# Patient Record
Sex: FEMALE | Race: WHITE | NOT HISPANIC OR LATINO | URBAN - METROPOLITAN AREA
[De-identification: names, ages, dates, MRNs, and addresses within clinical notes are randomized per-mention and may not be internally consistent; named-entity substitution may affect disease eponyms.]

---

## 2017-02-21 ENCOUNTER — EMERGENCY (EMERGENCY)
Facility: HOSPITAL | Age: 82
LOS: 1 days | Discharge: HOME | End: 2017-02-21
Attending: EMERGENCY MEDICINE | Admitting: EMERGENCY MEDICINE
Payer: MEDICARE

## 2017-02-21 VITALS
RESPIRATION RATE: 16 BRPM | SYSTOLIC BLOOD PRESSURE: 110 MMHG | DIASTOLIC BLOOD PRESSURE: 74 MMHG | OXYGEN SATURATION: 98 % | HEART RATE: 74 BPM | TEMPERATURE: 98 F

## 2017-02-21 VITALS
HEART RATE: 70 BPM | TEMPERATURE: 98 F | OXYGEN SATURATION: 97 % | RESPIRATION RATE: 16 BRPM | DIASTOLIC BLOOD PRESSURE: 68 MMHG | SYSTOLIC BLOOD PRESSURE: 126 MMHG

## 2017-02-21 DIAGNOSIS — R04.0 EPISTAXIS: ICD-10-CM

## 2017-02-21 DIAGNOSIS — I10 ESSENTIAL (PRIMARY) HYPERTENSION: ICD-10-CM

## 2017-02-21 DIAGNOSIS — J44.9 CHRONIC OBSTRUCTIVE PULMONARY DISEASE, UNSPECIFIED: ICD-10-CM

## 2017-02-21 DIAGNOSIS — Z79.82 LONG TERM (CURRENT) USE OF ASPIRIN: ICD-10-CM

## 2017-02-21 DIAGNOSIS — Z79.899 OTHER LONG TERM (CURRENT) DRUG THERAPY: ICD-10-CM

## 2017-02-21 DIAGNOSIS — I25.10 ATHEROSCLEROTIC HEART DISEASE OF NATIVE CORONARY ARTERY WITHOUT ANGINA PECTORIS: ICD-10-CM

## 2017-02-21 DIAGNOSIS — E11.9 TYPE 2 DIABETES MELLITUS WITHOUT COMPLICATIONS: ICD-10-CM

## 2017-02-21 LAB
HCT VFR BLD CALC: 30.9 % — LOW (ref 34.5–45)
HGB BLD-MCNC: 9.5 G/DL — LOW (ref 11.5–15.5)
INR BLD: 3.06 — HIGH (ref 0.88–1.16)
MCHC RBC-ENTMCNC: 29.1 PG — SIGNIFICANT CHANGE UP (ref 27–34)
MCHC RBC-ENTMCNC: 30.7 G/DL — LOW (ref 32–36)
MCV RBC AUTO: 94.5 FL — SIGNIFICANT CHANGE UP (ref 80–100)
PLATELET # BLD AUTO: 186 K/UL — SIGNIFICANT CHANGE UP (ref 150–400)
PROTHROM AB SERPL-ACNC: 34.4 SEC — HIGH (ref 10–13.1)
RBC # BLD: 3.27 M/UL — LOW (ref 3.8–5.2)
RBC # FLD: 14.6 % — SIGNIFICANT CHANGE UP (ref 10.3–16.9)
WBC # BLD: 9.1 K/UL — SIGNIFICANT CHANGE UP (ref 3.8–10.5)
WBC # FLD AUTO: 9.1 K/UL — SIGNIFICANT CHANGE UP (ref 3.8–10.5)

## 2017-02-21 PROCEDURE — 85610 PROTHROMBIN TIME: CPT

## 2017-02-21 PROCEDURE — 85027 COMPLETE CBC AUTOMATED: CPT

## 2017-02-21 PROCEDURE — 30901 CONTROL OF NOSEBLEED: CPT

## 2017-02-21 PROCEDURE — 30901 CONTROL OF NOSEBLEED: CPT | Mod: RT

## 2017-02-21 PROCEDURE — 99284 EMERGENCY DEPT VISIT MOD MDM: CPT | Mod: 25

## 2017-02-21 PROCEDURE — 99283 EMERGENCY DEPT VISIT LOW MDM: CPT | Mod: 25

## 2017-02-21 NOTE — ED PROVIDER NOTE - PROGRESS NOTE DETAILS
resolved no more bleeding, successfully cauterized with xeroform packing placed.  Plan outpt ENT fu.  INR WNL.

## 2017-02-21 NOTE — ED ADULT TRIAGE NOTE - CHIEF COMPLAINT QUOTE
BIBA for epistaxis from both nostrils that started 1 hour prior to arrival. Patient is on Coumadin. +Tissue "packing" in both nostrils, no bleeding noted at this time. Denies any HA, visual changes, numbness or tingling to extremities, fever, dizziness.

## 2017-02-21 NOTE — ED PROVIDER NOTE - MEDICAL DECISION MAKING DETAILS
Epistaxis resolved with cautery and packing.  Pt HD stable.  INR stable and CBC results noted.  Plan outpt ENT fu.

## 2017-02-21 NOTE — ED ADULT NURSE NOTE - ADDITIONAL PRINTED INSTRUCTIONS GIVEN
D/c w/ instructions for followup, pt verbalized understanding of all instructions, pt to followup w/ ENT return for worsening symptoms.

## 2017-02-21 NOTE — ED PROVIDER NOTE - MUSCULOSKELETAL, MLM
Spine appears normal, range of motion is not limited, no muscle or joint tenderness, chronic appearing symmetric bl LE edema.

## 2017-02-21 NOTE — ED PROVIDER NOTE - PSH
History of total knee replacement  2003  Presence of cardiac pacemaker  at [facility] by Dr Escoto

## 2017-02-21 NOTE — ED PROVIDER NOTE - OBJECTIVE STATEMENT
88 yo F with hx of afib on coumadin, hx of nose bleeding in past s/p cautery, presenting with spontaneous R sided and then L sided and pharyngeal epistaxis that occurred w/o provocation 1 hour prior to arrival.  Denies other bleeding, lightheadedness or near syncope.

## 2017-02-21 NOTE — ED PROVIDER NOTE - ENMT, MLM
R distal medial septum with nonbleeding prominent open blood vessel.  No L sided lesions noted.  No active bleeding to posterior pharynx or bl nares.

## 2017-02-21 NOTE — ED ADULT NURSE NOTE - PMH
CAD (coronary artery disease)    CHF (congestive heart failure)    COPD (chronic obstructive pulmonary disease)    Diabetes    HTN (hypertension)

## 2017-02-21 NOTE — ED ADULT NURSE NOTE - PSH
History of total knee replacement  2003  Presence of cardiac pacemaker  at [facility] by Dr Escoto

## 2017-02-21 NOTE — ED PROCEDURE NOTE - CPROC ED NASAL DETAIL1
2 times silver nitrate/The area was cauterized with silver nitrate./The source of the bleeding was clearly identified./The anatomic location was packed.

## 2017-06-11 ENCOUNTER — INPATIENT (INPATIENT)
Facility: HOSPITAL | Age: 82
LOS: 24 days | Discharge: NURSING, REHAB OR LONG-TERM CARE | DRG: 273 | End: 2017-07-06
Attending: INTERNAL MEDICINE | Admitting: INTERNAL MEDICINE
Payer: MEDICARE

## 2017-06-11 VITALS
SYSTOLIC BLOOD PRESSURE: 107 MMHG | HEART RATE: 80 BPM | DIASTOLIC BLOOD PRESSURE: 66 MMHG | TEMPERATURE: 99 F | RESPIRATION RATE: 20 BRPM | OXYGEN SATURATION: 90 %

## 2017-06-11 DIAGNOSIS — I50.33 ACUTE ON CHRONIC DIASTOLIC (CONGESTIVE) HEART FAILURE: ICD-10-CM

## 2017-06-11 DIAGNOSIS — I48.0 PAROXYSMAL ATRIAL FIBRILLATION: ICD-10-CM

## 2017-06-11 DIAGNOSIS — J44.9 CHRONIC OBSTRUCTIVE PULMONARY DISEASE, UNSPECIFIED: ICD-10-CM

## 2017-06-11 DIAGNOSIS — L89.92 PRESSURE ULCER OF UNSPECIFIED SITE, STAGE 2: ICD-10-CM

## 2017-06-11 DIAGNOSIS — E11.9 TYPE 2 DIABETES MELLITUS WITHOUT COMPLICATIONS: ICD-10-CM

## 2017-06-11 DIAGNOSIS — Z29.9 ENCOUNTER FOR PROPHYLACTIC MEASURES, UNSPECIFIED: ICD-10-CM

## 2017-06-11 DIAGNOSIS — I35.0 NONRHEUMATIC AORTIC (VALVE) STENOSIS: ICD-10-CM

## 2017-06-11 DIAGNOSIS — Z95.0 PRESENCE OF CARDIAC PACEMAKER: ICD-10-CM

## 2017-06-11 DIAGNOSIS — I25.10 ATHEROSCLEROTIC HEART DISEASE OF NATIVE CORONARY ARTERY WITHOUT ANGINA PECTORIS: ICD-10-CM

## 2017-06-11 DIAGNOSIS — D64.9 ANEMIA, UNSPECIFIED: ICD-10-CM

## 2017-06-11 DIAGNOSIS — N18.4 CHRONIC KIDNEY DISEASE, STAGE 4 (SEVERE): ICD-10-CM

## 2017-06-11 LAB
ALBUMIN SERPL ELPH-MCNC: 2.9 G/DL — LOW (ref 3.3–5)
ALP SERPL-CCNC: 117 U/L — SIGNIFICANT CHANGE UP (ref 40–120)
ALT FLD-CCNC: 12 U/L — SIGNIFICANT CHANGE UP (ref 10–45)
ANION GAP SERPL CALC-SCNC: 13 MMOL/L — SIGNIFICANT CHANGE UP (ref 5–17)
APPEARANCE UR: (no result)
APPEARANCE UR: CLEAR — SIGNIFICANT CHANGE UP
APTT BLD: 46.4 SEC — HIGH (ref 27.5–37.4)
AST SERPL-CCNC: 15 U/L — SIGNIFICANT CHANGE UP (ref 10–40)
BASOPHILS NFR BLD AUTO: 0.8 % — SIGNIFICANT CHANGE UP (ref 0–2)
BILIRUB SERPL-MCNC: 0.4 MG/DL — SIGNIFICANT CHANGE UP (ref 0.2–1.2)
BILIRUB UR-MCNC: NEGATIVE — SIGNIFICANT CHANGE UP
BILIRUB UR-MCNC: NEGATIVE — SIGNIFICANT CHANGE UP
BUN SERPL-MCNC: 40 MG/DL — HIGH (ref 7–23)
CALCIUM SERPL-MCNC: 9 MG/DL — SIGNIFICANT CHANGE UP (ref 8.4–10.5)
CHLORIDE SERPL-SCNC: 100 MMOL/L — SIGNIFICANT CHANGE UP (ref 96–108)
CK MB CFR SERPL CALC: 1.7 NG/ML — SIGNIFICANT CHANGE UP (ref 0–6.7)
CK SERPL-CCNC: 34 U/L — SIGNIFICANT CHANGE UP (ref 25–170)
CO2 SERPL-SCNC: 28 MMOL/L — SIGNIFICANT CHANGE UP (ref 22–31)
COLOR SPEC: YELLOW — SIGNIFICANT CHANGE UP
COLOR SPEC: YELLOW — SIGNIFICANT CHANGE UP
CREAT SERPL-MCNC: 2.8 MG/DL — HIGH (ref 0.5–1.3)
DIFF PNL FLD: (no result)
DIFF PNL FLD: (no result)
EOSINOPHIL NFR BLD AUTO: 3.2 % — SIGNIFICANT CHANGE UP (ref 0–6)
GLUCOSE SERPL-MCNC: 78 MG/DL — SIGNIFICANT CHANGE UP (ref 70–99)
GLUCOSE UR QL: NEGATIVE — SIGNIFICANT CHANGE UP
GLUCOSE UR QL: NEGATIVE — SIGNIFICANT CHANGE UP
HCT VFR BLD CALC: 32 % — LOW (ref 34.5–45)
HGB BLD-MCNC: 10 G/DL — LOW (ref 11.5–15.5)
INR BLD: 4.89 — HIGH (ref 0.88–1.16)
KETONES UR-MCNC: NEGATIVE — SIGNIFICANT CHANGE UP
KETONES UR-MCNC: NEGATIVE — SIGNIFICANT CHANGE UP
LEUKOCYTE ESTERASE UR-ACNC: (no result)
LEUKOCYTE ESTERASE UR-ACNC: (no result)
LYMPHOCYTES # BLD AUTO: 19.8 % — SIGNIFICANT CHANGE UP (ref 13–44)
MAGNESIUM SERPL-MCNC: 1.8 MG/DL — SIGNIFICANT CHANGE UP (ref 1.6–2.6)
MCHC RBC-ENTMCNC: 27.3 PG — SIGNIFICANT CHANGE UP (ref 27–34)
MCHC RBC-ENTMCNC: 31.3 G/DL — LOW (ref 32–36)
MCV RBC AUTO: 87.4 FL — SIGNIFICANT CHANGE UP (ref 80–100)
MONOCYTES NFR BLD AUTO: 6.3 % — SIGNIFICANT CHANGE UP (ref 2–14)
NEUTROPHILS NFR BLD AUTO: 69.9 % — SIGNIFICANT CHANGE UP (ref 43–77)
NITRITE UR-MCNC: NEGATIVE — SIGNIFICANT CHANGE UP
NITRITE UR-MCNC: POSITIVE
NT-PROBNP SERPL-SCNC: HIGH PG/ML (ref 0–300)
PH UR: 5 — SIGNIFICANT CHANGE UP (ref 5–8)
PH UR: 5.5 — SIGNIFICANT CHANGE UP (ref 5–8)
PLATELET # BLD AUTO: 149 K/UL — LOW (ref 150–400)
POTASSIUM SERPL-MCNC: 4.5 MMOL/L — SIGNIFICANT CHANGE UP (ref 3.5–5.3)
POTASSIUM SERPL-SCNC: 4.5 MMOL/L — SIGNIFICANT CHANGE UP (ref 3.5–5.3)
PROT SERPL-MCNC: 6.7 G/DL — SIGNIFICANT CHANGE UP (ref 6–8.3)
PROT UR-MCNC: (no result) MG/DL
PROT UR-MCNC: 30 MG/DL
PROTHROM AB SERPL-ACNC: 56.1 SEC — HIGH (ref 9.8–12.7)
RBC # BLD: 3.66 M/UL — LOW (ref 3.8–5.2)
RBC # FLD: 15.7 % — SIGNIFICANT CHANGE UP (ref 10.3–16.9)
SODIUM SERPL-SCNC: 141 MMOL/L — SIGNIFICANT CHANGE UP (ref 135–145)
SP GR SPEC: 1.01 — SIGNIFICANT CHANGE UP (ref 1–1.03)
SP GR SPEC: 1.02 — SIGNIFICANT CHANGE UP (ref 1–1.03)
TROPONIN T SERPL-MCNC: 0.04 NG/ML — HIGH (ref 0–0.01)
UROBILINOGEN FLD QL: 0.2 E.U./DL — SIGNIFICANT CHANGE UP
UROBILINOGEN FLD QL: 0.2 E.U./DL — SIGNIFICANT CHANGE UP
WBC # BLD: 6.7 K/UL — SIGNIFICANT CHANGE UP (ref 3.8–10.5)
WBC # FLD AUTO: 6.7 K/UL — SIGNIFICANT CHANGE UP (ref 3.8–10.5)

## 2017-06-11 PROCEDURE — 99233 SBSQ HOSP IP/OBS HIGH 50: CPT

## 2017-06-11 PROCEDURE — 71010: CPT | Mod: 26

## 2017-06-11 PROCEDURE — 93010 ELECTROCARDIOGRAM REPORT: CPT

## 2017-06-11 PROCEDURE — 99285 EMERGENCY DEPT VISIT HI MDM: CPT | Mod: 25

## 2017-06-11 RX ORDER — METOPROLOL TARTRATE 50 MG
25 TABLET ORAL DAILY
Qty: 0 | Refills: 0 | Status: DISCONTINUED | OUTPATIENT
Start: 2017-06-11 | End: 2017-06-14

## 2017-06-11 RX ORDER — ACETAMINOPHEN 500 MG
1 TABLET ORAL
Qty: 0 | Refills: 0 | COMMUNITY

## 2017-06-11 RX ORDER — ACETAMINOPHEN 500 MG
650 TABLET ORAL ONCE
Qty: 0 | Refills: 0 | Status: COMPLETED | OUTPATIENT
Start: 2017-06-11 | End: 2017-06-11

## 2017-06-11 RX ORDER — FUROSEMIDE 40 MG
40 TABLET ORAL DAILY
Qty: 0 | Refills: 0 | Status: DISCONTINUED | OUTPATIENT
Start: 2017-06-12 | End: 2017-06-12

## 2017-06-11 RX ORDER — FUROSEMIDE 40 MG
80 TABLET ORAL ONCE
Qty: 0 | Refills: 0 | Status: DISCONTINUED | OUTPATIENT
Start: 2017-06-11 | End: 2017-06-11

## 2017-06-11 RX ORDER — FUROSEMIDE 40 MG
40 TABLET ORAL ONCE
Qty: 0 | Refills: 0 | Status: COMPLETED | OUTPATIENT
Start: 2017-06-11 | End: 2017-06-11

## 2017-06-11 RX ORDER — FERROUS SULFATE 325(65) MG
1 TABLET ORAL
Qty: 0 | Refills: 0 | COMMUNITY

## 2017-06-11 RX ORDER — FERROUS SULFATE 325(65) MG
325 TABLET ORAL EVERY 8 HOURS
Qty: 0 | Refills: 0 | Status: DISCONTINUED | OUTPATIENT
Start: 2017-06-11 | End: 2017-07-06

## 2017-06-11 RX ORDER — ACETAMINOPHEN 500 MG
650 TABLET ORAL EVERY 6 HOURS
Qty: 0 | Refills: 0 | Status: DISCONTINUED | OUTPATIENT
Start: 2017-06-11 | End: 2017-06-12

## 2017-06-11 RX ADMIN — Medication 650 MILLIGRAM(S): at 14:00

## 2017-06-11 RX ADMIN — Medication 25 MILLIGRAM(S): at 17:57

## 2017-06-11 RX ADMIN — Medication 650 MILLIGRAM(S): at 13:35

## 2017-06-11 RX ADMIN — Medication 650 MILLIGRAM(S): at 19:21

## 2017-06-11 RX ADMIN — Medication 650 MILLIGRAM(S): at 17:57

## 2017-06-11 RX ADMIN — Medication 40 MILLIGRAM(S): at 10:58

## 2017-06-11 RX ADMIN — Medication 325 MILLIGRAM(S): at 21:34

## 2017-06-11 NOTE — H&P ADULT - PROBLEM SELECTOR PLAN 6
former smoker (quit 5 yrs ago) 30 yr ppk hx  -Call Dr. Mekhi Trejo for evaluation Type 2 DM (off meds).   -Check HbA1c in AM

## 2017-06-11 NOTE — ED PROVIDER NOTE - MEDICAL DECISION MAKING DETAILS
here with likely chf exacerbation. needs to be diuresed with IV lasix, but carefully given CKD. CT surg consulted because of pts aortic stenosis, will follow. As pts volume status complicated, cards consulted as well. dispo to ccu vs cards tele as per their evaluation.

## 2017-06-11 NOTE — H&P ADULT - PROBLEM SELECTOR PLAN 10
DVT prophylaxis: on systemic AC. Holding AC 2/2 Supra therapeutic    Pt signed DNR/DNI form and daughter is in agreement  SW/PT consult: discharge planning

## 2017-06-11 NOTE — H&P ADULT - HISTORY OF PRESENT ILLNESS
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 Pt states that her "legs were too weak". Pt is a poor historian and HPI obtained from her daughter. Pt's daughter states that her mother was unable to get OOB for 3 days and pt used her Life Alert x3 times to call for help. Reports that she had been "tired" for several days and her mobility has declined since her discharge from rehab in December. Additionally, pt has gained 17 lbs over the past month (from 258 to 275) and does not follow a low salt diet. 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 Pt states that her "legs were too weak". Pt is a poor historian and HPI obtained from her daughter. Pt's daughter states that her mother was unable to get OOB for 3 days and pt used her Life Alert x3 times to call for help-was told to go to the ED. Reports that she had been "tired" for several day, SOB w/ minimal exertion for "several months" and her mobility has declined since her discharge from rehab in December. Additionally, pt has gained 17 lbs over the past month (from 258 to 275) and non-compliant with her diet. As per daughter, pt has been hospitalized prior 2-3 yrs ago (? [facility]) for CHF exacerbation w/ aggressive diuresis. 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 Pt states that her "legs were too weak". Pt is a poor historian and HPI obtained from her daughter. Pt's daughter states that her mother was unable to get OOB for 3 days and pt used her Life Alert x3 times to call for help-was told to go to the ED. Reports that she had been "tired" for several day, SOB w/ minimal exertion for "several months" and her mobility has declined since her discharge from rehab in December. Additionally, pt has gained 17 lbs over the past month (from 258 to 275) and non-compliant with her diet. As per daughter, pt has been hospitalized prior 2-3 yrs ago (? [facility]) for CHF exacerbation w/ aggressive diuresis. Of note, pt was evaluation by Dr. Barger for aortic stenosis in December 16'. However, due to her deconditioned status during that time, pt was recommended medical management.

## 2017-06-11 NOTE — H&P ADULT - PSH
History of total knee replacement  2003  Presence of cardiac pacemaker  at [facility] by Dr Escoto

## 2017-06-11 NOTE — CONSULT NOTE ADULT - ASSESSMENT
1) acute on chronic diastolic chf  -o>i as tolerated with iv lasix  -clarify home rx list with o/p NP   -repeat cardiac markers and troponin until downtrend confirmed  -check tsh  2) nonrheumatic aortic stenosis  -communicated with [facility]bel - plan for echo in am; consider additional ischemia evalution if TAVR needed  3) cad s/p pci  -serial troponin and ecg; continuous telemetry for now - call if evidence of mi  -lipid panel in am  4) afib - supratherapeutic inr - hold coumadin for now; bb as tolerated  5) s/p ppm - call ep for ppm check  6) htn - home rx for now; avoid labile bp  7) copd - call Dr. Gaming to evaluate  8) dm-2 - monitor blood sugar; check hga1c  9) lupus - monitor   10) pvd per chart - please obtain old records for review  11) kidney failure - call renal -abnormal ua suggestive of uri - repeat ua now; consider antb  12) normocytic anemia and thrombocytopenia - check fe studies and b12/folate; consider Hematology eval  13) maintain K>4, Mg>2 1) acute on chronic diastolic chf  -o>i as tolerated with iv lasix  -clarify home rx list with o/p NP   -repeat cardiac markers and troponin until downtrend confirmed  -check tsh  2) nonrheumatic aortic stenosis  -communicated with Charbel - plan for echo in am; consider additional ischemia evalution if TAVR needed  3) cad s/p pci  -serial troponin and ecg; continuous telemetry for now - call if evidence of mi  -lipid panel in am  4) afib - supratherapeutic inr - hold coumadin for now; bb as tolerated  5) s/p ppm - call ep for ppm check  6) htn - home rx for now; avoid labile bp  7) copd - call Dr. Gaming to evaluate  8) dm-2 - monitor blood sugar; check hga1c  9) lupus - monitor   10) pvd per chart - please obtain old records for review  11) kidney failure - call renal -abnormal ua suggestive of uri - repeat ua now; consider antb  12) normocytic anemia and thrombocytopenia - check fe studies and b12/folate; consider Hematology eval  13) maintain K>4, Mg>2  d/w Housestaff; communicated with Charbel

## 2017-06-11 NOTE — ED ADULT TRIAGE NOTE - CHIEF COMPLAINT QUOTE
Pt BIBA from home c/o weakness and pain in her legs since last night, pt says she's been in the sitting position since 11pm. Denies fall, no respiratory distress or CP. Pt has pacemaker and COPD.

## 2017-06-11 NOTE — H&P ADULT - NSHPREVIEWOFSYSTEMS_GEN_ALL_CORE
REVIEW OF SYSTEMS:    CONSTITUTIONAL: No weakness, fevers or chills  EYES/ENT: No visual changes;  No vertigo or throat pain   NECK: No pain or stiffness  RESPIRATORY: No cough, wheezing, hemoptysis; No shortness of breath  CARDIOVASCULAR: No chest pain or palpitations  GASTROINTESTINAL: No abdominal or epigastric pain. No nausea, vomiting, or hematemesis; No diarrhea or constipation. No melena or hematochezia.  GENITOURINARY: No dysuria, frequency or hematuria  NEUROLOGICAL: No numbness or weakness  SKIN: No itching, burning, rashes, or lesions   All other review of systems is negative unless indicated above. REVIEW OF SYSTEMS:    CONSTITUTIONAL: No weakness, fevers or chills  EYES/ENT: No visual changes;  No vertigo or throat pain   NECK: No pain or stiffness  RESPIRATORY: No cough, wheezing, hemoptysis; c/o SOB with minimal exertion  CARDIOVASCULAR: No chest pain or palpitations  GASTROINTESTINAL: No abdominal or epigastric pain. No nausea, vomiting, or hematemesis; No diarrhea or constipation. No melena or hematochezia.  GENITOURINARY: No dysuria, frequency or hematuria  NEUROLOGICAL: No numbness or weakness  SKIN: No itching, burning, rashes, or lesions   All other review of systems is negative unless indicated above.

## 2017-06-11 NOTE — CONSULT NOTE ADULT - SUBJECTIVE AND OBJECTIVE BOX
Surgeon: Dr. Barger     Requesting Physician: Dr. Soto    HISTORY OF PRESENT ILLNESS:  87y Female y/o PMH of HTN, HLD, DM, pAF (on coumadin), PPM, PVD, known CAD s/p PCI (/), cardiac cath on 3/15 w/ MANDI to distal LM required impella support, CKD and CHF with known aortic stenosis who presented to [facility] w/ the complaint of not being able to get out of bed. Pt was evaluated in the [facility] ED and admitted to  for management of acute on chronic diastolic CHF exacerbation. Pt was seen by Dr. Barger as an outpatient in  for evaluation of valvular heart disease. Echo was reviewed by Dr. Barger which revealed moderate-severe aortic stenosis VS low gradient severe aortic stenosis. Due to her deconditioned state at the time pt was recommended for medical management and optimization. Currently Dr. Barger was consulted for further evaluation for her known valvular heart disease.     PAST MEDICAL & SURGICAL HISTORY:  Diabetes  HTN (hypertension)  CHF (congestive heart failure)  CAD (coronary artery disease)  COPD (chronic obstructive pulmonary disease)  History of total knee replacement:   Presence of cardiac pacemaker: at [facility] by Dr Escoto      MEDICATIONS  (STANDING):  metoprolol succinate ER 25milliGRAM(s) Oral daily  ferrous    sulfate 325milliGRAM(s) Oral every 8 hours    MEDICATIONS  (PRN):  acetaminophen   Tablet. 650milliGRAM(s) Oral every 6 hours PRN Mild Pain (1 - 3)      Allergies: No Known Allergies    Intolerances      SOCIAL HISTORY:  Smoker:  YES / NO        PACK YEARS:                         WHEN QUIT?  ETOH use:  YES / NO               FREQUENCY / QUANTITY:  Ilicit Drug use:  YES / NO  Occupation:  Assisted device use (Cane / Walker):  Live with:    FAMILY HISTORY:      Review of Systems  CONSTITUTIONAL:  Fevers / chills, sweats, fatigue, weight loss, weight gain                                    NEGATIVE  NEURO:  parathesias, seizures, syncope, confusion                                                                               NEGATIVE  EYES:  Blurry vision, discharge, pain, loss of vision                                                                                  NEGATIVE  ENMT:  Difficulty hearing, vertigo, dysphagia, epistaxis, recent dental work                                     NEGATIVE  CV:  Chest pain, palpitations, TERRY, orthopnea                                                                                         NEGATIVE  RESPIRATORY:  Wheezing, SOB, cough / sputum, hemoptysis                                                              NEGATIVE  GI:  Nausea, vommiting, diarrhea, constipation, melena                                                                        NEGATIVE  : Hematuria, dysuria, urgency, incontinence                                                                                       NEGATIVE  MUSKULOSKELETAL:  arthritis, joint swelling, muscle weakness                                                           NEGATIVE  SKIN/BREAST:  rash, itching, lucina loss, masses                                                                                            NEGATIVE  PSYCH:  depresion, anxiety, suicidal ideation                                                                                             NEGATIVE  HEME/LYMPH:  bruises easily, enlarged lymph nodes, tender lymph nodes                                        NEGATIVE  ENDOCRINE:  cold intolerance, heat intolerance, polydipsia                                                                   NEGATIVE    PHYSICAL EXAM  Vital Signs Last 24 Hrs  T(C): 37.2, Max: 37.2 ( @ 16:02)  T(F): 99, Max: 99 ( @ 16:02)  HR: 68 (68 - 80)  BP: 105/62 (105/62 - 127/58)  BP(mean): 74 (74 - 74)  RR: 22 (18 - 22)  SpO2: 95% (90% - 96%)    CONSTITUTIONAL:   NEURO:                    EYES:    ENMT:   CV:   RESPIRATORY:    GI:   : KISER + / -    MUSKULOSKELETAL:    SKIN / BREAST:                                                             LABS:                        10.0   6.7   )-----------( 149      ( 2017 10:15 )             32.0     11    141  |  100  |  40<H>  ----------------------------<  78  4.5   |  28  |  2.80<H>    Ca    9.0      2017 10:15  Mg     1.8     -11    TPro  6.7  /  Alb  2.9<L>  /  TBili  0.4  /  DBili  x   /  AST  15  /  ALT  12  /  AlkPhos  117  06-11    PT/INR - ( 2017 10:15 )   PT: 56.1 sec;   INR: 4.89          PTT - ( 2017 10:15 )  PTT:46.4 sec  Urinalysis Basic - ( 2017 11:17 )    Color: Yellow / Appearance: Clear / S.015 / pH: x  Gluc: x / Ketone: NEGATIVE  / Bili: NEGATIVE / Urobili: 0.2 E.U./dL   Blood: x / Protein: Trace mg/dL / Nitrite: POSITIVE   Leuk Esterase: Small / RBC: < 5 /HPF / WBC < 5 /HPF   Sq Epi: x / Non Sq Epi: Rare /HPF / Bacteria: Many /HPF      CARDIAC MARKERS ( 2017 10:15 )  x     / 0.04 ng/mL / 38 U/L / x     / 1.7 ng/mL      RADIOLOGY & ADDITIONAL STUDIES:    CXR: Portable examination of the chest demonstrates cardiomegaly. Pacemaker   overlies left chest wall. Tips of pacer wires overlie right atrium and   right ventricle respectively. Prominent bronchovascular markings. Mild   congestion cannot be excluded. Dextroscoliosis thoracic spine with   degenerative changes. Calcification noted involving thoracic aorta.    Impression: Prominent bronchovascular markings. Mild congestion cannot be   excluded. Small right effusion      Assessment: 87y Female y/o PMH of HTN, HLD, DM, pAF (on coumadin), PPM, PVD, known CAD s/p PCI (//), cardiac cath on 3/15 w/ MANDI to distal LM required impella support, CKD and CHF with known aortic stenosis who presented to [facility] w/ the complaint of not being able to get out of bed. Pt was evaluated in the [facility] ED and admitted to  for management of acute on chronic diastolic CHF exacerbation. Dr. Barger was consulted for evaluation of her known aortic stenosis.    Plan: As discussed with Dr. Barger  -Will need official echocardiogram tomorrow  -BNP   -PT consult   -Diuresis for a negative I/O's goal   -continue medical management as per primary team Surgeon: Dr. Barger     Requesting Physician: Dr. Soto    HISTORY OF PRESENT ILLNESS:  87y Female y/o PMH of HTN, HLD, DM, pAF (on coumadin), PPM, PVD, known CAD s/p PCI (/), cardiac cath on 3/15 w/ MANDI to distal LM required impella support, CKD and CHF with known aortic stenosis who presented to [facility] w/ the complaint of not being able to get out of bed. Pt was evaluated in the [facility] ED and admitted to  for management of acute on chronic diastolic CHF exacerbation. Pt was seen by Dr. Barger as an outpatient in  for evaluation of valvular heart disease. Echo was reviewed by Dr. Barger which revealed moderate-severe aortic stenosis VS low gradient severe aortic stenosis. Due to her deconditioned state at the time pt was recommended for medical management and optimization. Currently Dr. Barger was consulted for further evaluation for her known valvular heart disease.     PAST MEDICAL & SURGICAL HISTORY:  Diabetes  HTN (hypertension)  CHF (congestive heart failure)  CAD (coronary artery disease)  COPD (chronic obstructive pulmonary disease)  History of total knee replacement:   Presence of cardiac pacemaker: at [facility] by Dr Escoto      MEDICATIONS  (STANDING):  metoprolol succinate ER 25milliGRAM(s) Oral daily  ferrous    sulfate 325milliGRAM(s) Oral every 8 hours    MEDICATIONS  (PRN):  acetaminophen   Tablet. 650milliGRAM(s) Oral every 6 hours PRN Mild Pain (1 - 3)      Allergies: No Known Allergies    Intolerances      SOCIAL HISTORY:  Smoker:  YES / NO        PACK YEARS:                         WHEN QUIT?  ETOH use:  YES / NO               FREQUENCY / QUANTITY:  Ilicit Drug use:  YES / NO  Occupation:  Assisted device use (Cane / Walker):  Live with:    FAMILY HISTORY:      Review of Systems  CONSTITUTIONAL:  Fevers / chills, sweats, fatigue, weight loss, weight gain                                    NEGATIVE  NEURO:  parathesias, seizures, syncope, confusion                                                                               NEGATIVE  EYES:  Blurry vision, discharge, pain, loss of vision                                                                                  NEGATIVE  ENMT:  Difficulty hearing, vertigo, dysphagia, epistaxis, recent dental work                                     NEGATIVE  CV:  Chest pain, palpitations, TERRY, orthopnea                                                                                         NEGATIVE  RESPIRATORY:  Wheezing, SOB, cough / sputum, hemoptysis                                                              NEGATIVE  GI:  Nausea, vommiting, diarrhea, constipation, melena                                                                        NEGATIVE  : Hematuria, dysuria, urgency, incontinence                                                                                       NEGATIVE  MUSKULOSKELETAL:  arthritis, joint swelling, muscle weakness                                                           NEGATIVE  SKIN/BREAST:  rash, itching, lucina loss, masses                                                                                            NEGATIVE  PSYCH:  depresion, anxiety, suicidal ideation                                                                                             NEGATIVE  HEME/LYMPH:  bruises easily, enlarged lymph nodes, tender lymph nodes                                        NEGATIVE  ENDOCRINE:  cold intolerance, heat intolerance, polydipsia                                                                   NEGATIVE    PHYSICAL EXAM  Vital Signs Last 24 Hrs  T(C): 37.2, Max: 37.2 ( @ 16:02)  T(F): 99, Max: 99 ( @ 16:02)  HR: 68 (68 - 80)  BP: 105/62 (105/62 - 127/58)  BP(mean): 74 (74 - 74)  RR: 22 (18 - 22)  SpO2: 95% (90% - 96%)    CONSTITUTIONAL:   NEURO:                    EYES:    ENMT:   CV:   RESPIRATORY:    GI:   : KISER + / -    MUSKULOSKELETAL:    SKIN / BREAST:                                                             LABS:                        10.0   6.7   )-----------( 149      ( 2017 10:15 )             32.0     11    141  |  100  |  40<H>  ----------------------------<  78  4.5   |  28  |  2.80<H>    Ca    9.0      2017 10:15  Mg     1.8     -11    TPro  6.7  /  Alb  2.9<L>  /  TBili  0.4  /  DBili  x   /  AST  15  /  ALT  12  /  AlkPhos  117  06-11    PT/INR - ( 2017 10:15 )   PT: 56.1 sec;   INR: 4.89          PTT - ( 2017 10:15 )  PTT:46.4 sec  Urinalysis Basic - ( 2017 11:17 )    Color: Yellow / Appearance: Clear / S.015 / pH: x  Gluc: x / Ketone: NEGATIVE  / Bili: NEGATIVE / Urobili: 0.2 E.U./dL   Blood: x / Protein: Trace mg/dL / Nitrite: POSITIVE   Leuk Esterase: Small / RBC: < 5 /HPF / WBC < 5 /HPF   Sq Epi: x / Non Sq Epi: Rare /HPF / Bacteria: Many /HPF      CARDIAC MARKERS ( 2017 10:15 )  x     / 0.04 ng/mL / 38 U/L / x     / 1.7 ng/mL      RADIOLOGY & ADDITIONAL STUDIES:    CXR: Portable examination of the chest demonstrates cardiomegaly. Pacemaker   overlies left chest wall. Tips of pacer wires overlie right atrium and   right ventricle respectively. Prominent bronchovascular markings. Mild   congestion cannot be excluded. Dextroscoliosis thoracic spine with   degenerative changes. Calcification noted involving thoracic aorta.    Impression: Prominent bronchovascular markings. Mild congestion cannot be   excluded. Small right effusion      Assessment: 87y Female y/o PMH of HTN, HLD, DM, pAF (on coumadin), PPM, PVD, known CAD s/p PCI (/), cardiac cath on 3/15 w/ MANDI to distal LM required impella support, CKD and CHF with known aortic stenosis who presented to [facility] w/ the complaint of not being able to get out of bed. Pt was evaluated in the [facility] ED and admitted to  for management of acute on chronic diastolic CHF exacerbation. Dr. Barger was consulted for evaluation of her known aortic stenosis.    Plan: As discussed with Dr. Barger  -Will need official echocardiogram tomorrow  -+U/A- treat as per primary team and send for urine culture  -BNP   -PT consult   -Diuresis for a negative I/O's goal   -continue medical management as per primary team Surgeon: Dr. Barger     Requesting Physician: Dr. Soto    HISTORY OF PRESENT ILLNESS:  87y Female y/o PMH of HTN, HLD, DM, pAF (on coumadin), PPM, PVD, known CAD s/p PCI (/), cardiac cath on 3/15 w/ MANDI to distal LM required impella support, CKD and CHF with known aortic stenosis who presented to [facility] w/ the complaint of not being able to get out of bed. Pt was evaluated in the [facility] ED and admitted to  for management of acute on chronic diastolic CHF exacerbation. Pt was seen by Dr. Barger as an outpatient in  for evaluation of valvular heart disease. Echo was reviewed by Dr. Barger which revealed moderate-severe aortic stenosis VS low gradient severe aortic stenosis. Due to her deconditioned state at the time pt was recommended for medical management and optimization. Currently Dr. Barger was consulted for further evaluation for her known valvular heart disease.     PAST MEDICAL & SURGICAL HISTORY:  Diabetes  HTN (hypertension)  CHF (congestive heart failure)  CAD (coronary artery disease)  COPD (chronic obstructive pulmonary disease)  History of total knee replacement:   Presence of cardiac pacemaker: at [facility] by Dr Escoto      MEDICATIONS  (STANDING):  metoprolol succinate ER 25milliGRAM(s) Oral daily  ferrous    sulfate 325milliGRAM(s) Oral every 8 hours    MEDICATIONS  (PRN):  acetaminophen   Tablet. 650milliGRAM(s) Oral every 6 hours PRN Mild Pain (1 - 3)      Allergies: No Known Allergies    Intolerances      SOCIAL HISTORY:  Smoker:  YES / NO        PACK YEARS:                         WHEN QUIT?  ETOH use:  YES / NO               FREQUENCY / QUANTITY:  Ilicit Drug use:  YES / NO  Occupation:  Assisted device use (Cane / Walker):  Live with:    FAMILY HISTORY:      Review of Systems  CONSTITUTIONAL:  Fevers / chills, sweats, fatigue, weight loss, weight gain                                    NEGATIVE  NEURO:  parathesias, seizures, syncope, confusion                                                                               NEGATIVE  EYES:  Blurry vision, discharge, pain, loss of vision                                                                                  NEGATIVE  ENMT:  Difficulty hearing, vertigo, dysphagia, epistaxis, recent dental work                                     NEGATIVE  CV:  Chest pain, palpitations, TERRY, orthopnea                                                                                         NEGATIVE  RESPIRATORY:  Wheezing, SOB, cough / sputum, hemoptysis                                                              NEGATIVE  GI:  Nausea, vommiting, diarrhea, constipation, melena                                                                        NEGATIVE  : Hematuria, dysuria, urgency, incontinence                                                                                       NEGATIVE  MUSKULOSKELETAL:  arthritis, joint swelling, muscle weakness                                                           NEGATIVE  SKIN/BREAST:  rash, itching, lucina loss, masses                                                                                            NEGATIVE  PSYCH:  depresion, anxiety, suicidal ideation                                                                                             NEGATIVE  HEME/LYMPH:  bruises easily, enlarged lymph nodes, tender lymph nodes                                        NEGATIVE  ENDOCRINE:  cold intolerance, heat intolerance, polydipsia                                                                   NEGATIVE    PHYSICAL EXAM  Vital Signs Last 24 Hrs  T(C): 37.2, Max: 37.2 ( @ 16:02)  T(F): 99, Max: 99 ( @ 16:02)  HR: 68 (68 - 80)  BP: 105/62 (105/62 - 127/58)  BP(mean): 74 (74 - 74)  RR: 22 (18 - 22)  SpO2: 95% (90% - 96%)    CONSTITUTIONAL:   NEURO:                    EYES:    ENMT:   CV:   RESPIRATORY:    GI:   : KISER + / -    MUSKULOSKELETAL:    SKIN / BREAST:                                                             LABS:                        10.0   6.7   )-----------( 149      ( 2017 10:15 )             32.0     11    141  |  100  |  40<H>  ----------------------------<  78  4.5   |  28  |  2.80<H>    Ca    9.0      2017 10:15  Mg     1.8     -11    TPro  6.7  /  Alb  2.9<L>  /  TBili  0.4  /  DBili  x   /  AST  15  /  ALT  12  /  AlkPhos  117  06-11    PT/INR - ( 2017 10:15 )   PT: 56.1 sec;   INR: 4.89          PTT - ( 2017 10:15 )  PTT:46.4 sec  Urinalysis Basic - ( 2017 11:17 )    Color: Yellow / Appearance: Clear / S.015 / pH: x  Gluc: x / Ketone: NEGATIVE  / Bili: NEGATIVE / Urobili: 0.2 E.U./dL   Blood: x / Protein: Trace mg/dL / Nitrite: POSITIVE   Leuk Esterase: Small / RBC: < 5 /HPF / WBC < 5 /HPF   Sq Epi: x / Non Sq Epi: Rare /HPF / Bacteria: Many /HPF      CARDIAC MARKERS ( 2017 10:15 )  x     / 0.04 ng/mL / 38 U/L / x     / 1.7 ng/mL      RADIOLOGY & ADDITIONAL STUDIES:    CXR: Portable examination of the chest demonstrates cardiomegaly. Pacemaker   overlies left chest wall. Tips of pacer wires overlie right atrium and   right ventricle respectively. Prominent bronchovascular markings. Mild   congestion cannot be excluded. Dextroscoliosis thoracic spine with   degenerative changes. Calcification noted involving thoracic aorta.    Impression: Prominent bronchovascular markings. Mild congestion cannot be   excluded. Small right effusion      Assessment: 87y Female y/o PMH of HTN, HLD, DM, pAF (on coumadin), PPM, PVD, known CAD s/p PCI (/), cardiac cath on 3/15 w/ MANDI to distal LM required impella support, CKD and CHF with known aortic stenosis who presented to [facility] w/ the complaint of not being able to get out of bed. Pt was evaluated in the [facility] ED and admitted to  for management of acute on chronic diastolic CHF exacerbation. Dr. Barger was consulted for evaluation of her known aortic stenosis.    Plan: As discussed with Dr. Barger  -Will need official echocardiogram tomorrow  -continue to Trend INR and Reverse if needed   -+U/A- treat as per primary team and send for urine culture  -BNP   -PT consult   -Diuresis for a negative I/O's goal   -continue medical management as per primary team Surgeon: Dr. Barger     Requesting Physician: Dr. Soto    HISTORY OF PRESENT ILLNESS:  87y Female y/o PMH of HTN, HLD, DM, pAF (on coumadin), PPM, PVD, known CAD s/p PCI (/), cardiac cath on 3/15 w/ MANDI to distal LM required impella support, CKD and CHF with known aortic stenosis who presented to [facility] w/ the complaint of not being able to get out of bed. Pt was evaluated in the [facility] ED and admitted to  for management of acute on chronic diastolic CHF exacerbation. Pt was seen by Dr. Barger as an outpatient in  for evaluation of valvular heart disease. Echo was reviewed by Dr. Barger which revealed moderate-severe aortic stenosis VS low gradient severe aortic stenosis. Due to her deconditioned state at the time pt was recommended for medical management and optimization. Currently Dr. Barger was consulted for further evaluation for her known valvular heart disease.     PAST MEDICAL & SURGICAL HISTORY:  Diabetes  HTN (hypertension)  CHF (congestive heart failure)  CAD (coronary artery disease)  COPD (chronic obstructive pulmonary disease)  History of total knee replacement:   Presence of cardiac pacemaker: at [facility] by Dr Escoto      MEDICATIONS  (STANDING):  metoprolol succinate ER 25milliGRAM(s) Oral daily  ferrous    sulfate 325milliGRAM(s) Oral every 8 hours    MEDICATIONS  (PRN):  acetaminophen   Tablet. 650milliGRAM(s) Oral every 6 hours PRN Mild Pain (1 - 3)      Allergies: No Known Allergies    Intolerances      SOCIAL HISTORY:  Smoker:  NO        WHEN QUIT?   ETOH use:  NO          Ilicit Drug use:  NO  Assisted device use (Cane / Walker): walker  Live with: daughter    FAMILY HISTORY:      Review of Systems  CONSTITUTIONAL:  -Fevers / chills, -sweats, +fatigue, +weight gain   -  NEURO:  -  NEGATIVE  EYES:  -    NEGATIVE  ENMT:   NEGATIVE  CV:  -Chest pain, -palpitations, +TERRY, -orthopnea    +LE edema  RESPIRATORY:   +SOB,   GI:      NEGATIVE  :    NEGATIVE  MUSKULOSKELETAL:  +arthritis,+ joint swelling, +muscle weakness    SKIN/BREAST:    NEGATIVE  PSYCH:    NEGATIVE  HEME/LYMPH:   NEGATIVE  ENDOCRINE:        NEGATIVE    PHYSICAL EXAM  Vital Signs Last 24 Hrs  T(C): 37.2, Max: 37.2 ( @ 16:02)  T(F): 99, Max: 99 ( @ 16:02)  HR: 68 (68 - 80)  BP: 105/62 (105/62 - 127/58)  BP(mean): 74 (74 - 74)  RR: 22 (18 - 22)  SpO2: 95% (90% - 96%)    CONSTITUTIONAL: NAD, sitting upright in bed  NEURO:  moving all extremities, no focal deficits                  Neck: supple   CV: RRR, + IV/V systolic murmur, heard best at right sternal border   RESPIRATORY:  Unable to assess posterior lung fields, put was unable to sit up. Crackles at the bases b/l   GI: NT-ND, soft +obese abd. +pitting edema focused to the flanks  : KISER +   MUSKULOSKELETAL:  moving all extremities  Ext: unable to assess LE pulses d/t edema however warm and well perfused b/l. +2 Radial pulses b/l   SKIN / BREAST:   LE edema extending to abd and erythema                                                           LABS:                        10.0   6.7   )-----------( 149      ( 2017 10:15 )             32.0     06-11    141  |  100  |  40<H>  ----------------------------<  78  4.5   |  28  |  2.80<H>    Ca    9.0      2017 10:15  Mg     1.8     -11    TPro  6.7  /  Alb  2.9<L>  /  TBili  0.4  /  DBili  x   /  AST  15  /  ALT  12  /  AlkPhos  117  06-11    PT/INR - ( 2017 10:15 )   PT: 56.1 sec;   INR: 4.89          PTT - ( 2017 10:15 )  PTT:46.4 sec  Urinalysis Basic - ( 2017 11:17 )    Color: Yellow / Appearance: Clear / S.015 / pH: x  Gluc: x / Ketone: NEGATIVE  / Bili: NEGATIVE / Urobili: 0.2 E.U./dL   Blood: x / Protein: Trace mg/dL / Nitrite: POSITIVE   Leuk Esterase: Small / RBC: < 5 /HPF / WBC < 5 /HPF   Sq Epi: x / Non Sq Epi: Rare /HPF / Bacteria: Many /HPF      CARDIAC MARKERS ( 2017 10:15 )  x     / 0.04 ng/mL / 38 U/L / x     / 1.7 ng/mL      RADIOLOGY & ADDITIONAL STUDIES:    CXR: Portable examination of the chest demonstrates cardiomegaly. Pacemaker   overlies left chest wall. Tips of pacer wires overlie right atrium and   right ventricle respectively. Prominent bronchovascular markings. Mild   congestion cannot be excluded. Dextroscoliosis thoracic spine with   degenerative changes. Calcification noted involving thoracic aorta.    Impression: Prominent bronchovascular markings. Mild congestion cannot be   excluded. Small right effusion      Assessment: 87y Female y/o PMH of HTN, HLD, DM, pAF (on coumadin), PPM, PVD, known CAD s/p PCI (//), cardiac cath on 3/15 w/ MANDI to distal LM required impella support, CKD and CHF with known aortic stenosis who presented to [facility] w/ the complaint of not being able to get out of bed. Pt was evaluated in the [facility] ED and admitted to  for management of acute on chronic diastolic CHF exacerbation. Dr. Barger was consulted for evaluation of her known aortic stenosis.    Plan: As discussed with Dr. Barger  -Will need official echocardiogram tomorrow  -continue to Trend INR and Reverse if needed   -+U/A- treat as per primary team and send for urine culture  -BNP   -PT consult   -Diuresis for a negative I/O's goal   -continue medical management as per primary team Surgeon: Dr. Barger     Requesting Physician: Dr. Soto    HISTORY OF PRESENT ILLNESS:  87y Female y/o PMH of HTN, HLD, DM, pAF (on coumadin), PPM, PVD, known CAD s/p PCI (/), cardiac cath on 3/15 w/ MANDI to distal LM required impella support, CKD and CHF with known aortic stenosis who presented to [facility] w/ the complaint of not being able to get out of bed. Pt was evaluated in the [facility] ED and admitted to  for management of acute on chronic diastolic CHF exacerbation. Pt was seen by Dr. Barger as an outpatient in  for evaluation of valvular heart disease. Echo was reviewed by Dr. Barger which revealed moderate-severe aortic stenosis VS low gradient severe aortic stenosis. Due to her deconditioned state at the time pt was recommended for medical management and optimization. Currently Dr. Barger was consulted for further evaluation for her known valvular heart disease.     PAST MEDICAL & SURGICAL HISTORY:  Diabetes  HTN (hypertension)  CHF (congestive heart failure)  CAD (coronary artery disease)  COPD (chronic obstructive pulmonary disease)  History of total knee replacement:   Presence of cardiac pacemaker: at [facility] by Dr Escoto      MEDICATIONS  (STANDING):  metoprolol succinate ER 25milliGRAM(s) Oral daily  ferrous    sulfate 325milliGRAM(s) Oral every 8 hours    MEDICATIONS  (PRN):  acetaminophen   Tablet. 650milliGRAM(s) Oral every 6 hours PRN Mild Pain (1 - 3)      Allergies: No Known Allergies    Intolerances      SOCIAL HISTORY:  Smoker:  NO        WHEN QUIT?   ETOH use:  NO          Ilicit Drug use:  NO  Assisted device use (Cane / Walker): walker  Live with: daughter    FAMILY HISTORY:      Review of Systems  CONSTITUTIONAL:  -Fevers / chills, -sweats, +fatigue, +weight gain   -  NEURO:  -  NEGATIVE  EYES:  -    NEGATIVE  ENMT:   NEGATIVE  CV:  -Chest pain, -palpitations, +TERRY, -orthopnea    +LE edema  RESPIRATORY:   +SOB,   GI:      NEGATIVE  :    NEGATIVE  MUSKULOSKELETAL:  +arthritis,+ joint swelling, +muscle weakness    SKIN/BREAST:    NEGATIVE  PSYCH:    NEGATIVE  HEME/LYMPH:   NEGATIVE  ENDOCRINE:        NEGATIVE    PHYSICAL EXAM  Vital Signs Last 24 Hrs  T(C): 37.2, Max: 37.2 ( @ 16:02)  T(F): 99, Max: 99 (11 @ 16:02)  HR: 68 (68 - 80)  BP: 105/62 (105/62 - 127/58)  BP(mean): 74 (74 - 74)  RR: 22 (18 - 22)  SpO2: 95% (90% - 96%)    CONSTITUTIONAL: NAD, sitting upright in bed  NEURO:  moving all extremities, no focal deficits                  Neck: supple   CV: RRR, + III/V systolic murmur, heard best at right sternal border, diminshed s2  RESPIRATORY:  Unable to assess posterior lung fields, put was unable to sit up. Crackles at the bases b/l   GI: NT-ND, soft +obese abd. +pitting edema focused to the flanks  : KISER +   MUSKULOSKELETAL:  moving all extremities  Ext: unable to assess LE pulses d/t edema however warm and well perfused b/l. +2 Radial pulses b/l   SKIN / BREAST:   LE edema extending to abd and erythema                                                           LABS:                        10.0   6.7   )-----------( 149      ( 2017 10:15 )             32.0     06-11    141  |  100  |  40<H>  ----------------------------<  78  4.5   |  28  |  2.80<H>    Ca    9.0      2017 10:15  Mg     1.8     -11    TPro  6.7  /  Alb  2.9<L>  /  TBili  0.4  /  DBili  x   /  AST  15  /  ALT  12  /  AlkPhos  117  06-11    PT/INR - ( 2017 10:15 )   PT: 56.1 sec;   INR: 4.89          PTT - ( 2017 10:15 )  PTT:46.4 sec  Urinalysis Basic - ( 2017 11:17 )    Color: Yellow / Appearance: Clear / S.015 / pH: x  Gluc: x / Ketone: NEGATIVE  / Bili: NEGATIVE / Urobili: 0.2 E.U./dL   Blood: x / Protein: Trace mg/dL / Nitrite: POSITIVE   Leuk Esterase: Small / RBC: < 5 /HPF / WBC < 5 /HPF   Sq Epi: x / Non Sq Epi: Rare /HPF / Bacteria: Many /HPF      CARDIAC MARKERS ( 2017 10:15 )  x     / 0.04 ng/mL / 38 U/L / x     / 1.7 ng/mL      RADIOLOGY & ADDITIONAL STUDIES:    CXR: Portable examination of the chest demonstrates cardiomegaly. Pacemaker   overlies left chest wall. Tips of pacer wires overlie right atrium and   right ventricle respectively. Prominent bronchovascular markings. Mild   congestion cannot be excluded. Dextroscoliosis thoracic spine with   degenerative changes. Calcification noted involving thoracic aorta.    Impression: Prominent bronchovascular markings. Mild congestion cannot be   excluded. Small right effusion      Assessment: 87y Female y/o PMH of HTN, HLD, DM, pAF (on coumadin), PPM, PVD, known CAD s/p PCI (//), cardiac cath on 3/15 w/ MANDI to distal LM required impella support, CKD and CHF with known aortic stenosis who presented to [facility] w/ the complaint of not being able to get out of bed. Pt was evaluated in the [facility] ED and admitted to  for management of acute on chronic diastolic CHF exacerbation. Dr. Barger was consulted for evaluation of her known aortic stenosis.  imp: aortic stenosis; acute/chronic diastolic CHF; CAD s/p PCI with hx of LMCA stent impella assist; ARF/CRI; severe deconditioning; poor medical compliance; morbid obesity. pt is high risk for SAVR. given severe deconditioned state, high risk for TAVR at this time as well.    Plan: As discussed with Dr. Barger  -Will need official echocardiogram tomorrow  -agree [facility] medical optimization/agressive diuresis; renal consult  -will need cardiac catheterization when medically optimized and creatinine stable from renal standpoint  -+U/A- treat as per primary team and send for urine culture  -BNP   -PT consult   -continue medical management as per primary team

## 2017-06-11 NOTE — H&P ADULT - NSHPSOCIALHISTORY_GEN_ALL_CORE
Lives with daughter (Sandra) in an apartment  Prior tobacco abuse (quit 5 yrs ago, 30yr ppk hx)  Social alcohol (1-2 x/yr)  Denies drug use   Has HHA 6 days/week 5hrs

## 2017-06-11 NOTE — H&P ADULT - PROBLEM SELECTOR PLAN 8
Baseline Cr per daughter 2-3  On admission, Cr 2.8  -Strict I/Os-us catheter in place  -Renally dose all meds  -call Renal service consult (Dr. Ramirez-her outpt Nephrologist)  -Repeat UA and culture. UA with positive nitrites, WBCs and bacteria and thrombocytopenia   -check fe studies and b12/folate  -consider Hematology eval

## 2017-06-11 NOTE — H&P ADULT - PROBLEM SELECTOR PLAN 4
SR on telemetry currently  Supratherapeutic INR on admission, 4.8  -hold Coumadin tonight  -Daily PT/INR   -Continue Toprol XL 25mg po daily SR on telemetry currently  Supratherapeutic INR on admission, 4.8  -hold Coumadin tonight  s/p PPM 2011 2/2 symptomatic bradycardia  -Will call EP to check PPM in AM  -Daily PT/INR   -Continue Toprol XL 25mg po daily

## 2017-06-11 NOTE — H&P ADULT - PMH
Aortic stenosis, severe    CAD (coronary artery disease)    CHF (congestive heart failure)    CKD (chronic kidney disease) stage 4, GFR 15-29 ml/min    COPD (chronic obstructive pulmonary disease)    Diabetes    HTN (hypertension)    Paroxysmal atrial fibrillation Aortic stenosis, severe    CAD (coronary artery disease)    CHF (congestive heart failure)    CKD (chronic kidney disease) stage 4, GFR 15-29 ml/min    COPD (chronic obstructive pulmonary disease)    Diabetes    HTN (hypertension)    Iron deficiency anemia    Lupus (systemic lupus erythematosus)    Paroxysmal atrial fibrillation    PVD (peripheral vascular disease)

## 2017-06-11 NOTE — H&P ADULT - NSHPPHYSICALEXAM_GEN_ALL_CORE
General: A/ox 3, No acute Distress; obese female   Neck: Supple, NO JVD  Cardiac: S1 S2 noted; 4/6 holosystolic murmur; no rub or gallop   Pulmonary: Breathing unlabored, bibasilar fine rales, no wheezing or rhonchi   Abdomen: Soft, Non -tender, +BS x 4 quads; obese  Extremities: chronic venous changes noted to bilateral lower extremities; edema noted from abdomen to bilateral feet 3+  Vascular: R/L DP doppler; PT unable to assess due to edema; skim warm and perfused   Skin: Right buttock pressure ulcer stage II, 1 x 1.5cm; bilateral groins with redness noted   Neuro: A/o x 3, No focal deficits

## 2017-06-11 NOTE — ED PROVIDER NOTE - PSH
History of total knee replacement  2003  Presence of cardiac pacemaker  at [facility] by Dr Escoto

## 2017-06-11 NOTE — ED ADULT NURSE NOTE - ASSOCIATED SYMPTOMS
chronic pain in the legs, erythema and B/L LE swelling, chronic leg pain, erythema and B/L LE swelling, multiple ulcerations on both LE chronic leg pain, erythema and B/L LE swelling, wheeping serosanguinous fluid, pedal pulses good bilaterally.

## 2017-06-11 NOTE — H&P ADULT - ASSESSMENT
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 Pt states that her "legs were too weak". Pt is a poor historian and HPI obtained from her daughter. Pt's daughter states that her mother was unable to get OOB for 3 days and pt used her Life Alert x3 times to call for help-was told to go to the ED. Reports that she had been "tired" for several day, SOB w/ minimal exertion for "several months" and her mobility has declined since her discharge from rehab in December. Additionally, pt has gained 17 lbs over the past month (from 258 to 275) and non-compliant with her diet. As per daughter, pt has been hospitalized prior 2-3 yrs ago (? [facility]) for CHF exacerbation w/ aggressive diuresis. Of note, pt was evaluation by Dr. Barger for aortic stenosis in December 16'. However, due to her deconditioned status during that time, pt was recommended medical management.

## 2017-06-11 NOTE — H&P ADULT - NSHPOUTPATIENTPROVIDERS_GEN_ALL_CORE
Dr. Mayo Ramirez (Nephrologist)  Dr. Thomas (CTS)  PCP is a house call NP Tomas Tellez [phone] Dr. Mayo Ramirez (Nephrologist)  Dr. Barger (CTS)  PCP is a house call NP Tomas Tellez [phone] Dr. Mayo Ramirez (Nephrologist)  Dr. Barger (CTS)    PCP is a house call NP Tomas Tellez [phone]

## 2017-06-11 NOTE — ED PROVIDER NOTE - MUSCULOSKELETAL, MLM
Spine appears normal, range of motion is not limited, no muscle or joint tenderness. +diffuse edema from foot all the way up to hips

## 2017-06-11 NOTE — H&P ADULT - PROBLEM SELECTOR PLAN 1
2/2 dietary non-compliance; EF 50-55% on last TTE 2016  BNP on admission 02936  -Daily weights, strict I/Os (us in place), FR 1L/day  -TTE in AM  -Check TSH in AM   -Trend trop and cardiac enzymes. Initial trop 0.04  -Continue Toprol XL 25mg po daily  -Start Furosemide 40mg IV daily-tomorrow 6/12 2/2 dietary non-compliance; EF 50-55% on last TTE 2016  BNP on admission 89898  Lungs bibasilar fine rales, edema noted from abdomen to bilateral legs and feet  -Daily weights, strict I/Os (us in place), FR 1L/day  -TTE in AM  -Check TSH in AM   -Trend trop and cardiac enzymes. Initial trop 0.04  -Continue Toprol XL 25mg po daily  -Start Furosemide 40mg IV daily-tomorrow 6/12

## 2017-06-11 NOTE — H&P ADULT - PROBLEM SELECTOR PLAN 5
s/p PPM 2011 2/2 symptomatic bradycardia  -Will call EP to check PPM in AM former smoker (quit 5 yrs ago) 30 yr ppk hx  -Call Dr. Mekhi Trejo for evaluation

## 2017-06-11 NOTE — PATIENT PROFILE ADULT. - TEACHING/LEARNING LEARNING PREFERENCES
computer/internet/audio/group instruction/pictorial/written material/video/individual instruction/verbal instruction/skill demonstration

## 2017-06-11 NOTE — ED ADULT NURSE NOTE - PSH
History of total knee replacement  2003  Presence of cardiac pacemaker  at [facility] by Dr Escoto

## 2017-06-11 NOTE — ED ADULT NURSE NOTE - OBJECTIVE STATEMENT
Pt with hx of CAD, CHF, COPD BIBA c/o LE weakness and inability to sit up, had to call Life Alert several times in the past few days to help. Pt is a poor historian, states " My daughter arranges all the medications and appointments for me". Pt with hx of CAD, CHF, COPD, AFib on Coumadin BIBA c/o LE weakness and inability to sit up, had to call Life Alert several times in the past few days to help. Pt is a poor historian, states " My daughter arranges all the medications and appointments for me". Pt with hx of CAD, CHF, COPD, AFib on Coumadin BIBA c/o LE weakness and inability to sit up, had to call Life Alert several times in the past few days to help. Pt is a poor historian, states " My daughter arranges all the medications and appointments for me". Pt appears unkempt and smells of urine, states she usually wears a diaper at home and ambulates with the walker. Pt also c/o "wound from sitting on the commode", small stage II ulcer noted to Lt posterior thigh. Pt state she has a home Pt with hx of CAD, CHF, COPD, AFib on Coumadin BIBA c/o LE weakness and inability to sit up, had to call Life Alert several times in the past few days to help. Pt is a poor historian, states " My daughter arranges all the medications and appointments for me". Pt appears unkempt and smells of urine, states she usually wears a diaper at home and ambulates with the walker. Pt also c/o "wound from sitting on the commode", small stage II ulcer noted to Lt posterior thigh and stage I on left inner buttock, no drainage noted. Pt state she has a home Pt with hx of CAD, CHF, COPD, AFib on Coumadin BIBA c/o LE weakness and inability to sit up, had to call Life Alert several times in the past few days to help. Pt is a poor historian, states " My daughter arranges all the medications and appointments for me". Pt appears unkempt and smells of urine, states she usually wears a diaper at home and ambulates with the walker. Pt also c/o "wound from sitting on the commode", small stage II ulcer noted to Lt posterior thigh approx 4cm x 4cm and stage I on left inner buttock non blanchable, no drainage noted. Pt state she has a home

## 2017-06-11 NOTE — ED PROVIDER NOTE - OBJECTIVE STATEMENT
88yo F hx of afib on coumadin, in need of valve replacement but surgery too risky,       daughter is Sandra Nuñez. Cell [phone] 86yo F hx of afib on coumadin, in need of aortic valve replacement but surgery too risky, CKD requiring dialysis for 4 sessions last august but no longer requiring, now baseline 2-3, CHF, COPD, DM not requiring meds, HTN, here with complaint of inability to get out of bed x2 days. Denies trauma. Called life alert x2 yesterday to get out of bed. called again today and they told her to come to ED.     daughter states ckd starting to worsen, last 3.2. states that pt was weighed yesterday, 258lbs last month, now 275lbs.  is on lasix 20mg daily  warfarin 2mg daily  PCP is a house call doctor  daughter is Sandra Nuñez. Cell [phone] 86yo F hx of afib on coumadin, in need of aortic valve replacement but surgery too risky, CKD requiring dialysis for 4 sessions last august but no longer requiring, now baseline 2-3, CHF, COPD, DM not requiring meds, HTN, here with complaint of inability to get out of bed x2 days. Denies trauma. Called life alert x2 yesterday to get out of bed. called again today and they told her to come to ED.     daughter states ckd starting to worsen, last 3.2. states that pt was weighed yesterday, 258lbs last month, now 275lbs.  is on lasix 20mg daily  warfarin 2mg daily  PCP is a house call doctor  daughter is Sandra Nuñez. Cell [phone]. her  just suffered cardiac arrest so she is unable to come to hospital but available by phone.   has home health aide mon-sat for about 5 hrs/day 88yo F hx of afib on coumadin, in need of aortic valve replacement but surgery too risky, CKD requiring dialysis for 4 sessions last august but no longer requiring, now baseline 2-3, CHF, COPD, DM not requiring meds, HTN, here with complaint of inability to get out of bed x2 days. Denies trauma. Called life alert x2 yesterday to get out of bed. called again today and they told her to come to ED.     daughter states ckd starting to worsen, last 3.2. states that pt was weighed yesterday, 258lbs last month, now 275lbs.  is on lasix 20mg daily  warfarin 2mg daily  PCP is a house call NP Tomas Tellez [phone]  daughter is Sandra Nuñez. Cell [phone]. her  just suffered cardiac arrest so she is unable to come to hospital but available by phone.   has home health aide mon-sat for about 5 hrs/day

## 2017-06-11 NOTE — H&P ADULT - PROBLEM SELECTOR PLAN 2
non rheumatic heart disease  -Dr. Soto discussed with Dr. Barger-plan for TTE tomorrow and consider additional ischemic eval if TAVR is needed

## 2017-06-11 NOTE — CONSULT NOTE ADULT - SUBJECTIVE AND OBJECTIVE BOX
Patient is a 87y old  Female who presents with a chief complaint of unable to get out of bed x 3 days - known chf      HPI:  87 yof - h/o chronic diastolic CHF (EF 50-55% ), mod to severe AS (ALEKS 0.5cm), afib, CAD s/p PCI (04', 06' and 12' - last cath 3/15 PCI dLM w/ Impella support), Lupus, COPD (prior smoker), PNA, PPM , CKD, prior episode of renal failure requiring HD, HTN, PVD, and Type 2 DM, MONICA, and TKR ' - unable to get OOB x 3 days. Pt reported "legs were too weak". History by patient unclear.  Denies acute dyspnea at rest or cp.  Recent fatigue and exertioanl dyspnea with minimal exertion.  Recent weight gain.      PAST MEDICAL & SURGICAL HISTORY:  PVD (peripheral vascular disease)  Iron deficiency anemia  Lupus (systemic lupus erythematosus)  Paroxysmal atrial fibrillation  Aortic stenosis, severe  CKD (chronic kidney disease) stage 4, GFR 15-29 ml/min  Diabetes  HTN (hypertension)  CHF (congestive heart failure)  CAD (coronary artery disease)  COPD (chronic obstructive pulmonary disease)  History of total knee replacement:   Presence of cardiac pacemaker: at [facility] by Dr Escoto    MEDICATIONS  (STANDING):  metoprolol succinate ER 25milliGRAM(s) Oral daily  ferrous    sulfate 325milliGRAM(s) Oral every 8 hours    MEDICATIONS  (PRN):  acetaminophen   Tablet. 650milliGRAM(s) Oral every 6 hours PRN Mild Pain (1 - 3)  1 - 3)    FAMILY HISTORY:  acutely noncontributory    SOCIAL HISTORY: prior smoker; no recent drug or etoh use    REVIEW OF SYSTEMS  General:	no fever  	  Ophthalmologic:no acute vision changes  	  ENMT:	no nasal congestion    Respiratory and Thorax: exertional dyspnea as above  	  Cardiovascular:	no cp    Gastrointestinal:	no new abd pain    Genitourinary:	no dysuria    Musculoskeletal: lower extremity weakness as above	    Neurological:	no headache now    Psychiatric:	appropriate    Hematology/Lymphatics:anemic and thrombocytopenic	    Endocrine: known dm; no known thyroid disease	    Allergic/Immunologic:	nkda    Vital Signs Last 24 Hrs  T(C): 37.2, Max: 37.2 ( @ 16:02)  T(F): 99, Max: 99 ( @ 16:02)  HR: 68 (68 - 80)  BP: 105/62 (105/62 - 127/58)  BP(mean): 74 (74 - 74)  RR: 22 (18 - 22)  SpO2: 95% (90% - 96%)    PHYSICAL EXAM:  Constitutional:    Eyes:    ENMT:    Neck:    Breasts:    Back:    Respiratory:    Cardiovascular:    Gastrointestinal:    Genitourinary:    Rectal:    Extremities:    Vascular:    Neurological:    Skin:    Lymph Nodes:    Musculoskeletal:    Psychiatric:    INTERPRETATION OF TELEMETRY:    ECG:    I&O's Detail    I & Os for current day (as of 2017 18:55)  =============================================  IN:    Oral Fluid: 240 ml    Total IN: 240 ml  ---------------------------------------------  OUT:    Indwelling Catheter - Urethral: 800 ml    Total OUT: 800 ml  ---------------------------------------------  Total NET: -560 ml      LABS:                        10.0   6.7   )-----------( 149      ( 2017 10:15 )             32.0     06-11    141  |  100  |  40<H>  ----------------------------<  78  4.5   |  28  |  2.80<H>    Ca    9.0      2017 10:15  Mg     1.8     06-11    TPro  6.7  /  Alb  2.9<L>  /  TBili  0.4  /  DBili  x   /  AST  15  /  ALT  12  /  AlkPhos  117  06-11    CARDIAC MARKERS ( 2017 10:15 )  x     / 0.04 ng/mL / 38 U/L / x     / 1.7 ng/mL      PT/INR - ( 2017 10:15 )   PT: 56.1 sec;   INR: 4.89          PTT - ( 2017 10:15 )  PTT:46.4 sec  Urinalysis Basic - ( 2017 11:17 )    Color: Yellow / Appearance: Clear / S.015 / pH: x  Gluc: x / Ketone: NEGATIVE  / Bili: NEGATIVE / Urobili: 0.2 E.U./dL   Blood: x / Protein: Trace mg/dL / Nitrite: POSITIVE   Leuk Esterase: Small / RBC: < 5 /HPF / WBC < 5 /HPF   Sq Epi: x / Non Sq Epi: Rare /HPF / Bacteria: Many /HPF    Troponin T, Serum (17 @ 10:15)    Troponin T, Serum: 0.04: Reference interval for troponin T is </= 0.01 ng/mL which includes the  99th percentile of a healthy population. Troponin T results are not  interchangeable with troponin I results. ng/mL          I&O's Summary    I & Os for current day (as of 2017 18:55)  =============================================  IN: 240 ml / OUT: 800 ml / NET: -560 ml    BNPSerum Pro-Brain Natriuretic Peptide: 80480 pg/mL ( @ 10:15)    RADIOLOGY & ADDITIONAL STUDIES:    EXAM:  XR CHEST 1 VIEW PORT URGENT                          PROCEDURE DATE:  2017                     INTERPRETATION:  Clinical History: Congestion    Portable examination of the chest demonstrates cardiomegaly. Pacemaker   overlies left chest wall. Tips of pacer wires overlie right atrium and   right ventricle respectively. Prominent bronchovascular markings. Mild   congestion cannot be excluded. Dextroscoliosis thoracic spine with   degenerative changes. Calcification noted involving thoracic aorta.    Impression: Prominent bronchovascular markings. Mild congestion cannot be   excluded. Small right effusion              "Thank you for the opportunity to participate in the care of this   patient."        KATHARINA MOROCHO M.D., ATTENDING RADIOLOGIST  This document has been electronically signed. 2017 11:02AM    EXAM:  ECHOCARDIOGRAM (CARDIOL)       PROCEDURE DATE:  2016                    INTERPRETATION:  Patient Height: 167.6 cm  Patient Weight: 90.7 kg  Systolic Pressure: 130 mmHg  Diastolic Pressure: 64 mmHg  BSA: 2.0 m^2  Interpretation Summary  Study Quality: Fair.There was technical limitations during this study due   to   patient positioning.Left ventricular hypertrophy presentThe left   ventricular   ejection fraction is estimated to be 50-55%The left atrium is borderline   dilated.The mitral inflow pattern is consistent with impaired left   ventricular   relaxation with mildly elevated left atrial pressure (8-14mmHg).  Right   atrial   size is normal. The right ventricle is normal in size and function.   There is a   pacemaker wire in the right heart.  Calcified aortic valve. There is   Moderate   to severe aortic stenosis.The calculated aortic valve area indexed to   body   surface area is 0.5 cm2/m2.The peak pressure gradient is 45 mmHg.The mean   pressure gradient is 27 mmHg.The calculated stroke volume index is 19   cc/m2   (normal >35cc/m2).The calculated aortic valve area using the continuity   equation is 0.17 cm2.There is mild mitral annular calcification. There is   trace mitral regurgitation.  Structurally normal tricuspid valve. There   is   trace tricuspid regurgitation.There is no echocardiographic evidence for   pulmonary hypertension.No pulmonic regurgitation noted.No aortic root   dilatation.The inferior vena cava is normal in size (<2.1 cm) with normal  inspiratory collapse (>50%) consistent with normal right atrial pressure.   There is no pericardial effusion.  Procedure Details  A complete two-dimensional transthoracic echocardiogram was performed (2D,  M-mode, spectral and color flow doppler).  Study Quality: Fair.  There was technical limitations during this study due to patient   positioning.  Left Ventricle  Left ventricular hypertrophy present  The left ventricular ejection fraction is estimated to be 50-55%  Left Atrium  The mitral inflow pattern is consistent with impaired left ventricular  relaxation with mildly elevated left atrial pressure (8-14mmHg).  The left atrial volume index is 34 cc/m2 (normal <34cc/m2)  The left atrium is borderline dilated.  Right Atrium  Right atrial size is normal.  Right Ventricle  The right ventricle is normal in size and function.  There is a pacemaker wire in the right heart.  Aortic Valve  Calcified aortic valve.  No aortic regurgitation noted.  The peak pressure gradient is 45 mmHg.  The meanpressure gradient is 27 mmHg.  The calculated aortic valve area indexed to body surface area is 0.5   cm2/m2.  There is Moderate to severe aortic stenosis.  The calculated stroke volume index is 19 cc/m2 (normal >35cc/m2).  The calculated aortic valve area using the continuity equation is 0.17   cm2.  Mitral Valve  There is mild mitral annular calcification.  There is trace mitral regurgitation.  Tricuspid Valve  Structurally normal tricuspid valve.  There is trace tricuspid regurgitation.  The pulmonary artery systolic pressure is estimated to be 28 mmHg.  There is no echocardiographic evidence for pulmonary hypertension.  Pulmonic Valve  No pulmonic regurgitation noted.  Arteries and Venous System  No aortic root dilatation.  The inferior vena cava is normal in size (<2.1 cm) with normal inspiratory  collapse (>50%) consistent with normal right atrial pressure.  Pericardium / Pleura  There is no pericardial effusion.  Doppler Measurements & Calculations  MV E point: 82.4 cm/sec  MV dec time: 0.2 sec  Ao V2 mean: 206.3 cm/sec  Ao mean P.8 mmHg  Ao mean PG (full): 18.8 mmHg  Ao V2 VTI: 61.6 cm  ALEKS(I,A): 0.7 cm^2  ALEKS(I,D): 0.7 cm^2  LV max P.3 mmHg  LV mean P mmHg  LV V1 max: 57.8 cm/sec  LV V1 mean: 40.9 cm/sec  LV V1 VTI: 11 cm  SV(Ao): 576.0 ml  SI(Ao): 288.0 ml/m^2  SV(LVOT): 42.2 ml  SI(LVOT): 21.1 ml/m^2  TR Max graciela: 199.4 cm/sec  MMode 2D Measurements & Calculations  IVSd: 1.6 cm  LVIDd: 5.5 cm  LVIDs: 4.3 cm  LVPWd: 1.4 cm  IVS/LVPW: 1.2  FS: 22.2 %  EDV(Teich): 149.8 ml  ESV(Teich): 83.5 ml  LV mass(C)d: 374.6 grams  LV mass(C)dI: 187.3 grams/m^2  SI(cubed): 45.0 ml/m^2  Ao root diam: 3.5 cm  Ao root area: 9.4 cm^2  LA dimension: 4.4 cm  LA/Ao: 1.3  LVOT diam: 2.2 cm  LVOT area: 3.8 cm^2  LVOT area (M): 3.6 cm^2  LVLd ap4: 6.6 cm  EDV(MOD-sp4): 69 ml  LVLs ap4: 5.7 cm  ESV(MOD-sp4): 33 ml  EF(MOD-sp4): 52.2 %  LVLd ap2: 5.9 cm  EDV(MOD-sp2): 43 ml  LVLs ap2: 5.3 cm  ESV(MOD-sp2): 15 ml  EF(MOD-sp2): 65.1 %  SV(MOD-sp4): 36 ml  SI(MOD-sp4): 18.0 ml/m^2  SV(MOD-sp2): 28 ml  SI(MOD-sp2): 14.0 ml/m^2  Interpreting Physician:Verena Nathan MD electronically signed on   2016 14:22:22                "Thank you for the opportunity to participate in the care of this   patient."        VERENA NATHAN M.D., ATTENDING CARDIOLOGIST  This document has been electronically signed. 2016  2:22PM Patient is a 87y old  Female who presents with a chief complaint of unable to get out of bed x 3 days - known chf      HPI:  87 yof - h/o chronic diastolic CHF (EF 50-55% ), mod to severe AS (ALEKS 0.5cm), afib, CAD s/p PCI (04', 06' and 12' - last cath 3/15 PCI dLM w/ Impella support), Lupus, COPD (prior smoker), PNA, PPM , CKD, prior episode of renal failure requiring HD, HTN, PVD, and Type 2 DM, MONICA, and TKR 03' - unable to get OOB x 3 days. Pt reported "legs were too weak". History by patient unclear.  Denies acute dyspnea at rest or cp.  Recent fatigue and exertional dyspnea with minimal exertion per chart.  Recent weight gain.        PAST MEDICAL & SURGICAL HISTORY:  PVD (peripheral vascular disease)  Iron deficiency anemia  Lupus (systemic lupus erythematosus)  Paroxysmal atrial fibrillation  Aortic stenosis, severe  CKD (chronic kidney disease) stage 4, GFR 15-29 ml/min  Diabetes  HTN (hypertension)  CHF (congestive heart failure)  CAD (coronary artery disease)  COPD (chronic obstructive pulmonary disease)  History of total knee replacement:   Presence of cardiac pacemaker: at [facility] by Dr Escoto    MEDICATIONS  (STANDING):  metoprolol succinate ER 25milliGRAM(s) Oral daily  ferrous    sulfate 325milliGRAM(s) Oral every 8 hours    MEDICATIONS  (PRN):  acetaminophen   Tablet. 650milliGRAM(s) Oral every 6 hours PRN Mild Pain (1 - 3)  1 - 3)    FAMILY HISTORY:  acutely noncontributory    SOCIAL HISTORY: prior smoker; no recent drug or etoh use    REVIEW OF SYSTEMS  General:	no fever  	  Ophthalmologic:no acute vision changes  	  ENMT:	no nasal congestion    Respiratory and Thorax: exertional dyspnea as above  	  Cardiovascular:	no cp    Gastrointestinal:	no new abd pain    Genitourinary:	no dysuria    Musculoskeletal: lower extremity weakness as above	    Neurological:	no headache now    Psychiatric:	appropriate    Hematology/Lymphatics:anemic and thrombocytopenic	    Endocrine: known dm; no known thyroid disease	    Allergic/Immunologic:	nkda    Vital Signs Last 24 Hrs  T(C): 37.2, Max: 37.2 ( @ 16:02)  T(F): 99, Max: 99 ( @ 16:02)  HR: 68 (68 - 80)  BP: 105/62 (105/62 - 127/58)  BP(mean): 74 (74 - 74)  RR: 22 (18 - 22)  SpO2: 95% (90% - 96%)    PHYSICAL EXAM:  Constitutional: nad    Eyes:anicteric    ENMT:mmm    Neck:jugular vein exam obscured by habitus    Respiratory: decreased at base    Cardiovascular: rr; systolic murmur present at base toward carotid; diminshed s2; s1 present    Gastrointestinal:soft abd    Extremities: bilat pitting edema    Vascular:decreased le pulses    Neurological: arousable from sleep    Skin:warm    Psychiatric:no acute agtriation now    INTERPRETATION OF TELEMETRY:reviewed    EC2017 ecg reviewed on 2017    I&O's Detail    I & Os for current day (as of 2017 18:55)  =============================================  IN:    Oral Fluid: 240 ml    Total IN: 240 ml  ---------------------------------------------  OUT:    Indwelling Catheter - Urethral: 800 ml    Total OUT: 800 ml  ---------------------------------------------  Total NET: -560 ml      LABS:                        10.0   6.7   )-----------( 149      ( 2017 10:15 )             32.0     06-11    141  |  100  |  40<H>  ----------------------------<  78  4.5   |  28  |  2.80<H>    Ca    9.0      2017 10:15  Mg     1.8     11    TPro  6.7  /  Alb  2.9<L>  /  TBili  0.4  /  DBili  x   /  AST  15  /  ALT  12  /  AlkPhos  117  06-11    CARDIAC MARKERS ( 2017 10:15 )  x     / 0.04 ng/mL / 38 U/L / x     / 1.7 ng/mL      PT/INR - ( 2017 10:15 )   PT: 56.1 sec;   INR: 4.89          PTT - ( 2017 10:15 )  PTT:46.4 sec  Urinalysis Basic - ( 2017 11:17 )    Color: Yellow / Appearance: Clear / S.015 / pH: x  Gluc: x / Ketone: NEGATIVE  / Bili: NEGATIVE / Urobili: 0.2 E.U./dL   Blood: x / Protein: Trace mg/dL / Nitrite: POSITIVE   Leuk Esterase: Small / RBC: < 5 /HPF / WBC < 5 /HPF   Sq Epi: x / Non Sq Epi: Rare /HPF / Bacteria: Many /HPF    Troponin T, Serum (17 @ 10:15)    Troponin T, Serum: 0.04: Reference interval for troponin T is </= 0.01 ng/mL which includes the  99th percentile of a healthy population. Troponin T results are not  interchangeable with troponin I results. ng/mL          I&O's Summary    I & Os for current day (as of 2017 18:55)  =============================================  IN: 240 ml / OUT: 800 ml / NET: -560 ml    BNPSerum Pro-Brain Natriuretic Peptide: 31950 pg/mL ( @ 10:15)    RADIOLOGY & ADDITIONAL STUDIES:    EXAM:  XR CHEST 1 VIEW PORT URGENT                          PROCEDURE DATE:  2017                     INTERPRETATION:  Clinical History: Congestion    Portable examination of the chest demonstrates cardiomegaly. Pacemaker   overlies left chest wall. Tips of pacer wires overlie right atrium and   right ventricle respectively. Prominent bronchovascular markings. Mild   congestion cannot be excluded. Dextroscoliosis thoracic spine with   degenerative changes. Calcification noted involving thoracic aorta.    Impression: Prominent bronchovascular markings. Mild congestion cannot be   excluded. Small right effusion              "Thank you for the opportunity to participate in the care of this   patient."        KATHARINA MOROCHO M.D., ATTENDING RADIOLOGIST  This document has been electronically signed. 2017 11:02AM    EXAM:  ECHOCARDIOGRAM (CARDIOL)       PROCEDURE DATE:  2016                    INTERPRETATION:  Patient Height: 167.6 cm  Patient Weight: 90.7 kg  Systolic Pressure: 130 mmHg  Diastolic Pressure: 64 mmHg  BSA: 2.0 m^2  Interpretation Summary  Study Quality: Fair.There was technical limitations during this study due   to   patient positioning.Left ventricular hypertrophy presentThe left   ventricular   ejection fraction is estimated to be 50-55%The left atrium is borderline   dilated.The mitral inflow pattern is consistent with impaired left   ventricular   relaxation with mildly elevated left atrial pressure (8-14mmHg).  Right   atrial   size is normal. The right ventricle is normal in size and function.   There is a   pacemaker wire in the right heart.  Calcified aortic valve. There is   Moderate   to severe aortic stenosis.The calculated aortic valve area indexed to   body   surface area is 0.5 cm2/m2.The peak pressure gradient is 45 mmHg.The mean   pressure gradient is 27 mmHg.The calculated stroke volume index is 19   cc/m2   (normal >35cc/m2).The calculated aortic valve area using the continuity   equation is 0.17 cm2.There is mild mitral annular calcification. There is   trace mitral regurgitation.  Structurally normal tricuspid valve. There   is   trace tricuspid regurgitation.There is no echocardiographic evidence for   pulmonary hypertension.No pulmonic regurgitation noted.No aortic root   dilatation.The inferior vena cava is normal in size (<2.1 cm) with normal  inspiratory collapse (>50%) consistent with normal right atrial pressure.   There is no pericardial effusion.  Procedure Details  A complete two-dimensional transthoracic echocardiogram was performed (2D,  M-mode, spectral and color flow doppler).  Study Quality: Fair.  There was technical limitations during this study due to patient   positioning.  Left Ventricle  Left ventricular hypertrophy present  The left ventricular ejection fraction is estimated to be 50-55%  Left Atrium  The mitral inflow pattern is consistent with impaired left ventricular  relaxation with mildly elevated left atrial pressure (8-14mmHg).  The left atrial volume index is 34 cc/m2 (normal <34cc/m2)  The left atrium is borderline dilated.  Right Atrium  Right atrial size is normal.  Right Ventricle  The right ventricle is normal in size and function.  There is a pacemaker wire in the right heart.  Aortic Valve  Calcified aortic valve.  No aortic regurgitation noted.  The peak pressure gradient is 45 mmHg.  The meanpressure gradient is 27 mmHg.  The calculated aortic valve area indexed to body surface area is 0.5   cm2/m2.  There is Moderate to severe aortic stenosis.  The calculated stroke volume index is 19 cc/m2 (normal >35cc/m2).  The calculated aortic valve area using the continuity equation is 0.17   cm2.  Mitral Valve  There is mild mitral annular calcification.  There is trace mitral regurgitation.  Tricuspid Valve  Structurally normal tricuspid valve.  There is trace tricuspid regurgitation.  The pulmonary artery systolic pressure is estimated to be 28 mmHg.  There is no echocardiographic evidence for pulmonary hypertension.  Pulmonic Valve  No pulmonic regurgitation noted.  Arteries and Venous System  No aortic root dilatation.  The inferior vena cava is normal in size (<2.1 cm) with normal inspiratory  collapse (>50%) consistent with normal right atrial pressure.  Pericardium / Pleura  There is no pericardial effusion.  Doppler Measurements & Calculations  MV E point: 82.4 cm/sec  MV dec time: 0.2 sec  Ao V2 mean: 206.3 cm/sec  Ao mean P.8 mmHg  Ao mean PG (full): 18.8 mmHg  Ao V2 VTI: 61.6 cm  ALEKS(I,A): 0.7 cm^2  ALEKS(I,D): 0.7 cm^2  LV max P.3 mmHg  LV mean P mmHg  LV V1 max: 57.8 cm/sec  LV V1 mean: 40.9 cm/sec  LV V1 VTI: 11 cm  SV(Ao): 576.0 ml  SI(Ao): 288.0 ml/m^2  SV(LVOT): 42.2 ml  SI(LVOT): 21.1 ml/m^2  TR Max graciela: 199.4 cm/sec  MMode 2D Measurements & Calculations  IVSd: 1.6 cm  LVIDd: 5.5 cm  LVIDs: 4.3 cm  LVPWd: 1.4 cm  IVS/LVPW: 1.2  FS: 22.2 %  EDV(Teich): 149.8 ml  ESV(Teich): 83.5 ml  LV mass(C)d: 374.6 grams  LV mass(C)dI: 187.3 grams/m^2  SI(cubed): 45.0 ml/m^2  Ao root diam: 3.5 cm  Ao root area: 9.4 cm^2  LA dimension: 4.4 cm  LA/Ao: 1.3  LVOT diam: 2.2 cm  LVOT area: 3.8 cm^2  LVOT area (M): 3.6 cm^2  LVLd ap4: 6.6 cm  EDV(MOD-sp4): 69 ml  LVLs ap4: 5.7 cm  ESV(MOD-sp4): 33 ml  EF(MOD-sp4): 52.2 %  LVLd ap2: 5.9 cm  EDV(MOD-sp2): 43 ml  LVLs ap2: 5.3 cm  ESV(MOD-sp2): 15 ml  EF(MOD-sp2): 65.1 %  SV(MOD-sp4): 36 ml  SI(MOD-sp4): 18.0 ml/m^2  SV(MOD-sp2): 28 ml  SI(MOD-sp2): 14.0 ml/m^2  Interpreting Physician:Verena Nathan MD electronically signed on   2016 14:22:22                "Thank you for the opportunity to participate in the care of this   patient."        VERENA NATHAN M.D., ATTENDING CARDIOLOGIST  This document has been electronically signed. 2016  2:22PM

## 2017-06-11 NOTE — H&P ADULT - PROBLEM SELECTOR PLAN 9
and thrombocytopenia   -check fe studies and b12/folate  -consider Hematology eval Right pressure ulcer to right buttock  -Wound care consult  -Keep turned Q2  -Apply barrier cream to buttock daily and prn

## 2017-06-11 NOTE — ED ADULT NURSE NOTE - BREATHING, MLM
Spontaneous, unlabored and symmetrical Clear in upper bases bilaterally, fine crackles noted in lower bases bilaterally./Spontaneous, unlabored and symmetrical

## 2017-06-11 NOTE — ED ADULT NURSE REASSESSMENT NOTE - COMFORT CARE
plan of care explained/meal provided/warm blanket provided/repositioned/po fluids offered/wait time explained/side rails up

## 2017-06-11 NOTE — H&P ADULT - PROBLEM SELECTOR PLAN 7
Type 2 DM (off meds).   -Check HbA1c in AM Baseline Cr per daughter 2-3  On admission, Cr 2.8  -Strict I/Os-us catheter in place  -Renally dose all meds  -call Renal service consult (Dr. Ramirez-her outpt Nephrologist)  -Repeat UA and culture. UA with positive nitrites, WBCs and bacteria

## 2017-06-11 NOTE — ED ADULT NURSE REASSESSMENT NOTE - NS ED NURSE REASSESS COMMENT FT1
Labs and x-ray results reviewed, patient awaiting admission and aware with plan of care, will continue to monitor.

## 2017-06-11 NOTE — H&P ADULT - PROBLEM SELECTOR PLAN 3
s/p PCI x 3 stent (04', 06' and 12')-last cath 3/15' PCI dLM w/ Impella support  -continue to trend trop, cardiac enzymes   -Place on telemetry   -Continue Toprol XL 25mg po daily   -Lipid panel in AM

## 2017-06-11 NOTE — H&P ADULT - NSHPLABSRESULTS_GEN_ALL_CORE
CXR 6/11/17: see in results; prominent brochovascular markings, mild congestion cannot be excluded; small right effusion.   TTE 11/2016: EF 50-55%, CXR 6/11/17: see in results; prominent brochovascular markings, mild congestion cannot be excluded; small right effusion.   TTE 11/2016: EF 50-55%-  6/11/17 EKG: Afib rate 77, RBBB 10.0   6.7   )-----------( 149      ( 11 Jun 2017 10:15 )             32.0     06-11    141  |  100  |  40<H>  ----------------------------<  78  4.5   |  28  |  2.80<H>    Ca    9.0      11 Jun 2017 10:15  Mg     1.8     06-11    TPro  6.7  /  Alb  2.9<L>  /  TBili  0.4  /  DBili  x   /  AST  15  /  ALT  12  /  AlkPhos  117  06-11  BNP 12626    PT/INR - ( 11 Jun 2017 10:15 )   PT: 56.1 sec;   INR: 4.89          PTT - ( 11 Jun 2017 10:15 )  PTT:46.4 sec      CXR 6/11/17: see in results; prominent brochovascular markings, mild congestion cannot be excluded; small right effusion.   TTE 11/2016: EF 50-55%; Interpretation Summary  Study Quality: Fair.There was technical limitations during this study due   to patient positioning.Left ventricular hypertrophy presentThe left   ventricular ejection fraction is estimated to be 50-55%The left atrium is borderline dilated.The mitral inflow pattern is consistent with impaired left   ventricular relaxation with mildly elevated left atrial pressure (8-14mmHg).  Right atrial size is normal. The right ventricle is normal in size and function.   There is a pacemaker wire in the right heart.  Calcified aortic valve. There is   Moderate to severe aortic stenosis.The calculated aortic valve area indexed to   body surface area is 0.5 cm2/m2.The peak pressure gradient is 45 mmHg.The mean   pressure gradient is 27 mmHg.The calculated stroke volume index is 19   cc/m2 (normal >35cc/m2).The calculated aortic valve area using the continuity   equation is 0.17 cm2.There is mild mitral annular calcification. There is   trace mitral regurgitation.  Structurally normal tricuspid valve. There   is trace tricuspid regurgitation.There is no echocardiographic evidence for pulmonary hypertension.No pulmonic regurgitation noted.No aortic root   dilatation.The inferior vena cava is normal in size (<2.1 cm) with normal   inspiratory collapse (>50%) consistent with normal right atrial pressure. There is no pericardial effusion.  Procedure Details  A complete two-dimensional transthoracic echocardiogram was performed (2D,  M-mode, spectral and color flow doppler).  Study Quality: Fair.  There was technical limitations during this study due to patient   positioning.  Left Ventricle  Left ventricular hypertrophy present  The left ventricular ejection fraction is estimated to be 50-55%  Left Atrium  The mitral inflow pattern is consistent with impaired left ventricular  relaxation with mildly elevated left atrial pressure (8-14mmHg).  The left atrial volume index is 34 cc/m2 (normal <34cc/m2)  The left atrium is borderline dilated.  Right Atrium  Right atrial size is normal.  Right Ventricle  The right ventricle is normal in size and function.  There is a pacemaker wire in the right heart.  Aortic Valve  Calcified aortic valve.  No aortic regurgitation noted.  The peak pressure gradient is 45 mmHg.  The mean pressure gradient is 27 mmHg.  The calculated aortic valve area indexed to body surface area is 0.5   cm2/m2.  There is Moderate to severe aortic stenosis.  The calculated stroke volume index is 19 cc/m2 (normal >35cc/m2).  The calculated aortic valve area using the continuity equation is 0.17   cm2.  Mitral Valve  There is mild mitral annular calcification.  There is trace mitral regurgitation.  Tricuspid Valve  Structurally normal tricuspid valve.  There is trace tricuspid regurgitation.  The pulmonary artery systolic pressure is estimated to be 28 mmHg.  There is no echocardiographic evidence for pulmonary hypertension.  Pulmonic Valve  No pulmonic regurgitation noted.  Arteries and Venous System  No aortic root dilatation.  The inferior vena cava is normal in size (<2.1 cm) with normal inspiratory  collapse (>50%) consistent with normal right atrial pressure.  Pericardium / Pleura  There is no pericardial effusion.    6/11/17 EKG: Afib rate 77, RBBB

## 2017-06-12 DIAGNOSIS — R60.9 EDEMA, UNSPECIFIED: ICD-10-CM

## 2017-06-12 DIAGNOSIS — J81.0 ACUTE PULMONARY EDEMA: ICD-10-CM

## 2017-06-12 DIAGNOSIS — I50.9 HEART FAILURE, UNSPECIFIED: ICD-10-CM

## 2017-06-12 DIAGNOSIS — R06.81 APNEA, NOT ELSEWHERE CLASSIFIED: ICD-10-CM

## 2017-06-12 DIAGNOSIS — G47.9 SLEEP DISORDER, UNSPECIFIED: ICD-10-CM

## 2017-06-12 DIAGNOSIS — I10 ESSENTIAL (PRIMARY) HYPERTENSION: ICD-10-CM

## 2017-06-12 LAB
ALBUMIN SERPL ELPH-MCNC: 3 G/DL — LOW (ref 3.3–5)
ALP SERPL-CCNC: 114 U/L — SIGNIFICANT CHANGE UP (ref 40–120)
ALT FLD-CCNC: 11 U/L — SIGNIFICANT CHANGE UP (ref 10–45)
ANION GAP SERPL CALC-SCNC: 11 MMOL/L — SIGNIFICANT CHANGE UP (ref 5–17)
AST SERPL-CCNC: 14 U/L — SIGNIFICANT CHANGE UP (ref 10–40)
BILIRUB SERPL-MCNC: 0.4 MG/DL — SIGNIFICANT CHANGE UP (ref 0.2–1.2)
BUN SERPL-MCNC: 41 MG/DL — HIGH (ref 7–23)
CALCIUM SERPL-MCNC: 9.1 MG/DL — SIGNIFICANT CHANGE UP (ref 8.4–10.5)
CHLORIDE SERPL-SCNC: 99 MMOL/L — SIGNIFICANT CHANGE UP (ref 96–108)
CHOLEST SERPL-MCNC: 91 MG/DL — SIGNIFICANT CHANGE UP (ref 10–199)
CK MB CFR SERPL CALC: 1.9 NG/ML — SIGNIFICANT CHANGE UP (ref 0–6.7)
CK MB CFR SERPL CALC: 2.2 NG/ML — SIGNIFICANT CHANGE UP (ref 0–6.7)
CK SERPL-CCNC: 30 U/L — SIGNIFICANT CHANGE UP (ref 25–170)
CK SERPL-CCNC: 35 U/L — SIGNIFICANT CHANGE UP (ref 25–170)
CO2 SERPL-SCNC: 31 MMOL/L — SIGNIFICANT CHANGE UP (ref 22–31)
CREAT SERPL-MCNC: 2.9 MG/DL — HIGH (ref 0.5–1.3)
FERRITIN SERPL-MCNC: 92 NG/ML — SIGNIFICANT CHANGE UP (ref 15–150)
FOLATE SERPL-MCNC: 13.7 NG/ML — SIGNIFICANT CHANGE UP (ref 4.8–24.2)
GLUCOSE SERPL-MCNC: 91 MG/DL — SIGNIFICANT CHANGE UP (ref 70–99)
HBA1C BLD-MCNC: 5.7 % — HIGH (ref 4–5.6)
HCT VFR BLD CALC: 34.3 % — LOW (ref 34.5–45)
HDLC SERPL-MCNC: 30 MG/DL — LOW (ref 40–125)
HGB BLD-MCNC: 10.2 G/DL — LOW (ref 11.5–15.5)
INR BLD: 4.11 — HIGH (ref 0.88–1.16)
IRON SATN MFR SERPL: 18 % — SIGNIFICANT CHANGE UP (ref 14–50)
IRON SATN MFR SERPL: 41 UG/DL — SIGNIFICANT CHANGE UP (ref 30–160)
LDH SERPL L TO P-CCNC: 250 U/L — HIGH (ref 50–242)
LIPID PNL WITH DIRECT LDL SERPL: 49 MG/DL — SIGNIFICANT CHANGE UP
MAGNESIUM SERPL-MCNC: 2 MG/DL — SIGNIFICANT CHANGE UP (ref 1.6–2.6)
MCHC RBC-ENTMCNC: 27.1 PG — SIGNIFICANT CHANGE UP (ref 27–34)
MCHC RBC-ENTMCNC: 29.7 G/DL — LOW (ref 32–36)
MCV RBC AUTO: 91.2 FL — SIGNIFICANT CHANGE UP (ref 80–100)
PLATELET # BLD AUTO: 146 K/UL — LOW (ref 150–400)
POTASSIUM SERPL-MCNC: 4.7 MMOL/L — SIGNIFICANT CHANGE UP (ref 3.5–5.3)
POTASSIUM SERPL-SCNC: 4.7 MMOL/L — SIGNIFICANT CHANGE UP (ref 3.5–5.3)
PROT SERPL-MCNC: 7 G/DL — SIGNIFICANT CHANGE UP (ref 6–8.3)
PROTHROM AB SERPL-ACNC: 47 SEC — HIGH (ref 9.8–12.7)
RBC # BLD: 3.76 M/UL — LOW (ref 3.8–5.2)
RBC # FLD: 15.8 % — SIGNIFICANT CHANGE UP (ref 10.3–16.9)
SODIUM SERPL-SCNC: 141 MMOL/L — SIGNIFICANT CHANGE UP (ref 135–145)
TIBC SERPL-MCNC: 231 UG/DL — SIGNIFICANT CHANGE UP (ref 220–430)
TOTAL CHOLESTEROL/HDL RATIO MEASUREMENT: 3 RATIO — LOW (ref 3.3–7.1)
TRIGL SERPL-MCNC: 60 MG/DL — SIGNIFICANT CHANGE UP (ref 10–149)
TROPONIN T SERPL-MCNC: 0.04 NG/ML — HIGH (ref 0–0.01)
TROPONIN T SERPL-MCNC: 0.04 NG/ML — HIGH (ref 0–0.01)
TSH SERPL-MCNC: 2.62 UIU/ML — SIGNIFICANT CHANGE UP (ref 0.35–4.94)
UIBC SERPL-MCNC: 190 UG/DL — SIGNIFICANT CHANGE UP (ref 110–370)
VIT B12 SERPL-MCNC: 314 PG/ML — SIGNIFICANT CHANGE UP (ref 243–894)
WBC # BLD: 5.8 K/UL — SIGNIFICANT CHANGE UP (ref 3.8–10.5)
WBC # FLD AUTO: 5.8 K/UL — SIGNIFICANT CHANGE UP (ref 3.8–10.5)

## 2017-06-12 PROCEDURE — 93306 TTE W/DOPPLER COMPLETE: CPT | Mod: 26

## 2017-06-12 PROCEDURE — 93970 EXTREMITY STUDY: CPT | Mod: 26

## 2017-06-12 PROCEDURE — 93288 INTERROG EVL PM/LDLS PM IP: CPT | Mod: 26

## 2017-06-12 PROCEDURE — 99223 1ST HOSP IP/OBS HIGH 75: CPT | Mod: GC

## 2017-06-12 PROCEDURE — 93010 ELECTROCARDIOGRAM REPORT: CPT

## 2017-06-12 PROCEDURE — 73502 X-RAY EXAM HIP UNI 2-3 VIEWS: CPT | Mod: 26,RT

## 2017-06-12 RX ORDER — CEFTRIAXONE 500 MG/1
1 INJECTION, POWDER, FOR SOLUTION INTRAMUSCULAR; INTRAVENOUS ONCE
Qty: 0 | Refills: 0 | Status: COMPLETED | OUTPATIENT
Start: 2017-06-12 | End: 2017-06-12

## 2017-06-12 RX ORDER — FUROSEMIDE 40 MG
40 TABLET ORAL ONCE
Qty: 0 | Refills: 0 | Status: COMPLETED | OUTPATIENT
Start: 2017-06-12 | End: 2017-06-12

## 2017-06-12 RX ORDER — ACETAMINOPHEN 500 MG
975 TABLET ORAL EVERY 6 HOURS
Qty: 0 | Refills: 0 | Status: DISCONTINUED | OUTPATIENT
Start: 2017-06-12 | End: 2017-06-21

## 2017-06-12 RX ORDER — CEFTRIAXONE 500 MG/1
1 INJECTION, POWDER, FOR SOLUTION INTRAMUSCULAR; INTRAVENOUS EVERY 24 HOURS
Qty: 0 | Refills: 0 | Status: DISCONTINUED | OUTPATIENT
Start: 2017-06-13 | End: 2017-06-19

## 2017-06-12 RX ORDER — FUROSEMIDE 40 MG
40 TABLET ORAL EVERY 12 HOURS
Qty: 0 | Refills: 0 | Status: DISCONTINUED | OUTPATIENT
Start: 2017-06-12 | End: 2017-06-14

## 2017-06-12 RX ORDER — CEFTRIAXONE 500 MG/1
INJECTION, POWDER, FOR SOLUTION INTRAMUSCULAR; INTRAVENOUS
Qty: 0 | Refills: 0 | Status: DISCONTINUED | OUTPATIENT
Start: 2017-06-12 | End: 2017-06-19

## 2017-06-12 RX ADMIN — Medication 975 MILLIGRAM(S): at 14:00

## 2017-06-12 RX ADMIN — Medication 25 MILLIGRAM(S): at 06:45

## 2017-06-12 RX ADMIN — Medication 650 MILLIGRAM(S): at 12:03

## 2017-06-12 RX ADMIN — Medication 975 MILLIGRAM(S): at 12:58

## 2017-06-12 RX ADMIN — Medication 40 MILLIGRAM(S): at 18:19

## 2017-06-12 RX ADMIN — Medication 40 MILLIGRAM(S): at 13:59

## 2017-06-12 RX ADMIN — CEFTRIAXONE 100 GRAM(S): 500 INJECTION, POWDER, FOR SOLUTION INTRAMUSCULAR; INTRAVENOUS at 10:55

## 2017-06-12 RX ADMIN — Medication 650 MILLIGRAM(S): at 10:55

## 2017-06-12 RX ADMIN — Medication 325 MILLIGRAM(S): at 21:15

## 2017-06-12 RX ADMIN — Medication 650 MILLIGRAM(S): at 04:22

## 2017-06-12 RX ADMIN — Medication 40 MILLIGRAM(S): at 06:45

## 2017-06-12 RX ADMIN — Medication 325 MILLIGRAM(S): at 06:45

## 2017-06-12 RX ADMIN — Medication 650 MILLIGRAM(S): at 01:52

## 2017-06-12 NOTE — CONSULT NOTE ADULT - ATTENDING COMMENTS
continue with lasix IV BID for now-  patient did require acute dialysis in past ( 4 treatments per her daughter)  No indication for dialysis at this time.  May need to add metolazone  will also add diamox if CO2 remains elevated

## 2017-06-12 NOTE — PROGRESS NOTE ADULT - PROBLEM SELECTOR PLAN 1
2/2 dietary non-compliance; EF 50-55% on last TTE 2016  BNP on admission 15064  Lungs bibasilar fine rales, edema noted from abdomen to bilateral legs and feet  -Daily weights, strict I/Os (us in place), FR 1L/day  -TTE in AM  -Check TSH in AM   -Trend trop and cardiac enzymes. Initial trop 0.04  -Continue Toprol XL 25mg po daily  -Start Furosemide 40mg IV daily-tomorrow 6/12 2/2 dietary non-compliance; EF 50-55% on last TTE 2016. BNP on admission 08511.  Lungs bibasilar fine rales, edema noted from abdomen to bilateral legs and feet  -Daily weights, strict I/Os (us in place), FR 1L/day  -Pending echo today  -TSH, Lipid panel, HgA1c WNL.  -Trend trop and cardiac enzymes. Trop 0.04 x3.  -Continue Toprol XL 25mg po daily  -Start Furosemide 40mg BID per renal recs

## 2017-06-12 NOTE — PROGRESS NOTE ADULT - SUBJECTIVE AND OBJECTIVE BOX
PUD ATTENDING    87 year old  retired [employer] worker is admitted because she could not get out of bed. She has massive leg edema and severe aortic stenosis.  Her daughter lives in the same apartment. She has a helper who visits 5 days per week. She has dyspnea and her CXR shows intrathoracic edema. Today she is agitated and does not want to answer many questions    PAST MEDICAL & SURGICAL HISTORY:  PVD (peripheral vascular disease)  Iron deficiency anemia  Lupus (systemic lupus erythematosus)  Paroxysmal atrial fibrillation  Aortic stenosis, severe  CKD (chronic kidney disease) stage 4, GFR 15-29 ml/min  Diabetes  HTN (hypertension)  CHF (congestive heart failure)  CAD (coronary artery disease)  COPD (chronic obstructive pulmonary disease)  History of total knee replacement:   Presence of cardiac pacemaker: at [facility] by Dr Escoto    FAMILY HISTORY:  No pertinent family history in first degree relatives  fa cirrhosis 58  mo DM 80s    SOCIAL HISTORY:  quit smoking at age 30; has a son and a daughter    REVIEW OF SYSTEMS:  Constitutional: +weight change, -weight loss, EDEMA -fever, -chills, -fatigue, -night sweats  Eyes: -discharge, -eye pain, -visual change  ENT:  +hearing difficulty, -tinnitus, -vertigo, -sinus pain, -throat pain, -epistaxis, -dysphagia, -hoarseness  Neck: -pain, -stiffness, -neck swelling  Respiratory: -cough, -wheezing, -hemoptysis      Cardiovascular: -chest pain, -dysrhythmia, -palpitations, -dizziness   Gastrointestinal: -abdominal pain, -nausea, -vomiting, -hematemesis, -diarrhea, -constipation. -melena  Genitourinary: -dysuria  DENIED, -frequency, -hematuria, -incontinence      Neurologic: -headache, -memory loss, +loss of strength, -numbness, -tremor     Skin: -itching, -burning, +rash, -lesions   Lymphatic: -enlarged lymph nodes  Endocrine: -hair loss, -temperature intolerance         Musculoskeletal: -back pain, -joint pain, -extremity pain  Psychiatric: -visual change, -auditory change, -depression, -anxiety, -suicidal  Sleep: ++disorder, -insomnia, -sleep deprivation  Heme/Lymph: -easy bruising, -bleeding gums            Allergy and Immunologic: -hives, -eczema    Vital Signs Last 24 Hrs  T(C): 36.3, Max: 37.2 (06-11 @ 16:02)  T(F): 97.3, Max: 99 (06-11 @ 16:02)  HR: 70 (64 - 73)  BP: 106/53 (100/55 - 127/58)  BP(mean): 80 (74 - 97)  RR: 24 (16 - 24)  SpO2: 98% (95% - 98%)    I&O's Detail  I & Os for 24h ending 2017 07:00  =============================================  IN:    Oral Fluid: 240 ml    Total IN: 240 ml  ---------------------------------------------  OUT:    Indwelling Catheter - Urethral: 1150 ml    Total OUT: 1150 ml  ---------------------------------------------  Total NET: -910 ml    I & Os for current day (as of 2017 09:49)  =============================================  IN:    Total IN: 0 ml  ---------------------------------------------  OUT:    Indwelling Catheter - Urethral: 300 ml    Total OUT: 300 ml  ---------------------------------------------  Total NET: -300 ml    PHYSICAL EXAM:  in bed, on monitor, agitated, hearing difficulties  Obese, well developed, uncomfortable, - acute distress; vital signs are monitored continuously  Eyes: PERRLA, EOMI, -conjunctivitis, -scleritis   Head: no focal deficit, normocephalic,  no trauma  ENMT: DRY tongue, no thrush, -nasal discharge, -hoarseness, normal hearing, -cough, -hemoptysis, trachea midline  Neck: supple, - lymphadenopathy,  -masses, -JVD  Respiratory: bilateral diminished breath sounds, -wheezing, -rhonchi, -rales, -crackles  Chest: -accessory muscle use, -paradoxical breathing  Cardiovascular: irregular rate and afib; pacer FROM 2011, S1 S2 normal, -S3, -S4, 4/6 SYSTOLIC murmur, -gallop, -rub  Gastrointestinal: soft, nontender, nondistended, normal bowel sounds, no hepatosplenomegaly  Genitourinary: -flank pain, -dysuria  Extremities: -clubbing, -cyanosis, +++edema and erythema   Vascular: peripheral pulses palpable 2+ bilaterally  Neurological: alert, oriented x 3, no focal deficit, -tremor   Skin: warm, dry, -erythema, iv sites intact  Lymph nodes; no cervical, supraclavicular or axillary adenopathy  Psychiatric: agitated and somewhat cooperative    MEDICATIONS  (STANDING):  metoprolol succinate ER 25milliGRAM(s) Oral daily  ferrous    sulfate 325milliGRAM(s) Oral every 8 hours  furosemide   Injectable 40milliGRAM(s) IV Push every 12 hours    MEDICATIONS  (PRN):  acetaminophen   Tablet. 650milliGRAM(s) Oral every 6 hours PRN Mild Pain (1 - 3)    Allergies  No Known Allergies  Intolerances    LABS:                        10.2   5.8   )-----------( 146      ( 2017 05:20 )             34.3     06-12    141  |  99  |  41<H>  ----------------------------<  91  4.7   |  31  |  2.90<H>    Ca    9.1      2017 05:27  Mg     2.0     06-12  TPro  7.0  /  Alb  3.0<L>  /  TBili  0.4  /  DBili  x   /  AST  14  /  ALT  11  /  AlkPhos  114  06-12  PT/INR - ( 2017 05:20 )   PT: 47.0 sec;   INR: 4.11     PTT - ( 2017 10:15 )  PTT:46.4 sec  Urinalysis Basic - ( 2017 20:57 )    Color: Yellow / Appearance: Cloudy / S.020 / pH: x  Gluc: x / Ketone: NEGATIVE  / Bili: NEGATIVE / Urobili: 0.2 E.U./dL   Blood: x / Protein: 30 mg/dL / Nitrite: NEGATIVE   Leuk Esterase: Large / RBC: Many /HPF / WBC Many /HPF   Sq Epi: x / Non Sq Epi: Many /HPF / Bacteria: Many /HPF    +DVT prophylaxis INR  +Sleep POOR  +Nutrition goals  ORAL  -Pain  AT HOME EXTRASTRENGTH TYLENOL, "EVERYTHING HURTS"  -Decubital ulcer  +GI prophylaxis (PPV, coagulopathy, Hx)  +Aspiration prophylaxis (45 degrees)  +Sedation/analgesia stopped once  +ID (phos, CH, mupi, SB)  -Delirium  +Cardiac Beta  +Prevention  +Education  +Medication reviewed (drug-drug interactions, PDA)  Medical devices  AROLDO KISER    Discussed with PGY, CCRN, Dr Soto    RADIOLOGY & ADDITIONAL STUDIES:    CXR with intrathoracic edema

## 2017-06-12 NOTE — DIETITIAN INITIAL EVALUATION ADULT. - DIET TYPE
consistent carbohydrate (no snacks)/renal replacement pts:no protein restr,no conc K & phos, low sodium/DASH/TLC (sodium and cholesterol restricted diet)/1000ml

## 2017-06-12 NOTE — PROGRESS NOTE ADULT - PROBLEM SELECTOR PLAN 4
SR on telemetry currently  Supratherapeutic INR on admission 4.8. INR 4.11 today.  -hold Coumadin tonight  -s/p PPM 2011 2/2 symptomatic bradycardia. Awaiting EP PPM interrogation.  -Daily PT/INR   -Continue Toprol XL 25mg po daily

## 2017-06-12 NOTE — PROGRESS NOTE ADULT - SUBJECTIVE AND OBJECTIVE BOX
Electrophysiology Device Interrogation       Indication: CHF    Device model: 	[employer] Accent			                                  Functioning Mode: 	DDDR 65/110	    Underlying Rhythm:  afib    Pacemaker dependency: no   AP<1%, 65%    Battery status: 2.4-4.2 years    Interrogating parameters:   				RA			RV			   Sense:                                   4.3mV                         >12mV  Threshold:                             in afib                             0.5V@0.4ms                                                          Pacing Impedance:                 400                                  460                                                                                                                                                                        Events/Alert:  none    Parameter change: none	     Assessment: Pacemaker interrogation shows normal function.  All measured data shows normal function.  No events for review.  In afib 62% of the time on Coumadin.  In afib for past few days.  No other events for review.  patient educated on importance of following up for device checks.  No other EP intervention needed.  Please call [phone] with any questions or concerns

## 2017-06-12 NOTE — DIETITIAN INITIAL EVALUATION ADULT. - ENERGY NEEDS
Height: 5'6" Weight: 279.5lbs, IBW 130lbs+/-10%, %%, BMI 45  IBW used for calculations as pt >120% of IBW   Needs adjusted for CKD stage 4. Fluid needs per MD 2/2 CKD & current diuresis Height: 5'6" Weight: 279.5lbs, IBW 130lbs+/-10%, %%, BMI 45  IBW used for calculations as pt >120% of IBW   Needs adjusted for CKD stage 4. Fluid needs as estimated or per MD 2/2 CKD & current diuresis

## 2017-06-12 NOTE — PROGRESS NOTE ADULT - PROBLEM SELECTOR PLAN 2
Non rheumatic heart disease  -Dr. Soto discussed with Dr. Barger- awaiting Echo today and consider additional ischemic eval if TAVR is needed

## 2017-06-12 NOTE — CONSULT NOTE ADULT - PROBLEM SELECTOR RECOMMENDATION 2
Hgb acceptable @ 10.0 with iron sat @ 18percent  Cholesterol levels acceptable  Check phosphorus levels

## 2017-06-12 NOTE — PROGRESS NOTE ADULT - PROBLEM SELECTOR PLAN 2
non rheumatic heart disease  -Dr. Soto discussed with Dr. Barger-plan for TTE tomorrow and consider additional ischemic eval if TAVR is needed Non rheumatic heart disease  -Dr. Soto discussed with Dr. Barger- awaiting Echo today and consider additional ischemic eval if TAVR is needed

## 2017-06-12 NOTE — CONSULT NOTE ADULT - PROBLEM SELECTOR RECOMMENDATION 9
recommend aggressive iv diuresis of 40 mg iv lasix bid recommend aggressive iv diuresis of 40 mg iv lasix bid  may need to add diamox if alkalemia ( bicarb 31)  does not improve.

## 2017-06-12 NOTE — CHART NOTE - NSCHARTNOTEFT_GEN_A_CORE
Upon Nutritional Assessment by the Registered Dietitian your patient was determined to meet criteria / has evidence of the following diagnosis/diagnoses:          [ ]  Mild Protein Calorie Malnutrition        [ ]  Moderate Protein Calorie Malnutrition        [ ] Severe Protein Calorie Malnutrition        [ ] Unspecified Protein Calorie Malnutrition        [ ] Underweight / BMI <19        [ X] Morbid Obesity / BMI > 40      Findings as based on:  •  Comprehensive nutrition assessment and consultation    Treatment:    The following diet has been recommended:    DASH/ CHO consistent w/ Snack/ 60g pro/ 1000mL fluid restriction   Suplena BID 2/2 poor PO intake     PROVIDER Section:     By signing this assessment you are acknowledging and agree with the diagnosis/diagnoses assigned by the Registered Dietitian    Comments:

## 2017-06-12 NOTE — PROGRESS NOTE ADULT - PROBLEM SELECTOR PLAN 3
s/p PCI x 3 stent (04', 06' and 12')-last cath 3/15' PCI dLM w/ Impella support  -continue to trend trop, cardiac enzymes   -Place on telemetry   -Continue Toprol XL 25mg po daily   -Lipid panel in AM s/p PCI x 3 stent (04', 06' and 12')-last cath 3/15' PCI dLM w/ Impella support  -Trop 0.04 x3.  -Cont on telemetry   -Continue Toprol XL 25mg po daily   -Lipid panel WNL

## 2017-06-12 NOTE — CONSULT NOTE ADULT - SUBJECTIVE AND OBJECTIVE BOX
Pt is an 87 year old female, baseline cr 2.7,  ckd stage 4, h/o of requiring HD x 4 session ' @ [facility], admitted for sob and unable to get out of bed. Pt has gained 17 lbs over the past month (from 258 to 275) . pt currently sitting up in bed,noting significant lower extremity edema.   Pt denies nausea vomiting diarrhea sob or chest pains.       PAST MEDICAL & SURGICAL HISTORY:  PVD (peripheral vascular disease)  Iron deficiency anemia  Lupus (systemic lupus erythematosus)  Paroxysmal atrial fibrillation  Aortic stenosis, severe  CKD (chronic kidney disease) stage 4, GFR 15-29 ml/min  Diabetes  HTN (hypertension)  CHF (congestive heart failure)  CAD (coronary artery disease)  COPD (chronic obstructive pulmonary disease)  History of total knee replacement:   Presence of cardiac pacemaker: at [facility] by Dr Escoto      MEDICATIONS  (STANDING):  metoprolol succinate ER 25milliGRAM(s) Oral daily  ferrous    sulfate 325milliGRAM(s) Oral every 8 hours  furosemide   Injectable 40milliGRAM(s) IV Push every 12 hours  cefTRIAXone   IVPB  IV Intermittent     MEDICATIONS  (PRN):  acetaminophen   Tablet. 650milliGRAM(s) Oral every 6 hours PRN Mild Pain (1 - 3)      Allergies    No Known Allergies    Intolerances        SOCIAL HISTORY:    FAMILY HISTORY:  No pertinent family history in first degree relatives      T(C): , Max: 37.2 ( @ 16:02)  T(F): , Max: 99 ( @ 16:02)  HR: 70  BP: 106/53  BP(mean): 80  RR: 24  SpO2: 98%  Wt(kg): --  I & Os for 24h ending  @ 07:00  =============================================  IN: 240 ml / OUT: 1150 ml / NET: -910 ml    I & Os for current day (as of  @ 11:51)  =============================================  IN: 0 ml / OUT: 300 ml / NET: -300 ml    Height (cm): 167.6 ( @ 06:44)  Weight (kg): 126.8 ( @ 06:44)  BMI (kg/m2): 45.1 ( @ 06:44)  BSA (m2): 2.31 ( @ 06:44)    PHYSICAL EXAM:  Constitutional: ill  appearning.  No acute distress  ENMT: Moist mucous membrane.  No cyanosis.  Neck: Supple. No JVD.  Back: No CVA tenderness  Respiratory decreased breath sounds @ bases    Cardiovascular: S1, S2.  Regular rate and rhythm.    Gastrointestinal: soft, non-tender, non-distended, normal bowel sounds  Extremities: Warm.  chronic venous distention b/;      LABS:                        10.2   5.8   )-----------( 146      ( 2017 05:20 )             34.3         141  |  99  |  41<H>  ----------------------------<  91  4.7   |  31  |  2.90<H>    Ca    9.1      2017 05:27  Mg     2.0         TPro  7.0  /  Alb  3.0<L>  /  TBili  0.4  /  DBili  x   /  AST  14  /  ALT  11  /  AlkPhos  114      Cholesterol, Serum: 91 mg/dL [10 - 199] ( @ 05:27)  Creatine Kinase, Serum: 30 U/L [25 - 170] ( @ 05:27)    PT/INR - ( 2017 05:20 )   PT: 47.0 sec;   INR: 4.11          PTT - ( 2017 10:15 )  PTT:46.4 sec  Urinalysis Basic - ( 2017 20:57 )    Color: Yellow / Appearance: Cloudy / S.020 / pH: x  Gluc: x / Ketone: NEGATIVE  / Bili: NEGATIVE / Urobili: 0.2 E.U./dL   Blood: x / Protein: 30 mg/dL / Nitrite: NEGATIVE   Leuk Esterase: Large / RBC: Many /HPF / WBC Many /HPF   Sq Epi: x / Non Sq Epi: Many /HPF / Bacteria: Many /HPF            RADIOLOGY & ADDITIONAL STUDIES: Pt is an 87 year old female, baseline cr 2.7,  ckd stage 4, h/o of requiring HD x 4 session ' @ [facility], admitted for sob and unable to get out of bed. Pt has gained 17 lbs over the past month (from 258 to 275) . pt currently sitting up in bed,noting significant lower extremity edema.   Pt denies nausea vomiting diarrhea sob or chest pains.       PAST MEDICAL & SURGICAL HISTORY:  PVD (peripheral vascular disease)  Iron deficiency anemia  Lupus (systemic lupus erythematosus)  Paroxysmal atrial fibrillation  Aortic stenosis, severe  CKD (chronic kidney disease) stage 4, GFR 15-29 ml/min  Diabetes  HTN (hypertension)  CHF (congestive heart failure)  CAD (coronary artery disease)  COPD (chronic obstructive pulmonary disease)  History of total knee replacement:   Presence of cardiac pacemaker: at [facility] by Dr Escoto      MEDICATIONS  (STANDING):  metoprolol succinate ER 25milliGRAM(s) Oral daily  ferrous    sulfate 325milliGRAM(s) Oral every 8 hours  furosemide   Injectable 40milliGRAM(s) IV Push every 12 hours  cefTRIAXone   IVPB  IV Intermittent     MEDICATIONS  (PRN):  acetaminophen   Tablet. 650milliGRAM(s) Oral every 6 hours PRN Mild Pain (1 - 3)      Allergies    No Known Allergies    Intolerances        SOCIAL HISTORY:    FAMILY HISTORY:  No pertinent family history in first degree relatives      T(C): , Max: 37.2 ( @ 16:02)  T(F): , Max: 99 ( @ 16:02)  HR: 70  BP: 106/53  BP(mean): 80  RR: 24  SpO2: 98%  Wt(kg): --  I & Os for 24h ending  @ 07:00  =============================================  IN: 240 ml / OUT: 1150 ml / NET: -910 ml    I & Os for current day (as of  @ 11:51)  =============================================  IN: 0 ml / OUT: 300 ml / NET: -300 ml    Height (cm): 167.6 ( @ 06:44)  Weight (kg): 126.8 ( @ 06:44)  BMI (kg/m2): 45.1 ( @ 06:44)  BSA (m2): 2.31 ( @ 06:44)    PHYSICAL EXAM:  Constitutional: ill  appearning.  No acute distress  ENMT: Moist mucous membrane.  No cyanosis.  Neck: Supple. No JVD.  Back: No CVA tenderness  Respiratory decreased breath sounds @ bases    Cardiovascular: S1, S2.  Regular rate and rhythm. +murmur   Gastrointestinal: soft, non-tender, non-distended, normal bowel sounds  Extremities: Warm.  chronic venous distention bilat; + edema     LABS:                        10.2   5.8   )-----------( 146      ( 2017 05:20 )             34.3         141  |  99  |  41<H>  ----------------------------<  91  4.7   |  31  |  2.90<H>    Ca    9.1      2017 05:27  Mg     2.0         TPro  7.0  /  Alb  3.0<L>  /  TBili  0.4  /  DBili  x   /  AST  14  /  ALT  11  /  AlkPhos  114      Cholesterol, Serum: 91 mg/dL [10 - 199] ( @ 05:27)  Creatine Kinase, Serum: 30 U/L [25 - 170] ( @ 05:27)    PT/INR - ( 2017 05:20 )   PT: 47.0 sec;   INR: 4.11          PTT - ( 2017 10:15 )  PTT:46.4 sec  Urinalysis Basic - ( 2017 20:57 )    Color: Yellow / Appearance: Cloudy / S.020 / pH: x  Gluc: x / Ketone: NEGATIVE  / Bili: NEGATIVE / Urobili: 0.2 E.U./dL   Blood: x / Protein: 30 mg/dL / Nitrite: NEGATIVE   Leuk Esterase: Large / RBC: Many /HPF / WBC Many /HPF   Sq Epi: x / Non Sq Epi: Many /HPF / Bacteria: Many /HPF            RADIOLOGY & ADDITIONAL STUDIES:

## 2017-06-12 NOTE — PROGRESS NOTE ADULT - PROBLEM SELECTOR PLAN 8
and thrombocytopenia   -check fe studies and b12/folate  -consider Hematology eval and thrombocytopenia   -check Fe studies and b12/folate  -consider Hematology eval

## 2017-06-12 NOTE — PROGRESS NOTE ADULT - ASSESSMENT
88 yo female with pmh of chronic diastolic CHF (EF 50-55% 11/16'), mod to severe AS (ALEKS 0.5cm)-not a surgical candidate, pafib (on Coumadin), CAD s/p PCI x 3 stent (04', 06' and 12')-last cath 3/15' PCI dLM w/ Impella support, Lupus, COPD (prior 30yr ppk hx), PNA, PPM 2011 for bradycardia, CKD (baseline Cr 2-3), hx of Renal failure requiring HD x 4 session 8/16' @ [facility], HTN, PVD, and Type 2 DM (no longer on meds), MONICA, and TKR 03' presents to [facility] with cc of inability to get OOB x 3 days, Admitted for CHF exacerbation.

## 2017-06-12 NOTE — PROGRESS NOTE ADULT - ASSESSMENT
1) acute on chronic diastolic chf - now mildly reduced lvef with basal septal wma not previously described - elevated troponin without change  -supratherapeutic inr  -continue o>i as tolerated - lasix dosing per renal  -stable elevated troponin - check v/q; consider ischemia eval when cr imporoves  -hold ace-i/arb with kidney disease  2) nonrheumatic aortic stenosis  -awaiting Dr. Barger evaluation  3) cad s/p pci  -clarify home rx - rec asa and statin if no contraindications  4) afib - holding coumandin  5) s/p ppm - appreciate ep recs  6) htn - monitor on rx as tolerated  7) copd - Pulm following - note pulmonary htn  8) dm-2 - monitor blood sugar control; consider endo eval  9) lupus - clarify home rx  10) pvd per chart - please obtain old records  11) kidney failure - on antb; renal following  12) normocytic anemia and thrombocytopenia - monitor for now  13) maintain K>4, Mg>2  d/w [place]

## 2017-06-12 NOTE — PROGRESS NOTE ADULT - SUBJECTIVE AND OBJECTIVE BOX
Patient is a 87y old  Female who presents with a chief complaint of unable to get out of bed x 3 days - known chf      HPI:  87 yof - h/o chronic diastolic CHF (EF 50-55% ), mod to severe AS (ALEKS 0.5cm), afib, CAD s/p PCI (04', ' and 12' - last cath 3/15 PCI dLM w/ Impella support), Lupus, COPD (prior smoker), PNA, PPM , CKD, prior episode of renal failure requiring HD, HTN, PVD, and Type 2 DM, MONICA, and TKR 03' - unable to get OOB x 3 days. Pt reported "legs were too weak". History by patient unclear.  Denies acute dyspnea at rest or cp.  Recent fatigue and exertional dyspnea with minimal exertion per chart.  Recent weight gain.      interval - no cp now; events noted      PAST MEDICAL & SURGICAL HISTORY:  PVD (peripheral vascular disease)  Iron deficiency anemia  Lupus (systemic lupus erythematosus)  Paroxysmal atrial fibrillation  Aortic stenosis, severe  CKD (chronic kidney disease) stage 4, GFR 15-29 ml/min  Diabetes  HTN (hypertension)  CHF (congestive heart failure)  CAD (coronary artery disease)  COPD (chronic obstructive pulmonary disease)  History of total knee replacement:   Presence of cardiac pacemaker: at [facility] by Dr Escoto    MEDICATIONS  (STANDING):  metoprolol succinate ER 25milliGRAM(s) Oral daily  ferrous    sulfate 325milliGRAM(s) Oral every 8 hours  furosemide   Injectable 40milliGRAM(s) IV Push every 12 hours  cefTRIAXone   IVPB  IV Intermittent     MEDICATIONS  (PRN):  acetaminophen   Tablet. 975milliGRAM(s) Oral every 6 hours PRN Moderate Pain (4 - 6)      ICU Vital Signs Last 24 Hrs  T(C): 36.3, Max: 36.3 (06-12 @ 05:19)  T(F): 97.4, Max: 97.4 (06-12 @ 18:35)  HR: 64 (64 - 72)  BP: 105/56 (100/55 - 116/75)  BP(mean): 78 (73 - 97)  ABP: --  ABP(mean): --  RR: 17 (16 - 24)  SpO2: 95% (92% - 98%)      PHYSICAL EXAM:  Constitutional: nad; arousable    Eyes:clear sclerae    ENMT:no nasal congestion    Neck:jugular vein exam obscured by habitus    Respiratory: decreased breath sounds    Cardiovascular: rr; systolic murmur present at base toward carotid; decreased s2    Gastrointestinal:soft abd; nt    Extremities: le edema present - pitting    Vascular:ue pulses present    Neurological: grossly nonfocal    Skin:warm    Psychiatric:no acute agitation     INTERPRETATION OF TELEMETRY:reviewed    EC2017 ecg reviewed on 2017    I&O's Detail  I & Os for 24h ending 2017 07:00  =============================================  IN:    Oral Fluid: 240 ml    Total IN: 240 ml  ---------------------------------------------  OUT:    Indwelling Catheter - Urethral: 1150 ml    Total OUT: 1150 ml  ---------------------------------------------  Total NET: -910 ml    I & Os for current day (as of 2017 22:45)  =============================================  IN:    Oral Fluid: 180 ml    Total IN: 180 ml  ---------------------------------------------  OUT:    Indwelling Catheter - Urethral: 950 ml    Total OUT: 950 ml  ---------------------------------------------  Total NET: -770 ml        LABS:                                   10.2   5.8   )-----------( 146      ( 2017 05:20 )             34.3   06-12    141  |  99  |  41<H>  ----------------------------<  91  4.7   |  31  |  2.90<H>    Ca    9.1      2017 05:27  Mg     2.0         TPro  7.0  /  Alb  3.0<L>  /  TBili  0.4  /  DBili  x   /  AST  14  /  ALT  11  /  AlkPhos  114      I&O's Summary  I & Os for 24h ending 2017 07:00  =============================================  IN: 240 ml / OUT: 1150 ml / NET: -910 ml    I & Os for current day (as of 2017 22:45)  =============================================  IN: 180 ml / OUT: 950 ml / NET: -770 ml      BNPSerum Pro-Brain Natriuretic Peptide: 36929 pg/mL ( @ 10:15)    RADIOLOGY & ADDITIONAL STUDIES:    EXAM:  ECHOCARDIOGRAM W CONTRAST                          PROCEDURE DATE:  2017                        INTERPRETATION:  Patient Height: 167.6 cm  Patient Weight: 131.5 kg  Heart Rate: 108 bpm  Systolic Pressure: 100 mmHg  Diastolic Pressure: 60 mmHg  BSA: 2.3 m^2  Interpretation Summary  There is moderate concentric left ventricular hypertrophy. Hypokinesis of   the   basal septal region. Normal motion in the remaining regions.  The left   ventricular ejection fraction is 50%.The left atrium is severely   dilated.The   right atrium is markedly dilated.The right ventricle is not well   visualized.Calcified aortic valve. There is Severe aortic stenosis.The   calculated aortic valve area using the continuity equation is 0.55   cm2.The   peak pressure gradient is 71 mmHg.The mean pressure gradient is 43   mmHg.The   calculated stroke volume index is 27 cc/m2 (normal >35cc/m2).The   dimensionless   index (ratio of LVOTvelocity to aortic velocity) was calculated to be   0.17.   There ismoderate mitral annular calcification. There is mild mitral   regurgitation.  There is mild to moderate tricuspid regurgitation.There   is   moderate pulmonary hypertension. The pulmonary artery systolic pressure   is   estimated to be 52 mmHg.  No aortic root dilatation.There is a pacemaker   wire   in the right heart.The IVC is dilated (>2.1 cm) with an abnormal   inspiratory   collapse (<50%) consistent with elevated right atrial pressure.  There   is no   pericardial effusion.Left pleural effusion noted.  Procedure Details  A complete two-dimensional transthoracic echocardiogram was performed (2D,  M-mode, spectral and color flow doppler).  Study Quality: Good.  After verbal consent obtained, contrast injection of 2ml of diluted   Definity  contrast (1.3ml Definity diluted in 8.7ml Saline) were given to enhance  endocardial resolution.  Left Ventricle  There is moderate concentric left ventricular hypertrophy.  Hypokinesis of the basal septal region. Normal motion in the remaining  regions.  The left ventricular ejection fraction is 50%.  Left Atrium  The left atrium is severely dilated.  Right Atrium  The right atrium is markedly dilated.  Right Ventricle  The right ventricle is not well visualized.  There is a pacemaker wire in the right heart.  Aortic Valve  Calcified aortic valve.  No aortic regurgitation noted.  The calculated stroke volume index is 27 cc/m2 (normal >35cc/m2).  The peak pressure gradient is 71 mmHg.  The mean pressure gradient is 43 mmHg.  The calculated aortic valve area using the continuity equation is 0.55   cm2.  The dimensionless index (ratio of LVOTvelocity to aortic velocity) was  calculated to be 0.17.  There is Severe aortic stenosis.  Mitral Valve  There is moderate mitral annular calcification.  There is mild mitral regurgitation.  Tricuspid Valve  Structurally normal tricuspid valve.  There is mild to moderate tricuspid regurgitation.  The pulmonary artery systolic pressure is estimated to be 52 mmHg.  There is moderate pulmonary hypertension.  Pulmonic Valve  No pulmonic regurgitation noted.  Arteries and Venous System  No aortic root dilatation.  The IVC is dilated (>2.1 cm) with an abnormal inspiratory collapse (<50%)  consistent with elevated right atrial pressure.  Pericardium/ Pleura  There is no pericardial effusion.  Left pleural effusion noted.  Doppler Measurements & Calculations  MV dec time: 0.1 sec  Ao V2 mean: 295.8 cm/sec  Ao mean P.8 mmHg  Ao mean PG (full): 39.5 mmHg  Ao V2 VTI: 109.0 cm  ALEKS(I,A): 0.6 cm^2  ALEKS(I,D): 0.6 cm^2  LV max P.4 mmHg  LV mean P.3 mmHg  LV V1 max: 77.9 cm/sec  LV V1 mean: 53.4 cm/sec  LV V1 VTI: 20.1 cm  SV(Ao): 1115.9 ml  SI(Ao): 476.4 ml/m^2  SV(LVOT): 64.8 ml  SI(LVOT): 27.7 ml/m^2  TR Max graciela: 315.4 cm/sec  MMode 2D Measurements & Calculations  IVSd: 1.6 cm  LVIDd: 5.2 cm  LVIDs: 4.1 cm  LVPWd: 1.6 cm  IVS/LVPW: 1.0  FS: 20.9 %  EDV(Teich): 128.4 ml  ESV(Teich): 74.2 ml  LV mass(C)d: 373.3 grams  LV mass(C)dI: 159.4 grams/m^2  SI(cubed): 30.0 ml/m^2  Ao root diam: 3.6 cm  Ao root area: 10.2 cm^2  LA dimension: 5.2 cm  LA/Ao: 1.4  LVOT diam: 2.0 cm  LVOT area: 3.2 cm^2  LVOT area (M): 3.1 cm^2  LVLd ap4: 5.8 cm  EDV(MOD-sp4): 97 ml  LVLs ap4: 5.1 cm  ESV(MOD-sp4): 50 ml  EF(MOD-sp4): 48.5 %  SV(MOD-sp4): 47ml  SI(MOD-sp4): 20.1 ml/m^2  Interpreting Physician:Verena Nathan MD electronically signed on   2017 16:18:05                "Thank you for the opportunity to participate in the care of this   patient."        VERENA NATHAN M.D., ATTENDINGCARDIOLOGIST  This document has been electronically signed. 2017  4:18PM      EXAM:  US DPLX LWR EXT VEINS COMPL BI                          PROCEDURE DATE:  2017                     INTERPRETATION:  VENOUS DUPLEX DOPPLER OF BOTH LOWER EXTREMITIES dated   2017 3:07 PM    INDICATION: Bilateral lower summary swelling, right leg pain. Rule out   DVT.    TECHNIQUE: Duplex Doppler evaluation including gray-scale ultrasound   imaging, color flow Doppler imaging, and Doppler spectral analysis of the   veins of both lower extremities was performed.     COMPARISON: Partially Doppler 2015 and 3/5/2015    FINDINGS:    Thigh veins: The common femoral, femoral, popliteal, proximal greater   saphenous, and proximal deep femoral veins are patent and free of   thrombus bilaterally. The veins are normally compressibleand have normal   phasic flow and augmentation response.    Calf veins: The paired peroneal and posterior tibial calf veins were not   well visualized due to extensive soft tissue edema.    IMPRESSION:  No deep vein thrombosis seen above the knees.    Soft tissue edema in both lower extremities.            "Thank you for the opportunity to participate in the care of this   patient."    SRIDEVI BAUTISTA M.D., RADIOLOGY RESIDENT  This document has been electronically signed.  SOFÍA VILCHIS M.D., ATTENDING RADIOLOGIST  This document has been electronically signed. 2017  5:03PM          EXAM:  XR CHEST 1 VIEW PORT URGENT                          PROCEDURE DATE:  2017                     INTERPRETATION:  Clinical History: Congestion    Portable examination of the chest demonstrates cardiomegaly. Pacemaker   overlies left chest wall. Tips of pacer wires overlie right atrium and   right ventricle respectively. Prominent bronchovascular markings. Mild   congestion cannot be excluded. Dextroscoliosis thoracic spine with   degenerative changes. Calcification noted involving thoracic aorta.    Impression: Prominent bronchovascular markings. Mild congestion cannot be   excluded. Small right effusion              "Thank you for the opportunity to participate in the care of this   patient."        KATHARINA MOROCHO M.D., ATTENDING RADIOLOGIST  This document has been electronically signed. 2017 11:02AM    EXAM:  ECHOCARDIOGRAM (CARDIOL)       PROCEDURE DATE:  2016                    INTERPRETATION:  Patient Height: 167.6 cm  Patient Weight: 90.7 kg  Systolic Pressure: 130 mmHg  Diastolic Pressure: 64 mmHg  BSA: 2.0 m^2  Interpretation Summary  Study Quality: Fair.There was technical limitations during this study due   to   patient positioning.Left ventricular hypertrophy presentThe left   ventricular   ejection fraction is estimated to be 50-55%The left atrium is borderline   dilated.The mitral inflow pattern is consistent with impaired left   ventricular   relaxation with mildly elevated left atrial pressure (8-14mmHg).  Right   atrial   size is normal. The right ventricle is normal in size and function.   There is a   pacemaker wire in the right heart.  Calcified aortic valve. There is   Moderate   to severe aortic stenosis.The calculated aortic valve area indexed to   body   surface area is 0.5 cm2/m2.The peak pressure gradient is 45 mmHg.The mean   pressure gradient is 27 mmHg.The calculated stroke volume index is 19   cc/m2   (normal >35cc/m2).The calculated aortic valve area using the continuity   equation is 0.17 cm2.There is mild mitral annular calcification. There is   trace mitral regurgitation.  Structurally normal tricuspid valve. There   is   trace tricuspid regurgitation.There is no echocardiographic evidence for   pulmonary hypertension.No pulmonic regurgitation noted.No aortic root   dilatation.The inferior vena cava is normal in size (<2.1 cm) with normal  inspiratory collapse (>50%) consistent with normal right atrial pressure.   There is no pericardial effusion.

## 2017-06-12 NOTE — PROGRESS NOTE ADULT - PROBLEM SELECTOR PLAN 3
s/p PCI x 3 stent (04', 06' and 12')-last cath 3/15' PCI dLM w/ Impella support  -Trop 0.04 x3.  -Cont on telemetry   -Continue Toprol XL 25mg po daily   -Lipid panel WNL

## 2017-06-12 NOTE — PROGRESS NOTE ADULT - PROBLEM SELECTOR PLAN 7
Baseline Cr per daughter 2-3  On admission, Cr 2.8  -Strict I/Os-us catheter in place  -Renally dose all meds  -Renal consulted. F/u recs. (Dr. Ramirez-her outpt Nephrologist)  - UA with positive nitrites, WBCs and bacteria. UCx w/ GNR. Started on Ceftriaxone 1g qd.

## 2017-06-12 NOTE — PROGRESS NOTE ADULT - PROBLEM SELECTOR PLAN 5
former smoker (quit 5 yrs ago) 30 yr ppk hx  -Call Dr. Mekhi Trejo for evaluation former smoker (quit 5 yrs ago) 30 yr ppk hx  - Pulm Dr. Gaming consulted

## 2017-06-12 NOTE — PROGRESS NOTE ADULT - PROBLEM SELECTOR PLAN 4
SR on telemetry currently  Supratherapeutic INR on admission, 4.8  -hold Coumadin tonight  s/p PPM 2011 2/2 symptomatic bradycardia  -Will call EP to check PPM in AM  -Daily PT/INR   -Continue Toprol XL 25mg po daily SR on telemetry currently  Supratherapeutic INR on admission 4.8. INR 4.11 today.  -hold Coumadin tonight  -s/p PPM 2011 2/2 symptomatic bradycardia. Awaiting EP PPM interrogation.  -Daily PT/INR   -Continue Toprol XL 25mg po daily

## 2017-06-12 NOTE — DIETITIAN INITIAL EVALUATION ADULT. - NS AS NUTRI INTERV ED CONTENT
CKD stage 4 medical nutrition therapy edu initiated, pt declined./Purpose of the nutrition education

## 2017-06-12 NOTE — PROGRESS NOTE ADULT - SUBJECTIVE AND OBJECTIVE BOX
88 yo female with pmh of chronic diastolic CHF (EF 50-55% 11/16'), mod to severe AS (ALEKS 0.5cm)-not a surgical candidate, pafib (on Coumadin), CAD s/p PCI x 3 stent (04', 06' and 12')-last cath 3/15' PCI dLM w/ Impella support, Lupus, COPD (prior 30yr ppk hx), PNA, 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, HTN, PVD, and Type 2 DM (no longer on meds), MONICA, and TKR 03' presents to [facility] with cc of inability to get OOB x 3 days. Pt states that her "legs were too weak". Pt is a poor historian and HPI obtained from her daughter. Pt's daughter states that her mother was unable to get OOB for 3 days and pt used her Life Alert x3 times to call for help-was told to go to the ED. Reports that she had been "tired" for several day, SOB w/ minimal exertion for "several months" and her mobility has declined since her discharge from rehab in December. Additionally, pt has gained 17 lbs over the past month (from 258 to 275) and non-compliant with her diet. As per daughter, pt has been hospitalized prior 2-3 yrs ago (? [facility]) for CHF exacerbation w/ aggressive diuresis. Of note, pt was evaluation by Dr. Barger for aortic stenosis in December 16'. However, due to her deconditioned status during that time, pt was recommended medical management.      Pt seen and examined at bedside. Reports chronic R groin pain since last cath in 2015. Denies chest pain or sob at rest.    Overnight Events: Trop 0.04 x3.    TELEMETRY: Afib 70s    EKG: V-pacing    ICU Vital Signs Last 24 Hrs  T(C): 36.3, Max: 36.3 (06-12 @ 05:19)  T(F): 97.4, Max: 97.4 (06-12 @ 18:35)  HR: 64 (64 - 72)  BP: 105/56 (100/55 - 116/75)  BP(mean): 78 (73 - 97)  ABP: --  ABP(mean): --  RR: 17 (16 - 24)  SpO2: 95% (92% - 98%)    I&O's Summary  I & Os for 24h ending 12 Jun 2017 07:00  =============================================  IN: 240 ml / OUT: 1150 ml / NET: -910 ml    I & Os for current day (as of 12 Jun 2017 13:05)  =============================================  IN: 0 ml / OUT: 300 ml / NET: -300 ml        PHYSICAL EXAM:    General: A/ox 3, No acute Distress, Obese  Neck: Supple,+JVD  Cardiac: S1 S2, Grade III/VI systolic murmur  Pulmonary: Bibasilar crackles, Breathing unlabored, No Rhonchi/Rales/Wheezing  Abdomen: Soft, Non -tender, +BS x 4 quads  Extremities: No Rashes, 2+ romeo edema from abd to romeo feet, romeo LE redness appears chronic venous stasis, 1+ romeo pedal pulses  Neuro: A/o x 3, No focal deficits  Skin: Right buttock pressure ulcer stage II, 1 x 1.5cm; bilateral groins with redness noted           LABS:                          10.2   5.8   )-----------( 146      ( 12 Jun 2017 05:20 )             34.3                              06-12    141  |  99  |  41<H>  ----------------------------<  91  4.7   |  31  |  2.90<H>    Ca    9.1      12 Jun 2017 05:27  Mg     2.0     06-12    TPro  7.0  /  Alb  3.0<L>  /  TBili  0.4  /  DBili  x   /  AST  14  /  ALT  11  /  AlkPhos  114  06-12    LIVER FUNCTIONS - ( 12 Jun 2017 05:27 )  Alb: 3.0 g/dL / Pro: 7.0 g/dL / ALK PHOS: 114 U/L / ALT: 11 U/L / AST: 14 U/L / GGT: x                                 PT/INR - ( 12 Jun 2017 05:20 )   PT: 47.0 sec;   INR: 4.11          PTT - ( 11 Jun 2017 10:15 )  PTT:46.4 sec  CAPILLARY BLOOD GLUCOSE    CARDIAC MARKERS ( 12 Jun 2017 05:27 )  x     / 0.04 ng/mL / 30 U/L / x     / 1.9 ng/mL  CARDIAC MARKERS ( 11 Jun 2017 18:40 )  x     / 0.04 ng/mL / 34 U/L / x     / 1.7 ng/mL  CARDIAC MARKERS ( 11 Jun 2017 10:15 )  x     / 0.04 ng/mL / 38 U/L / x     / 1.7 ng/mL            No Known Allergies    MEDICATIONS  (STANDING):  metoprolol succinate ER 25milliGRAM(s) Oral daily  ferrous    sulfate 325milliGRAM(s) Oral every 8 hours  furosemide   Injectable 40milliGRAM(s) IV Push every 12 hours  cefTRIAXone   IVPB  IV Intermittent     MEDICATIONS  (PRN):  acetaminophen   Tablet. 975milliGRAM(s) Oral every 6 hours PRN Moderate Pain (4 - 6)        DIAGNOSTIC TESTS:

## 2017-06-12 NOTE — DIETITIAN INITIAL EVALUATION ADULT. - OTHER INFO
88 yo F  w/ CKD stage 4 (baseline Cr 2-3), hx of Renal failure requiring HD x 4 session 8/16, p/w c/o inability to get OOB x 3 days, Admitted for CHF exacerbation. Nutrition consulted for BMI. Pt is currently on a DASH, CSTCHO, [id number], Renal Replacement diet and tolerating PO. Pt states conusming <50% of meals d/t lack of desire to eat food provided. Main consumption is requested foods at off meal hrs like PB&J, crackers. Denies N/V/D/C, no issues chewing or swallowing. C/o of groin pain. Pt declined discussions over dietary habits and declined education on renal diet. States her daughter managers her medical issues and food intake. Pt has NKFA or dietary restrictions.Will provide written edu for daughter. Skin integrity intact except for pressure ulcer, willy score of 16, a tear & a wound bundle- GI WDL per flowsheet. 86 yo F  w/ CKD stage 4 (baseline Cr 2-3), hx of Renal failure requiring HD x 4 session 8/16, p/w c/o inability to get OOB x 3 days, Admitted for CHF exacerbation. Nutrition consulted for BMI. Pt is currently on a DASH, CSTCHO, [id number], Renal Replacement diet and tolerating PO. Pt states conusming <50% of meals d/t lack of desire to eat food provided. Main consumption is requested foods at off meal hrs like PB&J, crackers. Denies N/V/D/C, no issues chewing or swallowing. C/o of groin pain. Pt declined discussions over dietary habits and declined education on renal diet. States her daughter manages her medical issues and food intake. Pt has NKFA or dietary restrictions.Will provide written edu for daughter. Skin integrity intact except for pressure ulcer, willy score of 16, a tear & a wound bundle- GI WDL per flowsheet.

## 2017-06-12 NOTE — PROGRESS NOTE ADULT - SUBJECTIVE AND OBJECTIVE BOX
CARDIOLOGY NP PROGRESS NOTE    Subjective: Pt seen and examined at bedside. Reports chronic R groin pain since last cath in 2015. Denies chest pain or sob at rest.    Overnight Events: Trop 0.04 x3.    TELEMETRY: Afib 70s    EKG: V-pacing      VITAL SIGNS:  T(C): 36.3, Max: 37.2 (06-11 @ 16:02)  HR: 70 (64 - 73)  BP: 106/53 (100/55 - 127/58)  RR: 24 (16 - 24)  SpO2: 98% (95% - 98%)  Wt(kg): --    I&O's Summary  I & Os for 24h ending 12 Jun 2017 07:00  =============================================  IN: 240 ml / OUT: 1150 ml / NET: -910 ml    I & Os for current day (as of 12 Jun 2017 13:05)  =============================================  IN: 0 ml / OUT: 300 ml / NET: -300 ml        PHYSICAL EXAM:    General: A/ox 3, No acute Distress, Obese  Neck: Supple,+JVD  Cardiac: S1 S2, Grade III/VI systolic murmur  Pulmonary: Bibasilar crackles, Breathing unlabored, No Rhonchi/Rales/Wheezing  Abdomen: Soft, Non -tender, +BS x 4 quads  Extremities: No Rashes, 2+ romeo edema from abd to romeo feet, romeo LE redness appears chronic venous stasis, 1+ romeo pedal pulses  Neuro: A/o x 3, No focal deficits  Skin: Right buttock pressure ulcer stage II, 1 x 1.5cm; bilateral groins with redness noted           LABS:                          10.2   5.8   )-----------( 146      ( 12 Jun 2017 05:20 )             34.3                              06-12    141  |  99  |  41<H>  ----------------------------<  91  4.7   |  31  |  2.90<H>    Ca    9.1      12 Jun 2017 05:27  Mg     2.0     06-12    TPro  7.0  /  Alb  3.0<L>  /  TBili  0.4  /  DBili  x   /  AST  14  /  ALT  11  /  AlkPhos  114  06-12    LIVER FUNCTIONS - ( 12 Jun 2017 05:27 )  Alb: 3.0 g/dL / Pro: 7.0 g/dL / ALK PHOS: 114 U/L / ALT: 11 U/L / AST: 14 U/L / GGT: x                                 PT/INR - ( 12 Jun 2017 05:20 )   PT: 47.0 sec;   INR: 4.11          PTT - ( 11 Jun 2017 10:15 )  PTT:46.4 sec  CAPILLARY BLOOD GLUCOSE    CARDIAC MARKERS ( 12 Jun 2017 05:27 )  x     / 0.04 ng/mL / 30 U/L / x     / 1.9 ng/mL  CARDIAC MARKERS ( 11 Jun 2017 18:40 )  x     / 0.04 ng/mL / 34 U/L / x     / 1.7 ng/mL  CARDIAC MARKERS ( 11 Jun 2017 10:15 )  x     / 0.04 ng/mL / 38 U/L / x     / 1.7 ng/mL            No Known Allergies    MEDICATIONS  (STANDING):  metoprolol succinate ER 25milliGRAM(s) Oral daily  ferrous    sulfate 325milliGRAM(s) Oral every 8 hours  furosemide   Injectable 40milliGRAM(s) IV Push every 12 hours  cefTRIAXone   IVPB  IV Intermittent     MEDICATIONS  (PRN):  acetaminophen   Tablet. 975milliGRAM(s) Oral every 6 hours PRN Moderate Pain (4 - 6)        DIAGNOSTIC TESTS:

## 2017-06-12 NOTE — PROGRESS NOTE ADULT - PROBLEM SELECTOR PLAN 9
Right pressure ulcer to right buttock  -Wound care consult  -Keep turned Q2  -Apply barrier cream to buttock daily and prn

## 2017-06-12 NOTE — DIETITIAN INITIAL EVALUATION ADULT. - PERTINENT LABORATORY DATA
H/H 10.2/34.3, BUN 41, Cr 2.9, Alb 3.0, HDL 30, HgbA1C 5.7 H+H 10.2/34.3, BUN 41, Cr 2.9, Alb 3.0, HDL 30, HgbA1C 5.7

## 2017-06-12 NOTE — PROGRESS NOTE ADULT - ASSESSMENT
86 yo female with pmh of chronic diastolic CHF (EF 50-55% 11/16'), mod to severe AS (ALEKS 0.5cm)-not a surgical candidate, pafib (on Coumadin), CAD s/p PCI x 3 stent (04', 06' and 12')-last cath 3/15' PCI dLM w/ Impella support, Lupus, COPD (prior 30yr ppk hx), PNA, PPM 2011 for bradycardia, CKD (baseline Cr 2-3), hx of Renal failure requiring HD x 4 session 8/16' @ [facility], HTN, PVD, and Type 2 DM (no longer on meds), MONICA, and TKR 03' presents to [facility] with cc of inability to get OOB x 3 days, Admitted for CHF exacerbation.

## 2017-06-12 NOTE — PROGRESS NOTE ADULT - PROBLEM SELECTOR PLAN 1
2/2 dietary non-compliance; EF 50-55% on last TTE 2016. BNP on admission 96865.  Lungs bibasilar fine rales, edema noted from abdomen to bilateral legs and feet  -Daily weights, strict I/Os (us in place), FR 1L/day  -Pending echo today  -TSH, Lipid panel, HgA1c WNL.  -Trend trop and cardiac enzymes. Trop 0.04 x3.  -Continue Toprol XL 25mg po daily  -Start Furosemide 40mg BID per renal recs

## 2017-06-12 NOTE — PROGRESS NOTE ADULT - PROBLEM SELECTOR PLAN 7
Baseline Cr per daughter 2-3  On admission, Cr 2.8  -Strict I/Os-us catheter in place  -Renally dose all meds  -call Renal service consult (Dr. Ramirez-her outpt Nephrologist)  -Repeat UA and culture. UA with positive nitrites, WBCs and bacteria Baseline Cr per daughter 2-3  On admission, Cr 2.8  -Strict I/Os-us catheter in place  -Renally dose all meds  -Renal consulted. F/u recs. (Dr. Ramirez-her outpt Nephrologist)  - UA with positive nitrites, WBCs and bacteria. UCx w/ GNR. Started on Ceftriaxone 1g qd.

## 2017-06-13 LAB
-  AMPICILLIN/SULBACTAM: SIGNIFICANT CHANGE UP
-  AMPICILLIN: SIGNIFICANT CHANGE UP
-  CEFAZOLIN: SIGNIFICANT CHANGE UP
-  CEFTRIAXONE: SIGNIFICANT CHANGE UP
-  CIPROFLOXACIN: SIGNIFICANT CHANGE UP
-  GENTAMICIN: SIGNIFICANT CHANGE UP
-  NITROFURANTOIN: SIGNIFICANT CHANGE UP
-  PIPERACILLIN/TAZOBACTAM: SIGNIFICANT CHANGE UP
-  TOBRAMYCIN: SIGNIFICANT CHANGE UP
-  TRIMETHOPRIM/SULFAMETHOXAZOLE: SIGNIFICANT CHANGE UP
ANION GAP SERPL CALC-SCNC: 12 MMOL/L — SIGNIFICANT CHANGE UP (ref 5–17)
ANION GAP SERPL CALC-SCNC: 9 MMOL/L — SIGNIFICANT CHANGE UP (ref 5–17)
ANISOCYTOSIS BLD QL: SLIGHT — SIGNIFICANT CHANGE UP
BASOPHILS NFR BLD AUTO: 0.6 % — SIGNIFICANT CHANGE UP (ref 0–2)
BUN SERPL-MCNC: 42 MG/DL — HIGH (ref 7–23)
BUN SERPL-MCNC: 45 MG/DL — HIGH (ref 7–23)
CALCIUM SERPL-MCNC: 8.6 MG/DL — SIGNIFICANT CHANGE UP (ref 8.4–10.5)
CALCIUM SERPL-MCNC: 9.1 MG/DL — SIGNIFICANT CHANGE UP (ref 8.4–10.5)
CHLORIDE SERPL-SCNC: 100 MMOL/L — SIGNIFICANT CHANGE UP (ref 96–108)
CHLORIDE SERPL-SCNC: 97 MMOL/L — SIGNIFICANT CHANGE UP (ref 96–108)
CK MB CFR SERPL CALC: 2.2 NG/ML — SIGNIFICANT CHANGE UP (ref 0–6.7)
CK SERPL-CCNC: 29 U/L — SIGNIFICANT CHANGE UP (ref 25–170)
CO2 SERPL-SCNC: 31 MMOL/L — SIGNIFICANT CHANGE UP (ref 22–31)
CO2 SERPL-SCNC: 33 MMOL/L — HIGH (ref 22–31)
CREAT SERPL-MCNC: 3 MG/DL — HIGH (ref 0.5–1.3)
CREAT SERPL-MCNC: 3.1 MG/DL — HIGH (ref 0.5–1.3)
CULTURE RESULTS: SIGNIFICANT CHANGE UP
EOSINOPHIL NFR BLD AUTO: 4 % — SIGNIFICANT CHANGE UP (ref 0–6)
GAS PNL BLDA: SIGNIFICANT CHANGE UP
GAS PNL BLDA: SIGNIFICANT CHANGE UP
GLUCOSE SERPL-MCNC: 103 MG/DL — HIGH (ref 70–99)
GLUCOSE SERPL-MCNC: 95 MG/DL — SIGNIFICANT CHANGE UP (ref 70–99)
HCT VFR BLD CALC: 38.4 % — SIGNIFICANT CHANGE UP (ref 34.5–45)
HCT VFR BLD CALC: 38.6 % — SIGNIFICANT CHANGE UP (ref 34.5–45)
HGB BLD-MCNC: 10.8 G/DL — LOW (ref 11.5–15.5)
HGB BLD-MCNC: 11.1 G/DL — LOW (ref 11.5–15.5)
HYPOCHROMIA BLD QL: SLIGHT — SIGNIFICANT CHANGE UP
INR BLD: 3.95 — HIGH (ref 0.88–1.16)
LACTATE SERPL-SCNC: 1 MMOL/L — SIGNIFICANT CHANGE UP (ref 0.5–2)
LYMPHOCYTES # BLD AUTO: 6 % — LOW (ref 13–44)
MAGNESIUM SERPL-MCNC: 1.9 MG/DL — SIGNIFICANT CHANGE UP (ref 1.6–2.6)
MAGNESIUM SERPL-MCNC: 1.9 MG/DL — SIGNIFICANT CHANGE UP (ref 1.6–2.6)
MANUAL DIF COMMENT BLD-IMP: SIGNIFICANT CHANGE UP
MANUAL SMEAR VERIFICATION: SIGNIFICANT CHANGE UP
MCHC RBC-ENTMCNC: 26.6 PG — LOW (ref 27–34)
MCHC RBC-ENTMCNC: 27 PG — SIGNIFICANT CHANGE UP (ref 27–34)
MCHC RBC-ENTMCNC: 28.1 G/DL — LOW (ref 32–36)
MCHC RBC-ENTMCNC: 28.8 G/DL — LOW (ref 32–36)
MCV RBC AUTO: 93.9 FL — SIGNIFICANT CHANGE UP (ref 80–100)
MCV RBC AUTO: 94.6 FL — SIGNIFICANT CHANGE UP (ref 80–100)
METHOD TYPE: SIGNIFICANT CHANGE UP
MICROCYTES BLD QL: SLIGHT — SIGNIFICANT CHANGE UP
MONOCYTES NFR BLD AUTO: 22 % — HIGH (ref 2–14)
NEUTROPHILS NFR BLD AUTO: 64 % — SIGNIFICANT CHANGE UP (ref 43–77)
NEUTROPHILS NFR BLD AUTO: 64 % — SIGNIFICANT CHANGE UP (ref 43–77)
NEUTS BAND # BLD: 4 % — SIGNIFICANT CHANGE UP
ORGANISM # SPEC MICROSCOPIC CNT: SIGNIFICANT CHANGE UP
ORGANISM # SPEC MICROSCOPIC CNT: SIGNIFICANT CHANGE UP
OVALOCYTES BLD QL SMEAR: SLIGHT — SIGNIFICANT CHANGE UP
PLAT MORPH BLD: (no result)
PLATELET # BLD AUTO: 130 K/UL — LOW (ref 150–400)
PLATELET # BLD AUTO: 141 K/UL — LOW (ref 150–400)
POIKILOCYTOSIS BLD QL AUTO: SLIGHT — SIGNIFICANT CHANGE UP
POTASSIUM SERPL-MCNC: 4.8 MMOL/L — SIGNIFICANT CHANGE UP (ref 3.5–5.3)
POTASSIUM SERPL-MCNC: 4.9 MMOL/L — SIGNIFICANT CHANGE UP (ref 3.5–5.3)
POTASSIUM SERPL-SCNC: 4.8 MMOL/L — SIGNIFICANT CHANGE UP (ref 3.5–5.3)
POTASSIUM SERPL-SCNC: 4.9 MMOL/L — SIGNIFICANT CHANGE UP (ref 3.5–5.3)
PROTHROM AB SERPL-ACNC: 45.1 SEC — HIGH (ref 9.8–12.7)
RBC # BLD: 4.06 M/UL — SIGNIFICANT CHANGE UP (ref 3.8–5.2)
RBC # BLD: 4.11 M/UL — SIGNIFICANT CHANGE UP (ref 3.8–5.2)
RBC # FLD: 15.5 % — SIGNIFICANT CHANGE UP (ref 10.3–16.9)
RBC # FLD: 15.6 % — SIGNIFICANT CHANGE UP (ref 10.3–16.9)
RBC BLD AUTO: (no result)
SODIUM SERPL-SCNC: 140 MMOL/L — SIGNIFICANT CHANGE UP (ref 135–145)
SODIUM SERPL-SCNC: 142 MMOL/L — SIGNIFICANT CHANGE UP (ref 135–145)
SPECIMEN SOURCE: SIGNIFICANT CHANGE UP
TROPONIN T SERPL-MCNC: 0.04 NG/ML — HIGH (ref 0–0.01)
TROPONIN T SERPL-MCNC: 0.04 NG/ML — HIGH (ref 0–0.01)
WBC # BLD: 6.6 K/UL — SIGNIFICANT CHANGE UP (ref 3.8–10.5)
WBC # BLD: 8.1 K/UL — SIGNIFICANT CHANGE UP (ref 3.8–10.5)
WBC # FLD AUTO: 6.6 K/UL — SIGNIFICANT CHANGE UP (ref 3.8–10.5)
WBC # FLD AUTO: 8.1 K/UL — SIGNIFICANT CHANGE UP (ref 3.8–10.5)

## 2017-06-13 PROCEDURE — 71010: CPT | Mod: 26

## 2017-06-13 PROCEDURE — 71010: CPT | Mod: 26,77

## 2017-06-13 PROCEDURE — 78580 LUNG PERFUSION IMAGING: CPT | Mod: 26

## 2017-06-13 PROCEDURE — 99232 SBSQ HOSP IP/OBS MODERATE 35: CPT | Mod: GC

## 2017-06-13 RX ORDER — IPRATROPIUM/ALBUTEROL SULFATE 18-103MCG
3 AEROSOL WITH ADAPTER (GRAM) INHALATION EVERY 6 HOURS
Qty: 0 | Refills: 0 | Status: DISCONTINUED | OUTPATIENT
Start: 2017-06-13 | End: 2017-06-16

## 2017-06-13 RX ORDER — ACETAZOLAMIDE 250 MG/1
250 TABLET ORAL DAILY
Qty: 0 | Refills: 0 | Status: DISCONTINUED | OUTPATIENT
Start: 2017-06-13 | End: 2017-06-15

## 2017-06-13 RX ORDER — MAGNESIUM OXIDE 400 MG ORAL TABLET 241.3 MG
400 TABLET ORAL ONCE
Qty: 0 | Refills: 0 | Status: COMPLETED | OUTPATIENT
Start: 2017-06-13 | End: 2017-06-13

## 2017-06-13 RX ORDER — IPRATROPIUM/ALBUTEROL SULFATE 18-103MCG
3 AEROSOL WITH ADAPTER (GRAM) INHALATION EVERY 6 HOURS
Qty: 0 | Refills: 0 | Status: DISCONTINUED | OUTPATIENT
Start: 2017-06-13 | End: 2017-06-13

## 2017-06-13 RX ADMIN — ACETAZOLAMIDE 250 MILLIGRAM(S): 250 TABLET ORAL at 13:24

## 2017-06-13 RX ADMIN — Medication 975 MILLIGRAM(S): at 04:14

## 2017-06-13 RX ADMIN — Medication 325 MILLIGRAM(S): at 07:08

## 2017-06-13 RX ADMIN — Medication 3 MILLILITER(S): at 17:33

## 2017-06-13 RX ADMIN — Medication 25 MILLIGRAM(S): at 07:08

## 2017-06-13 RX ADMIN — Medication 975 MILLIGRAM(S): at 15:40

## 2017-06-13 RX ADMIN — Medication 40 MILLIGRAM(S): at 07:08

## 2017-06-13 RX ADMIN — Medication 325 MILLIGRAM(S): at 13:24

## 2017-06-13 RX ADMIN — Medication 975 MILLIGRAM(S): at 07:15

## 2017-06-13 RX ADMIN — MAGNESIUM OXIDE 400 MG ORAL TABLET 400 MILLIGRAM(S): 241.3 TABLET ORAL at 07:10

## 2017-06-13 RX ADMIN — Medication 40 MILLIGRAM(S): at 17:44

## 2017-06-13 RX ADMIN — CEFTRIAXONE 100 GRAM(S): 500 INJECTION, POWDER, FOR SOLUTION INTRAMUSCULAR; INTRAVENOUS at 10:07

## 2017-06-13 RX ADMIN — Medication 975 MILLIGRAM(S): at 15:00

## 2017-06-13 NOTE — PROGRESS NOTE ADULT - PROBLEM SELECTOR PLAN 7
Baseline Cr per daughter 2-3  On admission, Cr 2.8  -Strict I/Os-us catheter in place  -Renally dose all meds  -Renal consulted. F/u recs. (Dr. Ramirez-her outpt Nephrologist)  - UA with positive nitrites, WBCs and bacteria. UCx w/ GNR. Started on Ceftriaxone 1g qd. Baseline Cr per daughter 2-3. On admission, Cr 2.8  -Cont Furosemide 40mg IV BID, start Diamox 250mg qd per renal recs. May add Metolazone 5mg qd if I/O not at goal per Dr Gonzalez.  -Strict I/Os, daily weights  -us catheter in place  -Renally dose all meds  -Renal consulted. F/u recs. (Dr. Ramirez-her outpt Nephrologist)  - UA with positive nitrites, WBCs and bacteria. UCx w/ GNR, Klesiella. Started on Ceftriaxone 1g qd (day 2/5). Baseline Cr per daughter 2-3. On admission, Cr 2.8  -Cont Furosemide 40mg IV BID, start Diamox 250mg qd for alkalosis per renal recs. May add Metolazone 5mg qd if I/O not at goal per Dr Gonzalez.  -Strict I/Os, daily weights  -us catheter in place  -Renally dose all meds  -Renal consulted. F/u recs. (Dr. Ramirez-her outpt Nephrologist)  - UA with positive nitrites, WBCs and bacteria. UCx w/ GNR, Klesiella. Started on Ceftriaxone 1g qd (day 2/5).

## 2017-06-13 NOTE — PROGRESS NOTE ADULT - PROBLEM SELECTOR PLAN 7
Baseline Cr per daughter 2-3. On admission, Cr 2.8  -Cont Furosemide 40mg IV BID, start Diamox 250mg qd for alkalosis per renal recs. May add Metolazone 5mg qd if I/O not at goal per Dr Gonzalez.  -Strict I/Os, daily weights  -us catheter in place  -Renally dose all meds  -Renal consulted. F/u recs. (Dr. Ramirez-her outpt Nephrologist)  - UA with positive nitrites, WBCs and bacteria. UCx w/ GNR, Klesiella. Started on Ceftriaxone 1g qd (day 2/5).

## 2017-06-13 NOTE — PROGRESS NOTE ADULT - PROBLEM SELECTOR PLAN 8
and thrombocytopenia   -check Fe studies and b12/folate  -consider Hematology eval and thrombocytopenia   - Fe studies and b12/folate WNL

## 2017-06-13 NOTE — PHYSICAL THERAPY INITIAL EVALUATION ADULT - IMPAIRED TRANSFERS: SIT/STAND, REHAB EVAL
cognition/impaired balance/pain/decreased strength impaired motor control/cognition/impaired balance/pain/decreased strength/impaired postural control

## 2017-06-13 NOTE — PROGRESS NOTE ADULT - PROBLEM SELECTOR PLAN 10
DVT prophylaxis: on systemic AC. Holding AC 2/2 Supra therapeutic    Pt signed DNR/DNI form and daughter is in agreement  SW/PT consult: discharge planning DVT prophylaxis: on systemic AC. Holding AC 2/2 Supra therapeutic    Pt signed DNR/DNI form and daughter is in agreement    PT consult: TALIA

## 2017-06-13 NOTE — PROGRESS NOTE ADULT - PROBLEM SELECTOR PLAN 1
recommend aggressive iv diuresis of 40 mg iv lasix bid  will  add diamox 250 mg daily for alkalemia . recommend c/w iv diuresis of 40 mg lasix bid  will  add diamox 250 mg daily for rising CO2  can add metolazone if suboptimal diuresis- did receive 3 doses of lasix instead of metolazone on 6/12.

## 2017-06-13 NOTE — PROGRESS NOTE ADULT - PROBLEM SELECTOR PLAN 10
DVT prophylaxis: on systemic AC. Holding AC 2/2 Supra therapeutic    Pt signed DNR/DNI form and daughter is in agreement    PT consult: TALIA

## 2017-06-13 NOTE — CONSULT NOTE ADULT - ASSESSMENT
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     Problem/Plan - 1:  ·  Problem: Acute on chronic diastolic CHF (congestive heart failure).  Plan: 2/2 dietary non-compliance; EF 50-55% on last TTE 2016. BNP on admission 50033.  Lungs bibasilar crackles, edema noted from abdomen to bilateral legs and feet  -Daily weights, strict I/Os (us in place), FR 1L/day  -Echo w/ EF 50%, mod conc LVH, basal septal hypokinesis (seen previously on Echo 12/2015), severely dilated LA, severe AS, ALEKS 0.5, mean pressure gradient 43, mod pulm HTN, PASP 52.  -TSH, Lipid panel, HgA1c WNL.  -Trop 0.04 x 4. Likely 2/2 demand ischemia. Cont to trend until downtrends per Dr Soto.   -Continue Toprol XL 25mg po daily  -Cont Furosemide 40mg IV BID, start Diamox 250mg qd per renal recs. May add Metolazone 5mg qd if I/O not at goal per Dr Gonzalez.  -VQ scan per Dr Soto, after pt was taken down to VQ scan, pt refused to go through with procedure after discussion at length.  -F/u CXR2/2 dietary non-compliance; EF 50-55% on last TTE 2016. BNP on admission 99258.  Lungs bibasilar crackles, edema noted from abdomen to bilateral legs and feet  -Daily weights, strict I/Os (us in place), FR 1L/day  -Echo w/ EF 50%, mod conc LVH, basal septal hypokinesis (seen previously on Echo 12/2015), severely dilated LA, severe AS, ALEKS 0.5, mean pressure gradient 43, mod pulm HTN, PASP 52.  -TSH, Lipid panel, HgA1c WNL.  -Trop 0.04 x 4. Cont to trend until downtrends per Dr Soto  -Continue Toprol XL 25mg po daily  -Cont Furosemide 40mg IV BID, start Diamox 250mg qd per renal recs.     Problem/Plan - 2:  ·  Problem: Aortic stenosis, severe.  Plan: Non rheumatic heart disease. Echo w/ ALEKS 0.5, mean pressure gradient 43   -CTS Dr. Barger consulted. F/u recs.  -Per CTS team, to be medically optimized in regards to CHF prior to proceeding w/ TAVR w/u. Pt to follow up with out-patient for her continued work-up.  -Dr Soto to discuss w/ Dr Barger regarding plan.  -Consider possible ischemic w/u if TAVR is neededNon rheumatic heart disease. Echo w/ ALEKS 0.5, mean pressure gradient 43   -CTS Dr. Barger consulted. F/u recs.  -Per CTS team, to be medically optimized in regards to CHF prior to proceeding w/ TAVR w/u. Pt to follow up with out-patient for her continued work-up.  -Consider possible ischemic w/u if TAVR is needed.

## 2017-06-13 NOTE — PROGRESS NOTE ADULT - SUBJECTIVE AND OBJECTIVE BOX
PUD ATTENDING    INTERVAL HPI/OVERNIGHT EVENTS:    on monitor  CEF for UTI and cellulitis  pulmonary edema with effusions    PAST MEDICAL & SURGICAL HISTORY:  PVD (peripheral vascular disease)  Iron deficiency anemia  Lupus (systemic lupus erythematosus)  Paroxysmal atrial fibrillation  Aortic stenosis, severe  CKD (chronic kidney disease) stage 4, GFR 15-29 ml/min  Diabetes  HTN (hypertension)  CHF (congestive heart failure)  CAD (coronary artery disease)  COPD (chronic obstructive pulmonary disease)  History of total knee replacement:   Presence of cardiac pacemaker: at [facility] by Dr Escoto    FAMILY HISTORY:  No pertinent family history in first degree relatives    SOCIAL HISTORY:    REVIEW OF SYSTEMS:  no significant change  she does not want to talk much    Vital Signs Last 24 Hrs  T(C): 37.3, Max: 37.3 ( @ 05:12)  T(F): 99.1, Max: 99.1 ( @ 05:12)  HR: 81 (64 - 81)  BP: 116/62 (99/41 - 125/59)  BP(mean): 76 (54 - 90)  RR: 22 (17 - 22)  SpO2: 95% (92% - 97%) ON NASAL CANNULA    I&O's Detail  I & Os for 24h ending 2017 07:00  =============================================  IN:    Oral Fluid: 180 ml    Total IN: 180 ml  ---------------------------------------------  OUT:    Indwelling Catheter - Urethral: 1300 ml    Voided: 525 ml    Total OUT: 1825 ml  ---------------------------------------------  Total NET: -1645 ml    I & Os for current day (as of 2017 12:02)  =============================================  IN:    Oral Fluid: 180 ml    Total IN: 180 ml  ---------------------------------------------  OUT:    Total OUT: 0 ml  ---------------------------------------------  Total NET: 180 ml    PHYSICAL EXAM:  In bed, tired, weak, confused  Obese, uncomfortable, - acute distress; vital signs are monitored continuously  Eyes: PERRLA, EOMI, -conjunctivitis, -scleritis   Head: no focal deficit, normocephalic,  no trauma  ENMT: DRY tongue, no thrush, -nasal discharge, -hoarseness, abnormal hearing, -cough, -hemoptysis, trachea midline  Neck: supple, - lymphadenopathy,  -masses, -JVD  Respiratory: bilateral diminished breath sounds, -wheezing, +rhonchi, +rales, -crackles  Chest: +accessory muscle use, -paradoxical breathing  Cardiovascular: irregular rate and atrial flutter, S1 S2 normal, -S3, -S4, -murmur, -gallop, -rub  Gastrointestinal: soft, nontender, nondistended, normal bowel sounds, no hepatosplenomegaly  Genitourinary: -flank pain, -dysuria  Extremities: -clubbing, -cyanosis, ++edema    Vascular: peripheral pulses palpable 2+ bilaterally  Neurological: awake, confused, no focal deficit, -tremor   Skin: warm, dry, -erythema, iv site intact  Lymph nodes; no cervical, supraclavicular or axillary adenopathy  Psychiatric: uncooperative    MEDICATIONS  (STANDING):  metoprolol succinate ER 25milliGRAM(s) Oral daily  ferrous    sulfate 325milliGRAM(s) Oral every 8 hours  furosemide   Injectable 40milliGRAM(s) IV Push every 12 hours  cefTRIAXone   IVPB 1Gram(s) IV Intermittent every 24 hours  cefTRIAXone   IVPB  IV Intermittent     MEDICATIONS  (PRN):  acetaminophen   Tablet. 975milliGRAM(s) Oral every 6 hours PRN Moderate Pain (4 - 6)    Allergies  No Known Allergies  Intolerances    LABS:                        11.1   6.6   )-----------( 141      ( 2017 05:22 )             38.6     06-13    142  |  100  |  45<H>  ----------------------------<  95  4.9   |  33<H>  |  3.00<H>    Ca    9.1      2017 05:22  Mg     1.9     06-13  TPro  7.0  /  Alb  3.0<L>  /  TBili  0.4  /  DBili  x   /  AST  14  /  ALT  11  /  AlkPhos  114  06-12  PT/INR - ( 2017 05:22 )   PT: 45.1 sec;   INR: 3.95       Urinalysis Basic - ( 2017 20:57 )    Color: Yellow / Appearance: Cloudy / S.020 / pH: x  Gluc: x / Ketone: NEGATIVE  / Bili: NEGATIVE / Urobili: 0.2 E.U./dL   Blood: x / Protein: 30 mg/dL / Nitrite: NEGATIVE   Leuk Esterase: Large / RBC: Many /HPF / WBC Many /HPF   Sq Epi: x / Non Sq Epi: Many /HPF / Bacteria: Many /HPF    +DVT prophylaxis INR  +Sleep ?  +Nutrition goals ORAL  -Pain DENIED  -Decubital ulcer  +GI prophylaxis (PPV, coagulopathy, Hx)  +Aspiration prophylaxis (45 degrees)  +Sedation/analgesia stopped once  +ID (phos, CH, mupi, SB)  -Delirium  +Cardiac Beta  +Prevention  +Education  +Medication reviewed (drug-drug interactions, PDA)  Medical devices MARGI    Discussed with PA, REFUGION, cardiothoracic, daughter    RADIOLOGY & ADDITIONAL STUDIES:    CXR reviewed - cardiomegaly, bilateral pleural effusions, right > left

## 2017-06-13 NOTE — PROGRESS NOTE ADULT - SUBJECTIVE AND OBJECTIVE BOX
Patient is a 87y old  Female who presents with a chief complaint of unable to get out of bed x 3 days - known chf      HPI:  87 yof - h/o chronic diastolic CHF (EF 50-55% ), mod to severe AS (ALEKS 0.5cm), afib, CAD s/p PCI (04', ' and 12' - last cath 3/15 PCI dLM w/ Impella support), Lupus, COPD (prior smoker), PNA, PPM , CKD, prior episode of renal failure requiring HD, HTN, PVD, and Type 2 DM, MONICA, and TKR 03' - unable to get OOB x 3 days. Pt reported "legs were too weak". History by patient unclear.  Denies acute dyspnea at rest or cp.  Recent fatigue and exertional dyspnea with minimal exertion per chart.  Recent weight gain.      interval - events noted; pt unable to provide significant history now due to lethargy    PAST MEDICAL & SURGICAL HISTORY:  PVD (peripheral vascular disease)  Iron deficiency anemia  Lupus (systemic lupus erythematosus)  Paroxysmal atrial fibrillation  Aortic stenosis, severe  CKD (chronic kidney disease) stage 4, GFR 15-29 ml/min  Diabetes  HTN (hypertension)  CHF (congestive heart failure)  CAD (coronary artery disease)  COPD (chronic obstructive pulmonary disease)  History of total knee replacement:   Presence of cardiac pacemaker: at [facility] by Dr Escoto    MEDICATIONS  (STANDING):  metoprolol succinate ER 25milliGRAM(s) Oral daily  ferrous    sulfate 325milliGRAM(s) Oral every 8 hours  furosemide   Injectable 40milliGRAM(s) IV Push every 12 hours  cefTRIAXone   IVPB 1Gram(s) IV Intermittent every 24 hours  cefTRIAXone   IVPB  IV Intermittent   acetazolamide    Tablet 250milliGRAM(s) Oral daily    MEDICATIONS  (PRN):  acetaminophen   Tablet. 975milliGRAM(s) Oral every 6 hours PRN Moderate Pain (4 - 6)  ALBUTerol/ipratropium for Nebulization 3milliLiter(s) Nebulizer every 6 hours PRN Shortness of Breath and/or Wheezing    ICU Vital Signs Last 24 Hrs  T(C): 36.9, Max: 37.3 (- @ 05:12)  T(F): 98.4, Max: 99.1 ( @ 05:12)  HR: 74 (64 - 85)  BP: 113/55 (99/41 - 132/70)  BP(mean): 72 (54 - 91)  ABP: --  ABP(mean): --  RR: 23 (17 - 31)  SpO2: 98% (90% - 98%)      PHYSICAL EXAM:  Constitutional: nad; venti-mask in position    ENMT:mmm    Neck:jugular vein exam obscured by habitus    Respiratory: decreased breath sounds; rales present    Cardiovascular: rr; systolic murmur present     Gastrointestinal:soft abd; no guarding    Extremities: bilat le edema     Vascular:warm le     Neurological: lethargic - arousable with verbal and tactile stimuli    Psychiatric:arousable     INTERPRETATION OF TELEMETRY:reviewed    EC2017 ecg reviewed on 2017    I&O's Detail  I & Os for 24h ending 2017 07:00  =============================================  IN:    Oral Fluid: 180 ml    Total IN: 180 ml  ---------------------------------------------  OUT:    Indwelling Catheter - Urethral: 1300 ml    Voided: 525 ml    Total OUT: 1825 ml  ---------------------------------------------  Total NET: -1645 ml    I & Os for current day (as of 2017 21:04)  =============================================  IN:    Oral Fluid: 180 ml    Total IN: 180 ml  ---------------------------------------------  OUT:    Indwelling Catheter - Urethral: 1500 ml    Total OUT: 1500 ml  ---------------------------------------------  Total NET: -1320 ml          LABS:                                                 10.8   8.1   )-----------( 130      ( 2017 18:02 )             38.4       140  |  97  |  42<H>  ----------------------------<  103<H>  4.8   |  31  |  3.10<H>    Ca    8.6      2017 18:03  Mg     1.9         TPro  7.0  /  Alb  3.0<L>  /  TBili  0.4  /  DBili  x   /  AST  14  /  ALT  11  /  AlkPhos  114        I&O's Summary  I & Os for 24h ending 2017 07:00  =============================================  IN: 180 ml / OUT: 1825 ml / NET: -1645 ml    I & Os for current day (as of 2017 21:05)  =============================================  IN: 180 ml / OUT: 1500 ml / NET: -1320 ml        BNPSerum Pro-Brain Natriuretic Peptide: 94077 pg/mL ( @ 10:15)      RADIOLOGY & ADDITIONAL STUDIES:  ******PRELIMINARY REPORT******    ******PRELIMINARY REPORT******            EXAM:  NM PULM PERFUSION IMG                          PROCEDURE DATE:  2017               ******PRELIMINARY REPORT******    ******PRELIMINARY REPORT******              INTERPRETATION:    PERFUSION - VENTILATION LUNG SCAN    Indication: Shortness of breath.    Procedure:  Limited study due to patient's condition. The patient was   injected with 3 mCi of technetium 99m labeled macroaggregated albumin and   single anterior, left anterior oblique and right anterior oblique planar   images were produced. SPECT imaging were not performed. The ventilation   portion of the examination was not performed as well. The images were   aligned using fusion software and analyzed concurrently.    Findings:  None segmental filling defects are seen in the right upper   lung zone and left mid and lower lung zones.    Impression: Limited evaluation as patient was unable to perform the   ventilation component of the examination. Furthermore the patient could   not be positioned properly for SPECT imaging. Nonsegmental perfusion   defects are noted which are nonspecific. Nondiagnostic examination.   Recommend further evaluation with CTA of the chest.              "Thank you for the opportunity to participate in the care of this   patient."  ******PRELIMINARY REPORT******    ******PRELIMINARY REPORT******          GIOVANA TOVAR M.D., RADIOLOGY RESIDENT        ******PRELIMINARY REPORT******    ******PRELIMINARY REPORT******            EXAM:  XR CHEST 1 VIEW PORT IMMEDIATE                          PROCEDURE DATE:  2017               ******PRELIMINARY REPORT******    ******PRELIMINARY REPORT******          INTERPRETATION:  RESIDENT PRELIMINARY REPORT    AP Portable CXR dated 2017 5:28 PM    CLINICAL INFORMATION: 87 years, Female, r/o pnemonia    PRIOR STUDIES: Portable CXR on 2017 at 9:36 AM    FINDINGS:   Left-sided cardiac pacemaker with lead tips overlying the right atrium   and right ventricle. The heart is enlarged. There is interval increase in   congestion. Bilateral small pleural effusions. Increased opacity in the   right lower lung zone. No pneumothorax..No acute abnormalities of the   soft tissues and osseous structures. Upper abdomen is unremarkable.    IMPRESSION:  Interval increase in vascular congestion and bilateral small pleural   effusions.              "Thank you for the opportunity to participate in the care of this   patient."  ******PRELIMINARY REPORT******    ******PRELIMINARY REPORT******          GIOVANA TOVAR M.D., RADIOLOGY RESIDENT          EXAM:  ECHOCARDIOGRAM W CONTRAST                          PROCEDURE DATE:  2017                        INTERPRETATION:  Patient Height: 167.6 cm  Patient Weight: 131.5 kg  Heart Rate: 108 bpm  Systolic Pressure: 100 mmHg  Diastolic Pressure: 60 mmHg  BSA: 2.3 m^2  Interpretation Summary  There is moderate concentric left ventricular hypertrophy. Hypokinesis of   the   basal septal region. Normal motion in the remaining regions.  The left   ventricular ejection fraction is 50%.The left atrium is severely   dilated.The   right atrium is markedly dilated.The right ventricle is not well   visualized.Calcified aortic valve. There is Severe aortic stenosis.The   calculated aortic valve area using the continuity equation is 0.55   cm2.The   peak pressure gradient is 71 mmHg.The mean pressure gradient is 43   mmHg.The   calculated stroke volume index is 27 cc/m2 (normal >35cc/m2).The   dimensionless   index (ratio of LVOTvelocity to aortic velocity) was calculated to be   0.17.   There ismoderate mitral annular calcification. There is mild mitral   regurgitation.  There is mild to moderate tricuspid regurgitation.There   is   moderate pulmonary hypertension. The pulmonary artery systolic pressure   is   estimated to be 52 mmHg.  No aortic root dilatation.There is a pacemaker   wire   in the right heart.The IVC is dilated (>2.1 cm) with an abnormal   inspiratory   collapse (<50%) consistent with elevated right atrial pressure.  There   is no   pericardial effusion.Left pleural effusion noted.  Procedure Details  A complete two-dimensional transthoracic echocardiogram was performed (2D,  M-mode, spectral and color flow doppler).  Study Quality: Good.  After verbal consent obtained, contrast injection of 2ml of diluted   Definity  contrast (1.3ml Definity diluted in 8.7ml Saline) were given to enhance  endocardial resolution.  Left Ventricle  There is moderate concentric left ventricular hypertrophy.  Hypokinesis of the basal septal region. Normal motion in the remaining  regions.  The left ventricular ejection fraction is 50%.  Left Atrium  The left atrium is severely dilated.  Right Atrium  The right atrium is markedly dilated.  Right Ventricle  The right ventricle is not well visualized.  There is a pacemaker wire in the right heart.  Aortic Valve  Calcified aortic valve.  No aortic regurgitation noted.  The calculated stroke volume index is 27 cc/m2 (normal >35cc/m2).  The peak pressure gradient is 71 mmHg.  The mean pressure gradient is 43 mmHg.  The calculated aortic valve area using the continuity equation is 0.55   cm2.  The dimensionless index (ratio of LVOTvelocity to aortic velocity) was  calculated to be 0.17.  There is Severe aortic stenosis.  Mitral Valve  There is moderate mitral annular calcification.  There is mild mitral regurgitation.  Tricuspid Valve  Structurally normal tricuspid valve.  There is mild to moderate tricuspid regurgitation.  The pulmonary artery systolic pressure is estimated to be 52 mmHg.  There is moderate pulmonary hypertension.  Pulmonic Valve  No pulmonic regurgitation noted.  Arteries and Venous System  No aortic root dilatation.  The IVC is dilated (>2.1 cm) with an abnormal inspiratory collapse (<50%)  consistent with elevated right atrial pressure.  Pericardium/ Pleura  There is no pericardial effusion.  Left pleural effusion noted.  Doppler Measurements & Calculations  MV dec time: 0.1 sec  Ao V2 mean: 295.8 cm/sec  Ao mean P.8 mmHg  Ao mean PG (full): 39.5 mmHg  Ao V2 VTI: 109.0 cm  ALEKS(I,A): 0.6 cm^2  ALEKS(I,D): 0.6 cm^2  LV max P.4 mmHg  LV mean P.3 mmHg  LV V1 max: 77.9 cm/sec  LV V1 mean: 53.4 cm/sec  LV V1 VTI: 20.1 cm  SV(Ao): 1115.9 ml  SI(Ao): 476.4 ml/m^2  SV(LVOT): 64.8 ml  SI(LVOT): 27.7 ml/m^2  TR Max graciela: 315.4 cm/sec  MMode 2D Measurements & Calculations  IVSd: 1.6 cm  LVIDd: 5.2 cm  LVIDs: 4.1 cm  LVPWd: 1.6 cm  IVS/LVPW: 1.0  FS: 20.9 %  EDV(Teich): 128.4 ml  ESV(Teich): 74.2 ml  LV mass(C)d: 373.3 grams  LV mass(C)dI: 159.4 grams/m^2  SI(cubed): 30.0 ml/m^2  Ao root diam: 3.6 cm  Ao root area: 10.2 cm^2  LA dimension: 5.2 cm  LA/Ao: 1.4  LVOT diam: 2.0 cm  LVOT area: 3.2 cm^2  LVOT area (M): 3.1 cm^2  LVLd ap4: 5.8 cm  EDV(MOD-sp4): 97 ml  LVLs ap4: 5.1 cm  ESV(MOD-sp4): 50 ml  EF(MOD-sp4): 48.5 %  SV(MOD-sp4): 47ml  SI(MOD-sp4): 20.1 ml/m^2  Interpreting Physician:Verena Nathan MD electronically signed on   2017 16:18:05                "Thank you for the opportunity to participate in the care of this   patient."        VERENA NATHAN M.D., ATTENDINGCARDIOLOGIST  This document has been electronically signed. 2017  4:18PM          EXAM:  XR CHEST 1 VIEW PORT URGENT                          PROCEDURE DATE:  2017                     INTERPRETATION:  Clinical History: Congestion    Portable examination of the chest demonstrates cardiomegaly. Pacemaker   overlies left chest wall. Tips of pacer wires overlie right atrium and   right ventricle respectively. Prominent bronchovascular markings. Mild   congestion cannot be excluded. Dextroscoliosis thoracic spine with   degenerative changes. Calcification noted involving thoracic aorta.    Impression: Prominent bronchovascular markings. Mild congestion cannot be   excluded. Small right effusion              "Thank you for the opportunity to participate in the care of this   patient."        KATHARINA MOROCHO M.D., ATTENDING RADIOLOGIST  This document has been electronically signed. 2017 11:02AM    EXAM:  ECHOCARDIOGRAM (CARDIOL)       PROCEDURE DATE:  2016                    INTERPRETATION:  Patient Height: 167.6 cm  Patient Weight: 90.7 kg  Systolic Pressure: 130 mmHg  Diastolic Pressure: 64 mmHg  BSA: 2.0 m^2  Interpretation Summary  Study Quality: Fair.There was technical limitations during this study due   to   patient positioning.Left ventricular hypertrophy presentThe left   ventricular   ejection fraction is estimated to be 50-55%The left atrium is borderline   dilated.The mitral inflow pattern is consistent with impaired left   ventricular   relaxation with mildly elevated left atrial pressure (8-14mmHg).  Right   atrial   size is normal. The right ventricle is normal in size and function.   There is a   pacemaker wire in the right heart.  Calcified aortic valve. There is   Moderate   to severe aortic stenosis.The calculated aortic valve area indexed to   body   surface area is 0.5 cm2/m2.The peak pressure gradient is 45 mmHg.The mean   pressure gradient is 27 mmHg.The calculated stroke volume index is 19   cc/m2   (normal >35cc/m2).The calculated aortic valve area using the continuity   equation is 0.17 cm2.There is mild mitral annular calcification. There is   trace mitral regurgitation.  Structurally normal tricuspid valve. There   is   trace tricuspid regurgitation.There is no echocardiographic evidence for   pulmonary hypertension.No pulmonic regurgitation noted.No aortic root   dilatation.The inferior vena cava is normal in size (<2.1 cm) with normal  inspiratory collapse (>50%) consistent with normal right atrial pressure.   There is no pericardial effusion.

## 2017-06-13 NOTE — PROGRESS NOTE ADULT - PROBLEM SELECTOR PLAN 4
Supratherapeutic INR on admission 4.8. INR 3.95 today.  -hold Coumadin tonight  -s/p PPM 2011 2/2 symptomatic bradycardia. PPM interrogation WNL, .  -Daily PT/INR   -Continue Toprol XL 25mg po daily

## 2017-06-13 NOTE — PROGRESS NOTE ADULT - ASSESSMENT
The patient is an 84 yo F with the above history admitted for a CHF exacerbation, found to be altered for a few mins, found to have elevated CO2, put on BiPAP and her mental status improved to baseline.    CARDIO:  Pump: acute CHF exacerbation 2/2 dietary non-compliance   EF 50-55% on last TTE 2016. BNP on admission 43707.  Lungs bibasilar crackles, edema noted from abdomen to bilateral legs and feet  -Daily weights, strict I/Os (us in place)  -Echo w/ EF 50%, mod conc LVH, basal septal hypokinesis (seen previously on Echo 12/2015), severely dilated LA, severe AS, ALEKS 0.5, mean pressure gradient 43, mod pulm HTN, PASP 52.  -Trop 0.04 x 4. Likely 2/2 demand ischemia. Cont to trend until downtrends per Dr Soto.   -Continue Toprol XL 25mg po daily  -Cont Furosemide 40mg IV BID, start Diamox 250mg qd per renal recs. May add Metolazone 5mg qd if I/O not at goal per Dr Gonzalez, thought negative 2 L today  -VQ scan per Dr Soto, after pt was taken down to VQ scan, pt refused to go through with procedure after discussion at length.  -F/u CXR.   - BIPAP    Vessel:  s/p PCI x 3 stent (04', 06' and 12')-last cath 3/15' PCI dLM w/ Impella support  -Trop 0.04 x4. Will cont to trend cardiac enzymes until downtrend.  -Continue Toprol XL 25mg po daily   -Per daughter [facility] NP Eben Tellez d/c'ed ASA due to dual AC w/ Coumadin, statin was d/c'ed years ago for unclear reasons.    Electrical:  Supra therapeutic INR on admission 4.8. INR 3.95 yesterday.  -s/p PPM 2011 2/2 symptomatic bradycardia. PPM interrogation WNL, .  -Daily PT/INR   -Continue Toprol XL 25mg po daily.     Valves:  Non rheumatic heart disease. Echo w/ ALEKS 0.5, mean pressure gradient 43   -CTS Dr. Barger consulted. F/u recs.  -Per CTS team, to be medically optimized in regards to CHF prior to proceeding w/ TAVR w/u. Pt to follow up with out-patient for her continued work-up.  -Dr Soto to discuss w/ Dr Barger regarding plan.  -Consider possible ischemic w/u if TAVR is needed.     Hemodynamics: pressures stable, holding ACE/ARB in setting of ARF The patient is an 82 yo F with the above history admitted for a CHF exacerbation, found to be altered for a few mins, found to have elevated CO2, put on BiPAP and her mental status improved to baseline.    CARDIO:  Pump: acute CHF exacerbation 2/2 dietary non-compliance   EF 50-55% on last TTE 2016. BNP on admission 70305.  Lungs bibasilar crackles, edema noted from abdomen to bilateral legs and feet  -Daily weights, strict I/Os (us in place)  -Echo w/ EF 50%, mod conc LVH, basal septal hypokinesis (seen previously on Echo 12/2015), severely dilated LA, severe AS, ALEKS 0.5, mean pressure gradient 43, mod pulm HTN, PASP 52.  -Trop 0.04 x 4. Likely 2/2 demand ischemia. Cont to trend until downtrends per Dr Soto.   -Continue Toprol XL 25mg po daily  -Cont Furosemide 40mg IV BID, start Diamox 250mg qd per renal recs. May add Metolazone 5mg qd if I/O not at goal per Dr Gonzalez, thought negative 2 L today  -VQ scan per Dr Soto, after pt was taken down to VQ scan, pt refused to go through with procedure after discussion at length.  -daily CXR.   - BIPAP  - serial ABGs    Vessel:  s/p PCI x 3 stent (04', 06' and 12')-last cath 3/15' PCI dLM w/ Impella support  -Trop 0.04 x4. Will cont to trend cardiac enzymes until downtrend.  -Continue Toprol XL 25mg po daily   -Per daughter [facility] NP Eben Tellez d/c'ed ASA due to dual AC w/ Coumadin, statin was d/c'ed years ago for unclear reasons.    Electrical:  Supra therapeutic INR on admission 4.8. INR 3.95 yesterday.  -s/p PPM 2011 2/2 symptomatic bradycardia. PPM interrogation WNL, .  -Daily PT/INR   -Continue Toprol XL 25mg po daily.     Valves:  Non rheumatic heart disease. Echo w/ ALEKS 0.5, mean pressure gradient 43   -CTS Dr. Barger consulted. F/u recs.  -Per CTS team, to be medically optimized in regards to CHF prior to proceeding w/ TAVR w/u. Pt to follow up with out-patient for her continued work-up.  -Dr Soto to discuss w/ Dr Barger regarding plan.  -Consider possible ischemic w/u if TAVR is needed.     Hemodynamics: pressures stable, holding ACE/ARB in setting of ARF    RENAL:  CKD (chronic kidney disease) stage 4, GFR 15-29 ml/min- Baseline Cr per daughter 2-3. On admission, Cr 2.8  -Cont Furosemide 40mg IV BID, start Diamox 250mg qd for alkalosis per renal recs. May add Metolazone 5mg qd if I/O not at goal per Dr Gonzalez.  -Renal consulted. F/u recs.  - UA with positive nitrites, WBCs and bacteria. UCx w/ GNR, Klesiella. Started on Ceftriaxone 1g qd (day 2/5 to end on 6/16)Baseline Cr per daughter 2-3. On admission, Cr 2.8    PULM:  COPD (chronic obstructive pulmonary disease)  - V/Q scan, though patient refused  - Pulm Dr. Gaming consulted    Endo:  DM - off meds, a1c wnl

## 2017-06-13 NOTE — PROGRESS NOTE ADULT - ASSESSMENT
88 yo female with pmh of chronic diastolic CHF (EF 50-55% 11/16'), mod to severe AS (ALEKS 0.5cm)-not a surgical candidate, pafib (on Coumadin), CAD s/p PCI x 3 stent (04', 06' and 12')-last cath 3/15' PCI dLM w/ Impella support, Lupus, COPD (prior 30yr ppk hx), PNA, PPM 2011 for bradycardia, CKD (baseline Cr 2-3), hx of Renal failure requiring HD x 4 session 8/16' @ [facility], HTN, PVD, and Type 2 DM (no longer on meds), MONICA, and TKR 03' presents to [facility] with cc of inability to get OOB x 3 days, Admitted for CHF exacerbation.

## 2017-06-13 NOTE — PROGRESS NOTE ADULT - SUBJECTIVE AND OBJECTIVE BOX
Dr. Tan Florence  Renal Fellow  Beeper # [phone]        Patient seen and examined at bedside.     metoprolol succinate ER 25milliGRAM(s) daily  ferrous    sulfate 325milliGRAM(s) every 8 hours  furosemide   Injectable 40milliGRAM(s) every 12 hours  cefTRIAXone   IVPB 1Gram(s) every 24 hours  cefTRIAXone   IVPB    acetaminophen   Tablet. 975milliGRAM(s) every 6 hours PRN  acetazolamide    Tablet 250milliGRAM(s) daily      Allergies    No Known Allergies    Intolerances        T(C): , Max: 37.3 ( @ 05:12)  T(F): , Max: 99.1 ( @ 05:12)  HR: 85  BP: 105/47  BP(mean): 69  RR: 22  SpO2: 95%  Wt(kg): --  I & Os for 24h ending  @ 07:00  =============================================  IN:    Oral Fluid: 180 ml    Total IN: 180 ml  ---------------------------------------------  OUT:    Indwelling Catheter - Urethral: 1300 ml    Voided: 525 ml    Total OUT: 1825 ml  ---------------------------------------------  Total NET: -1645 ml    I & Os for current day (as of  @ 15:07)  =============================================  IN:    Oral Fluid: 180 ml    Total IN: 180 ml  ---------------------------------------------  OUT:    Indwelling Catheter - Urethral: 1350 ml    Total OUT: 1350 ml  ---------------------------------------------  Total NET: -1170 ml        PHYSICAL EXAM:  Constitutional: Well appearing.  No acute distress  Neck: Supple. No JVD.  Respiratory: Clear to auscultation   Cardiovascular: S1, S2.  Regular rate and rhythm.    Gastrointestinal: soft, non-tender, non-distended, normal bowel sounds  Extremities: Warm.  No lower extremity edema.      ACCESS:     LABS:                        11.1   6.6   )-----------( 141      ( 2017 05:22 )             38.6     06-13    142  |  100  |  45<H>  ----------------------------<  95  4.9   |  33<H>  |  3.00<H>    Ca    9.1      2017 05:22  Mg     1.9     06-13    TPro  7.0  /  Alb  3.0<L>  /  TBili  0.4  /  DBili  x   /  AST  14  /  ALT  11  /  AlkPhos  114  06-12      PT/INR - ( 2017 05:22 )   PT: 45.1 sec;   INR: 3.95            Urinalysis Basic - ( 2017 20:57 )    Color: Yellow / Appearance: Cloudy / S.020 / pH: x  Gluc: x / Ketone: NEGATIVE  / Bili: NEGATIVE / Urobili: 0.2 E.U./dL   Blood: x / Protein: 30 mg/dL / Nitrite: NEGATIVE   Leuk Esterase: Large / RBC: Many /HPF / WBC Many /HPF   Sq Epi: x / Non Sq Epi: Many /HPF / Bacteria: Many /HPF Dr. Tan Florence  Renal Fellow  Beeper # [phone]        Patient seen and examined at bedside.   reports feeling better this am.     metoprolol succinate ER 25milliGRAM(s) daily  ferrous    sulfate 325milliGRAM(s) every 8 hours  furosemide   Injectable 40milliGRAM(s) every 12 hours  cefTRIAXone   IVPB 1Gram(s) every 24 hours  cefTRIAXone   IVPB    acetaminophen   Tablet. 975milliGRAM(s) every 6 hours PRN  acetazolamide    Tablet 250milliGRAM(s) daily      Allergies    No Known Allergies    Intolerances        T(C): , Max: 37.3 ( @ 05:12)  T(F): , Max: 99.1 ( @ 05:12)  HR: 85  BP: 105/47  BP(mean): 69  RR: 22  SpO2: 95%  Wt(kg): --  I & Os for 24h ending  @ 07:00  =============================================  IN:    Oral Fluid: 180 ml    Total IN: 180 ml  ---------------------------------------------  OUT:    Indwelling Catheter - Urethral: 1300 ml    Voided: 525 ml    Total OUT: 1825 ml  ---------------------------------------------  Total NET: -1645 ml    I & Os for current day (as of  @ 15:07)  =============================================  IN:    Oral Fluid: 180 ml    Total IN: 180 ml  ---------------------------------------------  OUT:    Indwelling Catheter - Urethral: 1350 ml    Total OUT: 1350 ml  ---------------------------------------------  Total NET: -1170 ml        PHYSICAL EXAM:  Constitutional: ill appearing.  No acute distress  Neck: Supple. No JVD.  Respiratory: Clear to auscultation   Cardiovascular: S1, S2.  Regular rate and rhythm.    Gastrointestinal: soft, non-tender, non-distended, normal bowel sounds  Extremities: Warm.   one plus ower extremity edema.   chronic venous changes.     ACCESS:     LABS:                        11.1   6.6   )-----------( 141      ( 2017 05:22 )             38.6     06-13    142  |  100  |  45<H>  ----------------------------<  95  4.9   |  33<H>  |  3.00<H>    Ca    9.1      2017 05:22  Mg     1.9     06-13    TPro  7.0  /  Alb  3.0<L>  /  TBili  0.4  /  DBili  x   /  AST  14  /  ALT  11  /  AlkPhos  114  06-12      PT/INR - ( 2017 05:22 )   PT: 45.1 sec;   INR: 3.95            Urinalysis Basic - ( 2017 20:57 )    Color: Yellow / Appearance: Cloudy / S.020 / pH: x  Gluc: x / Ketone: NEGATIVE  / Bili: NEGATIVE / Urobili: 0.2 E.U./dL   Blood: x / Protein: 30 mg/dL / Nitrite: NEGATIVE   Leuk Esterase: Large / RBC: Many /HPF / WBC Many /HPF   Sq Epi: x / Non Sq Epi: Many /HPF / Bacteria: Many /HPF

## 2017-06-13 NOTE — PROGRESS NOTE ADULT - PROBLEM SELECTOR PLAN 1
2/2 dietary non-compliance; EF 50-55% on last TTE 2016. BNP on admission 92367.  Lungs bibasilar crackles, edema noted from abdomen to bilateral legs and feet  -Daily weights, strict I/Os (us in place), FR 1L/day  -Echo w/ EF 50%, mod conc LVH, basal septal hypokinesis (seen previously on Echo 12/2015), severely dilated LA, severe AS, ALEKS 0.5, mean pressure gradient 43, mod pulm HTN, PASP 52.  -TSH, Lipid panel, HgA1c WNL.  -Trop 0.04 x 4. Likely 2/2 demand ischemia. Cont to trend until downtrends per Dr Soto.   -Continue Toprol XL 25mg po daily  -Cont Furosemide 40mg IV BID, start Diamox 250mg qd per renal recs. May add Metolazone 5mg qd if I/O not at goal per Dr Gonzalez.  -VQ scan per Dr Soto, after pt was taken down to VQ scan, pt refused to go through with procedure after discussion at length.  -F/u CXR

## 2017-06-13 NOTE — CONSULT NOTE ADULT - SUBJECTIVE AND OBJECTIVE BOX
Patient is a 87y old  Female who presents with a chief complaint of unable to get out of bed x 3 days (2017 16:57)      HPI:  88 yo female with pmh of chronic diastolic CHF (EF 50-55% ), mod to severe AS (ALKES 0.5cm)-not a surgical candidate, pafib (on Coumadin), CAD s/p PCI x 3 stent (', ' and )-last cath 3/15' PCI dLM w/ Impella support, Lupus, COPD (prior 30yr ppk hx), PNA, PPM  for bradycardia, CKD (baseline Cr 2-3), hx of Renal failure requiring HD x 4 session  @ [facility], HTN, PVD, and Type 2 DM (no longer on meds), MONICA, and TKR  presents to [facility] with cc of inability to get OOB x 3 days. Pt states that her "legs were too weak". Pt is a poor historian and HPI obtained from her daughter. Pt's daughter states that her mother was unable to get OOB for 3 days and pt used her Life Alert x3 times to call for help-was told to go to the ED. Reports that she had been "tired" for several day, SOB w/ minimal exertion for "several months" and her mobility has declined since her discharge from rehab in December. Additionally, pt has gained 17 lbs over the past month (from 258 to 275) and non-compliant with her diet. As per daughter, pt has been hospitalized prior 2-3 yrs ago (? [facility]) for CHF exacerbation w/ aggressive diuresis. Of note, pt was evaluation by Dr. Barger for aortic stenosis in . However, due to her deconditioned status during that time, pt was recommended medical management. (2017 16:57)      PAST MEDICAL & SURGICAL HISTORY:  PVD (peripheral vascular disease)  Iron deficiency anemia  Lupus (systemic lupus erythematosus)  Paroxysmal atrial fibrillation  Aortic stenosis, severe  CKD (chronic kidney disease) stage 4, GFR 15-29 ml/min  Diabetes  HTN (hypertension)  CHF (congestive heart failure)  CAD (coronary artery disease)  COPD (chronic obstructive pulmonary disease)  History of total knee replacement:   Presence of cardiac pacemaker: at [facility] by Dr Escoto      MEDICATIONS  (STANDING):  metoprolol succinate ER 25milliGRAM(s) Oral daily  ferrous    sulfate 325milliGRAM(s) Oral every 8 hours  furosemide   Injectable 40milliGRAM(s) IV Push every 12 hours  cefTRIAXone   IVPB 1Gram(s) IV Intermittent every 24 hours  cefTRIAXone   IVPB  IV Intermittent   acetazolamide    Tablet 250milliGRAM(s) Oral daily    MEDICATIONS  (PRN):  acetaminophen   Tablet. 975milliGRAM(s) Oral every 6 hours PRN Moderate Pain (4 - 6)      Social History: lives alone in an elevator accessible apartment, has HHA 11:30 am to 4:30 pm x 7days    Functional Level Prior to Admission: bathing with assist, walks with a rolling walker    FAMILY HISTORY:  No pertinent family history in first degree relatives      CBC Full  -  ( 2017 05:22 )  WBC Count : 6.6 K/uL  Hemoglobin : 11.1 g/dL  Hematocrit : 38.6 %  Platelet Count - Automated : 141 K/uL  Mean Cell Volume : 93.9 fL  Mean Cell Hemoglobin : 27.0 pg  Mean Cell Hemoglobin Concentration : 28.8 g/dL  Auto Neutrophil # : x  Auto Lymphocyte # : x  Auto Monocyte # : x  Auto Eosinophil # : x  Auto Basophil # : x  Auto Neutrophil % : 64.0 %  Auto Lymphocyte % : 6.0 %  Auto Monocyte % : 22.0 %  Auto Eosinophil % : 4.0 %  Auto Basophil % : 0.6 %      -    142  |  100  |  45<H>  ----------------------------<  95  4.9   |  33<H>  |  3.00<H>    Ca    9.1      2017 05:22  Mg     1.9     -    TPro  7.0  /  Alb  3.0<L>  /  TBili  0.4  /  DBili  x   /  AST  14  /  ALT  11  /  AlkPhos  114  06-12      Urinalysis Basic - ( 2017 20:57 )    Color: Yellow / Appearance: Cloudy / S.020 / pH: x  Gluc: x / Ketone: NEGATIVE  / Bili: NEGATIVE / Urobili: 0.2 E.U./dL   Blood: x / Protein: 30 mg/dL / Nitrite: NEGATIVE   Leuk Esterase: Large / RBC: Many /HPF / WBC Many /HPF   Sq Epi: x / Non Sq Epi: Many /HPF / Bacteria: Many /HPF          Radiology:    EXAM:  XR CHEST 1 VIEW PORT URGENT                          PROCEDURE DATE:  2017                     INTERPRETATION:  Indication: CHF    A single portable view of the chest is submitted. Comparison is made to   the most recent prior study dated 2017. There is increased pulmonary   vascular congestion. Other features of the chest are similar to the prior   study.     Impression:    Increased vascular congestion        EXAM:  US DPLX LWR EXT VEINS COMPL BI                          PROCEDURE DATE:  2017                     INTERPRETATION:  VENOUS DUPLEX DOPPLER OF BOTH LOWER EXTREMITIES dated   2017 3:07 PM    INDICATION: Bilateral lower summary swelling, right leg pain. Rule out   DVT.    TECHNIQUE: Duplex Doppler evaluation including gray-scale ultrasound   imaging, color flow Doppler imaging, and Doppler spectral analysis of the   veins of both lower extremities was performed.     COMPARISON: Partially Doppler 2015 and 3/5/2015    FINDINGS:    Thigh veins: The common femoral, femoral, popliteal, proximal greater   saphenous, and proximal deep femoral veins are patent and free of   thrombus bilaterally. The veins are normally compressibleand have normal   phasic flow and augmentation response.    Calf veins: The paired peroneal and posterior tibial calf veins were not   well visualized due to extensive soft tissue edema.    IMPRESSION:  No deep vein thrombosis seen above the knees.    Soft tissue edema in both lower extremities.                Vital Signs Last 24 Hrs  T(C): 36.9, Max: 37.3 (- @ 05:12)  T(F): 98.4, Max: 99.1 ( @ 05:12)  HR: 85 (64 - 85)  BP: 105/47 (99/41 - 125/59)  BP(mean): 69 (54 - 78)  RR: 22 (17 - 22)  SpO2: 95% (92% - 97%)    REVIEW OF SYSTEMS:      CONSTITUTIONAL: fatigue  EYES: No eye pain, visual disturbances, or discharge  ENMT:  No difficulty hearing, tinnitus, vertigo; No sinus or throat pain  NECK: No pain or stiffness  BREASTS: No pain, masses, or nipple discharge  RESPIRATORY:  shortness of breath  CARDIOVASCULAR: No chest pain, palpitations, dizziness, or leg swelling  GASTROINTESTINAL: No abdominal or epigastric pain. No nausea, vomiting, or hematemesis; No diarrhea or constipation. No melena or hematochezia.  GENITOURINARY: No dysuria, frequency, hematuria, or incontinence  NEUROLOGICAL: No headaches, memory loss, loss of strength, numbness, or tremors  SKIN: No itching, burning, rashes, or lesions   LYMPH NODES: No enlarged glands  ENDOCRINE: No heat or cold intolerance; No hair loss  MUSCULOSKELETAL: No joint pain or swelling; No muscle, back, or extremity pain  PSYCHIATRIC: No depression, anxiety, mood swings, or difficulty sleeping  HEME/LYMPH: No easy bruising, or bleeding gums  ALLERGY AND IMMUNOLOGIC: No hives or eczema      Physical Exam: obese  woman lying in bed, agitated,     HEENT: normocephalic/ atraumatic, anicteric    Neck: supple, negative JVD, negative carotid bruits,    Chest: bibasilar rales    Cardiovascular: irregular rate and rhythm, III/VI holosystolic murmur    Abdomen: soft, obese, non tender, negative rebound/guarding, normal bowel sounds, neg hepatosplenomegaly    Extremities: WWP, 2 + LE edema with venous stasis changes, negative calf tenderness to palpation, negative Germaine's sign, ST 2 right buttock ulcer 2x4 cm neg drainage    Neurologic Exam:    Alert and oriented to person, place, date/year, speech fluent w/o dysarthria, repetition intact, comprehension intact,     Cranial Nerves:     II:                       pupils equal, round and reactive to light, visual fields intact   III/ IV/VI:             extraocular movements intact, neg nystagmus, ptosis  V:                      facial sensation intact, V1-3 normal  VII:                     face symmetric, no droop, normal eye closure and smile  VIII:                    hearing intact to finger rub bilaterally  IX/ X:                  soft palate rise symmetrical  XI:                      head turning, shoulder shrug normal  XII:                     tongue midline    Motor Exam:    Bilateral UE:        4+/5 /intrinsics                            5/5 biceps/triceps/wrist extensors-flexors/deltoid                            negative pronator drift      Bilateral LE:        3+/5 hip flexors                            4/5/5 quadriceps/hamstrings                            5/5 dorsiflexors/plantar flexors/invertors-evertors    Sensory: intact to LT/PP in all UE/LE dermatomes    DTR:       =  biceps/     triceps/     brachioradialis                =  patella/   medial hamstring/    ankle                 neg clonus                 neg Babinski                 neg Hoffmans      Gait:  NT        PM&R Impression:    1) deconditioned  2) gait dysfunction  3) acute CHF exacerbation  4) aortic stenosis      Recommendations:    1) Physical therapy focusing on therapeutic exercises, bed mobility/transfer out of bed evaluation, progressive ambulation with assistive devices.    2) Disposition Plan: recommend subacute rehab placement

## 2017-06-13 NOTE — PROGRESS NOTE ADULT - PROBLEM SELECTOR PLAN 3
s/p PCI x 3 stent (04', 06' and 12')-last cath 3/15' PCI dLM w/ Impella support  -Trop 0.04 x4. Will cont to trend cardiac enzymes until downtrend.  -Cont on telemetry   -Continue Toprol XL 25mg po daily   -Lipid panel WNL s/p PCI x 3 stent (04', 06' and 12')-last cath 3/15' PCI dLM w/ Impella support  -Trop 0.04 x4. Will cont to trend cardiac enzymes until downtrend.  -Cont on telemetry   -Continue Toprol XL 25mg po daily   -Lipid panel WNL  -Per daughter [facility] Call UMAIR Tellez d/c'ed ASA due to dual AC w/ Coumadin, statin was d/c'ed years ago for unclear reasons. Call placed to UMAIR Tellez for clarification, awaiting call back.

## 2017-06-13 NOTE — PROGRESS NOTE ADULT - PROBLEM SELECTOR PLAN 1
2/2 dietary non-compliance; EF 50-55% on last TTE 2016. BNP on admission 45608.  Lungs bibasilar crackles, edema noted from abdomen to bilateral legs and feet  -Daily weights, strict I/Os (us in place), FR 1L/day  -Echo w/ EF 50%, mod conc LVH, basal septal hypokinesis (seen previously on Echo 12/2015), severely dilated LA, severe AS, ALEKS 0.5, mean pressure gradient 43, mod pulm HTN, PASP 52.  -TSH, Lipid panel, HgA1c WNL.  -Trop 0.04 x 4. Cont to trend until downtrends per Dr Soto  -Continue Toprol XL 25mg po daily  -Cont Furosemide 40mg IV BID, start Diamox 250mg qd per renal recs 2/2 dietary non-compliance; EF 50-55% on last TTE 2016. BNP on admission 88962.  Lungs bibasilar crackles, edema noted from abdomen to bilateral legs and feet  -Daily weights, strict I/Os (us in place), FR 1L/day  -Echo w/ EF 50%, mod conc LVH, basal septal hypokinesis (seen previously on Echo 12/2015), severely dilated LA, severe AS, ALEKS 0.5, mean pressure gradient 43, mod pulm HTN, PASP 52.  -TSH, Lipid panel, HgA1c WNL.  -Trop 0.04 x 4. Likely 2/2 demand ischemia. Cont to trend until downtrends per Dr Soto.   -Continue Toprol XL 25mg po daily  -Cont Furosemide 40mg IV BID, start Diamox 250mg qd per renal recs. May add Metolazone 5mg qd if I/O not at goal per Dr Gonzalez.  -VQ scan per Dr Soto, after pt was taken down to VQ scan, pt refused to go through with procedure after discussion at length.  -F/u CXR

## 2017-06-13 NOTE — CHART NOTE - NSCHARTNOTEFT_GEN_A_CORE
No further testing to be done at this time for TAVR work-up; Patient needs to be medically optimized from heart failure standpoint before proceeding further.  If she is tuned up and appropriate for discharge from the primary team's standpoint, she can follow up with us out-patient for her continued work-up.  We will follow along with you for now.

## 2017-06-13 NOTE — PROGRESS NOTE ADULT - PROBLEM SELECTOR PLAN 1
coumadin at home, supratherapeutic INR, holding coumadin    transfer to CCU    NPO while on BiPAP, no IVF, replete lytes prn    DNR/DNI

## 2017-06-13 NOTE — PROGRESS NOTE ADULT - PROBLEM SELECTOR PLAN 2
Non rheumatic heart disease. Echo w/ ALEKS 0.5, mean pressure gradient 43   -CTS Dr. Barger consulted. F/u recs.  -Per CTS team, to be medically optimized in regards to CHF prior to proceeding w/ TAVR w/u. Pt to follow up with out-patient for her continued work-up.  -Consider possible ischemic w/u if TAVR is needed Non rheumatic heart disease. Echo w/ ALEKS 0.5, mean pressure gradient 43   -CTS Dr. Barger consulted. F/u recs.  -Per CTS team, to be medically optimized in regards to CHF prior to proceeding w/ TAVR w/u. Pt to follow up with out-patient for her continued work-up.  -Dr Soto to discuss w/ Dr Barger regarding plan.  -Consider possible ischemic w/u if TAVR is needed

## 2017-06-13 NOTE — PROVIDER CONTACT NOTE (CHANGE IN STATUS NOTIFICATION) - SITUATION
Pt oxygen saturation decreased to 80s on 4 liters of 02 during testing in nuclear medicine. Pt brought back to unit with SOB and audible wheezing.

## 2017-06-13 NOTE — PROGRESS NOTE ADULT - SUBJECTIVE AND OBJECTIVE BOX
PGY 1 CCU TRANSFER ACCEPTANCE NOTE    HOSPITAL COURSE:  The patient is a     SUBJECTIVE: Patient seen and examined at bedside.     ROS:  CV: Denies chest pain  Resp: Denies SOB  GI: Denies abdominal pain, constipation, diarrhea, nausea, vomiting  : Denies dysuria  ID: Denies fevers, chills  MSK: Denies joint pain     OBJECTIVE:    VITAL SIGNS:  ICU Vital Signs Last 24 Hrs  T(C): 36.1, Max: 37.3 (06-13 @ 05:12)  T(F): 96.9, Max: 99.1 (06-13 @ 05:12)  HR: 73 (70 - 85)  BP: 114/57 (105/47 - 132/70)  BP(mean): 81 (67 - 91)  ABP: --  ABP(mean): --  RR: 18 (18 - 31)  SpO2: 97% (90% - 98%)      I & Os for 24h ending 06-13 @ 07:00  =============================================  IN: 180 ml / OUT: 1825 ml / NET: -1645 ml    I & Os for current day (as of 06-13 @ 23:52)  =============================================  IN: 180 ml / OUT: 2050 ml / NET: -1870 ml    CAPILLARY BLOOD GLUCOSE  139 (13 Jun 2017 21:45)      PHYSICAL EXAM:    General: NAD  HEENT: NC/AT; PERRL, clear conjunctiva  Neck: supple  Respiratory: CTA b/l  Cardiovascular: +S1/S2; RRR  Abdomen: soft, NT/ND; +BS x4  Extremities: WWP, 2+ peripheral pulses b/l; no LE edema  Skin: normal color and turgor; no rash  Neurological:     MEDICATIONS:  MEDICATIONS  (STANDING):  metoprolol succinate ER 25milliGRAM(s) Oral daily  ferrous    sulfate 325milliGRAM(s) Oral every 8 hours  furosemide   Injectable 40milliGRAM(s) IV Push every 12 hours  cefTRIAXone   IVPB 1Gram(s) IV Intermittent every 24 hours  cefTRIAXone   IVPB  IV Intermittent   acetazolamide    Tablet 250milliGRAM(s) Oral daily    MEDICATIONS  (PRN):  acetaminophen   Tablet. 975milliGRAM(s) Oral every 6 hours PRN Moderate Pain (4 - 6)  ALBUTerol/ipratropium for Nebulization 3milliLiter(s) Nebulizer every 6 hours PRN Shortness of Breath and/or Wheezing      ALLERGIES:  Allergies    No Known Allergies    Intolerances        LABS:                        10.8   8.1   )-----------( 130      ( 13 Jun 2017 18:02 )             38.4     06-13    140  |  97  |  42<H>  ----------------------------<  103<H>  4.8   |  31  |  3.10<H>    Ca    8.6      13 Jun 2017 18:03  Mg     1.9     06-13    TPro  7.0  /  Alb  3.0<L>  /  TBili  0.4  /  DBili  x   /  AST  14  /  ALT  11  /  AlkPhos  114  06-12    PT/INR - ( 13 Jun 2017 05:22 )   PT: 45.1 sec;   INR: 3.95                RADIOLOGY & ADDITIONAL TESTS: Reviewed. 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 The patient was started on IV lasix 40 mg BID. CT surgery was consulted due to the patient's history of aortic stenosis They agreed with our plan of diuresis. Renal was consulted due to the patient's history of transient need for HD, and they stated the patient did not need dialysis at the time. EP interrogated her pacemaker and it was functioning well. Pulm was consulted and they recommended a CT chest after the patient was diuresed further. The patient was to get a V/Q scan, but she was not able to tolerate it and refused it adamantly. The patient had trops 0.04 x3, likely 2/2 demand ischemia. Renal later suggested to add diamox in order to further diurese the patient. The patient was then noted to have AMS, and an ABG showed elevated CO2 of 84. Though the patient's mental status was stable upon our evaluation of the patient. The patient was put on BiPAP and transferred to the CCU. The patient is pending a CT head.     SUBJECTIVE: Patient seen and examined at bedside.     ROS:  CV: Denies chest pain  Resp: Denies SOB  GI: Denies abdominal pain, constipation, diarrhea, nausea, vomiting  : Denies dysuria  ID: Denies fevers, chills  MSK: Denies joint pain     OBJECTIVE:    VITAL SIGNS:  ICU Vital Signs Last 24 Hrs  T(C): 36.1, Max: 37.3 (06-13 @ 05:12)  T(F): 96.9, Max: 99.1 (06-13 @ 05:12)  HR: 73 (70 - 85)  BP: 114/57 (105/47 - 132/70)  BP(mean): 81 (67 - 91)  ABP: --  ABP(mean): --  RR: 18 (18 - 31)  SpO2: 97% (90% - 98%)      I & Os for 24h ending 06-13 @ 07:00  =============================================  IN: 180 ml / OUT: 1825 ml / NET: -1645 ml    I & Os for current day (as of 06-13 @ 23:52)  =============================================  IN: 180 ml / OUT: 2050 ml / NET: -1870 ml    CAPILLARY BLOOD GLUCOSE  139 (13 Jun 2017 21:45)      PHYSICAL EXAM:  General: A/ox 3, No acute Distress, Obese  Neck: Supple, +JVD  Cardiac: S1 S2, Grade III/VI systolic murmur  Pulmonary: Bibasilar crackles, Breathing unlabored, No Wheezing  Abdomen: Soft, Non -tender, +BS x 4 quads  Extremities: No Rashes, 2+ romeo edema from upper thighs to romeo feet, romeo LE redness appears chronic venous stasis, 1+ romeo pedal pulses  Neuro: A/o x 3, No focal deficits  Skin: Right buttock pressure ulcer stage II, 1 x 1.5cm; bilateral groins with redness noted       MEDICATIONS:  MEDICATIONS  (STANDING):  metoprolol succinate ER 25milliGRAM(s) Oral daily  ferrous    sulfate 325milliGRAM(s) Oral every 8 hours  furosemide   Injectable 40milliGRAM(s) IV Push every 12 hours  cefTRIAXone   IVPB 1Gram(s) IV Intermittent every 24 hours  cefTRIAXone   IVPB  IV Intermittent   acetazolamide    Tablet 250milliGRAM(s) Oral daily    MEDICATIONS  (PRN):  acetaminophen   Tablet. 975milliGRAM(s) Oral every 6 hours PRN Moderate Pain (4 - 6)  ALBUTerol/ipratropium for Nebulization 3milliLiter(s) Nebulizer every 6 hours PRN Shortness of Breath and/or Wheezing      ALLERGIES:  Allergies    No Known Allergies    Intolerances        LABS:                        10.8   8.1   )-----------( 130      ( 13 Jun 2017 18:02 )             38.4     06-13    140  |  97  |  42<H>  ----------------------------<  103<H>  4.8   |  31  |  3.10<H>    Ca    8.6      13 Jun 2017 18:03  Mg     1.9     06-13    TPro  7.0  /  Alb  3.0<L>  /  TBili  0.4  /  DBili  x   /  AST  14  /  ALT  11  /  AlkPhos  114  06-12    PT/INR - ( 13 Jun 2017 05:22 )   PT: 45.1 sec;   INR: 3.95                RADIOLOGY & ADDITIONAL TESTS: Reviewed.

## 2017-06-13 NOTE — PROVIDER CONTACT NOTE (CHANGE IN STATUS NOTIFICATION) - BACKGROUND
Pt oxygen saturation decreased to 80s on 4 liters of 02 during testing in nuclear medicine. Pt brought back to unit with SOB and audible wheezing,

## 2017-06-13 NOTE — PHYSICAL THERAPY INITIAL EVALUATION ADULT - PERTINENT HX OF CURRENT PROBLEM, REHAB EVAL
87 year old female. Pt's daughter states that her mother was unable to get OOB for 3 days and pt used her Life Alert x3 times to call for help-was told to go to the ED. Reports that she had been "tired" for several day, SOB w/ minimal exertion for "several months" and her mobility has declined since her discharge from rehab in December.

## 2017-06-13 NOTE — PROGRESS NOTE ADULT - PROBLEM SELECTOR PLAN 2
Non rheumatic heart disease. Echo w/ ALEKS 0.5, mean pressure gradient 43   -CTS Dr. Barger consulted. F/u recs.  -Per CTS team, to be medically optimized in regards to CHF prior to proceeding w/ TAVR w/u. Pt to follow up with out-patient for her continued work-up.  -Dr Soto to discuss w/ Dr Barger regarding plan.  -Consider possible ischemic w/u if TAVR is needed

## 2017-06-13 NOTE — PROGRESS NOTE ADULT - ASSESSMENT
1) acute on chronic diastolic chf - now mildly reduced lvef with basal septal wma not previously described - elevated troponin without trend  -supratherapeutic inr continues - holding couamdin  -o>i as tolerated - lasix dosing per renal  -stable elevated troponin - did not tolerate v/q - monitor for now; on therapeutic ac  -hold ace-i/arb with kidney disease  2) nonrheumatic aortic stenosis  -consider BAV versus TAVR - shd follwing  3) cad s/p pci  -asa and statin if no contraindications  4) afib - holding coumadin; monitor on bb  5) s/p ppm - per ep  6) htn - monitor   7) copd - Pulm following - note pulmonary htn  -note co2 retention with ams/lethargy - recommend icu level care; consider ct brain and Neuro eval  8) dm-2 - no ace/arb with kidney failure; monitor blood sugar  9) lupus - monitor  10) pvd per chart - review old records when available  11) kidney failure - antb; f/u renal recs  12) normocytic anemia and thrombocytopenia - monitor cbc  13) K>4, Mg>2  d/w NP Brenda, CV Fellow Olivier, and Dr. Barger - I recommend transfer to ICU or CCU level care now

## 2017-06-13 NOTE — PHYSICAL THERAPY INITIAL EVALUATION ADULT - IMPAIRMENTS FOUND, PT EVAL
arousal, attention, and cognition/gait, locomotion, and balance/cognitive impairment/poor safety awareness/muscle strength/ventilation and respiration/gas exchange arousal, attention, and cognition/cognitive impairment/muscle strength/aerobic capacity/endurance/ventilation and respiration/gas exchange/gait, locomotion, and balance/anthropometric characteristics/poor safety awareness

## 2017-06-13 NOTE — CHART NOTE - NSCHARTNOTEFT_GEN_A_CORE
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 with cc of inability to get OOB x 3 days. Pt states that her "legs were too weak". Pt is a poor historian and HPI obtained from her daughter. Pt's daughter states that her mother was unable to get OOB for 3 days and pt used her Life Alert x3 times to call for help-was told to go to the ED. Reports that she had been "tired" for several day, SOB w/ minimal exertion for "several months" and her mobility has declined since her discharge from rehab in December. Additionally, pt has gained 17 lbs over the past month (from 258 to 275) and non-compliant with her diet. As per daughter, pt has been hospitalized prior 2-3 yrs ago (? [facility]) for CHF exacerbation w/ aggressive diuresis. Of note, pt was evaluation by Dr. Barger for aortic stenosis in December 16'. However, due to her deconditioned status during that time, pt was recommended medical management. 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 Pt states that her "legs were too weak". Pt is a poor historian and HPI obtained from her daughter. Pt's daughter states that her mother was unable to get OOB for 3 days, had been "tired" for several day, SOB w/ minimal exertion for "several months" and her mobility has declined since her discharge from rehab in December. Additionally, pt has gained 17 lbs over the past month (from 258 to 275) and non-compliant with low salt diet. On admission she was noted w/ elevated BNP 50453, Trop 0.04 (have flattened), INR 4.98, Coumadin was held. CXR w/ pulm vasc congestion, small R pleural effusion. EKG showed showed Afib w/ V-pacing. She was admitted to telemetry 5 [facility] for acute on chronic diastolic CHF exacerbation.             Of note, pt was evaluation by Dr. Barger for aortic stenosis in December 16'. However, due to her deconditioned status during that time, pt was recommended medical management. 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 Pt states that her "legs were too weak". Pt is a poor historian and HPI obtained from her daughter. Pt's daughter states that her mother was unable to get OOB for 3 days, had been "tired" for several day, SOB w/ minimal exertion for "several months" and her mobility has declined since her discharge from rehab in December. Additionally, pt has gained 17 lbs over the past month (from 258 to 275) and non-compliant with low salt diet. On admission she was noted w/ elevated BNP 43193, Trop 0.04 (have flattened, likely 2/2 demand ischemia), INR 4.98, Coumadin was held. CXR w/ pulm vasc congestion, small R pleural effusion. EKG showed showed Afib w/ V-pacing. She was admitted to telemetry 5 [facility] for acute on chronic diastolic CHF exacerbation. She was started on IV diuresis w/ Lasix 40mg BID. Renal was consulted for CKD, requiring HD in the past, rec adding Diamox in setting of elevated HCO3 on BMP. Pulm was consulted and rec obtaining CT chest after diuresis. CTS was consulted for critical AS w ALEKS 0.5, mean pressure gradient 43, with plan for aortic valvuloplasty after medically optimized w/ diuresis. Pt was taken to VQ scan but unable to complete test 2/2 increased somnolence during test. ABG performed showed resp acidosis w/ pH 7.2/CO2 84/HCO3 33. She was placed on BIPAP and tolerated for a few hrs but woke up alert and upset took own BIPAP mask off, refused to continue BIPAP. Pt was placed on Venti Mask FiO2 40%, maintaining SpO2 97%. Repeat ABG showed some improvement but w/ persistent resp acidosis pH 7.25/CO2 76/HCO3 33. She was placed back on BIPAP and transferred to CCU pending CT scan of head.

## 2017-06-13 NOTE — PROGRESS NOTE ADULT - ASSESSMENT
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

## 2017-06-13 NOTE — PHYSICAL THERAPY INITIAL EVALUATION ADULT - CRITERIA FOR SKILLED THERAPEUTIC INTERVENTIONS
rehab potential/anticipated discharge recommendation/risk reduction/prevention/therapy frequency/functional limitations in following categories/impairments found

## 2017-06-13 NOTE — PROGRESS NOTE ADULT - ASSESSMENT
no need for V/Q scanning at this time    balance of diuresis and critical aortic stenosis is difficult - high risk of sudden cardiac arrest    CT chest after more diuresis

## 2017-06-13 NOTE — PROGRESS NOTE ADULT - SUBJECTIVE AND OBJECTIVE BOX
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, HTN, PVD, and Type 2 DM (no longer on meds), MONICA, and TKR 03' presents to [facility] with cc of inability to get OOB x 3 days. Pt states that her "legs were too weak". Pt is a poor historian and HPI obtained from her daughter. Pt's daughter states that her mother was unable to get OOB for 3 days and pt used her Life Alert x3 times to call for help-was told to go to the ED. Reports that she had been "tired" for several day, SOB w/ minimal exertion for "several months" and her mobility has declined since her discharge from rehab in December. Additionally, pt has gained 17 lbs over the past month (from 258 to 275) and non-compliant with her diet. As per daughter, pt has been hospitalized prior 2-3 yrs ago (? [facility]) for CHF exacerbation w/ aggressive diuresis. Of note, pt was evaluation by Dr. Barger for aortic stenosis in December 16'. However, due to her deconditioned status during that time, pt was recommended medical management.      Pt seen and examined at bedside. Reports chronic R groin pain      Sleepy this AM as she did not sleep overnight .    Overnight Events: None    TELEMETRY: AF 70s          VITAL SIGNS:  T(C): 36.9, Max: 37.3 (06-13 @ 05:12)  HR: 85 (64 - 85)  BP: 105/47 (99/41 - 125/59)  RR: 22 (17 - 22)  SpO2: 95% (92% - 97%)  Wt(kg): --    I&O's Summary  I & Os for 24h ending 13 Jun 2017 07:00  =============================================  IN: 180 ml / OUT: 1825 ml / NET: -1645 ml    I & Os for current day (as of 13 Jun 2017 14:37)  =============================================  IN: 180 ml / OUT: 350 ml / NET: -170 ml        PHYSICAL EXAM:    General: A/ox 3, No acute Distress, Obese  Neck: Supple, +JVD  Cardiac: S1 S2, Grade III/VI systolic murmur  Pulmonary: Bibasilar crackles, Breathing unlabored, No Rhonchi/Rales/Wheezing  Abdomen: Soft, Non -tender, +BS x 4 quads  Extremities: No Rashes, 2+ romeo edema from upper thighs to romeo feet, romeo LE redness appears chronic venous stasis, 1+ romeo pedal pulses  Neuro: A/o x 3, No focal deficits  Skin: Right buttock pressure ulcer stage II, 1 x 1.5cm; bilateral groins with redness noted           LABS:                          11.1   6.6   )-----------( 141      ( 13 Jun 2017 05:22 )             38.6                              06-13    142  |  100  |  45<H>  ----------------------------<  95  4.9   |  33<H>  |  3.00<H>    Ca    9.1      13 Jun 2017 05:22  Mg     1.9     06-13    TPro  7.0  /  Alb  3.0<L>  /  TBili  0.4  /  DBili  x   /  AST  14  /  ALT  11  /  AlkPhos  114  06-12    LIVER FUNCTIONS - ( 12 Jun 2017 05:27 )  Alb: 3.0 g/dL / Pro: 7.0 g/dL / ALK PHOS: 114 U/L / ALT: 11 U/L / AST: 14 U/L / GGT: x                                 PT/INR - ( 13 Jun 2017 05:22 )   PT: 45.1 sec;   INR: 3.95            CAPILLARY BLOOD GLUCOSE    CARDIAC MARKERS ( 13 Jun 2017 05:22 )  x     / 0.04 ng/mL / 29 U/L / x     / 2.2 ng/mL  CARDIAC MARKERS ( 12 Jun 2017 12:35 )  x     / 0.04 ng/mL / 35 U/L / x     / 2.2 ng/mL  CARDIAC MARKERS ( 12 Jun 2017 05:27 )  x     / 0.04 ng/mL / 30 U/L / x     / 1.9 ng/mL  CARDIAC MARKERS ( 11 Jun 2017 18:40 )  x     / 0.04 ng/mL / 34 U/L / x     / 1.7 ng/mL            No Known Allergies    MEDICATIONS  (STANDING):  metoprolol succinate ER 25milliGRAM(s) Oral daily  ferrous    sulfate 325milliGRAM(s) Oral every 8 hours  furosemide   Injectable 40milliGRAM(s) IV Push every 12 hours  cefTRIAXone   IVPB 1Gram(s) IV Intermittent every 24 hours  cefTRIAXone   IVPB  IV Intermittent   acetazolamide    Tablet 250milliGRAM(s) Oral daily    MEDICATIONS  (PRN):  acetaminophen   Tablet. 975milliGRAM(s) Oral every 6 hours PRN Moderate Pain (4 - 6)        DIAGNOSTIC TESTS:     Echo 6/12/17: EF 50%, mod conc LVH, basal septal hypokinesis (seen previously on Echo 12/2015), severely dilated LA, severe AS, ALEKS 0.5, mean pressure gradient 43, mod pulm HTN, PASP 52.

## 2017-06-14 DIAGNOSIS — J96.02 ACUTE RESPIRATORY FAILURE WITH HYPERCAPNIA: ICD-10-CM

## 2017-06-14 DIAGNOSIS — Z51.5 ENCOUNTER FOR PALLIATIVE CARE: ICD-10-CM

## 2017-06-14 DIAGNOSIS — I50.23 ACUTE ON CHRONIC SYSTOLIC (CONGESTIVE) HEART FAILURE: ICD-10-CM

## 2017-06-14 DIAGNOSIS — J90 PLEURAL EFFUSION, NOT ELSEWHERE CLASSIFIED: ICD-10-CM

## 2017-06-14 DIAGNOSIS — N18.4 CHRONIC KIDNEY DISEASE, STAGE 4 (SEVERE): ICD-10-CM

## 2017-06-14 LAB
-  AMPICILLIN/SULBACTAM: SIGNIFICANT CHANGE UP
-  AMPICILLIN: SIGNIFICANT CHANGE UP
-  CEFAZOLIN: SIGNIFICANT CHANGE UP
-  CEFTRIAXONE: SIGNIFICANT CHANGE UP
-  CIPROFLOXACIN: SIGNIFICANT CHANGE UP
-  GENTAMICIN: SIGNIFICANT CHANGE UP
-  NITROFURANTOIN: SIGNIFICANT CHANGE UP
-  PIPERACILLIN/TAZOBACTAM: SIGNIFICANT CHANGE UP
-  TOBRAMYCIN: SIGNIFICANT CHANGE UP
-  TRIMETHOPRIM/SULFAMETHOXAZOLE: SIGNIFICANT CHANGE UP
ANION GAP SERPL CALC-SCNC: 15 MMOL/L — SIGNIFICANT CHANGE UP (ref 5–17)
BASE EXCESS BLDA CALC-SCNC: 4 MMOL/L — HIGH (ref -2–3)
BASE EXCESS BLDA CALC-SCNC: 4.9 MMOL/L — HIGH (ref -2–3)
BASE EXCESS BLDA CALC-SCNC: 6.1 MMOL/L — HIGH (ref -2–3)
BASOPHILS NFR BLD AUTO: 0.8 % — SIGNIFICANT CHANGE UP (ref 0–2)
BUN SERPL-MCNC: 47 MG/DL — HIGH (ref 7–23)
CALCIUM SERPL-MCNC: 9.1 MG/DL — SIGNIFICANT CHANGE UP (ref 8.4–10.5)
CHLORIDE SERPL-SCNC: 98 MMOL/L — SIGNIFICANT CHANGE UP (ref 96–108)
CO2 SERPL-SCNC: 29 MMOL/L — SIGNIFICANT CHANGE UP (ref 22–31)
CREAT SERPL-MCNC: 3 MG/DL — HIGH (ref 0.5–1.3)
CULTURE RESULTS: SIGNIFICANT CHANGE UP
EOSINOPHIL NFR BLD AUTO: 2.9 % — SIGNIFICANT CHANGE UP (ref 0–6)
GAS PNL BLDA: SIGNIFICANT CHANGE UP
GLUCOSE SERPL-MCNC: 73 MG/DL — SIGNIFICANT CHANGE UP (ref 70–99)
HCO3 BLDA-SCNC: 32 MMOL/L — HIGH (ref 21–28)
HCO3 BLDA-SCNC: 32 MMOL/L — HIGH (ref 21–28)
HCO3 BLDA-SCNC: 35 MMOL/L — HIGH (ref 21–28)
HCT VFR BLD CALC: 36.9 % — SIGNIFICANT CHANGE UP (ref 34.5–45)
HGB BLD-MCNC: 10.6 G/DL — LOW (ref 11.5–15.5)
INR BLD: 3.72 — HIGH (ref 0.88–1.16)
LYMPHOCYTES # BLD AUTO: 9.8 % — LOW (ref 13–44)
MAGNESIUM SERPL-MCNC: 2 MG/DL — SIGNIFICANT CHANGE UP (ref 1.6–2.6)
MCHC RBC-ENTMCNC: 26.7 PG — LOW (ref 27–34)
MCHC RBC-ENTMCNC: 28.7 G/DL — LOW (ref 32–36)
MCV RBC AUTO: 92.9 FL — SIGNIFICANT CHANGE UP (ref 80–100)
METHOD TYPE: SIGNIFICANT CHANGE UP
MONOCYTES NFR BLD AUTO: 23.4 % — HIGH (ref 2–14)
NEUTROPHILS NFR BLD AUTO: 63.1 % — SIGNIFICANT CHANGE UP (ref 43–77)
ORGANISM # SPEC MICROSCOPIC CNT: SIGNIFICANT CHANGE UP
ORGANISM # SPEC MICROSCOPIC CNT: SIGNIFICANT CHANGE UP
PCO2 BLDA: 65 MMHG — CRITICAL HIGH (ref 32–45)
PCO2 BLDA: 70 MMHG — CRITICAL HIGH (ref 32–45)
PCO2 BLDA: 76 MMHG — CRITICAL HIGH (ref 32–45)
PH BLDA: 7.28 — LOW (ref 7.35–7.45)
PH BLDA: 7.28 — LOW (ref 7.35–7.45)
PH BLDA: 7.32 — LOW (ref 7.35–7.45)
PLATELET # BLD AUTO: 132 K/UL — LOW (ref 150–400)
PO2 BLDA: 105 MMHG — SIGNIFICANT CHANGE UP (ref 83–108)
PO2 BLDA: 105 MMHG — SIGNIFICANT CHANGE UP (ref 83–108)
PO2 BLDA: 39 MMHG — CRITICAL LOW (ref 83–108)
POTASSIUM SERPL-MCNC: 4.6 MMOL/L — SIGNIFICANT CHANGE UP (ref 3.5–5.3)
POTASSIUM SERPL-SCNC: 4.6 MMOL/L — SIGNIFICANT CHANGE UP (ref 3.5–5.3)
PROTHROM AB SERPL-ACNC: 42.4 SEC — HIGH (ref 9.8–12.7)
RBC # BLD: 3.97 M/UL — SIGNIFICANT CHANGE UP (ref 3.8–5.2)
RBC # FLD: 15.4 % — SIGNIFICANT CHANGE UP (ref 10.3–16.9)
SAO2 % BLDA: 67 % — LOW (ref 95–100)
SAO2 % BLDA: 98 % — SIGNIFICANT CHANGE UP (ref 95–100)
SAO2 % BLDA: 98 % — SIGNIFICANT CHANGE UP (ref 95–100)
SODIUM SERPL-SCNC: 142 MMOL/L — SIGNIFICANT CHANGE UP (ref 135–145)
SPECIMEN SOURCE: SIGNIFICANT CHANGE UP
TROPONIN T SERPL-MCNC: 0.04 NG/ML — HIGH (ref 0–0.01)
WBC # BLD: 6.1 K/UL — SIGNIFICANT CHANGE UP (ref 3.8–10.5)
WBC # FLD AUTO: 6.1 K/UL — SIGNIFICANT CHANGE UP (ref 3.8–10.5)

## 2017-06-14 PROCEDURE — 99232 SBSQ HOSP IP/OBS MODERATE 35: CPT | Mod: GC

## 2017-06-14 PROCEDURE — 99291 CRITICAL CARE FIRST HOUR: CPT

## 2017-06-14 PROCEDURE — 99223 1ST HOSP IP/OBS HIGH 75: CPT

## 2017-06-14 PROCEDURE — 71010: CPT | Mod: 26

## 2017-06-14 RX ORDER — FUROSEMIDE 40 MG
20 TABLET ORAL EVERY 12 HOURS
Qty: 0 | Refills: 0 | Status: DISCONTINUED | OUTPATIENT
Start: 2017-06-14 | End: 2017-06-15

## 2017-06-14 RX ORDER — ACETYLCYSTEINE 200 MG/ML
5 VIAL (ML) MISCELLANEOUS EVERY 6 HOURS
Qty: 0 | Refills: 0 | Status: DISCONTINUED | OUTPATIENT
Start: 2017-06-14 | End: 2017-07-06

## 2017-06-14 RX ORDER — METOPROLOL TARTRATE 50 MG
2.5 TABLET ORAL EVERY 6 HOURS
Qty: 0 | Refills: 0 | Status: DISCONTINUED | OUTPATIENT
Start: 2017-06-14 | End: 2017-06-16

## 2017-06-14 RX ADMIN — Medication 2.5 MILLIGRAM(S): at 05:07

## 2017-06-14 RX ADMIN — Medication 20 MILLIGRAM(S): at 18:25

## 2017-06-14 RX ADMIN — ACETAZOLAMIDE 250 MILLIGRAM(S): 250 TABLET ORAL at 13:40

## 2017-06-14 RX ADMIN — Medication 2.5 MILLIGRAM(S): at 12:48

## 2017-06-14 RX ADMIN — Medication 40 MILLIGRAM(S): at 05:07

## 2017-06-14 RX ADMIN — Medication 325 MILLIGRAM(S): at 13:41

## 2017-06-14 RX ADMIN — Medication 2.5 MILLIGRAM(S): at 18:26

## 2017-06-14 RX ADMIN — CEFTRIAXONE 100 GRAM(S): 500 INJECTION, POWDER, FOR SOLUTION INTRAMUSCULAR; INTRAVENOUS at 10:49

## 2017-06-14 NOTE — PROGRESS NOTE ADULT - SUBJECTIVE AND OBJECTIVE BOX
PUD CCM ATTENDING    INTERVAL HPI/OVERNIGHT EVENTS:    I have evaluated the patient multiple times throughout the day  hypercapnic failure  BiPAP - high flow - lethargy - BiPAP    PAST MEDICAL & SURGICAL HISTORY:  PVD (peripheral vascular disease)  Iron deficiency anemia  Lupus (systemic lupus erythematosus)  Paroxysmal atrial fibrillation  Aortic stenosis, severe  CKD (chronic kidney disease) stage 4, GFR 15-29 ml/min  Diabetes  HTN (hypertension)  CHF (congestive heart failure)  CAD (coronary artery disease)  COPD (chronic obstructive pulmonary disease)  History of total knee replacement: 2003  Presence of cardiac pacemaker: at [facility] by Dr Escoto    FAMILY HISTORY:  No pertinent family history in first degree relatives    SOCIAL HISTORY:    REVIEW OF SYSTEMS:  -CP  -SOB  +lethargy    Vital Signs Last 24 Hrs  T(C): 36.5, Max: 36.5 (06-14 @ 14:23)  T(F): 97.7, Max: 97.7 (06-14 @ 14:23)  HR: 66 (64 - 85)  BP: 105/49 (95/38 - 134/61)  BP(mean): 71 (53 - 81)  RR: 18 (13 - 27)  SpO2: 92% (92% - 100%)    ABG - ( 14 Jun 2017 12:12 )  pH: 7.32  /  pCO2: 65    /  pO2: 105   / HCO3: 32    / Base Excess: 4.9   /  SaO2: 98        I&O's Detail  I & Os for 24h ending 14 Jun 2017 07:00  =============================================  IN:    Oral Fluid: 180 ml    Total IN: 180 ml  ---------------------------------------------  OUT:    Indwelling Catheter - Urethral: 2975 ml    Total OUT: 2975 ml  ---------------------------------------------  Total NET: -2795 ml    I & Os for current day (as of 14 Jun 2017 15:08)  =============================================  IN:    IV PiggyBack: 100 ml    Total IN: 100 ml  ---------------------------------------------  OUT:    Indwelling Catheter - Urethral: 735 ml    Total OUT: 735 ml  ---------------------------------------------  Total NET: -635 ml    PHYSICAL EXAM:  In MICU, back on BiPAP  Obese, agitated/lethargic,  uncomfortable, - acute distress; vital signs are monitored continuously  Eyes: PERRLA, EOMI, -conjunctivitis, -scleritis   Head: no focal deficit, normocephalic,  no trauma  ENMT: moist tongue, no thrush, -nasal discharge, -hoarseness, normal hearing, -cough, -hemoptysis, trachea midline  Neck: supple, - lymphadenopathy,  -masses, -JVD  Respiratory: bilateral diminished breath sounds, -wheezing, -rhonchi, +rales, -crackles  Chest: -accessory muscle use, -paradoxical breathing  Cardiovascular: regular rate and sinus rhythm, S1 S2 normal, -S3, -S4, 4/6 murmur, -gallop, -rub  Gastrointestinal: soft, nontender, nondistended, normal bowel sounds, no hepatosplenomegaly  Genitourinary: -flank pain, -dysuria KISER  Extremities: -clubbing, -cyanosis, +edema    Vascular: peripheral pulses palpable 2+ bilaterally  Neurological: alert, oriented x 3, no focal deficit, -tremor   Skin: warm, dry, -erythema, iv sites intact  Lymph nodes; no cervical, supraclavicular or axillary adenopathy  Psychiatric: cooperative, agitated    MEDICATIONS  (STANDING):  ferrous    sulfate 325milliGRAM(s) Oral every 8 hours  furosemide   Injectable 40milliGRAM(s) IV Push every 12 hours  cefTRIAXone   IVPB 1Gram(s) IV Intermittent every 24 hours  cefTRIAXone   IVPB  IV Intermittent   acetazolamide    Tablet 250milliGRAM(s) Oral daily  metoprolol Injectable 2.5milliGRAM(s) IV Push every 6 hours    MEDICATIONS  (PRN):  acetaminophen   Tablet. 975milliGRAM(s) Oral every 6 hours PRN Moderate Pain (4 - 6)  ALBUTerol/ipratropium for Nebulization 3milliLiter(s) Nebulizer every 6 hours PRN Shortness of Breath and/or Wheezing    Allergies  No Known Allergies  Intolerances    LABS:                        10.6   6.1   )-----------( 132      ( 14 Jun 2017 05:00 )             36.9     06-14    142  |  98  |  47<H>  ----------------------------<  73  4.6   |  29  |  3.00<H>    Ca    9.1      14 Jun 2017 05:01  Mg     2.0     06-14  PT/INR - ( 14 Jun 2017 05:05 )   PT: 42.4 sec;   INR: 3.72       +DVT prophylaxis INR  +Sleep DECREASED  +Nutrition goals ORAL, HAS BEEN ABLE TO EAT  -Pain DENIED  -Decubital ulcer  +GI prophylaxis (PPV, coagulopathy, Hx)  +Aspiration prophylaxis (45 degrees)    Ventilator synchronized  BiPAP 17/5; hiflow 43%, 50 L  Tracheal cuff pressure NA  +Sedation/analgesia stopped  +ID (phos, CH, mupi, SB)  -Delirium  +Cardiac Beta  +Prevention  +Education  +Medication reviewed (drug-drug interactions, PDA)  Medical devices MARGI GODDARD    Discussed with CCU, PGY, CCRN, family    RADIOLOGY & ADDITIONAL STUDIES:    CXR similar  TUS: bilateral effusions PERFORMED MYSELF

## 2017-06-14 NOTE — CONSULT NOTE ADULT - PROBLEM SELECTOR RECOMMENDATION 5
intro to service made.  Pt/family support, Music Therapy, and Massage Therapy to see for continued support.  Pt appears depressed and angry about even her pre-existing state prior to admit.  to cont. seeing for additive support as well.  DNR/DNI status noted.  Will cont. to follow

## 2017-06-14 NOTE — PROGRESS NOTE ADULT - SUBJECTIVE AND OBJECTIVE BOX
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 Pt states that her "legs were too weak". Pt is a poor historian and HPI obtained from her daughter. Pt's daughter states that her mother was unable to get OOB for 3 days and pt used her Life Alert x3 times to call for help-was told to go to the ED. Reports that she had been "tired" for several day, SOB w/ minimal exertion for "several months" and her mobility has declined since her discharge from rehab in December. Additionally, pt has gained 17 lbs over the past month (from 258 to 275) and non-compliant with her diet. As per daughter, pt has been hospitalized prior 2-3 yrs ago (? [facility]) for CHF exacerbation w/ aggressive diuresis. Of note, pt was evaluation by Dr. Barger for aortic stenosis in December 16'. However, due to her deconditioned status during that time, pt was recommended medical management.       	  MEDICATIONS:  acetazolamide    Tablet 250milliGRAM(s) Oral daily  metoprolol Injectable 2.5milliGRAM(s) IV Push every 6 hours  furosemide   Injectable 20milliGRAM(s) IV Push every 12 hours    cefTRIAXone   IVPB 1Gram(s) IV Intermittent every 24 hours  cefTRIAXone   IVPB  IV Intermittent     ALBUTerol/ipratropium for Nebulization 3milliLiter(s) Nebulizer every 6 hours PRN    acetaminophen   Tablet. 975milliGRAM(s) Oral every 6 hours PRN        ferrous    sulfate 325milliGRAM(s) Oral every 8 hours      Complaint:     Otherwise 12 point review of systems is normal.    PHYSICAL EXAM:    Constitutional: Agitated & upset  Eyes: PERRL, EOMI, sclera non-icteric  Neck: supple, no masses, no JVD  Respiratory: CTA b/l, good air entry b/l, no wheezing, rhonchi, rales,   Cardiovascular: RRR, normal S1S2, no M/R/G  Gastrointestinal: soft, NTND, no masses palpable, BS normal in all four quadrants,   Extremities:  +edema    ICU Vital Signs Last 24 Hrs  T(C): 37.1, Max: 37.1 (06-14 @ 18:27)  T(F): 98.7, Max: 98.7 (06-14 @ 18:27)  HR: 66 (61 - 85)  BP: 105/49 (95/38 - 134/61)  BP(mean): 71 (53 - 81)  ABP: 136/60 (118/52 - 138/60)  ABP(mean): 86 (70 - 88)  RR: 14 (13 - 31)  SpO2: 95% (92% - 100%)      ECG:      CHEST X RAY    CT    MRI    MRA    CT ANGIO    CAROTID DUPLEX    DUPLEX     Echocardiogram    Catheterization:    Stress Test:     LABS:	 	  CARDIAC MARKERS:  Troponin T, Serum: 0.04 ng/mL <H> [0.00 - 0.01] (06-14 @ 15:38)  Troponin T, Serum: 0.04 ng/mL <H> [0.00 - 0.01] (06-13 @ 18:03)  Troponin T, Serum: 0.04 ng/mL <H> [0.00 - 0.01] (06-13 @ 05:22)  Troponin T, Serum: 0.04 ng/mL <H> [0.00 - 0.01] (06-12 @ 12:35)  Troponin T, Serum: 0.04 ng/mL <H> [0.00 - 0.01] (06-12 @ 05:27)                              10.6   6.1   )-----------( 132      ( 14 Jun 2017 05:00 )             36.9     06-14    142  |  98  |  47<H>  ----------------------------<  73  4.6   |  29  |  3.00<H>    Ca    9.1      14 Jun 2017 05:01  Mg     2.0     06-14      proBNP:   Lipid Profile:   HgA1c: Hemoglobin A1C, Whole Blood: 5.7 % (06-12 @ 05:20)    TSH: Thyroid Stimulating Hormone, Serum: 2.618 uIU/mL (06-12 @ 05:27)            ASSESSMENT/PLAN: 	  +DVT prophylaxis  +Sleep  +Nutrition goals  -Pain  -Decubital ulcer  +GI prophylaxis (PPV, coagulopathy, Hx)  +Aspiration prophylaxis (45 degrees)

## 2017-06-14 NOTE — CONSULT NOTE ADULT - SUBJECTIVE AND OBJECTIVE BOX
LAVINIA MARIE   [id number]     : 1929    HPI:  86 yo female with pmh of chronic diastolic CHF (EF 50-55% ), mod to severe AS (ALEKS 0.5cm)-not a surgical candidate, pafib (on Coumadin), CAD s/p PCI x 3 stent (04', ' and 12')-last cath 3/15' PCI dLM w/ Impella support, Lupus, COPD (prior 30yr ppk hx), PNA, PPM  for bradycardia, CKD (baseline Cr 2-3), hx of Renal failure requiring HD x 4 session  @ [facility], HTN, PVD, and Type 2 DM (no longer on meds), MONICA, and TKR  presents to [facility] with cc of inability to get OOB x 3 days. Pt states that her "legs were too weak". Pt is a poor historian and HPI obtained from her daughter. Pt's daughter states that her mother was unable to get OOB for 3 days and pt used her Life Alert x3 times to call for help-was told to go to the ED. Reports that she had been "tired" for several day, SOB w/ minimal exertion for "several months" and her mobility has declined since her discharge from rehab in December. Additionally, pt has gained 17 lbs over the past month (from 258 to 275) and non-compliant with her diet. As per daughter, pt has been hospitalized prior 2-3 yrs ago (? [facility]) for CHF exacerbation w/ aggressive diuresis. Of note, pt was evaluation by Dr. Barger for aortic stenosis in . However, due to her deconditioned status during that time, pt was recommended medical management. (2017 16:57)      PAST MEDICAL & SURGICAL HISTORY:  PVD (peripheral vascular disease)  Iron deficiency anemia  Lupus (systemic lupus erythematosus)  Paroxysmal atrial fibrillation  Aortic stenosis, severe  CKD (chronic kidney disease) stage 4, GFR 15-29 ml/min  Diabetes  HTN (hypertension)  CHF (congestive heart failure)  CAD (coronary artery disease)  COPD (chronic obstructive pulmonary disease)  History of total knee replacement:   Presence of cardiac pacemaker: at 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 [employer], former smoker (30pk years),  HHA x5hrs x6days/wk    ROS:    Unable to attain due to:                      DYSPNEA (Leo 0-10): 3                        N/V (Y/N): n                             SECRETIONS (Y/N): n               AGITATION(Y/N): n  PAIN (Y/N): y, chronic right groin site reported as since stent placement        -Provocation/Palliation: none, extra strength Tylenol     -Quality/Quantity: sharp and deep     -Radiating: none     -Severity: severe     -Timing/Frequency: constant     -Impact on ADLs: has been homebound since 2016    GENERAL: Feels terrible and hates high flow NC  HEENT: [place]  NECK: denies  CVS: has had many prior stents placed and a PPM  RESP: hates the NC  GI: proud she doesn't use adult diapers provided by her insurance and can go to BR herself although with great effort  : denies  MS: denies    NEURO: denies  PSYCH: denies  SKIN: denies  LYMPH: denies    ALLERGIES:  Allergies    No Known Allergies    Intolerances    OPIATE NAIVE (Y/N):   -ISTOP REVIEWED(Y/N): y, ref# [id number]      MEDICATIONS:      MEDICATIONS  (STANDING):  ferrous    sulfate 325milliGRAM(s) Oral every 8 hours  furosemide   Injectable 40milliGRAM(s) IV Push every 12 hours  cefTRIAXone   IVPB 1Gram(s) IV Intermittent every 24 hours  cefTRIAXone   IVPB  IV Intermittent   acetazolamide    Tablet 250milliGRAM(s) Oral daily  metoprolol Injectable 2.5milliGRAM(s) IV Push every 6 hours    MEDICATIONS  (PRN):  acetaminophen   Tablet. 975milliGRAM(s) Oral every 6 hours PRN Moderate Pain (4 - 6)  ALBUTerol/ipratropium for Nebulization 3milliLiter(s) Nebulizer every 6 hours PRN Shortness of Breath and/or Wheezing    PHYSICAL EXAM:  Vital Signs Last 24 Hrs  T(C): 36.4, Max: 36.4 ( @ 01:16)  T(F): 97.6, Max: 97.6 ( @ 01:16)  HR: 66 (64 - 85)  BP: 105/49 (95/38 - 134/61)  BP(mean): 71 (53 - 81)  RR: 18 (13 - 27)  SpO2: 92% (92% - 100%)  Daily     Daily   CAPILLARY BLOOD GLUCOSE  139 (2017 21:45)    I&O's Summary  I & Os for 24h ending 2017 07:00  =============================================  IN: 180 ml / OUT: 2975 ml / NET: -2795 ml    I & Os for current day (as of 2017 13:57)  =============================================  IN: 100 ml / OUT: 735 ml / NET: -635 ml    GENERAL: Awake and alert, vocal about her concerns  HEENT: normocephalic, grayish plagues at edge of right eyebrown, EOMs intact, no icterus, +[place], no nasal discharge, moist mucous membranes, throat without any apparent lesions   NECK: short, but supple, no palpable masses   CVS: afib on ECG, +systolic murmur on right MCL, left PPM  RESP: 35% high flow NC, resp even and not labored even with speech, decreased BS throughout  GI: softly distended, NT, obese    : f/c with some sediments    MS: bilateral LEs with evidence of venous stasis blue purplish discoloration and cool to touch   NEURO:   PSYCH: appears a bit angry and depressed   SKIN:groin areas moist with mild rash, thicken hard toe nails    LYMPH: no supraclavicular LAD  KARNOFSKY: PRE-ADMIT= 40 %               CURRENT= 30%  CACHEXIA (Y/N): n  BMI: 45.1    LABS:    CBC:                        10.6   6.1   )-----------( 132      ( 2017 05:00 )             36.9     CMP:    -    142  |  98  |  47<H>  ----------------------------<  73  4.6   |  29  |  3.00<H>    Ca    9.1      2017 05:01  Mg     2.0     -    PT/INR - ( 2017 05:05 )   PT: 42.4 sec;   INR: 3.72     Albumin, Serum: 3.0 g/dL (17 @ 05:27)    Culture - Urine (17 @ 08:34)    -  Ciprofloxacin: S <=1    -  Tobramycin: S <=4    -  Ampicillin: R 16    -  Gentamicin: S <=4    -  Trimethoprim/Sulfamethoxazole: R >2/38    -  Ampicillin/Sulbactam: S <=8/4    -  Cefazolin: S <=8    -  Ceftriaxone: S <=1    -  Nitrofurantoin: R >64    -  Piperacillin/Tazobactam: S <=16    Specimen Source: .Urine Catheterized    Culture Results:   >100,000 CFU/ml Klebsiella pneumoniae    Organism Identification: Klebsiella pneumoniae    Organism: Klebsiella pneumoniae    Method Type: ROBERT    IMAGING:  Reviewed  EXAM:  XR CHEST 1 VIEW PORT ROUTINE                          PROCEDURE DATE:  2017  There are persistent   small bilateral pleural effusions. There is moderate pulmonary vascular   congestion. The heart is enlarged. There are calcifications of the aortic   knob.     EXAM:  XR HIP WITH PELV 2-3V RT                          PROCEDURE DATE:  2017    INTERPRETATION:  Indication: R groin painImpression: Advanced arthrosis of the right hip    ADVANCED DIRECTIVES:     DNR/DNI       DECISION MAKER: pt  LEGAL SURROGATE:    GOALS OF CARE DISCUSSION       Palliative care info/counseling provided	                  REFERRALS	         Unit SW/Case Mgmt        Patient/Family Support       Massage Therapy       Music Therapy       Hospice       Nutrition       PT/OT

## 2017-06-14 NOTE — PROGRESS NOTE ADULT - PROBLEM SELECTOR PLAN 1
recommend c/w iv diuresis of 40 mg lasix bid  c/w  diamox 250 mg daily for rising CO2  add metolazone  given increasing oxygen requirements.

## 2017-06-14 NOTE — PROGRESS NOTE ADULT - SUBJECTIVE AND OBJECTIVE BOX
Patient now on CCU Service  Per [facility] policy, management per CCU Attending during CCU Admission  Please re-call when stable for transfer

## 2017-06-14 NOTE — PROGRESS NOTE ADULT - SUBJECTIVE AND OBJECTIVE BOX
INTERVAL HPI/OVERNIGHT EVENTS:    PAST MEDICAL & SURGICAL HISTORY:  PVD (peripheral vascular disease)  Iron deficiency anemia  Lupus (systemic lupus erythematosus)  Paroxysmal atrial fibrillation  Aortic stenosis, severe  CKD (chronic kidney disease) stage 4, GFR 15-29 ml/min  Diabetes  HTN (hypertension)  CHF (congestive heart failure)  CAD (coronary artery disease)  COPD (chronic obstructive pulmonary disease)  History of total knee replacement: 2003  Presence of cardiac pacemaker: at [facility] by Dr Escoto      FAMILY HISTORY:  No pertinent family history in first degree relatives      SOCIAL HISTORY:    REVIEW OF SYSTEMS:  Constitutional: -weight change, -weight loss, -fever, -chills, -fatigue, -night sweats  Eyes: -discharge, -eye pain, -visual change, -discharge  ENT:  -hearing difficulty, -tinnitus, -vertigo, -sinus pain, -throat pain, -epistaxis, -dysphagia, -hoarseness  Neck: -pain, -stiffness, -neck swelling  Respiratory: -cough, -wheezing, -hemoptysis      Cardiovascular: -chest pain, -dysrhythmia, -palpitations, -dizziness   Gastrointestinal: -abdominal pain, -nausea, -vomiting, -hematemesis,-diarrhea, -constipation. -melena  Genitourinary: -dysuria, -frequency, -hematuria, -incontinence      Neurologic: -headache, -memory loss, -loss of strength, -numbness, -tremor     Skin: -itching, -burning, -rash, -lesions   Lymphatic: -enlarged lymph nodes  Endocrine: -hair loss, -temperature intolerance, -hair loss         Musculoskeletal: -back pain, -joint pain, -extremity pain  Psychiatric: -visual change, -auditory change, -depression, -anxiety, -suicidal  Sleep: -disorder, -insomnia, -sleep deprivation  Heme/Lymph: -easy bruising, -bleeding gums            Allergy and Immunologic: -hives, -eczema    Vital Signs Last 24 Hrs  T(C): 36.4, Max: 36.9 (06-13 @ 13:15)  T(F): 97.6, Max: 98.4 (06-13 @ 13:15)  HR: 66 (64 - 85)  BP: 105/49 (95/38 - 134/61)  BP(mean): 71 (53 - 91)  RR: 18 (13 - 31)  SpO2: 92% (90% - 100%)    ABG - ( 14 Jun 2017 12:12 )  pH: 7.32  /  pCO2: 65    /  pO2: 105   / HCO3: 32    / Base Excess: 4.9   /  SaO2: 98                      I&O's Detail  I & Os for 24h ending 14 Jun 2017 07:00  =============================================  IN:    Oral Fluid: 180 ml    Total IN: 180 ml  ---------------------------------------------  OUT:    Indwelling Catheter - Urethral: 2975 ml    Total OUT: 2975 ml  ---------------------------------------------  Total NET: -2795 ml    I & Os for current day (as of 14 Jun 2017 13:14)  =============================================  IN:    IV PiggyBack: 100 ml    Total IN: 100 ml  ---------------------------------------------  OUT:    Indwelling Catheter - Urethral: 735 ml    Total OUT: 735 ml  ---------------------------------------------  Total NET: -635 ml      PHYSICAL EXAM:    Well nourished, well developed, comfortable, - acute distress; vital signs are monitored continuously  Eyes: PERRLA, EOMI, -conjunctivitis, -scleritis   Head: no focal deficit, normocephalic,  no trauma  ENMT: moist tongue, no thrush, -nasal discharge, -hoarseness, normal hearing, -cough, -hemoptysis, trachea midline  Neck: supple, - lymphadenopathy,  -masses, -JVD  Respiratory: bilateral diminished breath sounds, -wheezing, -rhonchi, -rales, -crackles  Chest: -accessory muscle use, -paradoxical breathing  Cardiovascular: regular rate and sinus rhythm, S1 S2 normal, -S3, -S4, -murmur, -gallop, -rub  Gastrointestinal: soft, nontender, nondistended, normal bowel sounds, no hepatosplenomegaly  Genitourinary: -flank pain, -dysuria  Extremities: -clubbing, -cyanosis, -edema    Vascular: peripheral pulses palpable 2+ bilaterally  Neurological: alert, oriented x 3, no focal deficit, -tremor   Skin: warm, dry, -erythema, iv sites intact  Lymph nodes; no cervical, supraclavicular or axillary adenopathy  Psychiatric: cooperative, appropriate mood      MEDICATIONS  (STANDING):  ferrous    sulfate 325milliGRAM(s) Oral every 8 hours  furosemide   Injectable 40milliGRAM(s) IV Push every 12 hours  cefTRIAXone   IVPB 1Gram(s) IV Intermittent every 24 hours  cefTRIAXone   IVPB  IV Intermittent   acetazolamide    Tablet 250milliGRAM(s) Oral daily  metoprolol Injectable 2.5milliGRAM(s) IV Push every 6 hours    MEDICATIONS  (PRN):  acetaminophen   Tablet. 975milliGRAM(s) Oral every 6 hours PRN Moderate Pain (4 - 6)  ALBUTerol/ipratropium for Nebulization 3milliLiter(s) Nebulizer every 6 hours PRN Shortness of Breath and/or Wheezing      Allergies    No Known Allergies    Intolerances        LABS:                        10.6   6.1   )-----------( 132      ( 14 Jun 2017 05:00 )             36.9     06-14    142  |  98  |  47<H>  ----------------------------<  73  4.6   |  29  |  3.00<H>    Ca    9.1      14 Jun 2017 05:01  Mg     2.0     06-14      PT/INR - ( 14 Jun 2017 05:05 )   PT: 42.4 sec;   INR: 3.72              +DVT prophylaxis  +Sleep  +Nutrition goals  -Pain  -Decubital ulcer  +GI prophylaxis (PPV, coagulopathy, Hx)  +Aspiration prophylaxis (45 degrees)    Ventilator synchronized  Tracheal cuff pressure    +Sedation/analgesia stopped once  +ID (phos, CH, mupi, SB)  -Delirium    +Cardiac Beta/ACEI-ARB/ASA/statin  +Prevention  +Education  +Medication reviewed (drug-drug interactions, PDA)  Medical devices    Discussed with ICU, PGY, CCRN, family    RADIOLOGY & ADDITIONAL STUDIES:

## 2017-06-14 NOTE — PROGRESS NOTE ADULT - SUBJECTIVE AND OBJECTIVE BOX
INTERVAL HPI/OVERNIGHT EVENTS: Stepped up to CCU last night for hyper capneic resp distress    SUBJECTIVE:     VITAL SIGNS:  Vital Signs Last 24 Hrs  T(C): 36.4, Max: 36.9 (06-13 @ 13:15)  T(F): 97.6, Max: 98.4 (06-13 @ 13:15)  HR: 70 (64 - 85)  BP: 113/59 (105/47 - 134/61)  BP(mean): 68 (67 - 91)  RR: 19 (13 - 31)  SpO2: 99% (90% - 100%) on BIPAP    PHYSICAL EXAM:    Constitutional:  Eyes:  ENMT:  Neck:  Respiratory:  Cardiovascular:  Gastrointestinal:  Extremities:  Vascular:  Neurological:  Musculoskeletal:    LABS:                        10.6   6.1   )-----------( 132      ( 14 Jun 2017 05:00 )             36.9     06-14    142  |  98  |  47<H>  ----------------------------<  73  4.6   |  29  |  3.00<H>    Ca    9.1      14 Jun 2017 05:01  Mg     2.0     06-14      PT/INR - ( 14 Jun 2017 05:05 )   PT: 42.4 sec;   INR: 3.72       ABG - ( 14 Jun 2017 04:48 )  pH: 7.28  /  pCO2: 70    /  pO2: 105   / HCO3: 32    / Base Excess: 4.0   /  SaO2: 98         RADIOLOGY & ADDITIONAL TESTS:    MEDICATIONS  (STANDING):  ferrous    sulfate 325milliGRAM(s) Oral every 8 hours  furosemide   Injectable 40milliGRAM(s) IV Push every 12 hours  cefTRIAXone   IVPB 1Gram(s) IV Intermittent every 24 hours  cefTRIAXone   IVPB  IV Intermittent   acetazolamide    Tablet 250milliGRAM(s) Oral daily  metoprolol Injectable 2.5milliGRAM(s) IV Push every 6 hours    MEDICATIONS  (PRN):  acetaminophen   Tablet. 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 BNP on admission 94259.  Lungs bibasilar crackles, edema noted from abdomen to bilateral legs and feet  -Daily weights, strict I/Os (us in place)  -Echo w/ EF 50%, mod conc LVH, basal septal hypokinesis (seen previously on Echo 12/2015), severely dilated LA, severe AS, ALEKS 0.5, mean pressure gradient 43, mod pulm HTN, PASP 52.  -Trop 0.04 x 4. Likely 2/2 demand ischemia. Cont to trend until downtrends per Dr Soto.   -Continue Toprol XL 25mg po daily  -Cont Furosemide 40mg IV BID, start Diamox 250mg qd per renal recs. May add Metolazone 5mg qd if I/O not at goal per Dr Gonzalez, thought negative 2 L today  -VQ scan per Dr Soto, after pt was taken down to VQ scan, pt refused to go through with procedure after discussion at length.  -daily CXR.   - BIPAP  - serial ABGs    Vessel:  s/p PCI x 3 stent (04', 06' and 12')-last cath 3/15' PCI dLM w/ Impella support  -Trop 0.04 x4. Will cont to trend cardiac enzymes until downtrend.  -Continue Toprol XL 25mg po daily   -Per daughter [facility] NP Eben Rosalinda d/c'ed ASA due to dual AC w/ Coumadin, statin was d/c'ed years ago for unclear reasons.    Electrical:  Supra therapeutic INR on admission 4.8. INR 3.95 yesterday.  -s/p PPM 2011 2/2 symptomatic bradycardia. PPM interrogation WNL, .  -Daily PT/INR   -Continue Toprol XL 25mg po daily.     Valves:  Non rheumatic heart disease. Echo w/ ALEKS 0.5, mean pressure gradient 43   -CTS Dr. Barger consulted. F/u recs.  -Per CTS team, to be medically optimized in regards to CHF prior to proceeding w/ TAVR w/u. Pt to follow up with out-patient for her continued work-up.  -Dr Soto to discuss w/ Dr Barger regarding plan.  -Consider possible ischemic w/u if TAVR is needed.     Hemodynamics: pressures stable, holding ACE/ARB in setting of ARF    RENAL:  CKD (chronic kidney disease) stage 4, GFR 15-29 ml/min- Baseline Cr per daughter 2-3. On admission, Cr 2.8  -Cont Furosemide 40mg IV BID, start Diamox 250mg qd for alkalosis per renal recs. May add Metolazone 5mg qd if I/O not at goal per Dr Gonzalez.  -Renal consulted. F/u recs.  - UA with positive nitrites, WBCs and bacteria. UCx w/ GNR, Klesiella. Started on Ceftriaxone 1g qd (day 2/5 to end on 6/16)Baseline Cr per daughter 2-3. On admission, Cr 2.8    PULM:  COPD (chronic obstructive pulmonary disease)  - V/Q scan, though patient refused  - Pulm Dr. Gaming consulted    Endo:  DM - off meds, a1c wnl INTERVAL HPI/OVERNIGHT EVENTS: Stepped up to CCU last night for hyper capneic resp distress    SUBJECTIVE:     VITAL SIGNS:  Vital Signs Last 24 Hrs  T(C): 36.4, Max: 36.9 (06-13 @ 13:15)  T(F): 97.6, Max: 98.4 (06-13 @ 13:15)  HR: 70 (64 - 85)  BP: 113/59 (105/47 - 134/61)  BP(mean): 68 (67 - 91)  RR: 19 (13 - 31)  SpO2: 99% (90% - 100%) on BIPAP  I&O - net neg 2.7 L o/n    PHYSICAL EXAM:    Constitutional:  Eyes:  ENMT:  Neck:  Respiratory:  Cardiovascular:  Gastrointestinal:  Extremities:  Vascular:  Neurological:  Musculoskeletal:    LABS:                        10.6   6.1   )-----------( 132      ( 14 Jun 2017 05:00 )             36.9     06-14    142  |  98  |  47<H>  ----------------------------<  73  4.6   |  29  |  3.00<H>    Ca    9.1      14 Jun 2017 05:01  Mg     2.0     06-14      PT/INR - ( 14 Jun 2017 05:05 )   PT: 42.4 sec;   INR: 3.72       ABG - ( 14 Jun 2017 04:48 )  pH: 7.28  /  pCO2: 70    /  pO2: 105   / HCO3: 32    / Base Excess: 4.0   /  SaO2: 98         RADIOLOGY & ADDITIONAL TESTS:    MEDICATIONS  (STANDING):  ferrous    sulfate 325milliGRAM(s) Oral every 8 hours  furosemide   Injectable 40milliGRAM(s) IV Push every 12 hours  cefTRIAXone   IVPB 1Gram(s) IV Intermittent every 24 hours  cefTRIAXone   IVPB  IV Intermittent   acetazolamide    Tablet 250milliGRAM(s) Oral daily  metoprolol Injectable 2.5milliGRAM(s) IV Push every 6 hours    MEDICATIONS  (PRN):  acetaminophen   Tablet. 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 BNP on admission [id number].  Lungs bibasilar crackles, edema noted from abdomen to bilateral legs and feet  -Daily weights, strict I/Os (us in place)  -Echo w/ EF 50%, mod conc LVH, basal septal hypokinesis (seen previously on Echo 12/2015), severely dilated LA, severe AS, ALEKS 0.5, mean pressure gradient 43, mod pulm HTN, PASP 52.  -Trop 0.04 x 4. Likely 2/2 demand ischemia. Cont to trend until downtrends per Dr Soto.   -Continue Toprol XL 25mg po daily  -Cont Furosemide 40mg IV BID, start Diamox 250mg qd per renal recs. May add Metolazone 5mg qd if I/O not at goal per Dr Gonzalez, thought negative 2 L today  -VQ scan per Dr Soto, after pt was taken down to VQ scan, pt refused to go through with procedure after discussion at length.  -daily CXR.   - BIPAP  - serial ABGs    Vessel:  s/p PCI x 3 stent (04', 06' and 12')-last cath 3/15' PCI dLM w/ Impella support  -Trop 0.04 x4. Will cont to trend cardiac enzymes until downtrend.  -Continue Toprol XL 25mg po daily   -Per daughter [facility] Call NP Eben Tellez d/c'ed ASA due to dual AC w/ Coumadin, statin was d/c'ed years ago for unclear reasons.    Electrical:  Supra therapeutic INR on admission 4.8. INR 3.95 yesterday.  -s/p PPM 2011 2/2 symptomatic bradycardia. PPM interrogation WNL, .  -Daily PT/INR   -Continue Toprol XL 25mg po daily.     Valves:  Non rheumatic heart disease. Echo w/ ALEKS 0.5, mean pressure gradient 43   -CTS Dr. Barger consulted. F/u recs.  -Per CTS team, to be medically optimized in regards to CHF prior to proceeding w/ TAVR w/u. Pt to follow up with out-patient for her continued work-up.  -Dr Soto to discuss w/ Dr Barger regarding plan.  -Consider possible ischemic w/u if TAVR is needed.     Hemodynamics: pressures stable, holding ACE/ARB in setting of ARF    RENAL:  CKD (chronic kidney disease) stage 4, GFR 15-29 ml/min- Baseline Cr per daughter 2-3. On admission, Cr 2.8  -Cont Furosemide 40mg IV BID, start Diamox 250mg qd for alkalosis per renal recs. May add Metolazone 5mg qd if I/O not at goal per Dr Gonzalez.  -Renal consulted. F/u recs.  - UA with positive nitrites, WBCs and bacteria. UCx w/ GNR, Klesiella. Started on Ceftriaxone 1g qd (day 2/5 to end on 6/16)Baseline Cr per daughter 2-3. On admission, Cr 2.8    PULM:  COPD (chronic obstructive pulmonary disease)  - V/Q scan, though patient refused  - Pulm Dr. Gaming consulted    Endo:  DM - off meds, a1c wnl INTERVAL HPI/OVERNIGHT EVENTS: Stepped up to CCU last night for hyper capneic resp distress    SUBJECTIVE: Slight SOB. Chronic LLE pain. ROS otherwise negative.     VITAL SIGNS:  Vital Signs Last 24 Hrs  T(C): 36.4, Max: 36.9 (06-13 @ 13:15)  T(F): 97.6, Max: 98.4 (06-13 @ 13:15)  HR: 70 (64 - 85)  BP: 113/59 (105/47 - 134/61)  BP(mean): 68 (67 - 91)  RR: 19 (13 - 31)  SpO2: 99% (90% - 100%) on BIPAP  I&O - net neg 2.7 L o/n    PHYSICAL EXAM:    Constitutional: NAD on HFNC  ENMT: Dry MM  Neck: No JVD  Respiratory: bibasilar crackles, reduced bs at R base  Cardiovascular: RRR, systolic murmur  Gastrointestinal: obese, NTND  Extremities: WWP, bilateral pitting edema with stasis dermatitis   Vascular: +radial, trace DPs  Neurological: A&O to self, hospital, month, and year  Musculoskeletal: No joint swellings    LABS:                        10.6   6.1   )-----------( 132      ( 14 Jun 2017 05:00 )             36.9     06-14    142  |  98  |  47<H>  ----------------------------<  73  4.6   |  29  |  3.00<H>    Ca    9.1      14 Jun 2017 05:01  Mg     2.0     06-14      PT/INR - ( 14 Jun 2017 05:05 )   PT: 42.4 sec;   INR: 3.72       ABG - ( 14 Jun 2017 04:48 )  pH: 7.28  /  pCO2: 70    /  pO2: 105   / HCO3: 32    / Base Excess: 4.0   /  SaO2: 98         RADIOLOGY & ADDITIONAL TESTS:  CXR - IMPRESSION: No significant interval change in small bilateral pleural   effusions and moderate pulmonary vascular congestion.  EKG - paced with afib     MEDICATIONS  (STANDING):  ferrous    sulfate 325milliGRAM(s) Oral every 8 hours  furosemide   Injectable 40milliGRAM(s) IV Push every 12 hours  cefTRIAXone   IVPB 1Gram(s) IV Intermittent every 24 hours  cefTRIAXone   IVPB  IV Intermittent   acetazolamide    Tablet 250milliGRAM(s) Oral daily  metoprolol Injectable 2.5milliGRAM(s) IV Push every 6 hours    MEDICATIONS  (PRN):  acetaminophen   Tablet. 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 EF 50-55% on last TTE 2016. BNP on admission 24354.  -Daily weights, strict I/Os (us in place)  -Echo w/ EF 50%, mod conc LVH, basal septal hypokinesis (seen previously on Echo 12/2015), severely dilated LA, severe AS, ALEKS 0.5, mean pressure gradient 43, mod pulm HTN, PASP 52.  -Trop 0.04 x 4. Likely 2/2 demand ischemia. Cont to trend until downtrends per Dr Soto.   -Continue Toprol XL 25mg po daily  -Cont Furosemide prn, start Diamox 250mg qd per renal recs. May add Metolazone 5mg qd if I/O not at goal per Dr Gonzalez, thought negative 2 L today  -daily CXR.   - BIPAP  - serial ABGs    Vessel:  s/p PCI x 3 stent (04', 06' and 12')-last cath 3/15' PCI dLM w/ Impella support  -Trop 0.04 x4. Will cont to trend cardiac enzymes until downtrend.  -Continue Toprol XL 25mg po daily   -Per daughter [facility] Call NP Eben Tellez d/c'ed ASA due to dual AC w/ Coumadin, statin was d/c'ed years ago for unclear reasons.    Electrical:  Supra therapeutic INR on admission 4.8. INR 3.95 yesterday.  -s/p PPM 2011 2/2 symptomatic bradycardia. PPM interrogation WNL, .  -Daily PT/INR   -Continue Toprol XL 25mg po daily.     Valves:  Non rheumatic heart disease. Echo w/ ALEKS 0.5, mean pressure gradient 43   -CTS Dr. Barger consulted. F/u recs.  -Per CTS team, to be medically optimized in regards to CHF prior to proceeding w/ possible BAV   -Dr Soto to discuss w/ Dr Barger regarding plan.     Hemodynamics: pressures stable, holding ACE/ARB in setting of ARF    ID - UTI growing kleb. c/w ceftriaxone for 3 day course.     RENAL:  CKD (chronic kidney disease) stage 4, GFR 15-29 ml/min- Baseline Cr per daughter 2-3. On admission, Cr 2.8  -Cont Furosemide prn, c/w Diamox 250mg qd for alkalosis per renal recs. May add Metolazone 5mg qd if I/O not at goal per Dr Gonzalez.  -Renal consulted. F/u recs.    PULM:  COPD (chronic obstructive pulmonary disease)  - V/Q scan, though patient refused  - Pulm Dr. Gaming consulted    Endo:  DM - off meds, a1c wnl    NUTRITION - cardiac diet    PPX - supratherapeutic INR    ACCESS - peripheral, A line, us  DNR/DNI - consulting palliative

## 2017-06-14 NOTE — PROGRESS NOTE ADULT - SUBJECTIVE AND OBJECTIVE BOX
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 with cc of inability to get OOB x 3 days. Pt states that her "legs were too weak". Pt is a poor historian and HPI obtained from her daughter. Pt's daughter states that her mother was unable to get OOB for 3 days and pt used her Life Alert x3 times to call for help-was told to go to the ED. Reports that she had been "tired" for several day, SOB w/ minimal exertion for "several months" and her mobility has declined since her discharge from rehab in December. Additionally, pt has gained 17 lbs over the past month (from 258 to 275) and non-compliant with her diet. As per daughter, pt has been hospitalized prior 2-3 yrs ago (? [facility]) for CHF exacerbation w/ aggressive diuresis. Of note, pt was evaluation by Dr. Barger for aortic stenosis in December 16'. However, due to her deconditioned status during that time, pt was recommended medical management. (11 Jun 2017 16:57)                            10.6   6.1   )-----------( 132      ( 14 Jun 2017 05:00 )             36.9       06-14    142  |  98  |  47<H>  ----------------------------<  73  4.6   |  29  |  3.00<H>    Ca    9.1      14 Jun 2017 05:01  Mg     2.0     06-14      Vital Signs Last 24 Hrs  T(C): 36.5, Max: 36.5 (06-14 @ 14:23)  T(F): 97.7, Max: 97.7 (06-14 @ 14:23)  HR: 66 (64 - 85)  BP: 105/49 (95/38 - 134/61)  BP(mean): 71 (53 - 81)  RR: 18 (13 - 27)  SpO2: 92% (92% - 100%)    MEDICATIONS  (STANDING):  ferrous    sulfate 325milliGRAM(s) Oral every 8 hours  furosemide   Injectable 40milliGRAM(s) IV Push every 12 hours  cefTRIAXone   IVPB 1Gram(s) IV Intermittent every 24 hours  cefTRIAXone   IVPB  IV Intermittent   acetazolamide    Tablet 250milliGRAM(s) Oral daily  metoprolol Injectable 2.5milliGRAM(s) IV Push every 6 hours    MEDICATIONS  (PRN):  acetaminophen   Tablet. 975milliGRAM(s) Oral every 6 hours PRN Moderate Pain (4 - 6)  ALBUTerol/ipratropium for Nebulization 3milliLiter(s) Nebulizer every 6 hours PRN Shortness of Breath and/or Wheezing    Currently Undergoing Physical Therapy at bedside.    Initial Functional Status Assessment:    Previous Level of Function:   · Ambulation Skills	needs device and assist	  · Transfer Skills	needs device and assist	  	independent; needs device	  · ADL Skills	needs device and assist	  	independent; needs device	  · Work/Leisure Activity	independent; needs device	  · Additional Comments	Patient is an unreliable historian. Patient has 3 steps to enter home, prior use of rolling walker	    Cognitive Status Examination:   · Orientation	person; time (grossly to year)	  · Level of Consciousness	agitated; confused; combative	  · Follows Commands and Answers Questions	100% of the time	  · Personal Safety and Judgment	impaired	  · Short Term Memory	impaired	  · Long Term Memory	intact	    Skin:   Skin:  · Skin Color/Characteristics	redness nonblanchable; waxen; Bilateral shins	    Range of Motion Exam:   · Range of Motion Examination	bilateral upper extremity ROM was WFL (within functional limits); bilateral lower extremity ROM was WFL (within functional limits)	  Manual Muscle Testing:   · Manual Muscle Testing Results	Bilateral UE WFL grossly. Left LE grossly 4+/5, right LE grossly 4-/5	    Bed Mobility: Rolling/Turning:   · Level of [place]	maximum assist (25% patients effort)	  · Physical Assist/Nonphysical Assist	2 person assist	    Bed Mobility: Scooting/Bridging:   · Level of [place]	dependent (less than 25% patients effort)	  · Physical Assist/Nonphysical Assist	2 person assist	    Bed Mobility: Sit to Supine:   · Level of [place]	dependent (less than 25% patients effort)	  · Physical Assist/Nonphysical Assist	2 person assist	    Bed Mobility: Supine to Sit:   · Level of [place]	maximum assist (25% patients effort)	  · Physical Assist/Nonphysical Assist	2 person assist	    Bed Mobility Analysis:   · Bed Mobility Limitations	decreased ability to use arms for pushing/pulling; decreased ability to use legs for bridging/pushing; impaired ability to control trunk for mobility	  · Impairments Contributing to Impaired Bed Mobility	pain; decreased strength; cognition	  	pain; decreased strength	    Transfer: Sit to Stand:   · Level of [place]wood	maximum assist (25% patients effort)	  · Physical Assist/Nonphysical Assist	2 person assist	  · Weight-Bearing Restrictions	full weight-bearing	  · Assistive Device	rolling walker	    Transfer: Stand to Sit:   · Level of Redwood	maximum assist (25% patients effort)	  · Physical Assist/Nonphysical Assist	2 person assist	  · Weight-Bearing Restrictions	full weight-bearing	  · Assistive Device	rolling walker	    Sit/Stand Transfer Safety Analysis:   · Transfer Safety Concerns Noted	losing balance	  · Impairments Contributing to Impaired Transfers	impaired balance; cognition; pain; decreased strength; impaired postural control; impaired motor control	  	impaired balance; cognition; pain; decreased strength	    Gait Skills:   · Level of Redwood	unable to perform	    Stair Negotiation:   · Level of Redwood	To be assessed	    Balance Skills Assessment:   · Sitting Balance: Static	good balance	  · Sitting Balance: Dynamic	good balance	  · Sit-to-Stand Balance	poor balance	  · Standing Balance: Static	poor balance	  · Standing Balance: Dynamic	poor balance	  · Systems Impairment Contributing to Balance Disturbance	musculoskeletal	  · Identified Impairments Contributing to Balance Disturbance	pain; decreased strength; impaired postural control; impaired motor control	  	pain; decreased strength	    Sensory Examination:   Sensory Examination:  Grossly Intact:   · Gross Sensory Examination	Grossly Intact	      Clinical Impressions:   · Criteria for Skilled Therapeutic Interventions	impairments found; therapy frequency; functional limitations in following categories; risk reduction/prevention; rehab potential; anticipated discharge recommendation	  · Impairments Found (describe specific impairments)	anthropometric characteristics; arousal, attention, and cognition; cognitive impairment; gait, locomotion, and balance; muscle strength; poor safety awareness; ventilation and respiration/gas exchange; aerobic capacity/endurance	  · Functional Limitations in Following Categories (describe specific limitations)	self-care; home management; community/leisure	  · Risk Reduction/Prevention (Describe Specific Areas of risk reduction/prevention)	risk factors	  · Risk Areas	fall; safety awareness; cognitive impairment	  · Rehab Potential	fair, will monitor progress closely	  · Therapy Frequency	2-3x/week	    PM&R Impression: as above, currently in CCU    Disposition Plan Recommendations: subacute rehab placement

## 2017-06-14 NOTE — CONSULT NOTE ADULT - ASSESSMENT
86 y/o morbidly obese woman with h/o CHF, CAD with prior PCIs and stents, PPM, severe AS deemed not an appropriate surgical candidate previously in 12/16, prior Impella support, DM, CKD, prior short term HD, PVD, and COPD presenting from home with SOB with min. exertion, progressive functional decline (homebound since 12/16), approx. 17lb wt. gain, and unable to get OOBx3 days found to have acute on chronic heart failure Rx initially on Stepdown initially, then upgraded yesterday to ICU due to AMS and hypercapneic resp failure, now on high-flow NC, seen for goals of care

## 2017-06-14 NOTE — PROCEDURE NOTE - NSPROCDETAILS_GEN_ALL_CORE
positive blood return obtained via catheter/hemostasis with direct pressure, dressing applied/location identified, draped/prepped, sterile technique used, needle inserted/introduced/connected to a pressurized flush line/sutured in place/ultrasound guidance

## 2017-06-14 NOTE — PROGRESS NOTE ADULT - SUBJECTIVE AND OBJECTIVE BOX
Dr. Tan Florence  Renal Fellow  Beeper # [phone]        Patient seen and examined at bedside.   on bipap  urinating well   3 liters removed    ferrous    sulfate 325milliGRAM(s) every 8 hours  furosemide   Injectable 40milliGRAM(s) every 12 hours  cefTRIAXone   IVPB 1Gram(s) every 24 hours  cefTRIAXone   IVPB    acetaminophen   Tablet. 975milliGRAM(s) every 6 hours PRN  acetazolamide    Tablet 250milliGRAM(s) daily  ALBUTerol/ipratropium for Nebulization 3milliLiter(s) every 6 hours PRN  metoprolol Injectable 2.5milliGRAM(s) every 6 hours      Allergies    No Known Allergies    Intolerances        T(C): , Max: 36.9 (06-13 @ 13:15)  T(F): , Max: 98.4 (06-13 @ 13:15)  HR: 66  BP: 105/49  BP(mean): 71  RR: 18  SpO2: 92%  Wt(kg): --  I & Os for 24h ending 06-14 @ 07:00  =============================================  IN:    Oral Fluid: 180 ml    Total IN: 180 ml  ---------------------------------------------  OUT:    Indwelling Catheter - Urethral: 2975 ml    Total OUT: 2975 ml  ---------------------------------------------  Total NET: -2795 ml    I & Os for current day (as of 06-14 @ 12:02)  =============================================  IN:    IV PiggyBack: 100 ml    Total IN: 100 ml  ---------------------------------------------  OUT:    Indwelling Catheter - Urethral: 500 ml    Total OUT: 500 ml  ---------------------------------------------  Total NET: -400 ml        PHYSICAL EXAM:  Constitutional: ill appearing.   Neck: Supple. No JVD.  Respiratory: Clear to auscultation   Cardiovascular: S1, S2.  Regular rate and rhythm.    Gastrointestinal: soft, non-tender, non-distended, normal bowel sounds  Extremities: Warm.  one plus  lower extremity edema.      ACCESS:     LABS:                        10.6   6.1   )-----------( 132      ( 14 Jun 2017 05:00 )             36.9     06-14    142  |  98  |  47<H>  ----------------------------<  73  4.6   |  29  |  3.00<H>    Ca    9.1      14 Jun 2017 05:01  Mg     2.0     06-14        PT/INR - ( 14 Jun 2017 05:05 )   PT: 42.4 sec;   INR: 3.72

## 2017-06-14 NOTE — PROGRESS NOTE ADULT - ASSESSMENT
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 EF 50-55% on last TTE 2016. BNP on admission 05512.  -Daily weights, strict I/Os (us in place)  -Echo w/ EF 50%, mod conc LVH, basal septal hypokinesis (seen previously on Echo 12/2015), severely dilated LA, severe AS, ALEKS 0.5, mean pressure gradient 43, mod pulm HTN, PASP 52.  -Trop 0.04 x 4. Likely 2/2 demand ischemia. Cont to trend until downtrends per Dr Soto.   -Continue Toprol XL 25mg po daily  -Cont Furosemide prn, start Diamox 250mg qd per renal recs. May add Metolazone 5mg qd if I/O not at goal per Dr Gonzalez, thought negative 2 L today  -daily CXR.   - BIPAP  - serial ABGs    Vessel:  s/p PCI x 3 stent (04', 06' and 12')-last cath 3/15' PCI dLM w/ Impella support  -Trop 0.04 x4. Will cont to trend cardiac enzymes until downtrend.  -Continue Toprol XL 25mg po daily   -Per daughter Rosendo House Call NP Eben Tellez d/c'ed ASA due to dual AC w/ Coumadin, statin was d/c'ed years ago for unclear reasons.    Electrical:  Supra therapeutic INR on admission 4.8. INR 3.95 yesterday.  -s/p PPM 2011 2/2 symptomatic bradycardia. PPM interrogation WNL, .  -Daily PT/INR   -Continue Toprol XL 25mg po daily.     Valves:  Non rheumatic heart disease. Echo w/ ALEKS 0.5, mean pressure gradient 43   -CTS Dr. Barger consulted. F/u recs.  -Per CTS team, to be medically optimized in regards to CHF prior to proceeding w/ possible BAV   -Dr Soto to discuss w/ Dr Barger regarding plan.     Hemodynamics: pressures stable, holding ACE/ARB in setting of ARF  ID - UTI growing kleb. c/w ceftriaxone for 3 day course.     RENAL:  CKD (chronic kidney disease) stage 4, GFR 15-29 ml/min- Baseline Cr per daughter 2-3. On admission, Cr 2.8  -Cont Furosemide prn, c/w Diamox 250mg qd for alkalosis per renal recs. May add Metolazone 5mg qd if I/O not at goal per Dr Gonzalez.  -Renal consulted. F/u recs.    PULM:  COPD (chronic obstructive pulmonary disease)  - V/Q scan, though patient refused  - Pulm Dr. Gaming consulted

## 2017-06-15 DIAGNOSIS — R10.31 RIGHT LOWER QUADRANT PAIN: ICD-10-CM

## 2017-06-15 DIAGNOSIS — I35.0 NONRHEUMATIC AORTIC (VALVE) STENOSIS: ICD-10-CM

## 2017-06-15 DIAGNOSIS — Z71.89 OTHER SPECIFIED COUNSELING: ICD-10-CM

## 2017-06-15 LAB
ANION GAP SERPL CALC-SCNC: 11 MMOL/L — SIGNIFICANT CHANGE UP (ref 5–17)
APTT BLD: 40 SEC — HIGH (ref 27.5–37.4)
BUN SERPL-MCNC: 44 MG/DL — HIGH (ref 7–23)
CALCIUM SERPL-MCNC: 8.7 MG/DL — SIGNIFICANT CHANGE UP (ref 8.4–10.5)
CHLORIDE SERPL-SCNC: 99 MMOL/L — SIGNIFICANT CHANGE UP (ref 96–108)
CO2 SERPL-SCNC: 33 MMOL/L — HIGH (ref 22–31)
CREAT SERPL-MCNC: 2.8 MG/DL — HIGH (ref 0.5–1.3)
GAS PNL BLDV: SIGNIFICANT CHANGE UP
GLUCOSE SERPL-MCNC: 103 MG/DL — HIGH (ref 70–99)
HCT VFR BLD CALC: 37.2 % — SIGNIFICANT CHANGE UP (ref 34.5–45)
HGB BLD-MCNC: 10.7 G/DL — LOW (ref 11.5–15.5)
INR BLD: 3.74 — HIGH (ref 0.88–1.16)
MAGNESIUM SERPL-MCNC: 1.9 MG/DL — SIGNIFICANT CHANGE UP (ref 1.6–2.6)
MCHC RBC-ENTMCNC: 26.3 PG — LOW (ref 27–34)
MCHC RBC-ENTMCNC: 28.8 G/DL — LOW (ref 32–36)
MCV RBC AUTO: 91.4 FL — SIGNIFICANT CHANGE UP (ref 80–100)
PLATELET # BLD AUTO: 140 K/UL — LOW (ref 150–400)
POTASSIUM SERPL-MCNC: 4.6 MMOL/L — SIGNIFICANT CHANGE UP (ref 3.5–5.3)
POTASSIUM SERPL-SCNC: 4.6 MMOL/L — SIGNIFICANT CHANGE UP (ref 3.5–5.3)
PROTHROM AB SERPL-ACNC: 42.6 SEC — HIGH (ref 9.8–12.7)
RBC # BLD: 4.07 M/UL — SIGNIFICANT CHANGE UP (ref 3.8–5.2)
RBC # FLD: 15.6 % — SIGNIFICANT CHANGE UP (ref 10.3–16.9)
SODIUM SERPL-SCNC: 143 MMOL/L — SIGNIFICANT CHANGE UP (ref 135–145)
TROPONIN T SERPL-MCNC: 0.05 NG/ML — CRITICAL HIGH (ref 0–0.01)
WBC # BLD: 6.9 K/UL — SIGNIFICANT CHANGE UP (ref 3.8–10.5)
WBC # FLD AUTO: 6.9 K/UL — SIGNIFICANT CHANGE UP (ref 3.8–10.5)

## 2017-06-15 PROCEDURE — 71010: CPT | Mod: 26

## 2017-06-15 PROCEDURE — 93010 ELECTROCARDIOGRAM REPORT: CPT

## 2017-06-15 PROCEDURE — 99233 SBSQ HOSP IP/OBS HIGH 50: CPT | Mod: GC

## 2017-06-15 PROCEDURE — 99356: CPT

## 2017-06-15 PROCEDURE — 99291 CRITICAL CARE FIRST HOUR: CPT

## 2017-06-15 PROCEDURE — 99233 SBSQ HOSP IP/OBS HIGH 50: CPT | Mod: 25,GC

## 2017-06-15 RX ORDER — ACETAZOLAMIDE 250 MG/1
500 TABLET ORAL DAILY
Qty: 0 | Refills: 0 | Status: DISCONTINUED | OUTPATIENT
Start: 2017-06-16 | End: 2017-07-04

## 2017-06-15 RX ORDER — ACETAZOLAMIDE 250 MG/1
500 TABLET ORAL DAILY
Qty: 0 | Refills: 0 | Status: DISCONTINUED | OUTPATIENT
Start: 2017-06-15 | End: 2017-06-15

## 2017-06-15 RX ORDER — ACETAZOLAMIDE 250 MG/1
250 TABLET ORAL ONCE
Qty: 0 | Refills: 0 | Status: COMPLETED | OUTPATIENT
Start: 2017-06-15 | End: 2017-06-15

## 2017-06-15 RX ADMIN — ACETAZOLAMIDE 250 MILLIGRAM(S): 250 TABLET ORAL at 11:44

## 2017-06-15 RX ADMIN — ACETAZOLAMIDE 250 MILLIGRAM(S): 250 TABLET ORAL at 15:12

## 2017-06-15 RX ADMIN — Medication 2.5 MILLIGRAM(S): at 07:13

## 2017-06-15 RX ADMIN — Medication 325 MILLIGRAM(S): at 15:12

## 2017-06-15 RX ADMIN — Medication 325 MILLIGRAM(S): at 22:06

## 2017-06-15 RX ADMIN — Medication 975 MILLIGRAM(S): at 19:38

## 2017-06-15 RX ADMIN — CEFTRIAXONE 100 GRAM(S): 500 INJECTION, POWDER, FOR SOLUTION INTRAMUSCULAR; INTRAVENOUS at 09:27

## 2017-06-15 RX ADMIN — Medication 20 MILLIGRAM(S): at 07:13

## 2017-06-15 RX ADMIN — Medication 975 MILLIGRAM(S): at 18:38

## 2017-06-15 RX ADMIN — Medication 2.5 MILLIGRAM(S): at 02:42

## 2017-06-15 NOTE — PROGRESS NOTE ADULT - PROBLEM SELECTOR PLAN 1
secondary to hip arthrosis, on tylenol PRN which pt describes as "taking the edge off" Premedicate prior to P.T.

## 2017-06-15 NOTE — PROGRESS NOTE ADULT - SUBJECTIVE AND OBJECTIVE BOX
PUD CCM ATTENDING    INTERVAL HPI/OVERNIGHT EVENTS:    was on BiPAP  now on high flow at 45 L  diamox increased    PAST MEDICAL & SURGICAL HISTORY:  PVD (peripheral vascular disease)  Iron deficiency anemia  Lupus (systemic lupus erythematosus)  Paroxysmal atrial fibrillation  Aortic stenosis, severe  CKD (chronic kidney disease) stage 4, GFR 15-29 ml/min  Diabetes  HTN (hypertension)  CHF (congestive heart failure)  CAD (coronary artery disease)  COPD (chronic obstructive pulmonary disease)  History of total knee replacement: 2003  Presence of cardiac pacemaker: at [facility] by Dr Escoto    FAMILY HISTORY:  no pertinent family history in first degree relatives    SOCIAL HISTORY:    REVIEW OF SYSTEMS:  +cold feet  -CP  -SOB ON HIGH FLOW  -cough    Vital Signs Last 24 Hrs  T(C): 36.7, Max: 37.1 (06-14 @ 18:27)  T(F): 98, Max: 98.7 (06-14 @ 18:27)  HR: 68 (61 - 84)  BP: 111/50 (98/50 - 138/63)  BP(mean): 71 (59 - 99)  RR: 28 (14 - 31)  SpO2: 97% (92% - 100%)    ABG - ( 14 Jun 2017 20:10 )  pH: 7.28  /  pCO2: 76    /  pO2: 39    / HCO3: 35    / Base Excess: 6.1   /  SaO2: 67        I&O's Detail  I & Os for 24h ending 15 Hitesh 2017 07:00  =============================================  IN:    IV PiggyBack: 100 ml    Total IN: 100 ml  ---------------------------------------------  OUT:    Indwelling Catheter - Urethral: 2350 ml    Total OUT: 2350 ml  ---------------------------------------------  Total NET: -2250 ml    I & Os for current day (as of 15 Hitesh 2017 09:42)  =============================================  IN:    Total IN: 0 ml  ---------------------------------------------  OUT:    Indwelling Catheter - Urethral: 185 ml    Total OUT: 185 ml  ---------------------------------------------  Total NET: -185 ml    PHYSICAL EXAM:  VERY AGITATED, in bed, on monitor; wants water and cranberry juice  Obese, uncomfortable, - acute distress; vital signs are monitored continuously  Eyes: PERRLA, EOMI, -conjunctivitis, -scleritis   Head: no focal deficit, normocephalic,  no trauma  ENMT: moist tongue, no thrush, -nasal discharge, -hoarseness, abnormal hearing, -cough, -hemoptysis, trachea midline  Neck: supple, - lymphadenopathy,  -masses, -JVD  Respiratory: bilateral diminished breath sounds, -wheezing, -rhonchi, +rales, -crackles   Chest: +accessory muscle use (WITHOUT FLOW), -paradoxical breathing  Cardiovascular: regular rate and sinus rhythm, S1 S2 normal, -S3, -S4, -murmur, -gallop, -rub  Gastrointestinal: soft, nontender, nondistended, normal bowel sounds, no hepatosplenomegaly  Genitourinary: -flank pain, -dysuria  KISER (BECAUSE OF SKIN DEFECTS)  Extremities: -clubbing, -cyanosis, +edema; erythema of distal legs    Vascular: peripheral pulses palpable 2+ bilaterally  Neurological: alert, oriented x 3 (AFTER SEVERAL ATTEMPTS), no focal deficit, -tremor   Skin: warm, dry, -erythema, iv site intact  Lymph nodes; no cervical, supraclavicular or axillary adenopathy  Psychiatric: agitated    MEDICATIONS  (STANDING):  ferrous    sulfate 325milliGRAM(s) Oral every 8 hours  cefTRIAXone   IVPB 1Gram(s) IV Intermittent every 24 hours  cefTRIAXone   IVPB  IV Intermittent   acetazolamide    Tablet 250milliGRAM(s) Oral daily  metoprolol Injectable 2.5milliGRAM(s) IV Push every 6 hours    MEDICATIONS  (PRN):  acetaminophen   Tablet. 975milliGRAM(s) Oral every 6 hours PRN Moderate Pain (4 - 6)  ALBUTerol/ipratropium for Nebulization 3milliLiter(s) Nebulizer every 6 hours PRN Shortness of Breath and/or Wheezing  acetylcysteine 20% Inhalation 5milliLiter(s) Inhalation every 6 hours PRN shortness of breath    Allergies  No Known Allergies  Intolerances    LABS:                        10.7   6.9   )-----------( 140      ( 15 Hitesh 2017 06:31 )             37.2     06-15    143  |  99  |  44<H>  ----------------------------<  103<H>  4.6   |  33<H>  |  2.80<H>    Ca    8.7      15 Hitesh 2017 06:31  Mg     1.9     06-15  PT/INR - ( 14 Jun 2017 05:05 )   PT: 42.4 sec;   INR: 3.72       +DVT prophylaxis INR  +Sleep ON BiPAP  +Nutrition goals ORAL  -Pain DENIED  -Decubital ulcer  STAGE 1-2  +GI prophylaxis (PPV, coagulopathy, Hx)  +Aspiration prophylaxis (45 degrees)    Ventilator synchronized  33%; 45 L  Tracheal cuff pressure  NASAL PRONGS    +Sedation/analgesia stopped  AVOID SEDATION  +ID (phos, CH, mupi, SB)  -Delirium  +Cardiac Beta  +Prevention  +Education  NEED FOR BALLOON DILATION  +Medication reviewed (drug-drug interactions, PDA)  Medical devices KISER HIGH FLOW    Discussed with CCU, PGY, CCRN; daughter to be contacted; Dr. Plaza    RADIOLOGY & ADDITIONAL STUDIES:    CXR reviewed - improved edema and effusion

## 2017-06-15 NOTE — PROGRESS NOTE ADULT - SUBJECTIVE AND OBJECTIVE BOX
LAVINIA MARIE   MRN-[id number]         CC: Patient is a 87y old  Female who presents with a chief complaint of unable to get out of bed x 3 days (11 Jun 2017 16:57) secondary to acute on chronic CHF.  Daughter feels pt is mentally at baseline and better today relative to yesterday.      ROS:  UNABLE TO OBTAIN  due to:    DYSPNEA (Y/N): n, on high flow NC	  N/V (Y/N): n	  SECRETIONS (Y/N):n	  AGITATION (Y/N):n  PAIN(Y/N): y, chronic , right groin area       -Provocation/Palliation: mov't/rest     -Quality/Quantity: sharp     -Radiating: none     -Severity:sharp 'bad"     -Timing/Frequency: with mov't     -Impact on ADLs: keeps her in bed     OTHER REVIEW OF SYSTEMS:  ALLERGIES:  No Known Allergies    OPIATE NAÏVE (Y/N):y  iSTOP REVIEWED (Y/N): y    MEDICATIONS: reviewed  MEDICATIONS  (STANDING):  ferrous    sulfate 325milliGRAM(s) Oral every 8 hours  cefTRIAXone   IVPB 1Gram(s) IV Intermittent every 24 hours  cefTRIAXone   IVPB  IV Intermittent   metoprolol Injectable 2.5milliGRAM(s) IV Push every 6 hours    MEDICATIONS  (PRN):  acetaminophen   Tablet. 975milliGRAM(s) Oral every 6 hours PRN Moderate Pain (4 - 6)  ALBUTerol/ipratropium for Nebulization 3milliLiter(s) Nebulizer every 6 hours PRN Shortness of Breath and/or Wheezing  acetylcysteine 20% Inhalation 5milliLiter(s) Inhalation every 6 hours PRN shortness of breath    PHYSICAL EXAM:  T(C): 36.8, Max: 37.1 (06-14 @ 18:27)  T(F): 98.2, Max: 98.7 (06-14 @ 18:27)  HR: 70 (66 - 84)  BP: 114/53 (95/46 - 138/63)  RR: 23 (14 - 28)  SpO2: 95% (94% - 100%)    GENERAL:  Elderly woman more alert and interactive today  HEENT: normocephalic, grayish plagues at edge of right eyebrown, EOMs intact, no icterus, +[place], hears well with voice amplifier used,  no nasal discharge, moist mucous membranes, throat without any apparent lesions   NECK: short, but supple, no palpable masses   CVS: afib on ECG, +systolic murmur on right MCL, left PPM  RESP: 35% high flow NC, resp even and not labored even with speech, decreased BS throughout  GI: softly distended, NT, obese    : f/c    MS: bilateral LEs with evidence of venous stasis blue purplish discoloration and cool to touch   NEURO:   PSYCH: slightly anxious when discussing her groin pain and possible BAV  SKIN: groin areas moist with mild rash, thicken hard toe nails    LYMPH: no supraclavicular LAD          LABS: reviewed                        10.7   6.9   )-----------( 140      ( 15 Hitesh 2017 06:31 )             37.2     06-15    143  |  99  |  44<H>  ----------------------------<  103<H>  4.6   |  33<H>  |  2.80<H>    Ca    8.7      15 Hitesh 2017 06:31  Mg     1.9     06-15    PT/INR - ( 15 Hitesh 2017 14:15 )   PT: 42.6 sec;   INR: 3.74        PTT - ( 15 Hitesh 2017 14:15 )  PTT:40.0 sec    IMAGING: reviewed    ADVANCED DIRECTIVES:      DNR     DNI         DECISION MAKER: pt  LEGAL SURROGATE:    PSYCHOSOCIAL-SPIRITUAL ASSESSMENT:       Reviewed         GOALS OF CARE DISCUSSION       Palliative care info/counseling provided	           Family meeting       Advanced Directives addressed           	      AGENCY CHOICE DISCUSSED:           TALIA         REFERRALS	        Unit SW/Case Mgmt       Patient/Family Support       Massage Therapy       Music Therapy       Nutrition        PT/OT    [ ]CRITICAL CARE TIME PROVIDED TO UNSTABLE PT W/ ORGAN FAILURE    Start:               End:  	       Minutes:          [x]> 50% OF THE TIME SPENT IN COUNSELING AND COORDINATING CARE   Start:               End:  	       Minutes:  [x]PROLONGED SERVICE             FACE TO FACE: [x]PT     [x ]PT & FAMILY   Start:  1620             End:1715  	       Minutes:95

## 2017-06-15 NOTE — PROGRESS NOTE ADULT - SUBJECTIVE AND OBJECTIVE BOX
Dr. Tan Florence  Renal Fellow  Beeper # [phone]        Patient seen and examined at bedside.   sitting up in bed  reports feeling better  off bipap   output   Urethral: 2350 ml input -     ferrous    sulfate 325milliGRAM(s) every 8 hours  cefTRIAXone   IVPB 1Gram(s) every 24 hours  cefTRIAXone   IVPB    acetaminophen   Tablet. 975milliGRAM(s) every 6 hours PRN  acetazolamide    Tablet 250milliGRAM(s) daily  ALBUTerol/ipratropium for Nebulization 3milliLiter(s) every 6 hours PRN  metoprolol Injectable 2.5milliGRAM(s) every 6 hours  acetylcysteine 20% Inhalation 5milliLiter(s) every 6 hours PRN      Allergies    No Known Allergies    Intolerances        T(C): , Max: 37.1 (06-14 @ 18:27)  T(F): , Max: 98.7 (06-14 @ 18:27)  HR: 72  BP: 103/43  BP(mean): 67  RR: 23  SpO2: 96%  Wt(kg): --  I & Os for 24h ending 06-15 @ 07:00  =============================================  IN:    IV PiggyBack: 100 ml    Total IN: 100 ml  ---------------------------------------------  OUT:    Indwelling Catheter - Urethral: 2350 ml    Total OUT: 2350 ml  ---------------------------------------------  Total NET: -2250 ml    I & Os for current day (as of 06-15 @ 11:30)  =============================================  IN:    Oral Fluid: 360 ml    Total IN: 360 ml  ---------------------------------------------  OUT:    Indwelling Catheter - Urethral: 425 ml    Total OUT: 425 ml  ---------------------------------------------  Total NET: -65 ml        PHYSICAL EXAM:  Constitutional: tired appearing   Neck: Supple. No JVD.  Respiratory: rales at base   Cardiovascular: S1, S2.  Regular rate and rhythm.    Gastrointestinal: soft, non-tender, non-distended, normal bowel sounds  Extremities: Warm.  2 plus  lower extremity edema.          LABS:                        10.7   6.9   )-----------( 140      ( 15 Hitesh 2017 06:31 )             37.2     06-15    143  |  99  |  44<H>  ----------------------------<  103<H>  4.6   |  33<H>  |  2.80<H>    Ca    8.7      15 Hitesh 2017 06:31  Mg     1.9     06-15        PT/INR - ( 14 Jun 2017 05:05 )   PT: 42.4 sec;   INR: 3.72

## 2017-06-15 NOTE — PROGRESS NOTE ADULT - SUBJECTIVE AND OBJECTIVE BOX
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, HTN, PVD, and Type 2 DM (no longer on meds), MONICA, and TKR 03' presents to [facility] with cc of inability to get OOB x 3 days. Pt states that her "legs were too weak". Pt is a poor historian and HPI obtained from her daughter. Pt's daughter states that her mother was unable to get OOB for 3 days and pt used her Life Alert x3 times to call for help-was told to go to the ED. Reports that she had been "tired" for several day, SOB w/ minimal exertion for "several months" and her mobility has declined since her discharge from rehab in December. Additionally, pt has gained 17 lbs over the past month (from 258 to 275) and non-compliant with her diet. As per daughter, pt has been hospitalized prior 2-3 yrs ago (? [facility]) for CHF exacerbation w/ aggressive diuresis. Of note, pt was evaluation by Dr. Barger for aortic stenosis in December 16'. However, due to her deconditioned status during that time, pt was recommended medical management.         SUBJECTIVE:  Had long discussion with pt about aortic valve surgery risks and benefits. Pt expressed interest in the procedure but is nervous about the possibility of being intubated and other risks related to the procedure. She does not report any complaints other than "hating" the BIPAP.      VITAL SIGNS:  Vital Signs Last 24 Hrs  T(C): 36.3, Max: 37.1 (06-14 @ 18:27)  T(F): 97.4, Max: 98.7 (06-14 @ 18:27)  HR: 68 (61 - 85)  BP: 107/50 (95/38 - 138/63)  BP(mean): 72 (53 - 99)  RR: 17 (14 - 31)  SpO2: 99% (92% - 100%)  I&O: 24 hr net neg 2.2 L     PHYSICAL EXAM:    Constitutional: emotionally distraught, NAD on BIPAP  Eyes: PERRL  ENMT: dry MM  Neck: no JVD  Respiratory: RLL crackles and reduced bs at base.   Cardiovascular: RRR, loud systolic murmur at RUSB  Gastrointestinal: obese, soft NTND  Extremities: LE edema with stasis dermatitis. WWP  Vascular: diminished DPs        LABS:                        10.7   6.9   )-----------( 140      ( 15 Hitesh 2017 06:31 )             37.2     06-15    143  |  99  |  44<H>  ----------------------------<  103<H>  4.6   |  33<H>  |  2.80<H>    Ca    8.7      15 Hitesh 2017 06:31  Mg     1.9     06-15      RADIOLOGY & ADDITIONAL TESTS:      MEDICATIONS  (STANDING):  ferrous    sulfate 325milliGRAM(s) Oral every 8 hours  cefTRIAXone   IVPB 1Gram(s) IV Intermittent every 24 hours  cefTRIAXone   IVPB  IV Intermittent   acetazolamide    Tablet 250milliGRAM(s) Oral daily  metoprolol Injectable 2.5milliGRAM(s) IV Push every 6 hours  furosemide   Injectable 20milliGRAM(s) IV Push every 12 hours    MEDICATIONS  (PRN):  acetaminophen   Tablet. 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, HTN, PVD, and Type 2 DM (no longer on meds), MONICA, and TKR 03' who presented to the hospital due to the inability to get out of bed x 3 days, SOB, and increased LE edema, found to have a CHF exacerbation stepped up to CCU for hypercapneic resp failure    PLAN  CARDIO:  Valves:  Non rheumatic heart disease. Echo w/ ALEKS 0.5, mean pressure gradient 43   -CTS Dr. Barger consulted. F/u recs.  -Per CTS team, to be medically optimized in regards to CHF prior to proceeding w/ possible BAV   -severe AS, ALEKS 0.5, mean pressure gradient 43, mod pulm HTN, PASP 52. Structural cardiology is following pt and offered BAV. Pt is uncertain whether she wants to reverse her DNR/DNI for the procedure. Need GOC discussion with pt and family today with palliative.     Pump: acute CHF exacerbation 2/2 dietary non-compliance and severe AS . EF 50-55% on last TTE 2016.   -Daily weights, strict I/Os (us in place)  -Echo w/ EF 50%, mod conc LVH, basal septal hypokinesis (seen previously on Echo 12/2015), severely dilated LA-Continue Toprol XL 25mg po daily  -Cont Furosemide prn, increasing diamox to 500mg qd per renal recs. May add Metolazone 5mg qd if I/O not at goal per Dr Gonzalez,    Vessel:  s/p PCI x 3 stent (04', 06' and 12')-last cath 3/15' PCI dLM w/ Impella support  -Trop 0.04 x 4. Likely 2/2 demand ischemia. Not continuing to trend.   -Continue Toprol XL 25mg po daily   -Per daughter Rosendo House Call NP Eben Tellez d/c'ed ASA due to dual AC w/ Coumadin, statin was d/c'ed years ago for unclear reasons.    Electrical:  Supra therapeutic INR on admission 4.8. INR 3.95 yesterday.  -s/p PPM 2011 2/2 symptomatic bradycardia. PPM interrogation WNL.  -Daily PT/INR   -Continue Toprol XL 25mg po daily.     Hemodynamics: pressures stable, holding ACE/ARB in setting of acute renal injury.     PULM:  #COPD (chronic obstructive pulmonary disease) - c/w duonebs. not in acute exacerbation  #Hypercapneic resp failure 2/2 pulmonary congestion 2/2 CHF 2/2 severe AS - see plan above.   ID - UTI growing kleb. c/w ceftriaxone for 5-7 day course (day 4 today).     RENAL:  CKD (chronic kidney disease) stage 4, GFR 15-29 ml/min- Baseline Cr per daughter 2-3. On admission, Cr 2.8  -Cont Furosemide prn, c/w Diamox 500mg qd for alkalosis per renal recs.   -Renal consulted. F/u recs.    Endo:  DM - off meds, a1c wnl    NUTRITION - cardiac diet    PPX - supratherapeutic INR    ACCESS - peripheral    DNR/DNI - palliative is following. plan on GOC discussion today.

## 2017-06-15 NOTE — PROGRESS NOTE ADULT - PROBLEM SELECTOR PLAN 2
present during Dr. Barger's mtg with pt and daughter regarding possible BAV for the future pending pt improvement.  Both pt and daughter states they want the procedure. Rescinding of DNR/DNI status for procedure discussed, questions answered, to be readdress at a later time

## 2017-06-15 NOTE — PROGRESS NOTE ADULT - PROBLEM SELECTOR PLAN 3
pt/daughter's goal is to ultimately get potential BAV.  Pt agrees to be proactive in improving her functional status, participate in P.T. and allowing staff to improve her clinical status by following recs/being compliant

## 2017-06-15 NOTE — PROGRESS NOTE ADULT - SUBJECTIVE AND OBJECTIVE BOX
INTERVAL HPI/OVERNIGHT EVENTS: On BIPAP o/n. GALILEO    SUBJECTIVE:     VITAL SIGNS:  Vital Signs Last 24 Hrs  T(C): 36.3, Max: 37.1 (06-14 @ 18:27)  T(F): 97.4, Max: 98.7 (06-14 @ 18:27)  HR: 68 (61 - 85)  BP: 107/50 (95/38 - 138/63)  BP(mean): 72 (53 - 99)  RR: 17 (14 - 31)  SpO2: 99% (92% - 100%)  I&O: 24 hr net neg 2.2 L     PHYSICAL EXAM:    Constitutional:  Eyes:  ENMT:  Neck:  Respiratory:  Cardiovascular:  Gastrointestinal:  Extremities:  Vascular:  Neurological:  Musculoskeletal:    MEDICATIONS  (STANDING):  ferrous    sulfate 325milliGRAM(s) Oral every 8 hours  cefTRIAXone   IVPB 1Gram(s) IV Intermittent every 24 hours  cefTRIAXone   IVPB  IV Intermittent   acetazolamide    Tablet 250milliGRAM(s) Oral daily  metoprolol Injectable 2.5milliGRAM(s) IV Push every 6 hours  furosemide   Injectable 20milliGRAM(s) IV Push every 12 hours    MEDICATIONS  (PRN):  acetaminophen   Tablet. 975milliGRAM(s) Oral every 6 hours PRN Moderate Pain (4 - 6)  ALBUTerol/ipratropium for Nebulization 3milliLiter(s) Nebulizer every 6 hours PRN Shortness of Breath and/or Wheezing  acetylcysteine 20% Inhalation 5milliLiter(s) Inhalation every 6 hours PRN shortness of breath      Allergies    No Known Allergies    Intolerances        LABS:                        10.7   6.9   )-----------( 140      ( 15 Hitesh 2017 06:31 )             37.2     06-14    142  |  98  |  47<H>  ----------------------------<  73  4.6   |  29  |  3.00<H>    Ca    9.1      14 Jun 2017 05:01  Mg     2.0     06-14      PT/INR - ( 14 Jun 2017 05:05 )   PT: 42.4 sec;   INR: 3.72                RADIOLOGY & ADDITIONAL TESTS: INTERVAL HPI/OVERNIGHT EVENTS: Pt pulled out a-line yesterday. Had GOC discussion with pt's HCP (daughter). On BIPAP o/n. GALILEO    SUBJECTIVE:  Had long discussion with pt about aortic valve surgery risks and benefits. Pt expressed interest in the procedure but is nervous about the possibility of being intubated and other risks related to the procedure. She does not report any complaints other than "hating" the BIPAP. ROS otherwise negative.     VITAL SIGNS:  Vital Signs Last 24 Hrs  T(C): 36.3, Max: 37.1 (06-14 @ 18:27)  T(F): 97.4, Max: 98.7 (06-14 @ 18:27)  HR: 68 (61 - 85)  BP: 107/50 (95/38 - 138/63)  BP(mean): 72 (53 - 99)  RR: 17 (14 - 31)  SpO2: 99% (92% - 100%)  I&O: 24 hr net neg 2.2 L     PHYSICAL EXAM:    Constitutional: emotionally distraught, NAD on BIPAP  Eyes: PERRL  ENMT: dry MM  Neck: no JVD  Respiratory: RLL crackles and reduced bs at base.   Cardiovascular: RRR, loud systolic murmur at RUSB  Gastrointestinal: obese, soft NTND  Extremities: LE edema with stasis dermatitis. WWP  Vascular: diminished DPs  Neurological: A&O x 3  Musculoskeletal: No joint swelling       LABS:                        10.7   6.9   )-----------( 140      ( 15 Hitesh 2017 06:31 )             37.2     06-15    143  |  99  |  44<H>  ----------------------------<  103<H>  4.6   |  33<H>  |  2.80<H>    Ca    8.7      15 Hitesh 2017 06:31  Mg     1.9     06-15      RADIOLOGY & ADDITIONAL TESTS:      MEDICATIONS  (STANDING):  ferrous    sulfate 325milliGRAM(s) Oral every 8 hours  cefTRIAXone   IVPB 1Gram(s) IV Intermittent every 24 hours  cefTRIAXone   IVPB  IV Intermittent   acetazolamide    Tablet 250milliGRAM(s) Oral daily  metoprolol Injectable 2.5milliGRAM(s) IV Push every 6 hours  furosemide   Injectable 20milliGRAM(s) IV Push every 12 hours    MEDICATIONS  (PRN):  acetaminophen   Tablet. 975milliGRAM(s) Oral every 6 hours PRN Moderate Pain (4 - 6)  ALBUTerol/ipratropium for Nebulization 3milliLiter(s) Nebulizer every 6 hours PRN Shortness of Breath and/or Wheezing  acetylcysteine 20% Inhalation 5milliLiter(s) Inhalation every 6 hours PRN shortness of breath      Allergies    No Known Allergies    Intolerances INTERVAL HPI/OVERNIGHT EVENTS: Pt pulled out a-line yesterday. Had GOC discussion with pt's HCP (daughter). On BIPAP o/n. GALILEO    SUBJECTIVE:  Had long discussion with pt about aortic valve surgery risks and benefits. Pt expressed interest in the procedure but is nervous about the possibility of being intubated and other risks related to the procedure. She does not report any complaints other than "hating" the BIPAP. ROS otherwise negative.     VITAL SIGNS:  Vital Signs Last 24 Hrs  T(C): 36.3, Max: 37.1 (06-14 @ 18:27)  T(F): 97.4, Max: 98.7 (06-14 @ 18:27)  HR: 68 (61 - 85)  BP: 107/50 (95/38 - 138/63)  BP(mean): 72 (53 - 99)  RR: 17 (14 - 31)  SpO2: 99% (92% - 100%)  I&O: 24 hr net neg 2.2 L     PHYSICAL EXAM:    Constitutional: emotionally distraught, NAD on BIPAP  Eyes: PERRL  ENMT: dry MM  Neck: no JVD  Respiratory: RLL crackles and reduced bs at base.   Cardiovascular: RRR, loud systolic murmur at RUSB  Gastrointestinal: obese, soft NTND  Extremities: LE edema with stasis dermatitis. WWP  Vascular: diminished DPs  Neurological: A&O x 3  Musculoskeletal: No joint swelling       LABS:                        10.7   6.9   )-----------( 140      ( 15 Hitesh 2017 06:31 )             37.2     06-15    143  |  99  |  44<H>  ----------------------------<  103<H>  4.6   |  33<H>  |  2.80<H>    Ca    8.7      15 Hitesh 2017 06:31  Mg     1.9     06-15      RADIOLOGY & ADDITIONAL TESTS:      MEDICATIONS  (STANDING):  ferrous    sulfate 325milliGRAM(s) Oral every 8 hours  cefTRIAXone   IVPB 1Gram(s) IV Intermittent every 24 hours  cefTRIAXone   IVPB  IV Intermittent   acetazolamide    Tablet 250milliGRAM(s) Oral daily  metoprolol Injectable 2.5milliGRAM(s) IV Push every 6 hours  furosemide   Injectable 20milliGRAM(s) IV Push every 12 hours    MEDICATIONS  (PRN):  acetaminophen   Tablet. 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 Echo w/ ALEKS 0.5, mean pressure gradient 43   -CTS Dr. Barger consulted. F/u recs.  -Per CTS team, to be medically optimized in regards to CHF prior to proceeding w/ possible BAV   -Dr Soto to discuss w/ Dr Barger regarding plan.     Pump: acute CHF exacerbation 2/2 dietary non-compliance and severe AS . EF 50-55% on last TTE 2016.   -Daily weights, strict I/Os (us in place)  -Echo w/ EF 50%, mod conc LVH, basal septal hypokinesis (seen previously on Echo 12/2015), severely dilated LA, severe AS, ALEKS 0.5, mean pressure gradient 43, mod pulm HTN, PASP 52. Structural cardiology is following pt and offered BAV. Pt is uncertain whether she wants to reverse her DNR/DNI for the procedure. Need GOC discussion with pt and family today with palliative.   -Continue Toprol XL 25mg po daily  -Cont Furosemide prn, increasing diamox to 500mg qd per renal recs. May add Metolazone 5mg qd if I/O not at goal per Dr Gonzalez,    Vessel:  s/p PCI x 3 stent (04', 06' and 12')-last cath 3/15' PCI dLM w/ Impella support  -Trop 0.04 x 4. Likely 2/2 demand ischemia. Not continuing to trend.   -Continue Toprol XL 25mg po daily   -Per daughter [facility] NP Eben Tellez d/c'ed ASA due to dual AC w/ Coumadin, statin was d/c'ed years ago for unclear reasons.    Electrical:  Supra therapeutic INR on admission 4.8. INR 3.95 yesterday.  -s/p PPM 2011 2/2 symptomatic bradycardia. PPM interrogation WNL, .  -Daily PT/INR   -Continue Toprol XL 25mg po daily.     Hemodynamics: pressures stable, holding ACE/ARB in setting of acute renal injury.     ID - UTI growing kleb. c/w ceftriaxone for 3 day course.     RENAL:  CKD (chronic kidney disease) stage 4, GFR 15-29 ml/min- Baseline Cr per daughter 2-3. On admission, Cr 2.8  -Cont Furosemide prn, c/w Diamox 250mg qd for alkalosis per renal recs. May add Metolazone 5mg qd if I/O not at goal per Dr Gonzalez.  -Renal consulted. F/u recs.    PULM:  COPD (chronic obstructive pulmonary disease)  - V/Q scan, though patient refused  - Pulm Dr. Gaming consulted    Endo:  DM - off meds, a1c wnl    NUTRITION - cardiac diet    PPX - supratherapeutic INR    ACCESS - peripheral, A line, us  DNR/DNI - consulting palliative INTERVAL HPI/OVERNIGHT EVENTS: Pt pulled out a-line yesterday. Had GOC discussion with pt's HCP (daughter). On BIPAP o/n. GALILEO    SUBJECTIVE:  Had long discussion with pt about aortic valve surgery risks and benefits. Pt expressed interest in the procedure but is nervous about the possibility of being intubated and other risks related to the procedure. She does not report any complaints other than "hating" the BIPAP. ROS otherwise negative.     VITAL SIGNS:  Vital Signs Last 24 Hrs  T(C): 36.3, Max: 37.1 (06-14 @ 18:27)  T(F): 97.4, Max: 98.7 (06-14 @ 18:27)  HR: 68 (61 - 85)  BP: 107/50 (95/38 - 138/63)  BP(mean): 72 (53 - 99)  RR: 17 (14 - 31)  SpO2: 99% (92% - 100%)  I&O: 24 hr net neg 2.2 L     PHYSICAL EXAM:    Constitutional: emotionally distraught, NAD on BIPAP  Eyes: PERRL  ENMT: dry MM  Neck: no JVD  Respiratory: RLL crackles and reduced bs at base.   Cardiovascular: RRR, loud systolic murmur at RUSB  Gastrointestinal: obese, soft NTND  Extremities: LE edema with stasis dermatitis. WWP  Vascular: diminished DPs  Neurological: A&O x 3  Musculoskeletal: No joint swelling       LABS:                        10.7   6.9   )-----------( 140      ( 15 Hitesh 2017 06:31 )             37.2     06-15    143  |  99  |  44<H>  ----------------------------<  103<H>  4.6   |  33<H>  |  2.80<H>    Ca    8.7      15 Hitesh 2017 06:31  Mg     1.9     06-15      RADIOLOGY & ADDITIONAL TESTS:      MEDICATIONS  (STANDING):  ferrous    sulfate 325milliGRAM(s) Oral every 8 hours  cefTRIAXone   IVPB 1Gram(s) IV Intermittent every 24 hours  cefTRIAXone   IVPB  IV Intermittent   acetazolamide    Tablet 250milliGRAM(s) Oral daily  metoprolol Injectable 2.5milliGRAM(s) IV Push every 6 hours  furosemide   Injectable 20milliGRAM(s) IV Push every 12 hours    MEDICATIONS  (PRN):  acetaminophen   Tablet. 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 Echo w/ ALEKS 0.5, mean pressure gradient 43   -CTS Dr. Barger consulted. F/u recs.  -Per CTS team, to be medically optimized in regards to CHF prior to proceeding w/ possible BAV   -severe AS, ALEKS 0.5, mean pressure gradient 43, mod pulm HTN, PASP 52. Structural cardiology is following pt and offered BAV. Pt is uncertain whether she wants to reverse her DNR/DNI for the procedure. Need GOC discussion with pt and family today with palliative.     Pump: acute CHF exacerbation 2/2 dietary non-compliance and severe AS . EF 50-55% on last TTE 2016.   -Daily weights, strict I/Os (us in place)  -Echo w/ EF 50%, mod conc LVH, basal septal hypokinesis (seen previously on Echo 12/2015), severely dilated LA-Continue Toprol XL 25mg po daily  -Cont Furosemide prn, increasing diamox to 500mg qd per renal recs. May add Metolazone 5mg qd if I/O not at goal per Dr Gonzalez,    Vessel:  s/p PCI x 3 stent (04', 06' and 12')-last cath 3/15' PCI dLM w/ Impella support  -Trop 0.04 x 4. Likely 2/2 demand ischemia. Not continuing to trend.   -Continue Toprol XL 25mg po daily   -Per daughter [place] [facility] NP Eben Careyt d/c'ed ASA due to dual AC w/ Coumadin, statin was d/c'ed years ago for unclear reasons.    Electrical:  Supra therapeutic INR on admission 4.8. INR 3.95 yesterday.  -s/p PPM 2011 2/2 symptomatic bradycardia. PPM interrogation WNL.  -Daily PT/INR   -Continue Toprol XL 25mg po daily.     Hemodynamics: pressures stable, holding ACE/ARB in setting of acute renal injury.     PULM:  #COPD (chronic obstructive pulmonary disease) - c/w duonebs. not in acute exacerbation  #Hypercapneic resp failure 2/2 pulmonary congestion 2/2 CHF 2/2 severe AS - see plan above.   ID - UTI growing kleb. c/w ceftriaxone for 5-7 day course (day 4 today).     RENAL:  CKD (chronic kidney disease) stage 4, GFR 15-29 ml/min- Baseline Cr per daughter 2-3. On admission, Cr 2.8  -Cont Furosemide prn, c/w Diamox 500mg qd for alkalosis per renal recs.   -Renal consulted. F/u recs.    Endo:  DM - off meds, a1c wnl    NUTRITION - cardiac diet    PPX - supratherapeutic INR    ACCESS - peripheral    DNR/DNI - palliative is following. plan on GOC discussion today.

## 2017-06-15 NOTE — PROGRESS NOTE ADULT - PROBLEM SELECTOR PLAN 1
recommend c/w iv diuresis of 40 mg lasix bid  increase  diamox to 500 mg daily for rising bicarbonate

## 2017-06-15 NOTE — PROGRESS NOTE ADULT - ASSESSMENT
86 y/o morbidly obese woman with h/o CHF, CAD with prior PCIs and stents, PPM, severe AS deemed not an appropriate surgical candidate previously in 12/16, prior Impella support, DM, CKD, prior short term HD, PVD, and COPD presenting from home with SOB with min. exertion, progressive functional decline (homebound since 12/16), approx. 17lb wt. gain, and unable to get OOBx3 days found to have acute on chronic heart failure Rx initially on Stepdown initially, then upgraded to ICU due to AMS and hypercapneic resp failure, now on high-flow NC

## 2017-06-16 LAB
ANION GAP SERPL CALC-SCNC: 12 MMOL/L — SIGNIFICANT CHANGE UP (ref 5–17)
APTT BLD: 41 SEC — HIGH (ref 27.5–37.4)
BASE EXCESS BLDV CALC-SCNC: 9 MMOL/L — SIGNIFICANT CHANGE UP
BUN SERPL-MCNC: 44 MG/DL — HIGH (ref 7–23)
CA-I SERPL-SCNC: 1.11 MMOL/L — LOW (ref 1.12–1.3)
CALCIUM SERPL-MCNC: 8.9 MG/DL — SIGNIFICANT CHANGE UP (ref 8.4–10.5)
CHLORIDE SERPL-SCNC: 100 MMOL/L — SIGNIFICANT CHANGE UP (ref 96–108)
CO2 SERPL-SCNC: 32 MMOL/L — HIGH (ref 22–31)
CREAT SERPL-MCNC: 2.8 MG/DL — HIGH (ref 0.5–1.3)
GAS PNL BLDV: 140 MMOL/L — SIGNIFICANT CHANGE UP (ref 138–146)
GAS PNL BLDV: SIGNIFICANT CHANGE UP
GAS PNL BLDV: SIGNIFICANT CHANGE UP
GLUCOSE SERPL-MCNC: 103 MG/DL — HIGH (ref 70–99)
HCO3 BLDV-SCNC: 36 MMOL/L — HIGH (ref 20–27)
HCT VFR BLD CALC: 35.8 % — SIGNIFICANT CHANGE UP (ref 34.5–45)
HGB BLD-MCNC: 10.6 G/DL — LOW (ref 11.5–15.5)
INR BLD: 2.91 — HIGH (ref 0.88–1.16)
MAGNESIUM SERPL-MCNC: 1.9 MG/DL — SIGNIFICANT CHANGE UP (ref 1.6–2.6)
MCHC RBC-ENTMCNC: 27.1 PG — SIGNIFICANT CHANGE UP (ref 27–34)
MCHC RBC-ENTMCNC: 29.6 G/DL — LOW (ref 32–36)
MCV RBC AUTO: 91.6 FL — SIGNIFICANT CHANGE UP (ref 80–100)
PCO2 BLDV: 66 MMHG — HIGH (ref 41–51)
PH BLDV: 7.36 — SIGNIFICANT CHANGE UP (ref 7.32–7.43)
PLATELET # BLD AUTO: 154 K/UL — SIGNIFICANT CHANGE UP (ref 150–400)
PO2 BLDV: 71 MMHG — SIGNIFICANT CHANGE UP
POTASSIUM BLDV-SCNC: 3.8 MMOL/L — SIGNIFICANT CHANGE UP (ref 3.5–4.9)
POTASSIUM SERPL-MCNC: 4.3 MMOL/L — SIGNIFICANT CHANGE UP (ref 3.5–5.3)
POTASSIUM SERPL-SCNC: 4.3 MMOL/L — SIGNIFICANT CHANGE UP (ref 3.5–5.3)
PROTHROM AB SERPL-ACNC: 33 SEC — HIGH (ref 9.8–12.7)
RBC # BLD: 3.91 M/UL — SIGNIFICANT CHANGE UP (ref 3.8–5.2)
RBC # FLD: 15.8 % — SIGNIFICANT CHANGE UP (ref 10.3–16.9)
SAO2 % BLDV: 94 % — SIGNIFICANT CHANGE UP
SODIUM SERPL-SCNC: 144 MMOL/L — SIGNIFICANT CHANGE UP (ref 135–145)
WBC # BLD: 7.3 K/UL — SIGNIFICANT CHANGE UP (ref 3.8–10.5)
WBC # FLD AUTO: 7.3 K/UL — SIGNIFICANT CHANGE UP (ref 3.8–10.5)

## 2017-06-16 PROCEDURE — 71010: CPT | Mod: 26

## 2017-06-16 PROCEDURE — 99232 SBSQ HOSP IP/OBS MODERATE 35: CPT | Mod: GC

## 2017-06-16 PROCEDURE — 99291 CRITICAL CARE FIRST HOUR: CPT

## 2017-06-16 PROCEDURE — 93010 ELECTROCARDIOGRAM REPORT: CPT

## 2017-06-16 RX ORDER — FUROSEMIDE 40 MG
40 TABLET ORAL ONCE
Qty: 0 | Refills: 0 | Status: COMPLETED | OUTPATIENT
Start: 2017-06-16 | End: 2017-06-16

## 2017-06-16 RX ORDER — BUDESONIDE AND FORMOTEROL FUMARATE DIHYDRATE 160; 4.5 UG/1; UG/1
2 AEROSOL RESPIRATORY (INHALATION)
Qty: 0 | Refills: 0 | Status: COMPLETED | OUTPATIENT
Start: 2017-06-16 | End: 2018-05-15

## 2017-06-16 RX ORDER — METOPROLOL TARTRATE 50 MG
25 TABLET ORAL
Qty: 0 | Refills: 0 | Status: DISCONTINUED | OUTPATIENT
Start: 2017-06-16 | End: 2017-06-16

## 2017-06-16 RX ORDER — IPRATROPIUM/ALBUTEROL SULFATE 18-103MCG
3 AEROSOL WITH ADAPTER (GRAM) INHALATION EVERY 6 HOURS
Qty: 0 | Refills: 0 | Status: DISCONTINUED | OUTPATIENT
Start: 2017-06-16 | End: 2017-06-25

## 2017-06-16 RX ORDER — MAGNESIUM SULFATE 500 MG/ML
1 VIAL (ML) INJECTION ONCE
Qty: 0 | Refills: 0 | Status: COMPLETED | OUTPATIENT
Start: 2017-06-16 | End: 2017-06-16

## 2017-06-16 RX ADMIN — Medication 325 MILLIGRAM(S): at 14:29

## 2017-06-16 RX ADMIN — ACETAZOLAMIDE 500 MILLIGRAM(S): 250 TABLET ORAL at 14:30

## 2017-06-16 RX ADMIN — CEFTRIAXONE 100 GRAM(S): 500 INJECTION, POWDER, FOR SOLUTION INTRAMUSCULAR; INTRAVENOUS at 10:40

## 2017-06-16 RX ADMIN — Medication 3 MILLILITER(S): at 10:29

## 2017-06-16 RX ADMIN — Medication 2.5 MILLIGRAM(S): at 00:21

## 2017-06-16 RX ADMIN — Medication 3 MILLILITER(S): at 15:14

## 2017-06-16 RX ADMIN — Medication 2.5 MILLIGRAM(S): at 06:27

## 2017-06-16 RX ADMIN — Medication 100 GRAM(S): at 07:35

## 2017-06-16 RX ADMIN — Medication 40 MILLIGRAM(S): at 22:29

## 2017-06-16 RX ADMIN — Medication 40 MILLIGRAM(S): at 02:07

## 2017-06-16 NOTE — PROGRESS NOTE ADULT - SUBJECTIVE AND OBJECTIVE BOX
INTERVAL HPI/OVERNIGHT EVENTS:    in bed, CCU  on monitor  high flow 39%, 45 L    PAST MEDICAL & SURGICAL HISTORY:  PVD (peripheral vascular disease)  Iron deficiency anemia  Lupus (systemic lupus erythematosus)  Paroxysmal atrial fibrillation  Aortic stenosis, severe  CKD (chronic kidney disease) stage 4, GFR 15-29 ml/min  Diabetes  HTN (hypertension)  CHF (congestive heart failure)  CAD (coronary artery disease)  COPD (chronic obstructive pulmonary disease)  History of total knee replacement: 2003  Presence of cardiac pacemaker: at [facility] by Dr Escoto    FAMILY HISTORY:  No pertinent family history in first degree relatives    SOCIAL HISTORY:    REVIEW OF SYSTEMS:  no significant change  -CP  +SOB "A LITTLE BIT"  -cough; DENIED    Vital Signs Last 24 Hrs  T(C): 36.9, Max: 37.1 (06-15 @ 22:46)  T(F): 98.4, Max: 98.7 (06-15 @ 22:46)  HR: 88 (63 - 88)  BP: 117/53 (94/50 - 123/51)  BP(mean): 85 (49 - 85)  RR: 25 (16 - 25)  SpO2: 98% (94% - 100%)    ABG - ( 14 Jun 2017 20:10 )  pH: 7.28  /  pCO2: 76    /  pO2: 39    / HCO3: 35    / Base Excess: 6.1   /  SaO2: 67        I&O's Detail  I & Os for 24h ending 16 Jun 2017 07:00  =============================================  IN:    Oral Fluid: 770 ml    IV PiggyBack: 100 ml    Total IN: 870 ml  ---------------------------------------------  OUT:    Indwelling Catheter - Urethral: 2110 ml    Total OUT: 2110 ml  ---------------------------------------------  Total NET: -1240 ml    I & Os for current day (as of 16 Jun 2017 18:49)  =============================================  IN:    Oral Fluid: 180 ml    IV PiggyBack: 50 ml    Total IN: 230 ml  ---------------------------------------------  OUT:    Indwelling Catheter - Urethral: 895 ml    Total OUT: 895 ml  ---------------------------------------------  Total NET: -665 ml    PHYSICAL EXAM:  Uncooperative, agitated  Well nourished, well developed, comfortable, - acute distress; vital signs are monitored continuously  Eyes: PERRLA, EOMI, -conjunctivitis, -scleritis   Head: no focal deficit, normocephalic,  no trauma  ENMT: moist tongue, no thrush, -nasal discharge, -hoarseness, normal hearing, -cough, -hemoptysis, trachea midline  Neck: supple, - lymphadenopathy,  -masses, -JVD  Respiratory: bilateral diminished breath sounds, -wheezing, -rhonchi, -rales, -crackles  Chest: -accessory muscle use, -paradoxical breathing  Cardiovascular: irregular rate and rhythm, S1 S2 normal, -S3, -S4, -murmur, -gallop, -rub  Gastrointestinal: soft, nontender, nondistended, normal bowel sounds, no hepatosplenomegaly  Genitourinary: -flank pain, -dysuria  Extremities: -clubbing, -cyanosis, EDEMA  Vascular: peripheral pulses palpable 2+ bilaterally  Neurological: alert, oriented x 2, no focal deficit, -tremor   Skin: warm, dry, -erythema, iv site intact  Lymph nodes; no cervical, supraclavicular or axillary adenopathy  Psychiatric: cooperative, appropriate mood    MEDICATIONS  (STANDING):  ferrous    sulfate 325milliGRAM(s) Oral every 8 hours  cefTRIAXone   IVPB 1Gram(s) IV Intermittent every 24 hours  cefTRIAXone   IVPB  IV Intermittent   acetazolamide    Tablet 500milliGRAM(s) Oral daily  ALBUTerol/ipratropium for Nebulization 3milliLiter(s) Nebulizer every 6 hours  buDESOnide 160 MICROgram(s)/formoterol 4.5 MICROgram(s) Inhaler 2Puff(s) Inhalation two times a day    MEDICATIONS  (PRN):  acetaminophen   Tablet. 975milliGRAM(s) Oral every 6 hours PRN Moderate Pain (4 - 6)  acetylcysteine 20% Inhalation 5milliLiter(s) Inhalation every 6 hours PRN shortness of breath    Allergies  No Known Allergies  Intolerances    LABS:                        10.6   7.3   )-----------( 154      ( 16 Jun 2017 06:11 )             35.8     06-16    144  |  100  |  44<H>  ----------------------------<  103<H>  4.3   |  32<H>  |  2.80<H>    Ca    8.9      16 Jun 2017 06:10  Mg     1.9     06-16  PT/INR - ( 16 Jun 2017 09:57 )   PT: 33.0 sec;   INR: 2.91     PTT - ( 16 Jun 2017 09:57 )  PTT:41.0 sec    +DVT prophylaxis   +Sleep  +Nutrition goals  -Pain  -Decubital ulcer  +GI prophylaxis (PPV, coagulopathy, Hx)  +Aspiration prophylaxis (45 degrees)    Ventilator synchronized  Tracheal cuff pressure    +Sedation/analgesia stopped once  +ID (phos, CH, mupi, SB)  -Delirium    +Cardiac Beta/ACEI-ARB/ASA/statin  +Prevention  +Education  +Medication reviewed (drug-drug interactions, PDA)  Medical devices    Discussed with ICU, PGY, CCRN, family    RADIOLOGY & ADDITIONAL STUDIES:

## 2017-06-16 NOTE — PROGRESS NOTE ADULT - SUBJECTIVE AND OBJECTIVE BOX
Dr. Tan Florence  Renal Fellow  Beeper # [phone]        Patient seen and examined at bedside.   on hihg flow nasal cannula  reports feeling better     ferrous    sulfate 325milliGRAM(s) every 8 hours  cefTRIAXone   IVPB 1Gram(s) every 24 hours  cefTRIAXone   IVPB    acetaminophen   Tablet. 975milliGRAM(s) every 6 hours PRN  acetylcysteine 20% Inhalation 5milliLiter(s) every 6 hours PRN  acetazolamide    Tablet 500milliGRAM(s) daily  ALBUTerol/ipratropium for Nebulization 3milliLiter(s) every 6 hours  buDESOnide 160 MICROgram(s)/formoterol 4.5 MICROgram(s) Inhaler 2Puff(s) two times a day      Allergies    No Known Allergies    Intolerances        T(C): , Max: 37.1 (06-15 @ 22:46)  T(F): , Max: 98.7 (06-15 @ 22:46)  HR: 74  BP: 103/43  BP(mean): 64  RR: 23  SpO2: 95%  Wt(kg): --  I & Os for 24h ending 06-16 @ 07:00  =============================================  IN:    Oral Fluid: 770 ml    IV PiggyBack: 100 ml    Total IN: 870 ml  ---------------------------------------------  OUT:    Indwelling Catheter - Urethral: 2110 ml    Total OUT: 2110 ml  ---------------------------------------------  Total NET: -1240 ml    I & Os for current day (as of 06-16 @ 11:35)  =============================================  IN:    Oral Fluid: 60 ml    IV PiggyBack: 50 ml    Total IN: 110 ml  ---------------------------------------------  OUT:    Indwelling Catheter - Urethral: 530 ml    Total OUT: 530 ml  ---------------------------------------------  Total NET: -420 ml      Weight (kg): 120 (06-15 @ 14:33)    PHYSICAL EXAM:  Constitutional: Well appearing.  No acute distress  Neck: Supple. No JVD.  Respiratory: Clear to auscultation   Cardiovascular: S1, S2.  Regular rate and rhythm.    Gastrointestinal: soft, non-tender, non-distended, normal bowel sounds  Extremities: Warm.  warm 2 plus  lower extremity edema.        LABS:                        10.6   7.3   )-----------( 154      ( 16 Jun 2017 06:11 )             35.8     06-16    144  |  100  |  44<H>  ----------------------------<  103<H>  4.3   |  32<H>  |  2.80<H>    Ca    8.9      16 Jun 2017 06:10  Mg     1.9     06-16        PT/INR - ( 16 Jun 2017 09:57 )   PT: 33.0 sec;   INR: 2.91          PTT - ( 16 Jun 2017 09:57 )  PTT:41.0 sec

## 2017-06-16 NOTE — PROGRESS NOTE ADULT - SUBJECTIVE AND OBJECTIVE BOX
SUBJECTIVE ASSESSMENT:  Patient seen this morning at bedside, currently complaining of right hip pain that is consistent with her arthritis. Per patient's daughter patient is more confused today compared to yesterday. Patient denies any other complaints including chest pain, shortness of  breath or palpations and says that she does not want to be bothered by people anymore.     Vital Signs Last 24 Hrs  T(C): 36.7, Max: 37.1 (06-15 @ 22:46)  T(F): 98.1, Max: 98.7 (06-15 @ 22:46)  HR: 74 (63 - 80)  BP: 103/43 (94/50 - 128/71)  BP(mean): 64 (49 - 100)  RR: 23 (16 - 27)  SpO2: 95% (94% - 100%)  I&O's Detail  I & Os for 24h ending 16 Jun 2017 07:00  =============================================  IN:    Oral Fluid: 770 ml    IV PiggyBack: 100 ml    Total IN: 870 ml  ---------------------------------------------  OUT:    Indwelling Catheter - Urethral: 2110 ml    Total OUT: 2110 ml  ---------------------------------------------  Total NET: -1240 ml    I & Os for current day (as of 16 Jun 2017 11:24)  =============================================  IN:    Oral Fluid: 60 ml    IV PiggyBack: 50 ml    Total IN: 110 ml  ---------------------------------------------  OUT:    Indwelling Catheter - Urethral: 530 ml    Total OUT: 530 ml  ---------------------------------------------  Total NET: -420 ml    PHYSICAL EXAM:    General: Patient lying comfortably in bed, no acute distress     Neurological: A&Ox2 (states the year is 2003), no other focal neurological deficits     Cardiovascular: S1S2, RRR, grade II/IV murmur appreciated at LUSB    Respiratory: Scattered crackles appreciated in all lung fields bilaterally     Gastrointestinal: Abdomen soft, non tender, non distended     : + us     Extremities: Warm and well perfused. 2+ pitting edema bilaterally with blanching erythema along lower extremities bilaterally.     Vascular: Peripheral pulses 1+ bilaterally     LABS:                        10.6   7.3   )-----------( 154      ( 16 Jun 2017 06:11 )             35.8     PT/INR - ( 16 Jun 2017 09:57 )   PT: 33.0 sec;   INR: 2.91          PTT - ( 16 Jun 2017 09:57 )  PTT:41.0 sec    06-16    144  |  100  |  44<H>  ----------------------------<  103<H>  4.3   |  32<H>  |  2.80<H>    Ca    8.9      16 Jun 2017 06:10  Mg     1.9     06-16    MEDICATIONS  (STANDING):  ferrous    sulfate 325milliGRAM(s) Oral every 8 hours  cefTRIAXone   IVPB 1Gram(s) IV Intermittent every 24 hours  cefTRIAXone   IVPB  IV Intermittent   acetazolamide    Tablet 500milliGRAM(s) Oral daily  ALBUTerol/ipratropium for Nebulization 3milliLiter(s) Nebulizer every 6 hours  buDESOnide 160 MICROgram(s)/formoterol 4.5 MICROgram(s) Inhaler 2Puff(s) Inhalation two times a day    MEDICATIONS  (PRN):  acetaminophen   Tablet. 975milliGRAM(s) Oral every 6 hours PRN Moderate Pain (4 - 6)  acetylcysteine 20% Inhalation 5milliLiter(s) Inhalation every 6 hours PRN shortness of breath    A/P: 87y Female y/o PMH of HTN, HLD, DM, pAF (on coumadin), PPM, PVD, known CAD s/p PCI (2004/2006/2012), cardiac cath on 3/15 w/ MANDI to distal LM required impella support, CKD and CHF with known aortic stenosis who presented to [facility] w/ the complaint of not being able to get out of bed. Pt was evaluated in the [facility] ED and admitted to  for management of acute on chronic diastolic CHF exacerbation. Dr. Barger was consulted for evaluation of her known aortic stenosis. Patient with poor medical compliance, at high risk for SAVR and TAVR due to severe deconditioned state.   - Continue medical optimization and diuresis for CHF exacerbation as per primary team   - Palliative following, patient currently DNR/DNI, per their notes states patient is in discussion of rescinding requests in aortic valve intervention done   - As of now, patient too deconditioned for any aortic valve intervention, will likely need outpatient follow up once medically optimized.   - Will continue to follow SUBJECTIVE ASSESSMENT:  Patient seen this morning at bedside, currently complaining of right hip pain that is consistent with her arthritis. Per patient's daughter patient is more confused today compared to yesterday. Patient denies any other complaints including chest pain, shortness of  breath or palpations and says that she does not want to be bothered by people anymore.     Vital Signs Last 24 Hrs  T(C): 36.7, Max: 37.1 (06-15 @ 22:46)  T(F): 98.1, Max: 98.7 (06-15 @ 22:46)  HR: 74 (63 - 80)  BP: 103/43 (94/50 - 128/71)  BP(mean): 64 (49 - 100)  RR: 23 (16 - 27)  SpO2: 95% (94% - 100%)  I&O's Detail  I & Os for 24h ending 16 Jun 2017 07:00  =============================================  IN:    Oral Fluid: 770 ml    IV PiggyBack: 100 ml    Total IN: 870 ml  ---------------------------------------------  OUT:    Indwelling Catheter - Urethral: 2110 ml    Total OUT: 2110 ml  ---------------------------------------------  Total NET: -1240 ml    I & Os for current day (as of 16 Jun 2017 11:24)  =============================================  IN:    Oral Fluid: 60 ml    IV PiggyBack: 50 ml    Total IN: 110 ml  ---------------------------------------------  OUT:    Indwelling Catheter - Urethral: 530 ml    Total OUT: 530 ml  ---------------------------------------------  Total NET: -420 ml    PHYSICAL EXAM:    General: Patient lying comfortably in bed, no acute distress     Neurological: A&Ox2 (states the year is 2003), no other focal neurological deficits     Cardiovascular: S1S2, RRR, grade II/IV murmur appreciated at LUSB    Respiratory: Scattered crackles appreciated in all lung fields bilaterally     Gastrointestinal: Abdomen soft, non tender, non distended     : + us     Extremities: Warm and well perfused. 2+ pitting edema bilaterally with blanching erythema along lower extremities bilaterally.     Vascular: Peripheral pulses 1+ bilaterally     LABS:                        10.6   7.3   )-----------( 154      ( 16 Jun 2017 06:11 )             35.8     PT/INR - ( 16 Jun 2017 09:57 )   PT: 33.0 sec;   INR: 2.91          PTT - ( 16 Jun 2017 09:57 )  PTT:41.0 sec    06-16    144  |  100  |  44<H>  ----------------------------<  103<H>  4.3   |  32<H>  |  2.80<H>    Ca    8.9      16 Jun 2017 06:10  Mg     1.9     06-16    MEDICATIONS  (STANDING):  ferrous    sulfate 325milliGRAM(s) Oral every 8 hours  cefTRIAXone   IVPB 1Gram(s) IV Intermittent every 24 hours  cefTRIAXone   IVPB  IV Intermittent   acetazolamide    Tablet 500milliGRAM(s) Oral daily  ALBUTerol/ipratropium for Nebulization 3milliLiter(s) Nebulizer every 6 hours  buDESOnide 160 MICROgram(s)/formoterol 4.5 MICROgram(s) Inhaler 2Puff(s) Inhalation two times a day    MEDICATIONS  (PRN):  acetaminophen   Tablet. 975milliGRAM(s) Oral every 6 hours PRN Moderate Pain (4 - 6)  acetylcysteine 20% Inhalation 5milliLiter(s) Inhalation every 6 hours PRN shortness of breath    A/P: 87y Female y/o PMH of HTN, HLD, DM, pAF (on coumadin), PPM, PVD, known CAD s/p PCI (2004/2006/2012), cardiac cath on 3/15 w/ MANDI to distal LM required impella support, CKD and CHF with known aortic stenosis who presented to [facility] w/ the complaint of not being able to get out of bed. Pt was evaluated in the [facility] ED and admitted to  for management of acute on chronic diastolic CHF exacerbation. Dr. Barger was consulted for evaluation of her known aortic stenosis. Patient with poor medical compliance, at high risk for SAVR and TAVR due to severe deconditioned state.   - Continue medical optimization and diuresis for CHF exacerbation as per primary team   - Palliative following, patient currently DNR/DNI, per their notes states patient is in discussion of rescinding requests in aortic valve intervention done   - As of now, patient too deconditioned for any aortic valve intervention/TAVR, consider BAV in clinical improvement (pt bedbound for wks, refusing PT)  - Will continue to follow

## 2017-06-16 NOTE — PROGRESS NOTE ADULT - ASSESSMENT
The risks of uremic platelet dysfunction, high INR, critical aortic stenosis, very thick chest wall with morbid obesity, negative fluid balance (-700 mL), uncooperative    her aortic stenosis can be treated with balloon dilation and intubation..    increase diamox

## 2017-06-16 NOTE — PROGRESS NOTE ADULT - PROBLEM SELECTOR PLAN 1
recommend c/w iv diuresis of 40 mg lasix bid  c.w   diamox to 500 mg daily for elevated bicarbonate  repeat abg

## 2017-06-16 NOTE — PROGRESS NOTE ADULT - SUBJECTIVE AND OBJECTIVE BOX
Pt very non-compliant  spent most of the night on BIPAP.     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 with cc of inability to get OOB x 3 days. Pt states that her "legs were too weak". Pt is a poor historian and HPI obtained from her daughter. Pt's daughter states that her mother was unable to get OOB for 3 days and pt used her Life Alert x3 times to call for help-was told to go to the ED. Reports that she had been "tired" for several day, SOB w/ minimal exertion for "several months" and her mobility has declined since her discharge from rehab in December. Additionally, pt has gained 17 lbs over the past month (from 258 to 275) and non-compliant with her diet. As per daughter, pt has been hospitalized prior 2-3 yrs ago (? [facility]) for CHF exacerbation w/ aggressive diuresis. Of note, pt was evaluation by Dr. Barger for aortic stenosis in December 16'. However, due to her deconditioned status during that time, pt was recommended medical management.           TELE: Couplets      PHYSICAL EXAM:    Constitutional:  NAD  Eyes: PERRL  ENMT: dry MM  Neck: +JVD  Respiratory: reduce bs at right base and crackles at left base, diffuse mild wheezing  Cardiovascular: RRR, loud systolic murmur over LUSB  Gastrointestinal: obese, soft, NTND  Extremities: WWP, pedal edema  Vascular: diminished peripheral pulses      LABS:                        10.6   7.3   )-----------( 154      ( 16 Jun 2017 06:11 )             35.8     06-16    144  |  100  |  44<H>  ----------------------------<  103<H>  4.3   |  32<H>  |  2.80<H>    Ca    8.9      16 Jun 2017 06:10  Mg     1.9     06-16      RADIOLOGY & ADDITIONAL TESTS:  EKG - atrial paced, couplets  CXR - interval improvement in pulmonary congestion     MEDICATIONS  (STANDING):  ferrous    sulfate 325milliGRAM(s) Oral every 8 hours  cefTRIAXone   IVPB 1Gram(s) IV Intermittent every 24 hours  cefTRIAXone   IVPB  IV Intermittent   metoprolol Injectable 2.5milliGRAM(s) IV Push every 6 hours  acetazolamide    Tablet 500milliGRAM(s) Oral daily    MEDICATIONS  (PRN):  acetaminophen   Tablet. 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 Structural cardiology is following pt and offered BAV if patient can be compliant with medical management for 1 week however pt is often refusing BIPAP treatment and lab draws. Palliative is also following.    -CTS Dr. Barger consulted. F/u recs.      Pump: acute CHF exacerbation 2/2 dietary non-compliance and severe AS . EF 50-55%, severely dilated LA   -Daily weights, strict I/Os (us in place)  -holding Toprol XL 25mg po daily as pt borderline hypotensive   -Cont Furosemide prn, c/w diamox 500mg qd per renal recs. Careful diuresis with close monitoring of BP in setting of severe AS.     Vessel:  s/p PCI x 3 stent (04', 06' and 12')-last cath 3/15' PCI dLM   -Trop 0.04 x 4. Likely 2/2 demand ischemia. Not continuing to trend.   -holding Toprol XL 25mg po daily in setting of hypotension from diuresis  -Per daughter [facility] NP Eben Tellez d/c'ed ASA due to dual AC w/ Coumadin, statin was d/c'ed years ago for unclear reasons.    Electrical:  -s/p PPM 2011 2/2 symptomatic bradycardia. PPM interrogation WNL.  -Daily PT/INR. Dose coumadin accordingly  -Continue Toprol XL 25mg po daily.     Hemodynamics: borderline hypotensive, holding ACE/ARB/and BB in setting of diuresis.      PULM:  #COPD (chronic obstructive pulmonary disease) - c/w duonebs. starting budesonide.   #Hypercapneic resp failure 2/2 pulmonary congestion 2/2 CHF 2/2 severe AS - see plan above. C/w Bipap and HFNC. VBGs as needed.     ID - UTI growing kleb. c/w ceftriaxone for 7 day course (day 5 today).     RENAL:  CKD (chronic kidney disease) stage 4, GFR 15-29 ml/min- Baseline Cr per daughter 2-3.   -Cont Furosemide prn, c/w Diamox 500mg qd for alkalosis per renal recs.   -Renal consulted. F/u recs.    Endo:  DM - off meds, a1c wnl    NUTRITION - cardiac diet    PPX - Therapeutic INR    ACCESS - peripheral    DNR/DNI - palliative is following. continued GOC discussions

## 2017-06-16 NOTE — PROGRESS NOTE ADULT - SUBJECTIVE AND OBJECTIVE BOX
INTERVAL HPI/OVERNIGHT EVENTS: Structural cardiology will consider AV intervention if patient is compliant with medical treatment for at least 1 week. Pt on BiPAP o/n    SUBJECTIVE    VITAL SIGNS:  Vital Signs Last 24 Hrs  T(C): 36.7, Max: 37.1 (06-15 @ 22:46)  T(F): 98, Max: 98.7 (06-15 @ 22:46)  HR: 63 (63 - 80)  BP: 114/43 (94/50 - 128/71)  BP(mean): 68 (49 - 100)  RR: 22 (16 - 28)  SpO2: 100% (94% - 100%)  I&O: 24 hr net neg 1140cc  MEDICATIONS  (STANDING):  ferrous    sulfate 325milliGRAM(s) Oral every 8 hours  cefTRIAXone   IVPB 1Gram(s) IV Intermittent every 24 hours  cefTRIAXone   IVPB  IV Intermittent   metoprolol Injectable 2.5milliGRAM(s) IV Push every 6 hours  acetazolamide    Tablet 500milliGRAM(s) Oral daily    MEDICATIONS  (PRN):  acetaminophen   Tablet. 975milliGRAM(s) Oral every 6 hours PRN Moderate Pain (4 - 6)  ALBUTerol/ipratropium for Nebulization 3milliLiter(s) Nebulizer every 6 hours PRN Shortness of Breath and/or Wheezing  acetylcysteine 20% Inhalation 5milliLiter(s) Inhalation every 6 hours PRN shortness of breath    PHYSICAL EXAM:    Constitutional:  Eyes:  ENMT:  Neck:  Respiratory:  Cardiovascular:  Gastrointestinal:  Extremities:  Vascular:  Neurological:  Musculoskeletal:    x    LABS:                        10.6   7.3   )-----------( 154      ( 16 Jun 2017 06:11 )             35.8     06-15    143  |  99  |  44<H>  ----------------------------<  103<H>  4.6   |  33<H>  |  2.80<H>    Ca    8.7      15 Hitesh 2017 06:31  Mg     1.9     06-15      PT/INR - ( 15 Hitesh 2017 14:15 )   PT: 42.6 sec;   INR: 3.74          PTT - ( 15 Hitesh 2017 14:15 )  PTT:40.0 sec      RADIOLOGY & ADDITIONAL TESTS: INTERVAL HPI/OVERNIGHT EVENTS: Structural cardiology will consider AV intervention if patient is compliant with medical treatment for at least 1 week. Pt very non-compliant o/n with staff but spent most of the night on BIPAP.     SUBJECTIVE: ROS negative    VITAL SIGNS:  Vital Signs Last 24 Hrs  T(C): 36.7, Max: 37.1 (06-15 @ 22:46)  T(F): 98, Max: 98.7 (06-15 @ 22:46)  HR: 63 (63 - 80)  BP: 114/43 (94/50 - 128/71)  BP(mean): 68 (49 - 100)  RR: 22 (16 - 28)  SpO2: 100% (94% - 100%)  I&O: 24 hr net neg 1140cc    PHYSICAL EXAM:    Constitutional: sleepy, NAD, follows commands  Eyes: PERRL  ENMT: dry MM  Neck: +JVD  Respiratory: reduce bs at right base and crackles at left base, diffuse mild wheezing  Cardiovascular: RRR, loud systolic murmur over LUSB  Gastrointestinal: obese, soft, NTND  Extremities: WWP, pedal edema  Vascular: diminished peripheral pulses  Neurological: A&O to self, city, and year  Musculoskeletal: no joint swelling    LABS:                        10.6   7.3   )-----------( 154      ( 16 Jun 2017 06:11 )             35.8     06-16    144  |  100  |  44<H>  ----------------------------<  103<H>  4.3   |  32<H>  |  2.80<H>    Ca    8.9      16 Jun 2017 06:10  Mg     1.9     06-16      RADIOLOGY & ADDITIONAL TESTS:    MEDICATIONS  (STANDING):  ferrous    sulfate 325milliGRAM(s) Oral every 8 hours  cefTRIAXone   IVPB 1Gram(s) IV Intermittent every 24 hours  cefTRIAXone   IVPB  IV Intermittent   metoprolol Injectable 2.5milliGRAM(s) IV Push every 6 hours  acetazolamide    Tablet 500milliGRAM(s) Oral daily    MEDICATIONS  (PRN):  acetaminophen   Tablet. 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 Echo w/ ALEKS 0.5, mean pressure gradient 43   -CTS Dr. Barger consulted. F/u recs.  -Per CTS team, to be medically optimized in regards to CHF prior to proceeding w/ possible BAV   -severe AS, ALEKS 0.5, mean pressure gradient 43, mod pulm HTN, PASP 52. Structural cardiology is following pt and offered BAV. Pt is uncertain whether she wants to reverse her DNR/DNI for the procedure. Need GOC discussion with pt and family today with palliative.     Pump: acute CHF exacerbation 2/2 dietary non-compliance and severe AS . EF 50-55% on last TTE 2016.   -Daily weights, strict I/Os (us in place)  -Echo w/ EF 50%, mod conc LVH, basal septal hypokinesis (seen previously on Echo 12/2015), severely dilated LA-Continue Toprol XL 25mg po daily  -Cont Furosemide prn, increasing diamox to 500mg qd per renal recs. May add Metolazone 5mg qd if I/O not at goal per Dr Gonzalez,    Vessel:  s/p PCI x 3 stent (04', 06' and 12')-last cath 3/15' PCI dLM w/ Impella support  -Trop 0.04 x 4. Likely 2/2 demand ischemia. Not continuing to trend.   -Continue Toprol XL 25mg po daily   -Per daughter Rosendo House Call NP Eben Tellez d/c'ed ASA due to dual AC w/ Coumadin, statin was d/c'ed years ago for unclear reasons.    Electrical:  Supra therapeutic INR on admission 4.8. INR 3.95 yesterday.  -s/p PPM 2011 2/2 symptomatic bradycardia. PPM interrogation WNL.  -Daily PT/INR   -Continue Toprol XL 25mg po daily.     Hemodynamics: pressures stable, holding ACE/ARB in setting of acute renal injury.     PULM:  #COPD (chronic obstructive pulmonary disease) - c/w duonebs. not in acute exacerbation  #Hypercapneic resp failure 2/2 pulmonary congestion 2/2 CHF 2/2 severe AS - see plan above.   ID - UTI growing kleb. c/w ceftriaxone for 5-7 day course (day 4 today).     RENAL:  CKD (chronic kidney disease) stage 4, GFR 15-29 ml/min- Baseline Cr per daughter 2-3. On admission, Cr 2.8  -Cont Furosemide prn, c/w Diamox 500mg qd for alkalosis per renal recs.   -Renal consulted. F/u recs.    Endo:  DM - off meds, a1c wnl    NUTRITION - cardiac diet    PPX - supratherapeutic INR    ACCESS - peripheral    DNR/DNI - palliative is following. plan on GOC discussion today. INTERVAL HPI/OVERNIGHT EVENTS: Structural cardiology will consider AV intervention if patient is compliant with medical treatment for at least 1 week. Pt very non-compliant o/n with staff but spent most of the night on BIPAP.     SUBJECTIVE: ROS negative    VITAL SIGNS:  Vital Signs Last 24 Hrs  T(C): 36.7, Max: 37.1 (06-15 @ 22:46)  T(F): 98, Max: 98.7 (06-15 @ 22:46)  HR: 63 (63 - 80)  BP: 114/43 (94/50 - 128/71)  BP(mean): 68 (49 - 100)  RR: 22 (16 - 28)  SpO2: 100% (94% - 100%)  I&O: 24 hr net neg 1140cc    TELE: GALILEO    PHYSICAL EXAM:    Constitutional: sleepy, NAD, follows commands  Eyes: PERRL  ENMT: dry MM  Neck: +JVD  Respiratory: reduce bs at right base and crackles at left base, diffuse mild wheezing  Cardiovascular: RRR, loud systolic murmur over LUSB  Gastrointestinal: obese, soft, NTND  Extremities: WWP, pedal edema  Vascular: diminished peripheral pulses  Neurological: A&O to self, city, and year  Musculoskeletal: no joint swelling    LABS:                        10.6   7.3   )-----------( 154      ( 16 Jun 2017 06:11 )             35.8     06-16    144  |  100  |  44<H>  ----------------------------<  103<H>  4.3   |  32<H>  |  2.80<H>    Ca    8.9      16 Jun 2017 06:10  Mg     1.9     06-16      RADIOLOGY & ADDITIONAL TESTS:    MEDICATIONS  (STANDING):  ferrous    sulfate 325milliGRAM(s) Oral every 8 hours  cefTRIAXone   IVPB 1Gram(s) IV Intermittent every 24 hours  cefTRIAXone   IVPB  IV Intermittent   metoprolol Injectable 2.5milliGRAM(s) IV Push every 6 hours  acetazolamide    Tablet 500milliGRAM(s) Oral daily    MEDICATIONS  (PRN):  acetaminophen   Tablet. 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 Echo w/ ALEKS 0.5, mean pressure gradient 43   -CTS Dr. Barger consulted. F/u recs.  -Per CTS team, to be medically optimized in regards to CHF prior to proceeding w/ possible BAV   -severe AS, ALESK 0.5, mean pressure gradient 43, mod pulm HTN, PASP 52. Structural cardiology is following pt and offered BAV. Pt is uncertain whether she wants to reverse her DNR/DNI for the procedure. Need GOC discussion with pt and family today with palliative.     Pump: acute CHF exacerbation 2/2 dietary non-compliance and severe AS . EF 50-55% on last TTE 2016.   -Daily weights, strict I/Os (us in place)  -Echo w/ EF 50%, mod conc LVH, basal septal hypokinesis (seen previously on Echo 12/2015), severely dilated LA-Continue Toprol XL 25mg po daily  -Cont Furosemide prn, increasing diamox to 500mg qd per renal recs. May add Metolazone 5mg qd if I/O not at goal per Dr Gonzalez,    Vessel:  s/p PCI x 3 stent (04', 06' and 12')-last cath 3/15' PCI dLM w/ Impella support  -Trop 0.04 x 4. Likely 2/2 demand ischemia. Not continuing to trend.   -Continue Toprol XL 25mg po daily   -Per daughter Rosendo House Call NP Eben Tellez d/c'ed ASA due to dual AC w/ Coumadin, statin was d/c'ed years ago for unclear reasons.    Electrical:  Supra therapeutic INR on admission 4.8. INR 3.95 yesterday.  -s/p PPM 2011 2/2 symptomatic bradycardia. PPM interrogation WNL.  -Daily PT/INR   -Continue Toprol XL 25mg po daily.     Hemodynamics: pressures stable, holding ACE/ARB in setting of acute renal injury.     PULM:  #COPD (chronic obstructive pulmonary disease) - c/w duonebs. not in acute exacerbation  #Hypercapneic resp failure 2/2 pulmonary congestion 2/2 CHF 2/2 severe AS - see plan above.   ID - UTI growing kleb. c/w ceftriaxone for 5-7 day course (day 4 today).     RENAL:  CKD (chronic kidney disease) stage 4, GFR 15-29 ml/min- Baseline Cr per daughter 2-3. On admission, Cr 2.8  -Cont Furosemide prn, c/w Diamox 500mg qd for alkalosis per renal recs.   -Renal consulted. F/u recs.    Endo:  DM - off meds, a1c wnl    NUTRITION - cardiac diet    PPX - supratherapeutic INR    ACCESS - peripheral    DNR/DNI - palliative is following. plan on GOC discussion today. INTERVAL HPI/OVERNIGHT EVENTS: Primary team spent significant time on GOC discussion with pt and daughter. Structural cardiology will consider AV intervention if patient is compliant with medical treatment for at least 1 week. Pt very non-compliant o/n with staff but spent most of the night on BIPAP.     SUBJECTIVE: ROS negative    VITAL SIGNS:  Vital Signs Last 24 Hrs  T(C): 36.7, Max: 37.1 (06-15 @ 22:46)  T(F): 98, Max: 98.7 (06-15 @ 22:46)  HR: 63 (63 - 80)  BP: 114/43 (94/50 - 128/71)  BP(mean): 68 (49 - 100)  RR: 22 (16 - 28)  SpO2: 100% (94% - 100%)  I&O: 24 hr net neg 1140cc    TELE: GALILEO    PHYSICAL EXAM:    Constitutional: sleepy, NAD, follows commands  Eyes: PERRL  ENMT: dry MM  Neck: +JVD  Respiratory: reduce bs at right base and crackles at left base, diffuse mild wheezing  Cardiovascular: RRR, loud systolic murmur over LUSB  Gastrointestinal: obese, soft, NTND  Extremities: WWP, pedal edema  Vascular: diminished peripheral pulses  Neurological: A&O to self, city, and year  Musculoskeletal: no joint swelling    LABS:                        10.6   7.3   )-----------( 154      ( 16 Jun 2017 06:11 )             35.8     06-16    144  |  100  |  44<H>  ----------------------------<  103<H>  4.3   |  32<H>  |  2.80<H>    Ca    8.9      16 Jun 2017 06:10  Mg     1.9     06-16      RADIOLOGY & ADDITIONAL TESTS:    MEDICATIONS  (STANDING):  ferrous    sulfate 325milliGRAM(s) Oral every 8 hours  cefTRIAXone   IVPB 1Gram(s) IV Intermittent every 24 hours  cefTRIAXone   IVPB  IV Intermittent   metoprolol Injectable 2.5milliGRAM(s) IV Push every 6 hours  acetazolamide    Tablet 500milliGRAM(s) Oral daily    MEDICATIONS  (PRN):  acetaminophen   Tablet. 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 Structural cardiology is following pt and offered BAV if patient can be compliant with medical management for 1 week however pt is often refusing BIPAP treatment and lab draws. Palliative is also following.    -CTS Dr. Barger consulted. F/u recs.      Pump: acute CHF exacerbation 2/2 dietary non-compliance and severe AS . EF 50-55%, severely dilated LA   -Daily weights, strict I/Os (us in place)  -Continue Toprol XL 25mg po daily  -Cont Furosemide prn, c/w diamox 500mg qd per renal recs. Careful diuresis with close monitoring of BP in setting of severe AS.     Vessel:  s/p PCI x 3 stent (04', 06' and 12')-last cath 3/15' PCI dLM   -Trop 0.04 x 4. Likely 2/2 demand ischemia. Not continuing to trend.   -Continue Toprol XL 25mg po daily   -Per daughter Rosendo House Call NP Eben Tellez d/c'ed ASA due to dual AC w/ Coumadin, statin was d/c'ed years ago for unclear reasons.    Electrical:  -s/p PPM 2011 2/2 symptomatic bradycardia. PPM interrogation WNL.  -Daily PT/INR. Dose coumadin accordingly  -Continue Toprol XL 25mg po daily.     Hemodynamics: pressures stable, holding ACE/ARB in setting of acute renal injury and diuresis.      PULM:  #COPD (chronic obstructive pulmonary disease) - c/w duonebs. starting budesonide.   #Hypercapneic resp failure 2/2 pulmonary congestion 2/2 CHF 2/2 severe AS - see plan above. C/w Bipap and HFNC. VBGs as needed.     ID - UTI growing kleb. c/w ceftriaxone for 7 day course (day 5 today).     RENAL:  CKD (chronic kidney disease) stage 4, GFR 15-29 ml/min- Baseline Cr per daughter 2-3.   -Cont Furosemide prn, c/w Diamox 500mg qd for alkalosis per renal recs.   -Renal consulted. F/u recs.    Endo:  DM - off meds, a1c wnl    NUTRITION - cardiac diet    PPX - Therapeutic INR    ACCESS - peripheral    DNR/DNI - palliative is following. continued GOC discussions INTERVAL HPI/OVERNIGHT EVENTS: Primary team spent significant time on GOC discussion with pt and daughter. Structural cardiology will consider AV intervention if patient is compliant with medical treatment for at least 1 week. Pt very non-compliant o/n with staff but spent most of the night on BIPAP.     SUBJECTIVE: ROS negative    VITAL SIGNS:  Vital Signs Last 24 Hrs  T(C): 36.7, Max: 37.1 (06-15 @ 22:46)  T(F): 98, Max: 98.7 (06-15 @ 22:46)  HR: 63 (63 - 80)  BP: 114/43 (94/50 - 128/71)  BP(mean): 68 (49 - 100)  RR: 22 (16 - 28)  SpO2: 100% (94% - 100%)  I&O: 24 hr net neg 1140cc    TELE: Couplets    PHYSICAL EXAM:    Constitutional: sleepy, NAD, follows commands  Eyes: PERRL  ENMT: dry MM  Neck: +JVD  Respiratory: reduce bs at right base and crackles at left base, diffuse mild wheezing  Cardiovascular: RRR, loud systolic murmur over LUSB  Gastrointestinal: obese, soft, NTND  Extremities: WWP, pedal edema  Vascular: diminished peripheral pulses  Neurological: A&O to self, city, and year  Musculoskeletal: no joint swelling    LABS:                        10.6   7.3   )-----------( 154      ( 16 Jun 2017 06:11 )             35.8     06-16    144  |  100  |  44<H>  ----------------------------<  103<H>  4.3   |  32<H>  |  2.80<H>    Ca    8.9      16 Jun 2017 06:10  Mg     1.9     06-16      RADIOLOGY & ADDITIONAL TESTS:    MEDICATIONS  (STANDING):  ferrous    sulfate 325milliGRAM(s) Oral every 8 hours  cefTRIAXone   IVPB 1Gram(s) IV Intermittent every 24 hours  cefTRIAXone   IVPB  IV Intermittent   metoprolol Injectable 2.5milliGRAM(s) IV Push every 6 hours  acetazolamide    Tablet 500milliGRAM(s) Oral daily    MEDICATIONS  (PRN):  acetaminophen   Tablet. 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 Structural cardiology is following pt and offered BAV if patient can be compliant with medical management for 1 week however pt is often refusing BIPAP treatment and lab draws. Palliative is also following.    -CTS Dr. Barger consulted. F/u recs.      Pump: acute CHF exacerbation 2/2 dietary non-compliance and severe AS . EF 50-55%, severely dilated LA   -Daily weights, strict I/Os (us in place)  -dc Toprol XL 25mg po daily as pt borderline hypotensive   -Cont Furosemide prn, c/w diamox 500mg qd per renal recs. Careful diuresis with close monitoring of BP in setting of severe AS.     Vessel:  s/p PCI x 3 stent (04', 06' and 12')-last cath 3/15' PCI dLM   -Trop 0.04 x 4. Likely 2/2 demand ischemia. Not continuing to trend.   -Continue Toprol XL 25mg po daily   -Per daughter Rosendo House Call NP Eben Tellez d/c'ed ASA due to dual AC w/ Coumadin, statin was d/c'ed years ago for unclear reasons.    Electrical:  -s/p PPM 2011 2/2 symptomatic bradycardia. PPM interrogation WNL.  -Daily PT/INR. Dose coumadin accordingly  -Continue Toprol XL 25mg po daily.     Hemodynamics: borderline hypotensive, holding ACE/ARB/and BB in setting of diuresis.      PULM:  #COPD (chronic obstructive pulmonary disease) - c/w duonebs. starting budesonide.   #Hypercapneic resp failure 2/2 pulmonary congestion 2/2 CHF 2/2 severe AS - see plan above. C/w Bipap and HFNC. VBGs as needed.     ID - UTI growing kleb. c/w ceftriaxone for 7 day course (day 5 today).     RENAL:  CKD (chronic kidney disease) stage 4, GFR 15-29 ml/min- Baseline Cr per daughter 2-3.   -Cont Furosemide prn, c/w Diamox 500mg qd for alkalosis per renal recs.   -Renal consulted. F/u recs.    Endo:  DM - off meds, a1c wnl    NUTRITION - cardiac diet    PPX - Therapeutic INR    ACCESS - peripheral    DNR/DNI - palliative is following. continued GOC discussions INTERVAL HPI/OVERNIGHT EVENTS: Primary team spent significant time on GOC discussion with pt and daughter. Structural cardiology will consider AV intervention if patient is compliant with medical treatment for at least 1 week. Pt very non-compliant o/n with staff but spent most of the night on BIPAP.     SUBJECTIVE: ROS negative    VITAL SIGNS:  Vital Signs Last 24 Hrs  T(C): 36.7, Max: 37.1 (06-15 @ 22:46)  T(F): 98, Max: 98.7 (06-15 @ 22:46)  HR: 63 (63 - 80)  BP: 114/43 (94/50 - 128/71)  BP(mean): 68 (49 - 100)  RR: 22 (16 - 28)  SpO2: 100% (94% - 100%)  I&O: 24 hr net neg 1140cc    TELE: Couplets    PHYSICAL EXAM:    Constitutional: sleepy, NAD, follows commands  Eyes: PERRL  ENMT: dry MM  Neck: +JVD  Respiratory: reduce bs at right base and crackles at left base, diffuse mild wheezing  Cardiovascular: RRR, loud systolic murmur over LUSB  Gastrointestinal: obese, soft, NTND  Extremities: WWP, pedal edema  Vascular: diminished peripheral pulses  Neurological: A&O to self, city, and year  Musculoskeletal: no joint swelling    LABS:                        10.6   7.3   )-----------( 154      ( 16 Jun 2017 06:11 )             35.8     06-16    144  |  100  |  44<H>  ----------------------------<  103<H>  4.3   |  32<H>  |  2.80<H>    Ca    8.9      16 Jun 2017 06:10  Mg     1.9     06-16      RADIOLOGY & ADDITIONAL TESTS:  EKG - atrial paced, couplets  CXR - interval improvement in pulmonary congestion     MEDICATIONS  (STANDING):  ferrous    sulfate 325milliGRAM(s) Oral every 8 hours  cefTRIAXone   IVPB 1Gram(s) IV Intermittent every 24 hours  cefTRIAXone   IVPB  IV Intermittent   metoprolol Injectable 2.5milliGRAM(s) IV Push every 6 hours  acetazolamide    Tablet 500milliGRAM(s) Oral daily    MEDICATIONS  (PRN):  acetaminophen   Tablet. 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 Structural cardiology is following pt and offered BAV if patient can be compliant with medical management for 1 week however pt is often refusing BIPAP treatment and lab draws. Palliative is also following.    -CTS Dr. Barger consulted. F/u recs.      Pump: acute CHF exacerbation 2/2 dietary non-compliance and severe AS . EF 50-55%, severely dilated LA   -Daily weights, strict I/Os (us in place)  -holding Toprol XL 25mg po daily as pt borderline hypotensive   -Cont Furosemide prn, c/w diamox 500mg qd per renal recs. Careful diuresis with close monitoring of BP in setting of severe AS.     Vessel:  s/p PCI x 3 stent (04', 06' and 12')-last cath 3/15' PCI dLM   -Trop 0.04 x 4. Likely 2/2 demand ischemia. Not continuing to trend.   -holding Toprol XL 25mg po daily in setting of hypotension from diuresis  -Per daughter [place] House Call NP Eben Tellez d/c'ed ASA due to dual AC w/ Coumadin, statin was d/c'ed years ago for unclear reasons.    Electrical:  -s/p PPM 2011 2/2 symptomatic bradycardia. PPM interrogation WNL.  -Daily PT/INR. Dose coumadin accordingly  -Continue Toprol XL 25mg po daily.     Hemodynamics: borderline hypotensive, holding ACE/ARB/and BB in setting of diuresis.      PULM:  #COPD (chronic obstructive pulmonary disease) - c/w duonebs. starting budesonide.   #Hypercapneic resp failure 2/2 pulmonary congestion 2/2 CHF 2/2 severe AS - see plan above. C/w Bipap and HFNC. VBGs as needed.     ID - UTI growing kleb. c/w ceftriaxone for 7 day course (day 5 today).     RENAL:  CKD (chronic kidney disease) stage 4, GFR 15-29 ml/min- Baseline Cr per daughter 2-3.   -Cont Furosemide prn, c/w Diamox 500mg qd for alkalosis per renal recs.   -Renal consulted. F/u recs.    Endo:  DM - off meds, a1c wnl    NUTRITION - cardiac diet    PPX - Therapeutic INR    ACCESS - peripheral    DNR/DNI - palliative is following. continued GOC discussions

## 2017-06-17 DIAGNOSIS — I35.0 NONRHEUMATIC AORTIC (VALVE) STENOSIS: ICD-10-CM

## 2017-06-17 DIAGNOSIS — I13.10 HYPERTENSIVE HEART AND CHRONIC KIDNEY DISEASE WITHOUT HEART FAILURE, WITH STAGE 1 THROUGH STAGE 4 CHRONIC KIDNEY DISEASE, OR UNSPECIFIED CHRONIC KIDNEY DISEASE: ICD-10-CM

## 2017-06-17 DIAGNOSIS — E87.4 MIXED DISORDER OF ACID-BASE BALANCE: ICD-10-CM

## 2017-06-17 LAB
ANION GAP SERPL CALC-SCNC: 11 MMOL/L — SIGNIFICANT CHANGE UP (ref 5–17)
APTT BLD: 41.1 SEC — HIGH (ref 27.5–37.4)
BASE EXCESS BLDMV CALC-SCNC: 8.2 MMOL/L — SIGNIFICANT CHANGE UP
BUN SERPL-MCNC: 47 MG/DL — HIGH (ref 7–23)
CALCIUM SERPL-MCNC: 9.3 MG/DL — SIGNIFICANT CHANGE UP (ref 8.4–10.5)
CHLORIDE SERPL-SCNC: 97 MMOL/L — SIGNIFICANT CHANGE UP (ref 96–108)
CO2 SERPL-SCNC: 33 MMOL/L — HIGH (ref 22–31)
CREAT SERPL-MCNC: 2.6 MG/DL — HIGH (ref 0.5–1.3)
GAS PNL BLDMV: SIGNIFICANT CHANGE UP
GLUCOSE SERPL-MCNC: 107 MG/DL — HIGH (ref 70–99)
HCO3 BLDMV-SCNC: 37 MMOL/L — SIGNIFICANT CHANGE UP
HCT VFR BLD CALC: 36.8 % — SIGNIFICANT CHANGE UP (ref 34.5–45)
HGB BLD-MCNC: 10.9 G/DL — LOW (ref 11.5–15.5)
INR BLD: 2.85 — HIGH (ref 0.88–1.16)
MAGNESIUM SERPL-MCNC: 2 MG/DL — SIGNIFICANT CHANGE UP (ref 1.6–2.6)
MCHC RBC-ENTMCNC: 27.3 PG — SIGNIFICANT CHANGE UP (ref 27–34)
MCHC RBC-ENTMCNC: 29.6 G/DL — LOW (ref 32–36)
MCV RBC AUTO: 92 FL — SIGNIFICANT CHANGE UP (ref 80–100)
O2 CT VFR BLD CALC: 49 MMHG — SIGNIFICANT CHANGE UP (ref 30–65)
PCO2 BLDMV: 75 MMHG — HIGH (ref 30–65)
PH BLDMV: 7.31 — SIGNIFICANT CHANGE UP (ref 7.2–7.45)
PLATELET # BLD AUTO: 146 K/UL — LOW (ref 150–400)
POTASSIUM SERPL-MCNC: 4.1 MMOL/L — SIGNIFICANT CHANGE UP (ref 3.5–5.3)
POTASSIUM SERPL-SCNC: 4.1 MMOL/L — SIGNIFICANT CHANGE UP (ref 3.5–5.3)
PROTHROM AB SERPL-ACNC: 32.3 SEC — HIGH (ref 9.8–12.7)
RBC # BLD: 4 M/UL — SIGNIFICANT CHANGE UP (ref 3.8–5.2)
RBC # FLD: 15.7 % — SIGNIFICANT CHANGE UP (ref 10.3–16.9)
SAO2 % BLDMV: 80 % — SIGNIFICANT CHANGE UP
SODIUM SERPL-SCNC: 141 MMOL/L — SIGNIFICANT CHANGE UP (ref 135–145)
WBC # BLD: 7.6 K/UL — SIGNIFICANT CHANGE UP (ref 3.8–10.5)
WBC # FLD AUTO: 7.6 K/UL — SIGNIFICANT CHANGE UP (ref 3.8–10.5)

## 2017-06-17 PROCEDURE — 93010 ELECTROCARDIOGRAM REPORT: CPT

## 2017-06-17 PROCEDURE — 71010: CPT | Mod: 26

## 2017-06-17 RX ORDER — FUROSEMIDE 40 MG
60 TABLET ORAL ONCE
Qty: 0 | Refills: 0 | Status: COMPLETED | OUTPATIENT
Start: 2017-06-17 | End: 2017-06-17

## 2017-06-17 RX ORDER — FUROSEMIDE 40 MG
20 TABLET ORAL ONCE
Qty: 0 | Refills: 0 | Status: COMPLETED | OUTPATIENT
Start: 2017-06-17 | End: 2017-06-17

## 2017-06-17 RX ORDER — CLOPIDOGREL BISULFATE 75 MG/1
75 TABLET, FILM COATED ORAL DAILY
Qty: 0 | Refills: 0 | Status: DISCONTINUED | OUTPATIENT
Start: 2017-06-17 | End: 2017-07-06

## 2017-06-17 RX ADMIN — Medication 3 MILLILITER(S): at 23:36

## 2017-06-17 RX ADMIN — Medication 3 MILLILITER(S): at 17:16

## 2017-06-17 RX ADMIN — Medication 3 MILLILITER(S): at 00:59

## 2017-06-17 RX ADMIN — CEFTRIAXONE 100 GRAM(S): 500 INJECTION, POWDER, FOR SOLUTION INTRAMUSCULAR; INTRAVENOUS at 10:09

## 2017-06-17 RX ADMIN — Medication 325 MILLIGRAM(S): at 14:29

## 2017-06-17 RX ADMIN — Medication 3 MILLILITER(S): at 06:35

## 2017-06-17 RX ADMIN — Medication 20 MILLIGRAM(S): at 09:37

## 2017-06-17 RX ADMIN — Medication 20 MILLIGRAM(S): at 10:56

## 2017-06-17 RX ADMIN — ACETAZOLAMIDE 500 MILLIGRAM(S): 250 TABLET ORAL at 11:55

## 2017-06-17 RX ADMIN — Medication 60 MILLIGRAM(S): at 22:42

## 2017-06-17 RX ADMIN — CLOPIDOGREL BISULFATE 75 MILLIGRAM(S): 75 TABLET, FILM COATED ORAL at 14:29

## 2017-06-17 RX ADMIN — Medication 325 MILLIGRAM(S): at 05:33

## 2017-06-17 RX ADMIN — Medication 3 MILLILITER(S): at 11:01

## 2017-06-17 RX ADMIN — Medication 325 MILLIGRAM(S): at 21:14

## 2017-06-17 NOTE — PROGRESS NOTE ADULT - PROBLEM SELECTOR PLAN 1
Creatinine mildly decreasing but overall unchanged  Nonoliguric with net negative 1.875 liters through yesterday.     Remains hypervolemic right now    Please place patient on standing:  IV Lasix 40mg BID along with concurrent Diamox 500mg POQD   Net negative goal to be -1.5 to 2L per day.   Increase Lasix dose to 60mg and then 80mg stepwise in order to achieve this goal    With critical AS, patient is preload dependent, so will need to be cautious with this diuresis.

## 2017-06-17 NOTE — PROGRESS NOTE ADULT - SUBJECTIVE AND OBJECTIVE BOX
Patient seen and examined at bedside.     Patient received IV Lasix 40mg x 1 last evening.  Maintained on diamox right now  Net negative goal achieved  Patient reports she does not feel well and is angry at her situation. Does not report new dyspnea right now.     ferrous    sulfate 325milliGRAM(s) every 8 hours  cefTRIAXone   IVPB 1Gram(s) every 24 hours  cefTRIAXone   IVPB    acetaminophen   Tablet. 975milliGRAM(s) every 6 hours PRN  acetylcysteine 20% Inhalation 5milliLiter(s) every 6 hours PRN  acetazolamide    Tablet 500milliGRAM(s) daily  ALBUTerol/ipratropium for Nebulization 3milliLiter(s) every 6 hours  buDESOnide 160 MICROgram(s)/formoterol 4.5 MICROgram(s) Inhaler 2Puff(s) two times a day      Allergies    No Known Allergies    Intolerances        T(C): , Max: 37.4 (06-17 @ 01:43)  T(F): , Max: 99.3 (06-17 @ 01:43)  HR: 84  BP: 112/76  BP(mean): 96  RR: 24  SpO2: 97%  Wt(kg): --  I & Os for 24h ending 06-17 @ 07:00  =============================================  IN:    Oral Fluid: 360 ml    IV PiggyBack: 50 ml    Total IN: 410 ml  ---------------------------------------------  OUT:    Indwelling Catheter - Urethral: 2285 ml    Total OUT: 2285 ml  ---------------------------------------------  Total NET: -1875 ml    I & Os for current day (as of 06-17 @ 08:51)  =============================================  IN:    Total IN: 0 ml  ---------------------------------------------  OUT:    Indwelling Catheter - Urethral: 30 ml    Total OUT: 30 ml  ---------------------------------------------  Total NET: -30 ml          LABS:                        10.9   7.6   )-----------( 146      ( 17 Jun 2017 05:27 )             36.8     06-17    141  |  97  |  47<H>  ----------------------------<  107<H>  4.1   |  33<H>  |  2.60<H>    Ca    9.3      17 Jun 2017 05:27  Mg     2.0     06-17        PT/INR - ( 17 Jun 2017 05:27 )   PT: 32.3 sec;   INR: 2.85          PTT - ( 17 Jun 2017 05:27 )  PTT:41.1 sec          RADIOLOGY & ADDITIONAL STUDIES:

## 2017-06-17 NOTE — PROGRESS NOTE ADULT - ASSESSMENT
87y Female HTN, HLD, DM, pAF (on coumadin), PPM, PVD, known CAD s/p PCI (2004/2006/2012), cardiac cath on 3/15 w/ MANDI to distal LM required impella support, CKD and CHF with known aortic stenosis who presented to [facility] w/ the complaint of not being able to get out of bed. Pt was evaluated in the [facility] ED and admitted to  for management of acute on chronic diastolic CHF exacerbation. Dr. Barger was consulted for evaluation of her known aortic stenosis. Patient with poor medical compliance, at high risk for SAVR and TAVR due to severe deconditioned state.   - Continue medical optimization and diuresis for CHF exacerbation as per primary team   - Palliative following, patient currently DNR/DNI, per their notes states patient is in discussion of rescinding requests in aortic valve intervention done   - As of now, patient too deconditioned for any aortic valve intervention, will likely need outpatient follow up once medically optimized.   - Will continue to follow     -severe AS, ALEKS 0.5, mean pressure gradient 43, mod pulm HTN, PASP 52. Structural cardiology is following pt and offered BAV if patient can be compliant with medical management for 1 week however pt is often refusing BIPAP treatment and lab draws. Palliative is also following.    Pump: acute CHF exacerbation 2/2 dietary non-compliance and severe AS . EF 50-55%, severely dilated LA   -Daily weights, strict I/Os (us in place)  -holding Toprol XL 25mg po daily as pt borderline hypotensive   -Cont Furosemide prn, c/w diamox 500mg qd per renal recs. Careful diuresis with close monitoring of BP in setting of severe AS.     Vessel:  s/p PCI x 3 stent (04', 06' and 12')-last cath 3/15' PCI dLM   -Trop 0.04 x 4. Likely 2/2 demand ischemia. Not continuing to trend.   -holding Toprol XL 25mg po daily in setting of hypotension from diuresis  -Per daughter [place] House Call NP Eben Tellez d/c'ed ASA due to dual AC w/ Coumadin, statin was d/c'ed years ago for unclear reasons.    Electrical:  -s/p PPM 2011 2/2 symptomatic bradycardia. PPM interrogation WNL.  -Daily PT/INR. Coumadin currently held as INR therapeutic.  When INR < 2, will start heparin gtt  -Continue Toprol XL 25mg po daily.     Hemodynamics: borderline hypotensive, holding ACE/ARB/and BB in setting of diuresis.      PULM:  #COPD (chronic obstructive pulmonary disease) - c/w duonebs. starting budesonide.   #Hypercapneic resp failure 2/2 pulmonary congestion 2/2 CHF 2/2 severe AS - see plan above. C/w Bipap and HFNC. VBGs as needed.     ID - UTI growing kleb. c/w ceftriaxone for 7 day course (day 6 today).     RENAL:  CKD (chronic kidney disease) stage 4, GFR 15-29 ml/min- Baseline Cr per daughter 2-3.   -Cont Furosemide prn, c/w Diamox 500mg qd for alkalosis per renal recs.   -Renal consulted. F/u recs.  Endo:  DM - off meds, a1c wnl    NUTRITION - cardiac diet    PPX - Therapeutic INR    ACCESS - peripheral    DNR/DNI - palliative is following. continued GOC discussions 87y Female HTN, HLD, DM, pAF (on coumadin), PPM, PVD, known CAD s/p PCI (2004/2006/2012), cardiac cath on 3/15 w/ MANDI to distal LM required impella support, CKD and CHF with known aortic stenosis who presented to [facility] w/ the complaint of not being able to get out of bed. Pt was evaluated in the [facility] ED and admitted to  for management of acute on chronic diastolic CHF exacerbation. Dr. Barger was consulted for evaluation of her known aortic stenosis. Patient with poor medical compliance, at high risk for SAVR and TAVR due to severe deconditioned state.     - Continue medical optimization and diuresis for CHF exacerbation as per primary team   - Palliative following, patient currently DNR/DNI, per their notes states patient is in discussion of rescinding requests in aortic valve intervention done   - As of now, patient too deconditioned for any aortic valve intervention, will likely need outpatient follow up once medically optimized.   - Pt con't not consistently accepting bipap and lab draws 87y Female HTN, HLD, DM, pAF (on coumadin), PPM, PVD, known CAD s/p PCI (2004/2006/2012), cardiac cath on 3/15 w/ MANDI to distal LM required impella support, CKD and CHF with known aortic stenosis who presented to [facility] w/ the complaint of not being able to get out of bed. Pt was evaluated in the [facility] ED and admitted to  for management of acute on chronic diastolic CHF exacerbation. Dr. Barger was consulted for evaluation of her known aortic stenosis. Patient with poor medical compliance, at high risk for SAVR and TAVR due to severe deconditioned state.     - Continue medical optimization and diuresis for CHF exacerbation as per primary team   - Palliative following, patient currently DNR/DNI, per their notes states patient is in discussion of rescinding requests in aortic valve intervention done   - As of now, patient too deconditioned for any aortic valve intervention/TAVR, consider for BAV - pt refusing PT/bipap - now agreeable, more compliant with therapy

## 2017-06-17 NOTE — PROGRESS NOTE ADULT - SUBJECTIVE AND OBJECTIVE BOX
86 yo female with pmh of chronic diastolic CHF (EF 50-55% 11/16'), mod to severe AS (ALEKS 0.5cm)-not a surgical candidate, pafib (on Coumadin), CAD s/p PCI x 3 stent (04', 06' and 12')-last cath 3/15' PCI dLM w/ Impella support, Lupus, COPD (prior 30yr ppk hx), PNA, PPM 2011 for bradycardia, CKD (baseline Cr 2-3), hx of Renal failure requiring HD x 4 session 8/16' @ [facility], HTN, PVD, and Type 2 DM (no longer on meds), MONICA, and TKR 03' presents to [facility] with cc of inability to get OOB x 3 days. Pt states that her "legs were too weak". Pt is a poor historian and HPI obtained from her daughter. Pt's daughter states that her mother was unable to get OOB for 3 days and pt used her Life Alert x3 times to call for help-was told to go to the ED. Reports that she had been "tired" for several day, SOB w/ minimal exertion for "several months" and her mobility has declined since her discharge from rehab in December. Additionally, pt has gained 17 lbs over the past month (from 258 to 275) and non-compliant with her diet. As per daughter, pt has been hospitalized prior 2-3 yrs ago (? [facility]) for CHF exacerbation w/ aggressive diuresis. Of note, pt was evaluation by Dr. Barger for aortic stenosis in December 16'. However, due to her deconditioned status during that time, pt was recommended medical management.         ICU Vital Signs Last 24 Hrs  T(C): 36.7, Max: 37.4 (06-17 @ 01:43)  T(F): 98, Max: 99.3 (06-17 @ 01:43)  HR: 82 (70 - 92)  BP: 107/53 (98/43 - 128/51)  BP(mean): 79 (52 - 96)  ABP: --  ABP(mean): --  RR: 23 (15 - 29)  SpO2: 98% (93% - 100%)          =============================================  IN: 410 ml / OUT: 2175 ml / NET: -2175 ml    CAPILLARY BLOOD GLUCOSE      PHYSICAL EXAM:    Constitutional: sleepy, NAD, follows commands  Eyes: PERRL  ENMT: dry MM  Neck: +JVD  Respiratory: reduced bs at right base and crackles at left base, diffuse mild wheezing  Cardiovascular: RRR, loud systolic murmur over LUSB  Gastrointestinal: obese, soft, NTND  Extremities: WWP, pedal edema  Vascular: diminished peripheral pulses  Neurological: A&O to self, city, and year  Musculoskeletal: no joint swelling    MEDICATIONS:  MEDICATIONS  (STANDING):  ferrous    sulfate 325milliGRAM(s) Oral every 8 hours  cefTRIAXone   IVPB 1Gram(s) IV Intermittent every 24 hours  cefTRIAXone   IVPB  IV Intermittent   acetazolamide    Tablet 500milliGRAM(s) Oral daily  ALBUTerol/ipratropium for Nebulization 3milliLiter(s) Nebulizer every 6 hours  buDESOnide 160 MICROgram(s)/formoterol 4.5 MICROgram(s) Inhaler 2Puff(s) Inhalation two times a day    MEDICATIONS  (PRN):  acetaminophen   Tablet. 975milliGRAM(s) Oral every 6 hours PRN Moderate Pain (4 - 6)  acetylcysteine 20% Inhalation 5milliLiter(s) Inhalation every 6 hours PRN shortness of breath      ALLERGIES:  Allergies    No Known Allergies    Intolerances        LABS:                        10.9   7.6   )-----------( 146      ( 17 Jun 2017 05:27 )             36.8     06-17    141  |  97  |  47<H>  ----------------------------<  107<H>  4.1   |  33<H>  |  2.60<H>    Ca    9.3      17 Jun 2017 05:27  Mg     2.0     06-17      PT/INR - ( 17 Jun 2017 05:27 )   PT: 32.3 sec;   INR: 2.85          PTT - ( 17 Jun 2017 05:27 )  PTT:41.1 sec      RADIOLOGY & ADDITIONAL TESTS:      IMPRESSION  82 yo F chronic diastolic CHF (EF 50-55% 11/16'), mod to severe AS (ALEKS 0.5cm)-not a surgical candidate, pafib (on Coumadin), CAD s/p PCI x 3 stent (04', 06' and 12')-last cath 3/15' PCI dLM w/ Impella support, Lupus, COPD (prior 30yr ppk hx), PNA, PPM 2011 for bradycardia, CKD (baseline Cr 2-3), hx of Renal failure requiring HD x 4 session 8/16' @ [facility], HTN, PVD, and Type 2 DM (no longer on meds), MONICA, and TKR 03' who presented to the hospital due to the inability to get out of bed x 3 days, SOB, and increased LE edema, found to have a CHF exacerbation stepped up to CCU for hypercapneic resp failure    PLAN  CARDIO:  Valves:  -severe AS, ALEKS 0.5, mean pressure gradient 43, mod pulm HTN, PASP 52. Structural cardiology is following pt and offered BAV if patient can be compliant with medical management for 1 week however pt is often refusing BIPAP treatment and lab draws. Palliative is also following.    - Start Plavix 75 mg daily  -CTS Dr. Barger consulted. F/u recs.      Pump: acute CHF exacerbation 2/2 dietary non-compliance and severe AS . EF 50-55%, severely dilated LA   -Daily weights, strict I/Os (us in place)  -holding Toprol XL 25mg po daily as pt borderline hypotensive   -Cont Furosemide prn, c/w diamox 500mg qd per renal recs. Careful diuresis with close monitoring of BP in setting of severe AS.     Vessel:  s/p PCI x 3 stent (04', 06' and 12')-last cath 3/15' PCI dLM   -Trop 0.04 x 4. Likely 2/2 demand ischemia. Not continuing to trend.   -holding Toprol XL 25mg po daily in setting of hypotension from diuresis  -Per daughter Ros[place] House Call NP Eben Tellez d/c'ed ASA due to dual AC w/ Coumadin, statin was d/c'ed years ago for unclear reasons.    Electrical:  -s/p PPM 2011 2/2 symptomatic bradycardia. PPM interrogation WNL.  -Daily PT/INR. Coumadin currently held as INR therapeutic.  When INR < 2, will start heparin gtt  -Continue Toprol XL 25mg po daily.     Hemodynamics: borderline hypotensive, holding ACE/ARB/and BB in setting of diuresis.      PULM:  #COPD (chronic obstructive pulmonary disease) - c/w duonebs. starting budesonide.   #Hypercapneic resp failure 2/2 pulmonary congestion 2/2 CHF 2/2 severe AS - see plan above. C/w Bipap and HFNC. VBGs as needed.     ID - UTI growing kleb. c/w ceftriaxone for 7 day course (day 6 today).     RENAL:  CKD (chronic kidney disease) stage 4, GFR 15-29 ml/min- Baseline Cr per daughter 2-3.   -Cont Furosemide prn, c/w Diamox 500mg qd for alkalosis per renal recs.   -Renal consulted. F/u recs.    Endo:  DM - off meds, a1c wnl    NUTRITION - cardiac diet    PPX - Therapeutic INR    ACCESS - peripheral    DNR/DNI - palliative is following. continued GOC discussions

## 2017-06-17 NOTE — PROGRESS NOTE ADULT - SUBJECTIVE AND OBJECTIVE BOX
PUD CCM ATTENDING    looks like hypercapnic encephalopathy/coma  but able to lift both arms 180 degrees  -2.1 L negative    PAST MEDICAL & SURGICAL HISTORY:  PVD (peripheral vascular disease)  Iron deficiency anemia  Lupus (systemic lupus erythematosus)  Paroxysmal atrial fibrillation  Aortic stenosis, severe  CKD (chronic kidney disease) stage 4, GFR 15-29 ml/min  Diabetes  HTN (hypertension)  CHF (congestive heart failure)  CAD (coronary artery disease)  COPD (chronic obstructive pulmonary disease)  History of total knee replacement: 2003  Presence of cardiac pacemaker: at [facility] by Dr Escoto    FAMILY HISTORY:  No pertinent family history in first degree relatives    SOCIAL HISTORY:    REVIEW OF SYSTEMS:  no significantly change  -CP  -SOB DENIED   "NOT REALLY"  -cough    Vital Signs Last 24 Hrs  T(C): 36.7, Max: 37.4 (06-17 @ 01:43)  T(F): 98, Max: 99.3 (06-17 @ 01:43)  HR: 78 (70 - 92)  BP: 113/45 (98/43 - 128/51)  BP(mean): 66 (52 - 96)  RR: 25 (15 - 29)  SpO2: 100% (93% - 100%)    I&O's Detail  I & Os for 24h ending 17 Jun 2017 07:00  =============================================  IN:    Oral Fluid: 360 ml    IV PiggyBack: 50 ml    Total IN: 410 ml  ---------------------------------------------  OUT:    Indwelling Catheter - Urethral: 2285 ml    Total OUT: 2285 ml  ---------------------------------------------  Total NET: -1875 ml    I & Os for current day (as of 17 Jun 2017 14:10)  =============================================  IN:    Oral Fluid: 360 ml    IV PiggyBack: 50 ml    Total IN: 410 ml  ---------------------------------------------  OUT:    Indwelling Catheter - Urethral: 405 ml    Total OUT: 405 ml  ---------------------------------------------  Total NET: 5 ml    PHYSICAL EXAM:  NIPPV high flow 45L and 40%  Well nourished, well developed, comfortable, - acute distress; vital signs are monitored continuously  Eyes: PERRLA, EOMI, -conjunctivitis, -scleritis   Head: no focal deficit, normocephalic,  no trauma  ENMT: moist tongue, no thrush, -nasal discharge, -hoarseness, normal hearing, -cough, -hemoptysis, trachea midline  Neck: supple, - lymphadenopathy,  -masses, -JVD  Respiratory: bilateral diminished breath sounds, -wheezing, -rhonchi, -rales, -crackles  Chest: -accessory muscle use, -paradoxical breathing  Cardiovascular: irregular rate and paced, S1 S2 normal, -S3, -S4, 4/6 murmur, -gallop, -rub  Gastrointestinal: soft, nontender, nondistended, normal bowel sounds, no hepatosplenomegaly  Genitourinary: -flank pain, -dysuria  Extremities: -clubbing, -cyanosis, ++edema    Vascular: peripheral pulses palpable 2+ bilaterally  Neurological: alert, oriented x 3, no focal deficit, -tremor   Skin: warm, dry, -erythema, iv sites intact  Lymph nodes; no cervical, supraclavicular or axillary adenopathy  Psychiatric: cooperative, appropriate mood    MEDICATIONS  (STANDING):  ferrous    sulfate 325milliGRAM(s) Oral every 8 hours  cefTRIAXone   IVPB 1Gram(s) IV Intermittent every 24 hours  cefTRIAXone   IVPB  IV Intermittent   acetazolamide    Tablet 500milliGRAM(s) Oral daily  ALBUTerol/ipratropium for Nebulization 3milliLiter(s) Nebulizer every 6 hours  buDESOnide 160 MICROgram(s)/formoterol 4.5 MICROgram(s) Inhaler 2Puff(s) Inhalation two times a day  clopidogrel Tablet 75milliGRAM(s) Oral daily    MEDICATIONS  (PRN):  acetaminophen   Tablet. 975milliGRAM(s) Oral every 6 hours PRN Moderate Pain (4 - 6)  acetylcysteine 20% Inhalation 5milliLiter(s) Inhalation every 6 hours PRN shortness of breath    Allergies  No Known Allergies  Intolerances    LABS:                        10.9   7.6   )-----------( 146      ( 17 Jun 2017 05:27 )             36.8     06-17    141  |  97  |  47<H>  ----------------------------<  107<H>  4.1   |  33<H>  |  2.60<H>    Ca    9.3      17 Jun 2017 05:27  Mg     2.0     06-17    TPro  7.0  /  Alb  3.0<L>  /  TBili  0.3  /  DBili  <0.2  /  AST  15  /  ALT  6<L>  /  AlkPhos  94  06-17    PT/INR - ( 17 Jun 2017 05:27 )   PT: 32.3 sec;   INR: 2.85     PTT - ( 17 Jun 2017 05:27 )  PTT:41.1 sec    +DVT prophylaxis INR  +Sleep  NOT MUCH  +Nutrition goals ORAL  -Pain DENIED  -Decubital ulcer  +GI prophylaxis (PPV, coagulopathy, Hx)  +Aspiration prophylaxis (45 degrees)    Ventilator synchronized  BiPAP 12/5 prn  Tracheal cuff pressure    +Sedation/analgesia stopped once  +ID (phos, CH, mupi, SB)  -Delirium  +Cardiac   MAY NEED BETA AGAIN  +Prevention  +Education  +Medication reviewed (drug-drug interactions, PDA)  Medical devices  Discussed with CCU, PGY, CCRN     RADIOLOGY & ADDITIONAL STUDIES:  CXRs reviewed - edema and effusions  CXR

## 2017-06-17 NOTE — PROGRESS NOTE ADULT - PROBLEM SELECTOR PLAN 2
Primary respiratory acidosis with secondary metabolic alkalosis.  Without a primary metabolic alkalosis, use of Diamox may impair his renal compensation to her pulmonary disease. However, this has not manifested yet. Plan was discussed with Dr. Gonzalez during the weekday.      Continue with diuresis as above

## 2017-06-17 NOTE — PROGRESS NOTE ADULT - SUBJECTIVE AND OBJECTIVE BOX
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 Pt states that her "legs were too weak". Pt is a poor historian and HPI obtained from her daughter. Pt's daughter states that her mother was unable to get OOB for 3 days and pt used her Life Alert x3 times to call for help-was told to go to the ED. Reports that she had been "tired" for several day, SOB w/ minimal exertion for "several months" and her mobility has declined since her discharge from rehab in December. Additionally, pt has gained 17 lbs over the past month (from 258 to 275) and non-compliant with her diet. As per daughter, pt has been hospitalized prior 2-3 yrs ago (? [facility]) for CHF exacerbation w/ aggressive diuresis. Of note, pt was evaluation by Dr. Barger for aortic stenosis in December 16'. However, due to her deconditioned status during that time, pt was recommended medical management.     INTERVAL HPI/OVERNIGHT EVENTS:  UOP decreasing to 30-35cc/hr, given 40mg IVP Lasix x1 w/ good response 125-225cc/hr net neg 1.13L o/n. pt refusing BiPAP, agreeing to stay on HFNC.    SUBJECTIVE: Patient seen and examined at bedside.    [OBJECTIVE]:    VITAL SIGNS:  T(F): 99.2, Max: 99.3 (06-17 @ 01:43)  HR: 78 (64 - 88)  BP: 114/47 (98/43 - 128/51)  BP(mean): 61 (56 - 88)  RR: 21 (15 - 25)  SpO2: 99% (94% - 100%)  Wt(kg): --  CVP(cm H2O): --      I & Os for current day (as of 06-17 @ 07:03)  =============================================  IN: 410 ml / OUT: 2175 ml / NET: -2175 ml    CAPILLARY BLOOD GLUCOSE      PHYSICAL EXAM:    Constitutional: sleepy, NAD, follows commands  Eyes: PERRL  ENMT: dry MM  Neck: +JVD  Respiratory: reduced bs at right base and crackles at left base, diffuse mild wheezing  Cardiovascular: RRR, loud systolic murmur over LUSB  Gastrointestinal: obese, soft, NTND  Extremities: WWP, pedal edema  Vascular: diminished peripheral pulses  Neurological: A&O to self, city, and year  Musculoskeletal: no joint swelling    MEDICATIONS:  MEDICATIONS  (STANDING):  ferrous    sulfate 325milliGRAM(s) Oral every 8 hours  cefTRIAXone   IVPB 1Gram(s) IV Intermittent every 24 hours  cefTRIAXone   IVPB  IV Intermittent   acetazolamide    Tablet 500milliGRAM(s) Oral daily  ALBUTerol/ipratropium for Nebulization 3milliLiter(s) Nebulizer every 6 hours  buDESOnide 160 MICROgram(s)/formoterol 4.5 MICROgram(s) Inhaler 2Puff(s) Inhalation two times a day    MEDICATIONS  (PRN):  acetaminophen   Tablet. 975milliGRAM(s) Oral every 6 hours PRN Moderate Pain (4 - 6)  acetylcysteine 20% Inhalation 5milliLiter(s) Inhalation every 6 hours PRN shortness of breath      ALLERGIES:  Allergies    No Known Allergies    Intolerances        LABS:                        10.9   7.6   )-----------( 146      ( 17 Jun 2017 05:27 )             36.8     06-17    141  |  97  |  47<H>  ----------------------------<  107<H>  4.1   |  33<H>  |  2.60<H>    Ca    9.3      17 Jun 2017 05:27  Mg     2.0     06-17      PT/INR - ( 17 Jun 2017 05:27 )   PT: 32.3 sec;   INR: 2.85          PTT - ( 17 Jun 2017 05:27 )  PTT:41.1 sec      RADIOLOGY & ADDITIONAL TESTS:      IMPRESSION  84 yo F chronic diastolic CHF (EF 50-55% 11/16'), mod to severe AS (ALEKS 0.5cm)-not a surgical candidate, pafib (on Coumadin), CAD s/p PCI x 3 stent (04', 06' and 12')-last cath 3/15' PCI dLM w/ Impella support, Lupus, COPD (prior 30yr ppk hx), PNA, 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 Structural cardiology is following pt and offered BAV if patient can be compliant with medical management for 1 week however pt is often refusing BIPAP treatment and lab draws. Palliative is also following.    -CTS Dr. Barger consulted. F/u recs.      Pump: acute CHF exacerbation 2/2 dietary non-compliance and severe AS . EF 50-55%, severely dilated LA   -Daily weights, strict I/Os (us in place)  -holding Toprol XL 25mg po daily as pt borderline hypotensive   -Cont Furosemide prn, c/w diamox 500mg qd per renal recs. Careful diuresis with close monitoring of BP in setting of severe AS.     Vessel:  s/p PCI x 3 stent (04', 06' and 12')-last cath 3/15' PCI dLM   -Trop 0.04 x 4. Likely 2/2 demand ischemia. Not continuing to trend.   -holding Toprol XL 25mg po daily in setting of hypotension from diuresis  -[facility] Eben Tellez d/c'ed ASA due to dual AC w/ Coumadin, statin was d/c'ed years ago for unclear reasons.    Electrical:  -s/p PPM 2011 2/2 symptomatic bradycardia. PPM interrogation WNL.  -Daily PT/INR. Dose coumadin accordingly  -Continue Toprol XL 25mg po daily.     Hemodynamics: borderline hypotensive, holding ACE/ARB/and BB in setting of diuresis.      PULM:  #COPD (chronic obstructive pulmonary disease) - c/w duonebs. starting budesonide.   #Hypercapneic resp failure 2/2 pulmonary congestion 2/2 CHF 2/2 severe AS - see plan above. C/w Bipap and HFNC. VBGs as needed.     ID - UTI growing kleb. c/w ceftriaxone for 7 day course (day 5 today).     RENAL:  CKD (chronic kidney disease) stage 4, GFR 15-29 ml/min- Baseline Cr per daughter 2-3.   -Cont Furosemide prn, c/w Diamox 500mg qd for alkalosis per renal recs.   -Renal consulted. F/u recs.    Endo:  DM - off meds, a1c wnl    NUTRITION - cardiac diet    PPX - Therapeutic INR    ACCESS - peripheral    DNR/DNI - palliative is following. 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 Pt states that her "legs were too weak". Pt is a poor historian and HPI obtained from her daughter. Pt's daughter states that her mother was unable to get OOB for 3 days and pt used her Life Alert x3 times to call for help-was told to go to the ED. Reports that she had been "tired" for several day, SOB w/ minimal exertion for "several months" and her mobility has declined since her discharge from rehab in December. Additionally, pt has gained 17 lbs over the past month (from 258 to 275) and non-compliant with her diet. As per daughter, pt has been hospitalized prior 2-3 yrs ago (? [facility]) for CHF exacerbation w/ aggressive diuresis. Of note, pt was evaluation by Dr. Barger for aortic stenosis in December 16'. However, due to her deconditioned status during that time, pt was recommended medical management.     INTERVAL HPI/OVERNIGHT EVENTS:  UOP decreasing to 30-35cc/hr, given 40mg IVP Lasix x1 w/ good response 125-225cc/hr net neg 1.13L o/n. pt refusing BiPAP, agreeing to stay on HFNC.    SUBJECTIVE: Patient seen and examined at bedside. Pt denies CP and SOB.  Refusing to answer other ROS questions.     [OBJECTIVE]:    VITAL SIGNS:  T(F): 99.2, Max: 99.3 (06-17 @ 01:43)  HR: 78 (64 - 88)  BP: 114/47 (98/43 - 128/51)  BP(mean): 61 (56 - 88)  RR: 21 (15 - 25)  SpO2: 99% (94% - 100%)  Wt(kg): --  CVP(cm H2O): --      I & Os for current day (as of 06-17 @ 07:03)  =============================================  IN: 410 ml / OUT: 2175 ml / NET: -2175 ml    CAPILLARY BLOOD GLUCOSE      PHYSICAL EXAM:    Constitutional: sleepy, NAD, follows commands  Eyes: PERRL  ENMT: dry MM  Neck: +JVD  Respiratory: reduced bs at right base and crackles at left base, diffuse mild wheezing  Cardiovascular: RRR, loud systolic murmur over LUSB  Gastrointestinal: obese, soft, NTND  Extremities: WWP, pedal edema  Vascular: diminished peripheral pulses  Neurological: A&O to self, city, and year  Musculoskeletal: no joint swelling    MEDICATIONS:  MEDICATIONS  (STANDING):  ferrous    sulfate 325milliGRAM(s) Oral every 8 hours  cefTRIAXone   IVPB 1Gram(s) IV Intermittent every 24 hours  cefTRIAXone   IVPB  IV Intermittent   acetazolamide    Tablet 500milliGRAM(s) Oral daily  ALBUTerol/ipratropium for Nebulization 3milliLiter(s) Nebulizer every 6 hours  buDESOnide 160 MICROgram(s)/formoterol 4.5 MICROgram(s) Inhaler 2Puff(s) Inhalation two times a day    MEDICATIONS  (PRN):  acetaminophen   Tablet. 975milliGRAM(s) Oral every 6 hours PRN Moderate Pain (4 - 6)  acetylcysteine 20% Inhalation 5milliLiter(s) Inhalation every 6 hours PRN shortness of breath      ALLERGIES:  Allergies    No Known Allergies    Intolerances        LABS:                        10.9   7.6   )-----------( 146      ( 17 Jun 2017 05:27 )             36.8     06-17    141  |  97  |  47<H>  ----------------------------<  107<H>  4.1   |  33<H>  |  2.60<H>    Ca    9.3      17 Jun 2017 05:27  Mg     2.0     06-17      PT/INR - ( 17 Jun 2017 05:27 )   PT: 32.3 sec;   INR: 2.85          PTT - ( 17 Jun 2017 05:27 )  PTT:41.1 sec      RADIOLOGY & ADDITIONAL TESTS:      IMPRESSION  82 yo F chronic diastolic CHF (EF 50-55% 11/16'), mod to severe AS (ALEKS 0.5cm)-not a surgical candidate, pafib (on Coumadin), CAD s/p PCI x 3 stent (04', 06' and 12')-last cath 3/15' PCI dLM w/ Impella support, Lupus, COPD (prior 30yr ppk hx), PNA, 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 Structural cardiology is following pt and offered BAV if patient can be compliant with medical management for 1 week however pt is often refusing BIPAP treatment and lab draws. Palliative is also following.    -CTS Dr. Barger consulted. F/u recs.      Pump: acute CHF exacerbation 2/2 dietary non-compliance and severe AS . EF 50-55%, severely dilated LA   -Daily weights, strict I/Os (us in place)  -holding Toprol XL 25mg po daily as pt borderline hypotensive   -Cont Furosemide prn, c/w diamox 500mg qd per renal recs. Careful diuresis with close monitoring of BP in setting of severe AS.     Vessel:  s/p PCI x 3 stent (04', 06' and 12')-last cath 3/15' PCI dLM   -Trop 0.04 x 4. Likely 2/2 demand ischemia. Not continuing to trend.   -holding Toprol XL 25mg po daily in setting of hypotension from diuresis  -Per daughter [facility] Call NP Eben Tellez d/c'ed ASA due to dual AC w/ Coumadin, statin was d/c'ed years ago for unclear reasons.    Electrical:  -s/p PPM 2011 2/2 symptomatic bradycardia. PPM interrogation WNL.  -Daily PT/INR. Coumadin currently held as INR therapeutic.  When INR < 2, will start heparin gtt  -Continue Toprol XL 25mg po daily.     Hemodynamics: borderline hypotensive, holding ACE/ARB/and BB in setting of diuresis.      PULM:  #COPD (chronic obstructive pulmonary disease) - c/w duonebs. starting budesonide.   #Hypercapneic resp failure 2/2 pulmonary congestion 2/2 CHF 2/2 severe AS - see plan above. C/w Bipap and HFNC. VBGs as needed.     ID - UTI growing kleb. c/w ceftriaxone for 7 day course (day 5 today).     RENAL:  CKD (chronic kidney disease) stage 4, GFR 15-29 ml/min- Baseline Cr per daughter 2-3.   -Cont Furosemide prn, c/w Diamox 500mg qd for alkalosis per renal recs.   -Renal consulted. F/u recs.    Endo:  DM - off meds, a1c wnl    NUTRITION - cardiac diet    PPX - Therapeutic INR    ACCESS - peripheral    DNR/DNI - palliative is following. 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 with cc of inability to get OOB x 3 days. Pt states that her "legs were too weak". Pt is a poor historian and HPI obtained from her daughter. Pt's daughter states that her mother was unable to get OOB for 3 days and pt used her Life Alert x3 times to call for help-was told to go to the ED. Reports that she had been "tired" for several day, SOB w/ minimal exertion for "several months" and her mobility has declined since her discharge from rehab in December. Additionally, pt has gained 17 lbs over the past month (from 258 to 275) and non-compliant with her diet. As per daughter, pt has been hospitalized prior 2-3 yrs ago (? [facility]) for CHF exacerbation w/ aggressive diuresis. Of note, pt was evaluation by Dr. Barger for aortic stenosis in December 16'. However, due to her deconditioned status during that time, pt was recommended medical management.     INTERVAL HPI/OVERNIGHT EVENTS:  UOP decreasing to 30-35cc/hr, given 40mg IVP Lasix x1 w/ good response 125-225cc/hr net neg 1.13L o/n. pt refusing BiPAP, agreeing to stay on HFNC.    SUBJECTIVE: Patient seen and examined at bedside. Pt denies CP and SOB.  Refusing to answer other ROS questions.     [OBJECTIVE]:    VITAL SIGNS:  T(F): 99.2, Max: 99.3 (06-17 @ 01:43)  HR: 78 (64 - 88)  BP: 114/47 (98/43 - 128/51)  BP(mean): 61 (56 - 88)  RR: 21 (15 - 25)  SpO2: 99% (94% - 100%)  Wt(kg): --  CVP(cm H2O): --      I & Os for current day (as of 06-17 @ 07:03)  =============================================  IN: 410 ml / OUT: 2175 ml / NET: -2175 ml    CAPILLARY BLOOD GLUCOSE      PHYSICAL EXAM:    Constitutional: sleepy, NAD, follows commands  Eyes: PERRL  ENMT: dry MM  Neck: +JVD  Respiratory: reduced bs at right base and crackles at left base, diffuse mild wheezing  Cardiovascular: RRR, loud systolic murmur over LUSB  Gastrointestinal: obese, soft, NTND  Extremities: WWP, pedal edema  Vascular: diminished peripheral pulses  Neurological: A&O to self, city, and year  Musculoskeletal: no joint swelling    MEDICATIONS:  MEDICATIONS  (STANDING):  ferrous    sulfate 325milliGRAM(s) Oral every 8 hours  cefTRIAXone   IVPB 1Gram(s) IV Intermittent every 24 hours  cefTRIAXone   IVPB  IV Intermittent   acetazolamide    Tablet 500milliGRAM(s) Oral daily  ALBUTerol/ipratropium for Nebulization 3milliLiter(s) Nebulizer every 6 hours  buDESOnide 160 MICROgram(s)/formoterol 4.5 MICROgram(s) Inhaler 2Puff(s) Inhalation two times a day    MEDICATIONS  (PRN):  acetaminophen   Tablet. 975milliGRAM(s) Oral every 6 hours PRN Moderate Pain (4 - 6)  acetylcysteine 20% Inhalation 5milliLiter(s) Inhalation every 6 hours PRN shortness of breath      ALLERGIES:  Allergies    No Known Allergies    Intolerances        LABS:                        10.9   7.6   )-----------( 146      ( 17 Jun 2017 05:27 )             36.8     06-17    141  |  97  |  47<H>  ----------------------------<  107<H>  4.1   |  33<H>  |  2.60<H>    Ca    9.3      17 Jun 2017 05:27  Mg     2.0     06-17      PT/INR - ( 17 Jun 2017 05:27 )   PT: 32.3 sec;   INR: 2.85          PTT - ( 17 Jun 2017 05:27 )  PTT:41.1 sec      RADIOLOGY & ADDITIONAL TESTS:      IMPRESSION  84 yo F chronic diastolic CHF (EF 50-55% 11/16'), mod to severe AS (ALEKS 0.5cm)-not a surgical candidate, pafib (on Coumadin), CAD s/p PCI x 3 stent (04', 06' and 12')-last cath 3/15' PCI dLM w/ Impella support, Lupus, COPD (prior 30yr ppk hx), PNA, 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 Structural cardiology is following pt and offered BAV if patient can be compliant with medical management for 1 week however pt is often refusing BIPAP treatment and lab draws. Palliative is also following.    - Start Plavix 75 mg daily  -CTS Dr. Barger consulted. F/u recs.      Pump: acute CHF exacerbation 2/2 dietary non-compliance and severe AS . EF 50-55%, severely dilated LA   -Daily weights, strict I/Os (us in place)  -holding Toprol XL 25mg po daily as pt borderline hypotensive   -Cont Furosemide prn, c/w diamox 500mg qd per renal recs. Careful diuresis with close monitoring of BP in setting of severe AS.     Vessel:  s/p PCI x 3 stent (04', 06' and 12')-last cath 3/15' PCI dLM   -Trop 0.04 x 4. Likely 2/2 demand ischemia. Not continuing to trend.   -holding Toprol XL 25mg po daily in setting of hypotension from diuresis  -Per daughter [facility] Call NP Eben Tellez d/c'ed ASA due to dual AC w/ Coumadin, statin was d/c'ed years ago for unclear reasons.    Electrical:  -s/p PPM 2011 2/2 symptomatic bradycardia. PPM interrogation WNL.  -Daily PT/INR. Coumadin currently held as INR therapeutic.  When INR < 2, will start heparin gtt  -Continue Toprol XL 25mg po daily.     Hemodynamics: borderline hypotensive, holding ACE/ARB/and BB in setting of diuresis.      PULM:  #COPD (chronic obstructive pulmonary disease) - c/w duonebs. starting budesonide.   #Hypercapneic resp failure 2/2 pulmonary congestion 2/2 CHF 2/2 severe AS - see plan above. C/w Bipap and HFNC. VBGs as needed.     ID - UTI growing kleb. c/w ceftriaxone for 7 day course (day 6 today).     RENAL:  CKD (chronic kidney disease) stage 4, GFR 15-29 ml/min- Baseline Cr per daughter 2-3.   -Cont Furosemide prn, c/w Diamox 500mg qd for alkalosis per renal recs.   -Renal consulted. F/u recs.    Endo:  DM - off meds, a1c wnl    NUTRITION - cardiac diet    PPX - Therapeutic INR    ACCESS - peripheral    DNR/DNI - palliative is following. 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 Pt states that her "legs were too weak". Pt is a poor historian and HPI obtained from her daughter. Pt's daughter states that her mother was unable to get OOB for 3 days and pt used her Life Alert x3 times to call for help-was told to go to the ED. Reports that she had been "tired" for several day, SOB w/ minimal exertion for "several months" and her mobility has declined since her discharge from rehab in December. Additionally, pt has gained 17 lbs over the past month (from 258 to 275) and non-compliant with her diet. As per daughter, pt has been hospitalized prior 2-3 yrs ago (? [facility]) for CHF exacerbation w/ aggressive diuresis. Of note, pt was evaluation by Dr. Barger for aortic stenosis in December 16'. However, due to her deconditioned status during that time, pt was recommended medical management.     INTERVAL HPI/OVERNIGHT EVENTS:  UOP decreasing to 30-35cc/hr, given 40mg IVP Lasix x1 w/ good response 125-225cc/hr net neg 1.13L o/n. pt refusing BiPAP, agreeing to stay on HFNC.    SUBJECTIVE: Patient seen and examined at bedside. Pt denies CP and SOB.  Refusing to answer other ROS questions.     [OBJECTIVE]:    VITAL SIGNS:  T(F): 99.2, Max: 99.3 (06-17 @ 01:43)  HR: 78 (64 - 88)  BP: 114/47 (98/43 - 128/51)  BP(mean): 61 (56 - 88)  RR: 21 (15 - 25)  SpO2: 99% (94% - 100%)  Wt(kg): --  CVP(cm H2O): --      I & Os for current day (as of 06-17 @ 07:03)  =============================================  IN: 410 ml / OUT: 2175 ml / NET: -2175 ml    CAPILLARY BLOOD GLUCOSE      PHYSICAL EXAM:    Constitutional: sleepy, NAD, follows commands  Eyes: PERRL  ENMT: dry MM  Neck: +JVD  Respiratory: reduced bs at right base and crackles at left base, diffuse mild wheezing  Cardiovascular: RRR, loud systolic murmur over LUSB  Gastrointestinal: obese, soft, NTND  Extremities: WWP, pedal edema  Vascular: diminished peripheral pulses  Neurological: A&O to self, city, and year  Musculoskeletal: no joint swelling    MEDICATIONS:  MEDICATIONS  (STANDING):  ferrous    sulfate 325milliGRAM(s) Oral every 8 hours  cefTRIAXone   IVPB 1Gram(s) IV Intermittent every 24 hours  cefTRIAXone   IVPB  IV Intermittent   acetazolamide    Tablet 500milliGRAM(s) Oral daily  ALBUTerol/ipratropium for Nebulization 3milliLiter(s) Nebulizer every 6 hours  buDESOnide 160 MICROgram(s)/formoterol 4.5 MICROgram(s) Inhaler 2Puff(s) Inhalation two times a day    MEDICATIONS  (PRN):  acetaminophen   Tablet. 975milliGRAM(s) Oral every 6 hours PRN Moderate Pain (4 - 6)  acetylcysteine 20% Inhalation 5milliLiter(s) Inhalation every 6 hours PRN shortness of breath      ALLERGIES:  Allergies    No Known Allergies    Intolerances        LABS:                        10.9   7.6   )-----------( 146      ( 17 Jun 2017 05:27 )             36.8     06-17    141  |  97  |  47<H>  ----------------------------<  107<H>  4.1   |  33<H>  |  2.60<H>    Ca    9.3      17 Jun 2017 05:27  Mg     2.0     06-17      PT/INR - ( 17 Jun 2017 05:27 )   PT: 32.3 sec;   INR: 2.85          PTT - ( 17 Jun 2017 05:27 )  PTT:41.1 sec      RADIOLOGY & ADDITIONAL TESTS:      IMPRESSION  82 yo F chronic diastolic CHF (EF 50-55% 11/16'), mod to severe AS (ALEKS 0.5cm)-not a surgical candidate, pafib (on Coumadin), CAD s/p PCI x 3 stent (04', 06' and 12')-last cath 3/15' PCI dLM w/ Impella support, Lupus, COPD (prior 30yr ppk hx), PNA, 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     PLAN  CARDIO:  Valves:  -severe AS, ALEKS 0.5, mean pressure gradient 43, mod pulm HTN, PASP 52. Structural cardiology is following pt and offered BAV if patient can be compliant with medical management for 1 week however pt is often refusing BIPAP treatment and lab draws. Palliative is also following.    - Start Plavix 75 mg daily  -CTS Dr. Barger consulted. F/u recs.      Pump: acute CHF exacerbation 2/2 dietary non-compliance and severe AS . EF 50-55%, severely dilated LA   -Daily weights, strict I/Os (us in place)  -holding Toprol XL 25mg po daily as pt borderline hypotensive   -Cont Furosemide prn, c/w diamox 500mg qd per renal recs. Careful diuresis with close monitoring of BP in setting of severe AS.     Vessel:  s/p PCI x 3 stent (04', 06' and 12')-last cath 3/15' PCI dLM   -Trop 0.04 x 4. Likely 2/2 demand ischemia. Not continuing to trend.   -holding Toprol XL 25mg po daily in setting of hypotension from diuresis  -Per daughter [facility] NP Eben Tellez d/c'ed ASA due to dual AC w/ Coumadin, statin was d/c'ed years ago for unclear reasons.    Electrical:  -s/p PPM 2011 2/2 symptomatic bradycardia. PPM interrogation WNL.  -Daily PT/INR. Coumadin currently held as INR therapeutic.  When INR < 2, will start heparin gtt  -Continue Toprol XL 25mg po daily.     Hemodynamics: borderline hypotensive, holding ACE/ARB/and BB in setting of diuresis.      PULM:  #COPD (chronic obstructive pulmonary disease) - c/w duonebs. starting budesonide.   #Hypercapneic resp failure 2/2 pulmonary congestion 2/2 CHF 2/2 severe AS - see plan above. C/w Bipap and HFNC. VBGs as needed.     ID - UTI growing kleb. c/w ceftriaxone for 7 day course (day 6 today).     RENAL:  CKD (chronic kidney disease) stage 4, GFR 15-29 ml/min- Baseline Cr per daughter 2-3.   -Cont Furosemide prn, c/w Diamox 500mg qd for alkalosis per renal recs.   -Renal consulted. F/u recs.    Endo:  DM - off meds, a1c wnl    NUTRITION - cardiac diet    PPX - Therapeutic INR    ACCESS - peripheral    DNR/DNI - palliative is following. continued GOC discussions

## 2017-06-17 NOTE — PROGRESS NOTE ADULT - SUBJECTIVE AND OBJECTIVE BOX
· Subjective and Objective:   SUBJECTIVE ASSESSMENT:  Patient seen this morning at bedside, currently complaining of right hip pain that is consistent with her arthritis. Per patient's daughter patient is more confused today compared to yesterday. Patient denies any other complaints including chest pain, shortness of  breath or palpations and says that she does not want to be bothered by people anymore.     Vital Signs Last 24 Hrs  T(C): 36.7, Max: 37.1 (06-15 @ 22:46)  T(F): 98.1, Max: 98.7 (06-15 @ 22:46)  HR: 74 (63 - 80)  BP: 103/43 (94/50 - 128/71)  BP(mean): 64 (49 - 100)  RR: 23 (16 - 27)  SpO2: 95% (94% - 100%)  I&O's Detail  I & Os for 24h ending 16 Jun 2017 07:00  =============================================  IN:    Oral Fluid: 770 ml    IV PiggyBack: 100 ml    Total IN: 870 ml  ---------------------------------------------  OUT:    Indwelling Catheter - Urethral: 2110 ml    Total OUT: 2110 ml  ---------------------------------------------  Total NET: -1240 ml    I & Os for current day (as of 16 Jun 2017 11:24)  =============================================  IN:    Oral Fluid: 60 ml    IV PiggyBack: 50 ml    Total IN: 110 ml  ---------------------------------------------  OUT:    Indwelling Catheter - Urethral: 530 ml    Total OUT: 530 ml  ---------------------------------------------  Total NET: -420 ml    PHYSICAL EXAM:    General: Patient lying comfortably in bed, no acute distress     Neurological: A&Ox2 (states the year is 2003), no other focal neurological deficits     Cardiovascular: S1S2, RRR, grade II/IV murmur appreciated at LUSB    Respiratory: Scattered crackles appreciated in all lung fields bilaterally     Gastrointestinal: Abdomen soft, non tender, non distended     : + us     Extremities: Warm and well perfused. 2+ pitting edema bilaterally with blanching erythema along lower extremities bilaterally.     Vascular: Peripheral pulses 1+ bilaterally     LABS:                        10.6   7.3   )-----------( 154      ( 16 Jun 2017 06:11 )             35.8     PT/INR - ( 16 Jun 2017 09:57 )   PT: 33.0 sec;   INR: 2.91          PTT - ( 16 Jun 2017 09:57 )  PTT:41.0 sec    06-16    144  |  100  |  44<H>  ----------------------------<  103<H>  4.3   |  32<H>  |  2.80<H>    Ca    8.9      16 Jun 2017 06:10  Mg     1.9     06-16    MEDICATIONS  (STANDING):  ferrous    sulfate 325milliGRAM(s) Oral every 8 hours  cefTRIAXone   IVPB 1Gram(s) IV Intermittent every 24 hours  cefTRIAXone   IVPB  IV Intermittent   acetazolamide    Tablet 500milliGRAM(s) Oral daily  ALBUTerol/ipratropium for Nebulization 3milliLiter(s) Nebulizer every 6 hours  buDESOnide 160 MICROgram(s)/formoterol 4.5 MICROgram(s) Inhaler 2Puff(s) Inhalation two times a day    MEDICATIONS  (PRN):  acetaminophen   Tablet. 975milliGRAM(s) Oral every 6 hours PRN Moderate Pain (4 - 6)  acetylcysteine 20% Inhalation 5milliLiter(s) Inhalation every 6 hours PRN shortness of breath SUBJECTIVE ASSESSMENT:  87y Female denies chest pain, shortness of  breath or palpations.       Vital Signs Last 24 Hrs  T(F): 98.6, Max: 99.3 (06-17 @ 01:43)  HR: 86 (70 - 92) paced rhythms, afib  BP: 115/52 (100/48 - 128/51)  BP(mean): 74 (52 - 96)  RR: 23 (15 - 29)  SpO2: 97% (93% - 100%) High flow 30%  I&O's Detail  I & Os for 24h ending 17 Jun 2017 07:00  =============================================  Total NET: -1875 ml    I & Os for current day (as of 17 Jun 2017 18:48)  =============================================  Total NET: -340 ml      PHYSICAL EXAM:    General: Patient lying comfortably in bed, no acute distress     Neurological: A&Ox2, no other focal neurological deficits     Cardiovascular: S1S2, RRR, grade II/IV murmur appreciated at LUSB    Respiratory: Scattered crackles appreciated in all lung fields bilaterally     Gastrointestinal: Abdomen soft, non tender, non distended     : + us     Extremities: Warm and well perfused. 2+ pitting edema bilaterally with blanching erythema along lower extremities bilaterally.         LABS:                        10.9   7.6   )-----------( 146              36.8         PT/INR - ( 17 Jun 2017 05:27 )   PT: 32.3 sec;   INR: 2.85          PTT - ( 17 Jun 2017 05:27 )  PTT:41.1 sec    06-17    141  |  97  |  47<H>  ----------------------------<  107<H>  4.1   |  33<H>  |  2.60<H>    Ca    9.3      17 Jun 2017 05:27  Mg     2.0     06-17    TPro  7.0  /  Alb  3.0<L>  /  TBili  0.3  /  DBili  <0.2  /  AST  15  /  ALT  6<L>  /  AlkPhos  94  06-17          MEDICATIONS  (STANDING):  ferrous    sulfate 325milliGRAM(s) Oral every 8 hours  cefTRIAXone   IVPB 1Gram(s) IV Intermittent every 24 hours  cefTRIAXone   IVPB  IV Intermittent   acetazolamide    Tablet 500milliGRAM(s) Oral daily  ALBUTerol/ipratropium for Nebulization 3milliLiter(s) Nebulizer every 6 hours  buDESOnide 160 MICROgram(s)/formoterol 4.5 MICROgram(s) Inhaler 2Puff(s) Inhalation two times a day  clopidogrel Tablet 75milliGRAM(s) Oral daily    MEDICATIONS  (PRN):  acetaminophen   Tablet. 975milliGRAM(s) Oral every 6 hours PRN Moderate Pain (4 - 6)  acetylcysteine 20% Inhalation 5milliLiter(s) Inhalation every 6 hours PRN shortness of breath

## 2017-06-18 DIAGNOSIS — I50.9 HEART FAILURE, UNSPECIFIED: ICD-10-CM

## 2017-06-18 LAB
ANION GAP SERPL CALC-SCNC: 12 MMOL/L — SIGNIFICANT CHANGE UP (ref 5–17)
APTT BLD: 36.4 SEC — SIGNIFICANT CHANGE UP (ref 27.5–37.4)
BASE EXCESS BLDA CALC-SCNC: 8.7 MMOL/L — HIGH (ref -2–3)
BUN SERPL-MCNC: 48 MG/DL — HIGH (ref 7–23)
CALCIUM SERPL-MCNC: 9.3 MG/DL — SIGNIFICANT CHANGE UP (ref 8.4–10.5)
CHLORIDE SERPL-SCNC: 98 MMOL/L — SIGNIFICANT CHANGE UP (ref 96–108)
CO2 SERPL-SCNC: 34 MMOL/L — HIGH (ref 22–31)
CREAT SERPL-MCNC: 2.5 MG/DL — HIGH (ref 0.5–1.3)
GAS PNL BLDA: SIGNIFICANT CHANGE UP
GLUCOSE SERPL-MCNC: 115 MG/DL — HIGH (ref 70–99)
HCO3 BLDA-SCNC: 37 MMOL/L — HIGH (ref 21–28)
HCT VFR BLD CALC: 34.8 % — SIGNIFICANT CHANGE UP (ref 34.5–45)
HGB BLD-MCNC: 10.1 G/DL — LOW (ref 11.5–15.5)
INR BLD: 2.22 — HIGH (ref 0.88–1.16)
MAGNESIUM SERPL-MCNC: 2 MG/DL — SIGNIFICANT CHANGE UP (ref 1.6–2.6)
MCHC RBC-ENTMCNC: 26.6 PG — LOW (ref 27–34)
MCHC RBC-ENTMCNC: 29 G/DL — LOW (ref 32–36)
MCV RBC AUTO: 91.6 FL — SIGNIFICANT CHANGE UP (ref 80–100)
PCO2 BLDA: 67 MMHG — CRITICAL HIGH (ref 32–45)
PH BLDA: 7.36 — SIGNIFICANT CHANGE UP (ref 7.35–7.45)
PHOSPHATE SERPL-MCNC: 3.9 MG/DL — SIGNIFICANT CHANGE UP (ref 2.5–4.5)
PLATELET # BLD AUTO: 154 K/UL — SIGNIFICANT CHANGE UP (ref 150–400)
PO2 BLDA: 108 MMHG — SIGNIFICANT CHANGE UP (ref 83–108)
POTASSIUM SERPL-MCNC: 4.1 MMOL/L — SIGNIFICANT CHANGE UP (ref 3.5–5.3)
POTASSIUM SERPL-SCNC: 4.1 MMOL/L — SIGNIFICANT CHANGE UP (ref 3.5–5.3)
PROTHROM AB SERPL-ACNC: 25 SEC — HIGH (ref 9.8–12.7)
RBC # BLD: 3.8 M/UL — SIGNIFICANT CHANGE UP (ref 3.8–5.2)
RBC # FLD: 15.9 % — SIGNIFICANT CHANGE UP (ref 10.3–16.9)
SAO2 % BLDA: 98 % — SIGNIFICANT CHANGE UP (ref 95–100)
SODIUM SERPL-SCNC: 144 MMOL/L — SIGNIFICANT CHANGE UP (ref 135–145)
WBC # BLD: 7.7 K/UL — SIGNIFICANT CHANGE UP (ref 3.8–10.5)
WBC # FLD AUTO: 7.7 K/UL — SIGNIFICANT CHANGE UP (ref 3.8–10.5)

## 2017-06-18 PROCEDURE — 71010: CPT | Mod: 26

## 2017-06-18 RX ORDER — POTASSIUM CHLORIDE 20 MEQ
40 PACKET (EA) ORAL ONCE
Qty: 0 | Refills: 0 | Status: COMPLETED | OUTPATIENT
Start: 2017-06-18 | End: 2017-06-18

## 2017-06-18 RX ORDER — FUROSEMIDE 40 MG
60 TABLET ORAL ONCE
Qty: 0 | Refills: 0 | Status: COMPLETED | OUTPATIENT
Start: 2017-06-18 | End: 2017-06-18

## 2017-06-18 RX ORDER — BUDESONIDE AND FORMOTEROL FUMARATE DIHYDRATE 160; 4.5 UG/1; UG/1
2 AEROSOL RESPIRATORY (INHALATION)
Qty: 0 | Refills: 0 | Status: DISCONTINUED | OUTPATIENT
Start: 2017-06-18 | End: 2017-06-19

## 2017-06-18 RX ORDER — HYDROCORTISONE 1 %
1 OINTMENT (GRAM) TOPICAL
Qty: 0 | Refills: 0 | Status: DISCONTINUED | OUTPATIENT
Start: 2017-06-18 | End: 2017-07-06

## 2017-06-18 RX ORDER — POLYETHYLENE GLYCOL 3350 17 G/17G
17 POWDER, FOR SOLUTION ORAL DAILY
Qty: 0 | Refills: 0 | Status: DISCONTINUED | OUTPATIENT
Start: 2017-06-18 | End: 2017-06-21

## 2017-06-18 RX ORDER — DOCUSATE SODIUM 100 MG
100 CAPSULE ORAL DAILY
Qty: 0 | Refills: 0 | Status: DISCONTINUED | OUTPATIENT
Start: 2017-06-18 | End: 2017-06-23

## 2017-06-18 RX ORDER — SENNA PLUS 8.6 MG/1
1 TABLET ORAL DAILY
Qty: 0 | Refills: 0 | Status: DISCONTINUED | OUTPATIENT
Start: 2017-06-18 | End: 2017-06-21

## 2017-06-18 RX ADMIN — CEFTRIAXONE 100 GRAM(S): 500 INJECTION, POWDER, FOR SOLUTION INTRAMUSCULAR; INTRAVENOUS at 09:38

## 2017-06-18 RX ADMIN — Medication 3 MILLILITER(S): at 17:33

## 2017-06-18 RX ADMIN — BUDESONIDE AND FORMOTEROL FUMARATE DIHYDRATE 2 PUFF(S): 160; 4.5 AEROSOL RESPIRATORY (INHALATION) at 17:33

## 2017-06-18 RX ADMIN — SENNA PLUS 1 TABLET(S): 8.6 TABLET ORAL at 11:31

## 2017-06-18 RX ADMIN — Medication 3 MILLILITER(S): at 11:02

## 2017-06-18 RX ADMIN — Medication 975 MILLIGRAM(S): at 18:06

## 2017-06-18 RX ADMIN — Medication 325 MILLIGRAM(S): at 05:30

## 2017-06-18 RX ADMIN — Medication 975 MILLIGRAM(S): at 17:31

## 2017-06-18 RX ADMIN — Medication 1 DROP(S): at 22:15

## 2017-06-18 RX ADMIN — Medication 40 MILLIEQUIVALENT(S): at 14:36

## 2017-06-18 RX ADMIN — Medication 60 MILLIGRAM(S): at 08:40

## 2017-06-18 RX ADMIN — Medication 325 MILLIGRAM(S): at 13:46

## 2017-06-18 RX ADMIN — ACETAZOLAMIDE 500 MILLIGRAM(S): 250 TABLET ORAL at 13:46

## 2017-06-18 RX ADMIN — Medication 325 MILLIGRAM(S): at 22:15

## 2017-06-18 RX ADMIN — Medication 100 MILLIGRAM(S): at 11:31

## 2017-06-18 RX ADMIN — Medication 3 MILLILITER(S): at 05:31

## 2017-06-18 RX ADMIN — CLOPIDOGREL BISULFATE 75 MILLIGRAM(S): 75 TABLET, FILM COATED ORAL at 11:31

## 2017-06-18 RX ADMIN — Medication 1 APPLICATION(S): at 16:45

## 2017-06-18 NOTE — PROGRESS NOTE ADULT - SUBJECTIVE AND OBJECTIVE BOX
INTERVAL HPI/OVERNIGHT EVENTS:    SUBJECTIVE:     VITAL SIGNS:  Vital Signs Last 24 Hrs  T(C): 36.9, Max: 37.3 (06-18 @ 01:00)  T(F): 98.4, Max: 99.1 (06-18 @ 01:00)  HR: 90 (72 - 92)  BP: 127/76 (91/45 - 128/53)  BP(mean): 85 (52 - 96)  RR: 24 (18 - 29)  SpO2: 99% (93% - 100%)  I&O:     PHYSICAL EXAM:    Constitutional:  Eyes:  ENMT:  Neck:  Respiratory:  Cardiovascular:  Gastrointestinal:  Extremities:  Vascular:  Neurological:  Musculoskeletal:          LABS:                        10.1   7.7   )-----------( 154      ( 18 Jun 2017 03:50 )             34.8     06-18    144  |  98  |  48<H>  ----------------------------<  115<H>  4.1   |  34<H>  |  2.50<H>    Ca    9.3      18 Jun 2017 03:50  Phos  3.9     06-18  Mg     2.0     06-18    TPro  7.0  /  Alb  3.0<L>  /  TBili  0.3  /  DBili  <0.2  /  AST  15  /  ALT  6<L>  /  AlkPhos  94  06-17    PT/INR - ( 18 Jun 2017 03:50 )   PT: 25.0 sec;   INR: 2.22          PTT - ( 18 Jun 2017 03:50 )  PTT:36.4 sec      RADIOLOGY & ADDITIONAL TESTS:    MEDICATIONS  (STANDING):  ferrous    sulfate 325milliGRAM(s) Oral every 8 hours  cefTRIAXone   IVPB 1Gram(s) IV Intermittent every 24 hours  cefTRIAXone   IVPB  IV Intermittent   acetazolamide    Tablet 500milliGRAM(s) Oral daily  ALBUTerol/ipratropium for Nebulization 3milliLiter(s) Nebulizer every 6 hours  buDESOnide 160 MICROgram(s)/formoterol 4.5 MICROgram(s) Inhaler 2Puff(s) Inhalation two times a day  clopidogrel Tablet 75milliGRAM(s) Oral daily    MEDICATIONS  (PRN):  acetaminophen   Tablet. 975milliGRAM(s) Oral every 6 hours PRN Moderate Pain (4 - 6)  acetylcysteine 20% Inhalation 5milliLiter(s) Inhalation every 6 hours PRN shortness of breath      Allergies    No Known Allergies    Intolerances INTERVAL HPI/OVERNIGHT EVENTS:    SUBJECTIVE:     VITAL SIGNS:  Vital Signs Last 24 Hrs  T(C): 36.9, Max: 37.3 (06-18 @ 01:00)  T(F): 98.4, Max: 99.1 (06-18 @ 01:00)  HR: 90 (72 - 92)  BP: 127/76 (91/45 - 128/53)  BP(mean): 85 (52 - 96)  RR: 24 (18 - 29)  SpO2: 99% (93% - 100%)  I&O: 24 hr net neg 2.1 L     PHYSICAL EXAM:    Constitutional:  Eyes:  ENMT:  Neck:  Respiratory:  Cardiovascular:  Gastrointestinal:  Extremities:  Vascular:  Neurological:  Musculoskeletal:          LABS:                        10.1   7.7   )-----------( 154      ( 18 Jun 2017 03:50 )             34.8     06-18    144  |  98  |  48<H>  ----------------------------<  115<H>  4.1   |  34<H>  |  2.50<H>    Ca    9.3      18 Jun 2017 03:50  Phos  3.9     06-18  Mg     2.0     06-18    TPro  7.0  /  Alb  3.0<L>  /  TBili  0.3  /  DBili  <0.2  /  AST  15  /  ALT  6<L>  /  AlkPhos  94  06-17    PT/INR - ( 18 Jun 2017 03:50 )   PT: 25.0 sec;   INR: 2.22          PTT - ( 18 Jun 2017 03:50 )  PTT:36.4 sec      RADIOLOGY & ADDITIONAL TESTS:    MEDICATIONS  (STANDING):  ferrous    sulfate 325milliGRAM(s) Oral every 8 hours  cefTRIAXone   IVPB 1Gram(s) IV Intermittent every 24 hours  cefTRIAXone   IVPB  IV Intermittent   acetazolamide    Tablet 500milliGRAM(s) Oral daily  ALBUTerol/ipratropium for Nebulization 3milliLiter(s) Nebulizer every 6 hours  buDESOnide 160 MICROgram(s)/formoterol 4.5 MICROgram(s) Inhaler 2Puff(s) Inhalation two times a day  clopidogrel Tablet 75milliGRAM(s) Oral daily    MEDICATIONS  (PRN):  acetaminophen   Tablet. 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     PLAN  CARDIO:  Valves:  -severe AS, ALEKS 0.5, mean pressure gradient 43, mod pulm HTN, PASP 52. Structural cardiology is following pt and offered BAV if patient can be compliant with medical management for 1 week however pt is often refusing BIPAP treatment and lab draws. Palliative is also following.    - Start Plavix 75 mg daily  -CTS Dr. Barger consulted. F/u recs.      Pump: acute CHF exacerbation 2/2 dietary non-compliance and severe AS . EF 50-55%, severely dilated LA   -Daily weights, strict I/Os (us in place)  -holding Toprol XL 25mg po daily as pt borderline hypotensive   -Cont Furosemide prn, c/w diamox 500mg qd per renal recs. Careful diuresis with close monitoring of BP in setting of severe AS.     Vessel:  s/p PCI x 3 stent (04', 06' and 12')-last cath 3/15' PCI dLM   -Trop 0.04 x 4. Likely 2/2 demand ischemia. Not continuing to trend.   -holding Toprol XL 25mg po daily in setting of hypotension from diuresis  -Per daughter [facility] NP Eben Tellez d/c'ed ASA due to dual AC w/ Coumadin, statin was d/c'ed years ago for unclear reasons.    Electrical:  -s/p PPM 2011 2/2 symptomatic bradycardia. PPM interrogation WNL.  -Daily PT/INR. Coumadin currently held as INR therapeutic.  When INR < 2, will start heparin gtt  -Continue Toprol XL 25mg po daily.     Hemodynamics: borderline hypotensive, holding ACE/ARB/and BB in setting of diuresis.      PULM:  #COPD (chronic obstructive pulmonary disease) - c/w duonebs. starting budesonide.   #Hypercapneic resp failure 2/2 pulmonary congestion 2/2 CHF 2/2 severe AS - see plan above. C/w Bipap and HFNC. VBGs as needed.     ID - UTI growing kleb. c/w ceftriaxone for 7 day course (day 6 today).     RENAL:  CKD (chronic kidney disease) stage 4, GFR 15-29 ml/min- Baseline Cr per daughter 2-3.   -Cont Furosemide prn, c/w Diamox 500mg qd for alkalosis per renal recs.   -Renal consulted. F/u recs.    Endo:  DM - off meds, a1c wnl    NUTRITION - cardiac diet    PPX - Therapeutic INR    ACCESS - peripheral    DNR/DNI - palliative is following. continued GOC discussions INTERVAL HPI/OVERNIGHT EVENTS: Structural cardiology does not plan on intervention during this admission, if done at all, would be as outpatient. pt compliant with BIPAP o/n, switched to HFNC this morning. given 60mg IVP lasix last night and another 60 this morning.     SUBJECTIVE: Wants the HFNC off. Diffuse itchiness. No SOB or pain. No BM in last several days. Other ROS neg    VITAL SIGNS:  Vital Signs Last 24 Hrs  T(C): 36.9, Max: 37.3 (06-18 @ 01:00)  T(F): 98.4, Max: 99.1 (06-18 @ 01:00)  HR: 90 (72 - 92)  BP: 127/76 (91/45 - 128/53)  BP(mean): 85 (52 - 96)  RR: 24 (18 - 29)  SpO2: 99% (93% - 100%)  I&O: 24 hr net neg 2.1 L     TELE: couplets    PHYSICAL EXAM:    Constitutional: NAD on HFNC  Eyes: L eye injection   ENMT: dry MM  Neck: distended neck veins  Respiratory: bibasilar crackles, reduced bs at R base  Cardiovascular: irreg, systolic murmur  Gastrointestinal: soft, NTND  Extremities: pedal edema, stasis dermatitis, WWP  Vascular: normal peripheral pulses  Neurological: A&O self, hospital, year  Musculoskeletal: no joint swelling          LABS:                        10.1   7.7   )-----------( 154      ( 18 Jun 2017 03:50 )             34.8     06-18    144  |  98  |  48<H>  ----------------------------<  115<H>  4.1   |  34<H>  |  2.50<H>    Ca    9.3      18 Jun 2017 03:50  Phos  3.9     06-18  Mg     2.0     06-18    PT/INR - ( 18 Jun 2017 03:50 )   PT: 25.0 sec;   INR: 2.22          PTT - ( 18 Jun 2017 03:50 )  PTT:36.4 sec      RADIOLOGY & ADDITIONAL TESTS:  PORTABLE CXR - persistent pulmonary congestion, bilateral pleural effusions  AM EKG pending    MEDICATIONS  (STANDING):  ferrous    sulfate 325milliGRAM(s) Oral every 8 hours  cefTRIAXone   IVPB 1Gram(s) IV Intermittent every 24 hours  cefTRIAXone   IVPB  IV Intermittent   acetazolamide    Tablet 500milliGRAM(s) Oral daily  ALBUTerol/ipratropium for Nebulization 3milliLiter(s) Nebulizer every 6 hours  buDESOnide 160 MICROgram(s)/formoterol 4.5 MICROgram(s) Inhaler 2Puff(s) Inhalation two times a day  clopidogrel Tablet 75milliGRAM(s) Oral daily    MEDICATIONS  (PRN):  acetaminophen   Tablet. 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     PLAN  CARDIO:  Valves:  -severe AS, ALEKS 0.5, mean pressure gradient 43, mod pulm HTN, PASP 52. Structural cardiology is following pt and offered BAV if patient can be compliant with medical management for 1 week however pt is often refusing BIPAP treatment and lab draws. Palliative is also following.    - Start Plavix 75 mg daily  -CTS Dr. Barger consulted. F/u recs.      Pump: acute CHF exacerbation 2/2 dietary non-compliance and severe AS . EF 50-55%, severely dilated LA   -Daily weights, strict I/Os (us in place)  -holding Toprol XL 25mg po daily as pt borderline hypotensive   -Cont Furosemide prn, c/w diamox 500mg qd per renal recs. Careful diuresis with close monitoring of BP in setting of severe AS.     Vessel:  s/p PCI x 3 stent (04', 06' and 12')-last cath 3/15' PCI dLM   -Trop 0.04 x 4. Likely 2/2 demand ischemia. Not continuing to trend.   -holding Toprol XL 25mg po daily in setting of hypotension from diuresis  -Per daughter [facility] NP Eben Careyt d/c'ed ASA due to dual AC w/ Coumadin, statin was d/c'ed years ago for unclear reasons.    Electrical:  -s/p PPM 2011 2/2 symptomatic bradycardia. PPM interrogation WNL.  -Daily PT/INR. Coumadin currently held as INR therapeutic.  When INR < 2, will start heparin gtt  -Continue Toprol XL 25mg po daily.     Hemodynamics: borderline hypotensive, holding ACE/ARB/and BB in setting of diuresis.      PULM:  #COPD (chronic obstructive pulmonary disease) - c/w duonebs. starting budesonide.   #Hypercapneic resp failure 2/2 pulmonary congestion 2/2 CHF 2/2 severe AS - see plan above. C/w Bipap and HFNC. VBGs as needed.     ID - UTI growing kleb. c/w ceftriaxone for 7 day course (day 6 today).     RENAL:  CKD (chronic kidney disease) stage 4, GFR 15-29 ml/min- Baseline Cr per daughter 2-3.   -Cont Furosemide prn, c/w Diamox 500mg qd for alkalosis per renal recs.   -Renal consulted. F/u recs.    GI  #Constipation -   Endo:  DM - off meds, a1c wnl    NUTRITION - cardiac diet    PPX - Therapeutic INR    ACCESS - peripheral    DNR/DNI - palliative is following. continued GOC discussions INTERVAL HPI/OVERNIGHT EVENTS: Structural cardiology does not plan on intervention during this admission, if done at all, would be as outpatient. pt compliant with BIPAP o/n, switched to HFNC this morning. given 60mg IVP lasix last night and another 60 this morning.     SUBJECTIVE: Wants the HFNC off. Diffuse itchiness. No SOB or pain. No BM in last several days. Other ROS neg    VITAL SIGNS:  Vital Signs Last 24 Hrs  T(C): 36.9, Max: 37.3 (06-18 @ 01:00)  T(F): 98.4, Max: 99.1 (06-18 @ 01:00)  HR: 90 (72 - 92)  BP: 127/76 (91/45 - 128/53)  BP(mean): 85 (52 - 96)  RR: 24 (18 - 29)  SpO2: 99% (93% - 100%)  I&O: 24 hr net neg 2.1 L     TELE: couplets    PHYSICAL EXAM:    Constitutional: NAD on HFNC  Eyes: L eye injection   ENMT: dry MM  Neck: distended neck veins  Respiratory: bibasilar crackles, reduced bs at R base  Cardiovascular: irreg, systolic murmur  Gastrointestinal: soft, NTND  Extremities: pedal edema, stasis dermatitis, WWP  Vascular: normal peripheral pulses  Neurological: A&O self, hospital, year  Musculoskeletal: no joint swelling          LABS:                        10.1   7.7   )-----------( 154      ( 18 Jun 2017 03:50 )             34.8     06-18    144  |  98  |  48<H>  ----------------------------<  115<H>  4.1   |  34<H>  |  2.50<H>    Ca    9.3      18 Jun 2017 03:50  Phos  3.9     06-18  Mg     2.0     06-18    PT/INR - ( 18 Jun 2017 03:50 )   PT: 25.0 sec;   INR: 2.22          PTT - ( 18 Jun 2017 03:50 )  PTT:36.4 sec      RADIOLOGY & ADDITIONAL TESTS:  PORTABLE CXR - persistent pulmonary congestion, bilateral pleural effusions  AM EKG pending    MEDICATIONS  (STANDING):  ferrous    sulfate 325milliGRAM(s) Oral every 8 hours  cefTRIAXone   IVPB 1Gram(s) IV Intermittent every 24 hours  cefTRIAXone   IVPB  IV Intermittent   acetazolamide    Tablet 500milliGRAM(s) Oral daily  ALBUTerol/ipratropium for Nebulization 3milliLiter(s) Nebulizer every 6 hours  buDESOnide 160 MICROgram(s)/formoterol 4.5 MICROgram(s) Inhaler 2Puff(s) Inhalation two times a day  clopidogrel Tablet 75milliGRAM(s) Oral daily    MEDICATIONS  (PRN):  acetaminophen   Tablet. 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     PLAN  CARDIO:  Valves:  -severe AS, ALEKS 0.5, mean pressure gradient 43, mod pulm HTN, PASP 52. Structural cardiology is following pt and offered BAV as outpatient if patient can be compliant with medical management. Palliative is also following.    -CTS Dr. Barger consulted. F/u recs.      Pump: acute CHF exacerbation 2/2 dietary non-compliance and severe AS . EF 50-55%, severely dilated LA   -Daily weights, strict I/Os (us in place)  -holding Toprol XL 25mg po daily as pt borderline hypotensive   -Cont Furosemide prn, c/w diamox 500mg qd per renal recs. Careful diuresis with close monitoring of BP in setting of severe AS.     Vessel:  s/p PCI x 3 stent (04', 06' and 12')-last cath 3/15' PCI dLM   -Trop 0.04 x 4. Likely 2/2 demand ischemia. Not continuing to trend.   -holding Toprol XL 25mg po daily in setting of hypotension from diuresis  -Per daughter [facility] NP Eben Tellez d/c'ed ASA due to dual AC w/ Coumadin, statin was d/c'ed years ago for unclear reasons. Started plavix yesterday.     Electrical:  -s/p PPM 2011 2/2 symptomatic bradycardia. PPM interrogation WNL.  -Daily PT/INR. Coumadin currently held as INR therapeutic.  When INR < 2, will start heparin gtt  -holding Toprol XL 25mg po daily in setting of borderline low BP and diuresis.      Hemodynamics: borderline hypotensive, holding ACE/ARB/and BB in setting of diuresis.      PULM:  #COPD (chronic obstructive pulmonary disease) - c/w duonebs and budesonide.   #Hypercapneic resp failure 2/2 pulmonary congestion 2/2 CHF 2/2 severe AS - see plan above. C/w Bipap and HFNC. VBGs as needed.     ID - UTI growing kleb. c/w ceftriaxone for 7 day course (day 7 today).     RENAL:  CKD (chronic kidney disease) stage 4, GFR 15-29 ml/min- Baseline Cr per daughter 2-3.   -Cont Furosemide prn, c/w Diamox 500mg qd for alkalosis per renal recs.   -Renal consulted. F/u recs.    GI  #Constipation - 2/2 iron supplement. started bowel regimen (miralax, senna, colace).     Endo:  DM - off meds, a1c wnl    NUTRITION - cardiac diet    PPX - Therapeutic INR    ACCESS - peripheral    DNR/DNI - palliative is following. continued GOC discussions

## 2017-06-18 NOTE — PROGRESS NOTE ADULT - ASSESSMENT
This 87 year old female with PMH significant for HTN, DM, A-fib *(on coumadin), PPM, PVD, and known CAD with multiple PCI placement and found to have CKD with CHF exacerbation and aortic stenosis.

## 2017-06-18 NOTE — PROGRESS NOTE ADULT - SUBJECTIVE AND OBJECTIVE BOX
coverage for Dr. Martinez    Pt seen and examined   no complaints, using high flow    REVIEW OF SYSTEMS:    Cardiovascular: No chest pain, palpitations, dizziness or leg swelling  Gastrointestinal: No abdominal or epigastric pain. No nausea, vomiting or hematemesis; No diarrhea or constipation. No melena or hematochezia.  Skin: No itching, burning, rashes or lesions       MEDICATIONS:  MEDICATIONS  (STANDING):  ferrous    sulfate 325milliGRAM(s) Oral every 8 hours  cefTRIAXone   IVPB 1Gram(s) IV Intermittent every 24 hours  cefTRIAXone   IVPB  IV Intermittent   acetazolamide    Tablet 500milliGRAM(s) Oral daily  ALBUTerol/ipratropium for Nebulization 3milliLiter(s) Nebulizer every 6 hours  buDESOnide 160 MICROgram(s)/formoterol 4.5 MICROgram(s) Inhaler 2Puff(s) Inhalation two times a day  clopidogrel Tablet 75milliGRAM(s) Oral daily  polyethylene glycol 3350 17Gram(s) Oral daily  docusate sodium 100milliGRAM(s) Oral daily  senna 1Tablet(s) Oral daily  artificial  tears Solution 1Drop(s) Both EYES three times a day  hydrocortisone 2.5% Cream 1Application(s) Topical two times a day    MEDICATIONS  (PRN):  acetaminophen   Tablet. 975milliGRAM(s) Oral every 6 hours PRN Moderate Pain (4 - 6)  acetylcysteine 20% Inhalation 5milliLiter(s) Inhalation every 6 hours PRN shortness of breath      Allergies    No Known Allergies    Intolerances        Vital Signs Last 24 Hrs  T(C): 37.7, Max: 37.7 (06-18 @ 09:44)  T(F): 99.9, Max: 99.9 (06-18 @ 09:44)  HR: 82 (72 - 96)  BP: 112/47 (91/40 - 128/53)  BP(mean): 61 (59 - 98)  RR: 20 (18 - 25)  SpO2: 97% (93% - 100%)  I & Os for 24h ending 06-18 @ 07:00  =============================================  IN: 680 ml / OUT: 2850 ml / NET: -2170 ml    I & Os for current day (as of 06-18 @ 12:38)  =============================================  IN: 50 ml / OUT: 600 ml / NET: -550 ml      PHYSICAL EXAM:    General: obese; mild tachypnea, on high flow  HEENT: MMM, conjunctiva and sclera clear  Lungs: clear  Heart: regular  Gastrointestinal: Soft non-tender non-distended; Normal bowel sounds; No hepatosplenomegaly  Skin: Warm and dry. No obvious rash    LABS:  ABG - ( 18 Jun 2017 00:43 )  pH: 7.36  /  pCO2: 67    /  pO2: 108   / HCO3: 37    / Base Excess: 8.7   /  SaO2: 98                  CBC Full  -  ( 18 Jun 2017 03:50 )  WBC Count : 7.7 K/uL  Hemoglobin : 10.1 g/dL  Hematocrit : 34.8 %  Platelet Count - Automated : 154 K/uL  Mean Cell Volume : 91.6 fL  Mean Cell Hemoglobin : 26.6 pg  Mean Cell Hemoglobin Concentration : 29.0 g/dL  Auto Neutrophil # : x  Auto Lymphocyte # : x  Auto Monocyte # : x  Auto Eosinophil # : x  Auto Basophil # : x  Auto Neutrophil % : x  Auto Lymphocyte % : x  Auto Monocyte % : x  Auto Eosinophil % : x  Auto Basophil % : x    06-18    144  |  98  |  48<H>  ----------------------------<  115<H>  4.1   |  34<H>  |  2.50<H>    Ca    9.3      18 Jun 2017 03:50  Phos  3.9     06-18  Mg     2.0     06-18    TPro  7.0  /  Alb  3.0<L>  /  TBili  0.3  /  DBili  <0.2  /  AST  15  /  ALT  6<L>  /  AlkPhos  94  06-17    PT/INR - ( 18 Jun 2017 03:50 )   PT: 25.0 sec;   INR: 2.22          PTT - ( 18 Jun 2017 03:50 )  PTT:36.4 sec                  RADIOLOGY & ADDITIONAL STUDIES (The following images were personally reviewed):

## 2017-06-18 NOTE — PROGRESS NOTE ADULT - ASSESSMENT
87 year old female admitted for sob with ckd stage4 and cardiorenal syndrome.  Diuretics were recently held but right now patient is having worsening pulmonary congestion

## 2017-06-18 NOTE — PROGRESS NOTE ADULT - SUBJECTIVE AND OBJECTIVE BOX
Patient seen and examined at bedside.     Patient remains dyspneic and requires hi flow.  Has been receiving x1 Lasix dosage with recent 60mg dosage this morning.   Has worsening leg edema and worsening CXR this morning     ferrous    sulfate 325milliGRAM(s) every 8 hours  cefTRIAXone   IVPB 1Gram(s) every 24 hours  cefTRIAXone   IVPB    acetaminophen   Tablet. 975milliGRAM(s) every 6 hours PRN  acetylcysteine 20% Inhalation 5milliLiter(s) every 6 hours PRN  acetazolamide    Tablet 500milliGRAM(s) daily  ALBUTerol/ipratropium for Nebulization 3milliLiter(s) every 6 hours  buDESOnide 160 MICROgram(s)/formoterol 4.5 MICROgram(s) Inhaler 2Puff(s) two times a day  clopidogrel Tablet 75milliGRAM(s) daily  polyethylene glycol 3350 17Gram(s) daily  docusate sodium 100milliGRAM(s) daily  senna 1Tablet(s) daily  artificial  tears Solution 1Drop(s) three times a day      Allergies    No Known Allergies    Intolerances        T(C): , Max: 37.3 (06-18 @ 01:00)  T(F): , Max: 99.1 (06-18 @ 01:00)  HR: 88  BP: 119/61  BP(mean): 96  RR: 21  SpO2: 99%  Wt(kg): --  I & Os for 24h ending 06-18 @ 07:00  =============================================  IN:    Oral Fluid: 630 ml    IV PiggyBack: 50 ml    Total IN: 680 ml  ---------------------------------------------  OUT:    Indwelling Catheter - Urethral: 2850 ml    Total OUT: 2850 ml  ---------------------------------------------  Total NET: -2170 ml    I & Os for current day (as of 06-18 @ 09:08)  =============================================  IN:    Total IN: 0 ml  ---------------------------------------------  OUT:    Indwelling Catheter - Urethral: 100 ml    Total OUT: 100 ml  ---------------------------------------------  Total NET: -100 ml          LABS:                        10.1   7.7   )-----------( 154      ( 18 Jun 2017 03:50 )             34.8     06-18    144  |  98  |  48<H>  ----------------------------<  115<H>  4.1   |  34<H>  |  2.50<H>    Ca    9.3      18 Jun 2017 03:50  Phos  3.9     06-18  Mg     2.0     06-18    TPro  7.0  /  Alb  3.0<L>  /  TBili  0.3  /  DBili  <0.2  /  AST  15  /  ALT  6<L>  /  AlkPhos  94  06-17      PT/INR - ( 18 Jun 2017 03:50 )   PT: 25.0 sec;   INR: 2.22          PTT - ( 18 Jun 2017 03:50 )  PTT:36.4 sec          RADIOLOGY & ADDITIONAL STUDIES:

## 2017-06-18 NOTE — PROGRESS NOTE ADULT - PROBLEM SELECTOR PLAN 2
consider gentle diuresis per patient tolerance continue diuresis; consider for BAV if pt more medically compliant

## 2017-06-18 NOTE — PROGRESS NOTE ADULT - PROBLEM SELECTOR PLAN 3
gentle diuresis and continue to monitor for renal function diuresis and continue to monitor for renal function; f/u with renal

## 2017-06-18 NOTE — PROGRESS NOTE ADULT - PROBLEM SELECTOR PLAN 1
Creatinine stable around 2.5 range.   Worsening pulmonary congestion on CXR despite achieving net negative goal  Agree with IV Lasix 60mg x 1 given  c/w Diamox as ordered for   However, would suggest patient to receive a standing dose of Lasix at 60mg BID to ensure sustained natriuresis. Moreover, closer oral inputs should be monitored  Daily weights

## 2017-06-18 NOTE — PROGRESS NOTE ADULT - SUBJECTIVE AND OBJECTIVE BOX
Patient discussed on morning rounds with Dr. Woodall      OBUBJECTIVE ASSESSMENT:  87y Female with PMH significant for aortic stenosis is admitted for CHF exacerbation and management.  She is stable at visit.          Vital Signs Last 24 Hrs  T(C): 36.6, Max: 37.7 (06-18 @ 09:44)  T(F): 97.8, Max: 99.9 (06-18 @ 09:44)  HR: 76 (72 - 96)  BP: 107/52 (91/40 - 128/53)  BP(mean): 80 (59 - 98)  RR: 21 (18 - 25)  SpO2: 97% (93% - 100%)  I&O's Detail  I & Os for 24h ending 18 Jun 2017 07:00  =============================================  IN:    Oral Fluid: 630 ml    IV PiggyBack: 50 ml    Total IN: 680 ml  ---------------------------------------------  OUT:    Indwelling Catheter - Urethral: 2850 ml    Total OUT: 2850 ml  ---------------------------------------------  Total NET: -2170 ml    I & Os for current day (as of 18 Jun 2017 17:18)  =============================================  IN:    Oral Fluid: 360 ml    IV PiggyBack: 50 ml    Total IN: 410 ml  ---------------------------------------------  OUT:    Indwelling Catheter - Urethral: 1375 ml    Total OUT: 1375 ml  ---------------------------------------------  Total NET: -965 ml      LABS:                        10.1   7.7   )-----------( 154      ( 18 Jun 2017 03:50 )             34.8       PT/INR - ( 18 Jun 2017 03:50 )   PT: 25.0 sec;   INR: 2.22          PTT - ( 18 Jun 2017 03:50 )  PTT:36.4 sec    06-18    144  |  98  |  48<H>  ----------------------------<  115<H>  4.1   |  34<H>  |  2.50<H>    Ca    9.3      18 Jun 2017 03:50  Phos  3.9     06-18  Mg     2.0     06-18    TPro  7.0  /  Alb  3.0<L>  /  TBili  0.3  /  DBili  <0.2  /  AST  15  /  ALT  6<L>  /  AlkPhos  94  06-17      MEDICATIONS  (STANDING):  ferrous    sulfate 325milliGRAM(s) Oral every 8 hours  cefTRIAXone   IVPB 1Gram(s) IV Intermittent every 24 hours  cefTRIAXone   IVPB  IV Intermittent   acetazolamide    Tablet 500milliGRAM(s) Oral daily  ALBUTerol/ipratropium for Nebulization 3milliLiter(s) Nebulizer every 6 hours  clopidogrel Tablet 75milliGRAM(s) Oral daily  polyethylene glycol 3350 17Gram(s) Oral daily  docusate sodium 100milliGRAM(s) Oral daily  senna 1Tablet(s) Oral daily  artificial  tears Solution 1Drop(s) Both EYES three times a day  hydrocortisone 2.5% Cream 1Application(s) Topical two times a day  buDESOnide 160 MICROgram(s)/formoterol 4.5 MICROgram(s) Inhaler 2Puff(s) Inhalation two times a day    MEDICATIONS  (PRN):  acetaminophen   Tablet. 975milliGRAM(s) Oral every 6 hours PRN Moderate Pain (4 - 6)  acetylcysteine 20% Inhalation 5milliLiter(s) Inhalation every 6 hours PRN shortness of breath        RADIOLOGY & ADDITIONAL TESTS: Patient discussed on morning rounds with Dr. Woodall      OBUBJECTIVE ASSESSMENT:  87y Female with PMH significant for aortic stenosis is admitted for CHF exacerbation and management.  She is stable at visit.      Vital Signs Last 24 Hrs  T(C): 36.6, Max: 37.7 (06-18 @ 09:44)  T(F): 97.8, Max: 99.9 (06-18 @ 09:44)  HR: 76 (72 - 96)  BP: 107/52 (91/40 - 128/53)  BP(mean): 80 (59 - 98)  RR: 21 (18 - 25)  SpO2: 97% (93% - 100%)  I&O's Detail  I & Os for 24h ending 18 Jun 2017 07:00  =============================================  IN:    Oral Fluid: 630 ml    IV PiggyBack: 50 ml    Total IN: 680 ml  ---------------------------------------------  OUT:    Indwelling Catheter - Urethral: 2850 ml    Total OUT: 2850 ml  ---------------------------------------------  Total NET: -2170 ml    I & Os for current day (as of 18 Jun 2017 17:18)  =============================================  IN:    Oral Fluid: 360 ml    IV PiggyBack: 50 ml    Total IN: 410 ml  ---------------------------------------------  OUT:    Indwelling Catheter - Urethral: 1375 ml    Total OUT: 1375 ml  ---------------------------------------------  Total NET: -965 ml      LABS:                        10.1   7.7   )-----------( 154      ( 18 Jun 2017 03:50 )             34.8       PT/INR - ( 18 Jun 2017 03:50 )   PT: 25.0 sec;   INR: 2.22          PTT - ( 18 Jun 2017 03:50 )  PTT:36.4 sec    06-18    144  |  98  |  48<H>  ----------------------------<  115<H>  4.1   |  34<H>  |  2.50<H>    Ca    9.3      18 Jun 2017 03:50  Phos  3.9     06-18  Mg     2.0     06-18    TPro  7.0  /  Alb  3.0<L>  /  TBili  0.3  /  DBili  <0.2  /  AST  15  /  ALT  6<L>  /  AlkPhos  94  06-17      MEDICATIONS  (STANDING):  ferrous    sulfate 325milliGRAM(s) Oral every 8 hours  cefTRIAXone   IVPB 1Gram(s) IV Intermittent every 24 hours  cefTRIAXone   IVPB  IV Intermittent   acetazolamide    Tablet 500milliGRAM(s) Oral daily  ALBUTerol/ipratropium for Nebulization 3milliLiter(s) Nebulizer every 6 hours  clopidogrel Tablet 75milliGRAM(s) Oral daily  polyethylene glycol 3350 17Gram(s) Oral daily  docusate sodium 100milliGRAM(s) Oral daily  senna 1Tablet(s) Oral daily  artificial  tears Solution 1Drop(s) Both EYES three times a day  hydrocortisone 2.5% Cream 1Application(s) Topical two times a day  buDESOnide 160 MICROgram(s)/formoterol 4.5 MICROgram(s) Inhaler 2Puff(s) Inhalation two times a day    MEDICATIONS  (PRN):  acetaminophen   Tablet. 975milliGRAM(s) Oral every 6 hours PRN Moderate Pain (4 - 6)  acetylcysteine 20% Inhalation 5milliLiter(s) Inhalation every 6 hours PRN shortness of breath        RADIOLOGY & ADDITIONAL TESTS:

## 2017-06-18 NOTE — PROGRESS NOTE ADULT - SUBJECTIVE AND OBJECTIVE BOX
PUD CCM ATTENDING    looks like hypercapnic encephalopathy in am, improved in pm  but able to lift both arms 180 degrees  negative fluid balance    PAST MEDICAL & SURGICAL HISTORY:  PVD (peripheral vascular disease)  Iron deficiency anemia  Lupus (systemic lupus erythematosus)  Paroxysmal atrial fibrillation  Aortic stenosis, severe  CKD (chronic kidney disease) stage 4, GFR 15-29 ml/min  Diabetes  HTN (hypertension)  CHF (congestive heart failure)  CAD (coronary artery disease)  COPD (chronic obstructive pulmonary disease)  History of total knee replacement: 2003  Presence of cardiac pacemaker: at [facility] by Dr Escoto    FAMILY HISTORY:  No pertinent family history in first degree relatives    SOCIAL HISTORY:    REVIEW OF SYSTEMS:  no significantly change  -CP  -SOB DENIED   "I GUESS IT IS ALRIGHT"  -cough    Vital Signs Last 24 Hrs  T(C): 36.7, Max: 37.4 (06-17 @ 01:43)  T(F): 98, Max: 99.3 (06-17 @ 01:43)  HR: 78 (70 - 92)  BP: 113/45 (98/43 - 128/51)  BP(mean): 66 (52 - 96)  RR: 25 (15 - 29)  SpO2: 100% (93% - 100%)    I&O's Detail  I & Os for 24h ending 17 Jun 2017 07:00  =============================================  IN:    Oral Fluid: 360 ml    IV PiggyBack: 50 ml    Total IN: 410 ml  ---------------------------------------------  OUT:    Indwelling Catheter - Urethral: 2285 ml    Total OUT: 2285 ml  ---------------------------------------------  Total NET: -1875 ml    I & Os for current day (as of 17 Jun 2017 14:10)  =============================================  IN:    Oral Fluid: 360 ml    IV PiggyBack: 50 ml    Total IN: 410 ml  ---------------------------------------------  OUT:    Indwelling Catheter - Urethral: 405 ml    Total OUT: 405 ml  ---------------------------------------------  Total NET: 5 ml    PHYSICAL EXAM:  NIPPV high flow 45L and 40%  Well nourished, well developed, comfortable, - acute distress; vital signs are monitored continuously  Eyes: PERRLA, EOMI, -conjunctivitis, -scleritis   Head: no focal deficit, normocephalic,  no trauma  ENMT: moist tongue, no thrush, -nasal discharge, -hoarseness, normal hearing, -cough, -hemoptysis, trachea midline  Neck: supple, - lymphadenopathy,  -masses, -JVD  Respiratory: bilateral diminished breath sounds, -wheezing, -rhonchi, +rales, -crackles  Chest: -accessory muscle use, -paradoxical breathing  Cardiovascular: irregular rate and paced, S1 S2 normal, -S3, -S4, 4/6 murmur, -gallop, -rub  Gastrointestinal: soft, nontender, nondistended, normal bowel sounds, no hepatosplenomegaly  Genitourinary: -flank pain, -dysuria  Extremities: -clubbing, -cyanosis, ++edema    Vascular: peripheral pulses palpable 2+ bilaterally  Neurological: alert, oriented x 2, no focal deficit, -tremor   Skin: warm, dry, -erythema, iv site intact  Lymph nodes; no cervical, supraclavicular or axillary adenopathy  Psychiatric: agitated    MEDICATIONS  (STANDING):  ferrous    sulfate 325milliGRAM(s) Oral every 8 hours  cefTRIAXone   IVPB 1Gram(s) IV Intermittent every 24 hours  cefTRIAXone   IVPB  IV Intermittent   acetazolamide    Tablet 500milliGRAM(s) Oral daily  ALBUTerol/ipratropium for Nebulization 3milliLiter(s) Nebulizer every 6 hours  buDESOnide 160 MICROgram(s)/formoterol 4.5 MICROgram(s) Inhaler 2Puff(s) Inhalation two times a day  clopidogrel Tablet 75milliGRAM(s) Oral daily    MEDICATIONS  (PRN):  acetaminophen   Tablet. 975milliGRAM(s) Oral every 6 hours PRN Moderate Pain (4 - 6)  acetylcysteine 20% Inhalation 5milliLiter(s) Inhalation every 6 hours PRN shortness of breath    Allergies  No Known Allergies  Intolerances    LABS:                        10.9   7.6   )-----------( 146      ( 17 Jun 2017 05:27 )             36.8     06-17    141  |  97  |  47<H>  ----------------------------<  107<H>  4.1   |  33<H>  |  2.60<H>    Ca    9.3      17 Jun 2017 05:27  Mg     2.0     06-17  TPro  7.0  /  Alb  3.0<L>  /  TBili  0.3  /  DBili  <0.2  /  AST  15  /  ALT  6<L>  /  AlkPhos  94  06-17  PT/INR - ( 17 Jun 2017 05:27 )   PT: 32.3 sec;   INR: 2.85     PTT - ( 17 Jun 2017 05:27 )  PTT:41.1 sec    +DVT prophylaxis INR  +Sleep  NOT MUCH  +Nutrition goals ORAL  -Pain DENIED  -Decubital ulcer  +GI prophylaxis (PPV, coagulopathy, Hx)  +Aspiration prophylaxis (45 degrees)    Ventilator synchronized  BiPAP 12/5 PRN  Tracheal cuff pressure    +Sedation/analgesia stopped once  +ID (phos, CH, mupi, SB)  -Delirium  +Cardiac   MAY NEED BETA AGAIN  +Prevention  +Education  +Medication reviewed (drug-drug interactions, PDA)  Medical devices  Discussed with CCU, PGY, CCRN     RADIOLOGY & ADDITIONAL STUDIES:  CXRs reviewed - edema and effusions

## 2017-06-18 NOTE — PROGRESS NOTE ADULT - PROBLEM SELECTOR PLAN 1
consider intervention if patient is much medical optimized consider intervention if patient is medically optimized

## 2017-06-19 ENCOUNTER — APPOINTMENT (OUTPATIENT)
Dept: CARDIOTHORACIC SURGERY | Facility: CLINIC | Age: 82
End: 2017-06-19

## 2017-06-19 LAB
ALBUMIN SERPL ELPH-MCNC: 2.8 G/DL — LOW (ref 3.3–5)
ALP SERPL-CCNC: 84 U/L — SIGNIFICANT CHANGE UP (ref 40–120)
ALT FLD-CCNC: <5 U/L — LOW (ref 10–45)
ANION GAP SERPL CALC-SCNC: 10 MMOL/L — SIGNIFICANT CHANGE UP (ref 5–17)
APTT BLD: 128.9 SEC — CRITICAL HIGH (ref 27.5–37.4)
APTT BLD: 140.8 SEC — CRITICAL HIGH (ref 27.5–37.4)
APTT BLD: 34.8 SEC — SIGNIFICANT CHANGE UP (ref 27.5–37.4)
APTT BLD: 67.9 SEC — HIGH (ref 27.5–37.4)
AST SERPL-CCNC: 10 U/L — SIGNIFICANT CHANGE UP (ref 10–40)
BILIRUB SERPL-MCNC: 0.4 MG/DL — SIGNIFICANT CHANGE UP (ref 0.2–1.2)
BUN SERPL-MCNC: 44 MG/DL — HIGH (ref 7–23)
CALCIUM SERPL-MCNC: 9.2 MG/DL — SIGNIFICANT CHANGE UP (ref 8.4–10.5)
CHLORIDE SERPL-SCNC: 95 MMOL/L — LOW (ref 96–108)
CO2 SERPL-SCNC: 35 MMOL/L — HIGH (ref 22–31)
CREAT SERPL-MCNC: 2.4 MG/DL — HIGH (ref 0.5–1.3)
GAS PNL BLDV: SIGNIFICANT CHANGE UP
GLUCOSE SERPL-MCNC: 97 MG/DL — SIGNIFICANT CHANGE UP (ref 70–99)
HCT VFR BLD CALC: 34.6 % — SIGNIFICANT CHANGE UP (ref 34.5–45)
HCT VFR BLD CALC: 36.7 % — SIGNIFICANT CHANGE UP (ref 34.5–45)
HGB BLD-MCNC: 10.6 G/DL — LOW (ref 11.5–15.5)
HGB BLD-MCNC: 10.7 G/DL — LOW (ref 11.5–15.5)
INR BLD: 1.92 — HIGH (ref 0.88–1.16)
MAGNESIUM SERPL-MCNC: 1.9 MG/DL — SIGNIFICANT CHANGE UP (ref 1.6–2.6)
MCHC RBC-ENTMCNC: 26.6 PG — LOW (ref 27–34)
MCHC RBC-ENTMCNC: 27.6 PG — SIGNIFICANT CHANGE UP (ref 27–34)
MCHC RBC-ENTMCNC: 29.2 G/DL — LOW (ref 32–36)
MCHC RBC-ENTMCNC: 30.6 G/DL — LOW (ref 32–36)
MCV RBC AUTO: 90.1 FL — SIGNIFICANT CHANGE UP (ref 80–100)
MCV RBC AUTO: 91.3 FL — SIGNIFICANT CHANGE UP (ref 80–100)
PLATELET # BLD AUTO: 145 K/UL — LOW (ref 150–400)
PLATELET # BLD AUTO: 161 K/UL — SIGNIFICANT CHANGE UP (ref 150–400)
POTASSIUM SERPL-MCNC: 4.3 MMOL/L — SIGNIFICANT CHANGE UP (ref 3.5–5.3)
POTASSIUM SERPL-SCNC: 4.3 MMOL/L — SIGNIFICANT CHANGE UP (ref 3.5–5.3)
PROT SERPL-MCNC: 6.7 G/DL — SIGNIFICANT CHANGE UP (ref 6–8.3)
PROTHROM AB SERPL-ACNC: 21.6 SEC — HIGH (ref 9.8–12.7)
RBC # BLD: 3.84 M/UL — SIGNIFICANT CHANGE UP (ref 3.8–5.2)
RBC # BLD: 4.02 M/UL — SIGNIFICANT CHANGE UP (ref 3.8–5.2)
RBC # FLD: 15.8 % — SIGNIFICANT CHANGE UP (ref 10.3–16.9)
RBC # FLD: 15.8 % — SIGNIFICANT CHANGE UP (ref 10.3–16.9)
SODIUM SERPL-SCNC: 140 MMOL/L — SIGNIFICANT CHANGE UP (ref 135–145)
WBC # BLD: 6.8 K/UL — SIGNIFICANT CHANGE UP (ref 3.8–10.5)
WBC # BLD: 8.6 K/UL — SIGNIFICANT CHANGE UP (ref 3.8–10.5)
WBC # FLD AUTO: 6.8 K/UL — SIGNIFICANT CHANGE UP (ref 3.8–10.5)
WBC # FLD AUTO: 8.6 K/UL — SIGNIFICANT CHANGE UP (ref 3.8–10.5)

## 2017-06-19 PROCEDURE — 93010 ELECTROCARDIOGRAM REPORT: CPT

## 2017-06-19 PROCEDURE — 71010: CPT | Mod: 26

## 2017-06-19 PROCEDURE — 99291 CRITICAL CARE FIRST HOUR: CPT

## 2017-06-19 PROCEDURE — 99231 SBSQ HOSP IP/OBS SF/LOW 25: CPT | Mod: GC

## 2017-06-19 RX ORDER — HEPARIN SODIUM 5000 [USP'U]/ML
1500 INJECTION INTRAVENOUS; SUBCUTANEOUS
Qty: 25000 | Refills: 0 | Status: DISCONTINUED | OUTPATIENT
Start: 2017-06-20 | End: 2017-06-20

## 2017-06-19 RX ORDER — FUROSEMIDE 40 MG
60 TABLET ORAL EVERY 12 HOURS
Qty: 0 | Refills: 0 | Status: DISCONTINUED | OUTPATIENT
Start: 2017-06-19 | End: 2017-06-19

## 2017-06-19 RX ORDER — MAGNESIUM SULFATE 500 MG/ML
1 VIAL (ML) INJECTION ONCE
Qty: 0 | Refills: 0 | Status: COMPLETED | OUTPATIENT
Start: 2017-06-19 | End: 2017-06-19

## 2017-06-19 RX ORDER — FUROSEMIDE 40 MG
60 TABLET ORAL ONCE
Qty: 0 | Refills: 0 | Status: COMPLETED | OUTPATIENT
Start: 2017-06-19 | End: 2017-06-19

## 2017-06-19 RX ORDER — FUROSEMIDE 40 MG
60 TABLET ORAL EVERY 12 HOURS
Qty: 0 | Refills: 0 | Status: DISCONTINUED | OUTPATIENT
Start: 2017-06-20 | End: 2017-06-25

## 2017-06-19 RX ORDER — HEPARIN SODIUM 5000 [USP'U]/ML
INJECTION INTRAVENOUS; SUBCUTANEOUS
Qty: 25000 | Refills: 0 | Status: DISCONTINUED | OUTPATIENT
Start: 2017-06-19 | End: 2017-06-19

## 2017-06-19 RX ORDER — WARFARIN SODIUM 2.5 MG/1
2 TABLET ORAL ONCE
Qty: 0 | Refills: 0 | Status: DISCONTINUED | OUTPATIENT
Start: 2017-06-19 | End: 2017-06-19

## 2017-06-19 RX ORDER — HEPARIN SODIUM 5000 [USP'U]/ML
1800 INJECTION INTRAVENOUS; SUBCUTANEOUS
Qty: 25000 | Refills: 0 | Status: DISCONTINUED | OUTPATIENT
Start: 2017-06-19 | End: 2017-06-19

## 2017-06-19 RX ORDER — POTASSIUM CHLORIDE 20 MEQ
40 PACKET (EA) ORAL ONCE
Qty: 0 | Refills: 0 | Status: COMPLETED | OUTPATIENT
Start: 2017-06-19 | End: 2017-06-19

## 2017-06-19 RX ORDER — FUROSEMIDE 40 MG
60 TABLET ORAL ONCE
Qty: 0 | Refills: 0 | Status: DISCONTINUED | OUTPATIENT
Start: 2017-06-19 | End: 2017-06-19

## 2017-06-19 RX ADMIN — Medication 3 MILLILITER(S): at 11:43

## 2017-06-19 RX ADMIN — CLOPIDOGREL BISULFATE 75 MILLIGRAM(S): 75 TABLET, FILM COATED ORAL at 11:01

## 2017-06-19 RX ADMIN — Medication 3 MILLILITER(S): at 00:17

## 2017-06-19 RX ADMIN — HEPARIN SODIUM 2100 UNIT(S)/HR: 5000 INJECTION INTRAVENOUS; SUBCUTANEOUS at 07:47

## 2017-06-19 RX ADMIN — Medication 3 MILLILITER(S): at 05:52

## 2017-06-19 RX ADMIN — Medication 325 MILLIGRAM(S): at 06:12

## 2017-06-19 RX ADMIN — SENNA PLUS 1 TABLET(S): 8.6 TABLET ORAL at 11:01

## 2017-06-19 RX ADMIN — Medication 60 MILLIGRAM(S): at 08:52

## 2017-06-19 RX ADMIN — Medication 100 GRAM(S): at 09:28

## 2017-06-19 RX ADMIN — Medication 1 DROP(S): at 06:12

## 2017-06-19 RX ADMIN — Medication 40 MILLIEQUIVALENT(S): at 14:17

## 2017-06-19 RX ADMIN — Medication 60 MILLIGRAM(S): at 20:05

## 2017-06-19 RX ADMIN — Medication 325 MILLIGRAM(S): at 22:01

## 2017-06-19 RX ADMIN — Medication 100 MILLIGRAM(S): at 11:01

## 2017-06-19 RX ADMIN — Medication 1 APPLICATION(S): at 17:13

## 2017-06-19 RX ADMIN — Medication 3 MILLILITER(S): at 17:38

## 2017-06-19 RX ADMIN — POLYETHYLENE GLYCOL 3350 17 GRAM(S): 17 POWDER, FOR SOLUTION ORAL at 11:13

## 2017-06-19 RX ADMIN — Medication 1 DROP(S): at 22:01

## 2017-06-19 RX ADMIN — Medication 1 APPLICATION(S): at 06:12

## 2017-06-19 RX ADMIN — HEPARIN SODIUM 1800 UNIT(S)/HR: 5000 INJECTION INTRAVENOUS; SUBCUTANEOUS at 16:21

## 2017-06-19 RX ADMIN — Medication 325 MILLIGRAM(S): at 14:17

## 2017-06-19 RX ADMIN — Medication 1 DROP(S): at 14:17

## 2017-06-19 RX ADMIN — ACETAZOLAMIDE 500 MILLIGRAM(S): 250 TABLET ORAL at 11:01

## 2017-06-19 NOTE — PROGRESS NOTE ADULT - SUBJECTIVE AND OBJECTIVE BOX
INTERVAL HPI/OVERNIGHT EVENTS:    SUBJECTIVE    VITAL SIGNS:  Vital Signs Last 24 Hrs  T(C): 37, Max: 37.7 (06-18 @ 09:44)  T(F): 98.6, Max: 99.9 (06-18 @ 09:44)  HR: 84 (72 - 94)  BP: 110/50 (83/42 - 119/61)  BP(mean): 64 (60 - 96)  RR: 28 (16 - 28)  SpO2: 99% (93% - 100%)  I&O: Net neg 1.8 L for 24hr    PHYSICAL EXAM:    Constitutional:  Eyes:  ENMT:  Neck:  Respiratory:  Cardiovascular:  Gastrointestinal:  Extremities:  Vascular:  Neurological:  Musculoskeletal:      LABS:                        10.7   6.8   )-----------( 145      ( 19 Jun 2017 05:00 )             36.7     06-19    140  |  95<L>  |  44<H>  ----------------------------<  97  4.3   |  35<H>  |  2.40<H>    Ca    9.2      19 Jun 2017 05:05  Phos  3.9     06-18  Mg     1.9     06-19    TPro  6.7  /  Alb  2.8<L>  /  TBili  0.4  /  DBili  x   /  AST  10  /  ALT  <5<L>  /  AlkPhos  84  06-19    PT/INR - ( 19 Jun 2017 05:01 )   PT: 21.6 sec;   INR: 1.92          PTT - ( 19 Jun 2017 05:01 )  PTT:34.8 sec      RADIOLOGY & ADDITIONAL TESTS:    MEDICATIONS  (STANDING):  ferrous    sulfate 325milliGRAM(s) Oral every 8 hours  cefTRIAXone   IVPB 1Gram(s) IV Intermittent every 24 hours  cefTRIAXone   IVPB  IV Intermittent   acetazolamide    Tablet 500milliGRAM(s) Oral daily  ALBUTerol/ipratropium for Nebulization 3milliLiter(s) Nebulizer every 6 hours  clopidogrel Tablet 75milliGRAM(s) Oral daily  polyethylene glycol 3350 17Gram(s) Oral daily  docusate sodium 100milliGRAM(s) Oral daily  senna 1Tablet(s) Oral daily  artificial  tears Solution 1Drop(s) Both EYES three times a day  hydrocortisone 2.5% Cream 1Application(s) Topical two times a day  buDESOnide 160 MICROgram(s)/formoterol 4.5 MICROgram(s) Inhaler 2Puff(s) Inhalation two times a day    MEDICATIONS  (PRN):  acetaminophen   Tablet. 975milliGRAM(s) Oral every 6 hours PRN Moderate Pain (4 - 6)  acetylcysteine 20% Inhalation 5milliLiter(s) Inhalation every 6 hours PRN shortness of breath      Allergies    No Known Allergies    Intolerances INTERVAL HPI/OVERNIGHT EVENTS: Tolerated BIPAP o/n    SUBJECTIVE: Wants NIV off. Everything feels "horrible", but asked about specifics ROS was otherwise negative.     VITAL SIGNS:  Vital Signs Last 24 Hrs  T(C): 37, Max: 37.7 (06-18 @ 09:44)  T(F): 98.6, Max: 99.9 (06-18 @ 09:44)  HR: 84 (72 - 94)  BP: 110/50 (83/42 - 119/61)  BP(mean): 64 (60 - 96)  RR: 28 (16 - 28)  SpO2: 99% (93% - 100%)  I&O: Net neg 1.8 L for 24hr    TELE: PVCs    PHYSICAL EXAM:    Constitutional: NAD on HFNC  ENMT: dry MM  Neck: distended neck veins  Respiratory: bibasilar crackles, no whezing  Cardiovascular: irreg rhythm, reg rate, harsh systolic murmur  Gastrointestinal: obese, soft, NTND  Extremities: WWP, +edema  Vascular: normal peripheral pulses  Neurological: A&O x self, hospital, year  Musculoskeletal: No joint swellings  Skin: Clustered scaly papules on LLE      LABS:                        10.7   6.8   )-----------( 145      ( 19 Jun 2017 05:00 )             36.7     06-19    140  |  95<L>  |  44<H>  ----------------------------<  97  4.3   |  35<H>  |  2.40<H>    Ca    9.2      19 Jun 2017 05:05  Mg     1.9     06-19    TPro  6.7  /  Alb  2.8<L>  /  TBili  0.4  /  DBili  x   /  AST  10  /  ALT  <5<L>  /  AlkPhos  84  06-19    PT/INR - ( 19 Jun 2017 05:01 )   PT: 21.6 sec;   INR: 1.92          PTT - ( 19 Jun 2017 05:01 )  PTT:34.8 sec      RADIOLOGY & ADDITIONAL TESTS:   PORTABLE CXR - interval improvement in pulmonary congestion   EKG -     MEDICATIONS  (STANDING):  ferrous    sulfate 325milliGRAM(s) Oral every 8 hours  cefTRIAXone   IVPB 1Gram(s) IV Intermittent every 24 hours  cefTRIAXone   IVPB  IV Intermittent   acetazolamide    Tablet 500milliGRAM(s) Oral daily  ALBUTerol/ipratropium for Nebulization 3milliLiter(s) Nebulizer every 6 hours  clopidogrel Tablet 75milliGRAM(s) Oral daily  polyethylene glycol 3350 17Gram(s) Oral daily  docusate sodium 100milliGRAM(s) Oral daily  senna 1Tablet(s) Oral daily  artificial  tears Solution 1Drop(s) Both EYES three times a day  hydrocortisone 2.5% Cream 1Application(s) Topical two times a day  buDESOnide 160 MICROgram(s)/formoterol 4.5 MICROgram(s) Inhaler 2Puff(s) Inhalation two times a day    MEDICATIONS  (PRN):  acetaminophen   Tablet. 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     PLAN  CARDIO:  Valves:  -severe AS, ALEKS 0.5, mean pressure gradient 43, mod pulm HTN, PASP 52. Structural cardiology is following pt and offered BAV as outpatient if patient can be compliant with medical management. Palliative is also following.    -CTS Dr. Barger consulted. F/u recs.      Pump: acute CHF exacerbation 2/2 dietary non-compliance and severe AS . EF 50-55%, severely dilated LA   -Daily weights, strict I/Os (us in place)  -holding Toprol XL 25mg po daily as pt borderline hypotensive   -Cont Furosemide prn, c/w diamox 500mg qd per renal recs. Careful diuresis with close monitoring of BP in setting of severe AS.     Vessel:  s/p PCI x 3 stent (04', 06' and 12')-last cath 3/15' PCI dLM   -Trop 0.04 x 4. Likely 2/2 demand ischemia. Not continuing to trend.   -holding Toprol XL 25mg po daily in setting of hypotension from diuresis  -Per daughter [facility] NP Eben Tellez d/c'ed ASA due to dual AC w/ Coumadin, statin was d/c'ed years ago for unclear reasons. Started plavix yesterday.     Electrical:  -s/p PPM 2011 2/2 symptomatic bradycardia. PPM interrogation WNL.  -Daily PT/INR. Coumadin currently held as INR therapeutic.  When INR < 2, will start heparin gtt  -holding Toprol XL 25mg po daily in setting of borderline low BP and diuresis.      Hemodynamics: borderline hypotensive, holding ACE/ARB/and BB in setting of diuresis.      PULM:  #COPD (chronic obstructive pulmonary disease) - c/w duonebs   #Hypercapneic resp failure 2/2 pulmonary congestion 2/2 CHF 2/2 severe AS - see plan above. C/w Bipap and HFNC. VBGs as needed.     ID - UTI growing kleb. completed 7 day course of abx.     RENAL:  CKD (chronic kidney disease) stage 4, GFR 15-29 ml/min- Baseline Cr per daughter 2-3.   -Cont Furosemide prn, c/w Diamox 500mg qd for alkalosis per renal recs.   -Renal consulted. F/u recs.    GI  #Constipation - 2/2 iron supplement. started bowel regimen (miralax, senna, colace).     Endo:  DM - off meds, a1c wnl    NUTRITION - cardiac diet    PPX - Therapeutic INR    ACCESS - peripheral    DNR/DNI - palliative is following. continued GOC discussions      INTERVAL HPI/OVERNIGHT EVENTS: Structural cardiology does not plan on intervention during this admission, if done at all, would be as outpatient. pt compliant with BIPAP o/n, switched to HFNC this morning. given 60mg IVP lasix last night and another 60 this morning.     SUBJECTIVE: Wants the HFNC off. Diffuse itchiness. No SOB or pain. No BM in last several days. Other ROS neg    VITAL SIGNS:  Vital Signs Last 24 Hrs  T(C): 36.9, Max: 37.3 (06-18 @ 01:00)  T(F): 98.4, Max: 99.1 (06-18 @ 01:00)  HR: 90 (72 - 92)  BP: 127/76 (91/45 - 128/53)  BP(mean): 85 (52 - 96)  RR: 24 (18 - 29)  SpO2: 99% (93% - 100%)  I&O: 24 hr net neg 2.1 L     TELE: couplets    PHYSICAL EXAM:    Constitutional: NAD on HFNC  Eyes: L eye injection   ENMT: dry MM  Neck: distended neck veins  Respiratory: bibasilar crackles, reduced bs at R base  Cardiovascular: irreg, systolic murmur  Gastrointestinal: soft, NTND  Extremities: pedal edema, stasis dermatitis, WWP  Vascular: normal peripheral pulses  Neurological: A&O self, hospital, year  Musculoskeletal: no joint swelling          LABS:                        10.1   7.7   )-----------( 154      ( 18 Jun 2017 03:50 )             34.8     06-18    144  |  98  |  48<H>  ----------------------------<  115<H>  4.1   |  34<H>  |  2.50<H>    Ca    9.3      18 Jun 2017 03:50  Phos  3.9     06-18  Mg     2.0     06-18    PT/INR - ( 18 Jun 2017 03:50 )   PT: 25.0 sec;   INR: 2.22          PTT - ( 18 Jun 2017 03:50 )  PTT:36.4 sec      RADIOLOGY & ADDITIONAL TESTS:  PORTABLE CXR - persistent pulmonary congestion, bilateral pleural effusions  AM EKG pending    MEDICATIONS  (STANDING):  ferrous    sulfate 325milliGRAM(s) Oral every 8 hours  cefTRIAXone   IVPB 1Gram(s) IV Intermittent every 24 hours  cefTRIAXone   IVPB  IV Intermittent   acetazolamide    Tablet 500milliGRAM(s) Oral daily  ALBUTerol/ipratropium for Nebulization 3milliLiter(s) Nebulizer every 6 hours  buDESOnide 160 MICROgram(s)/formoterol 4.5 MICROgram(s) Inhaler 2Puff(s) Inhalation two times a day  clopidogrel Tablet 75milliGRAM(s) Oral daily    MEDICATIONS  (PRN):  acetaminophen   Tablet. 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     PLAN  CARDIO:  Valves:  -severe AS, ALEKS 0.5, mean pressure gradient 43, mod pulm HTN, PASP 52. Structural cardiology is following pt and offered BAV if patient can be compliant with medical management for 1 week however pt is often refusing BIPAP treatment and lab draws. Palliative is also following.    - Start Plavix 75 mg daily  -CTS Dr. Barger consulted. F/u recs.      Pump: acute CHF exacerbation 2/2 dietary non-compliance and severe AS . EF 50-55%, severely dilated LA   -Daily weights, strict I/Os (us in place)  -holding Toprol XL 25mg po daily as pt borderline hypotensive   -Cont Furosemide prn, c/w diamox 500mg qd per renal recs. Careful diuresis with close monitoring of BP in setting of severe AS.     Vessel:  s/p PCI x 3 stent (04', 06' and 12')-last cath 3/15' PCI dLM   -Trop 0.04 x 4. Likely 2/2 demand ischemia. Not continuing to trend.   -holding Toprol XL 25mg po daily in setting of hypotension from diuresis  -[facility] Eben Careyt d/c'ed ASA due to dual AC w/ Coumadin, statin was d/c'ed years ago for unclear reasons.    Electrical:  -s/p PPM 2011 2/2 symptomatic bradycardia. PPM interrogation WNL.  -Daily PT/INR. Coumadin currently held as INR therapeutic.  When INR < 2, will start heparin gtt  -Continue Toprol XL 25mg po daily.     Hemodynamics: borderline hypotensive, holding ACE/ARB/and BB in setting of diuresis.      PULM:  #COPD (chronic obstructive pulmonary disease) - c/w duonebs. starting budesonide.   #Hypercapneic resp failure 2/2 pulmonary congestion 2/2 CHF 2/2 severe AS - see plan above. C/w Bipap and HFNC. VBGs as needed.     ID - UTI growing kleb. c/w ceftriaxone for 7 day course (day 6 today).     RENAL:  CKD (chronic kidney disease) stage 4, GFR 15-29 ml/min- Baseline Cr per daughter 2-3.   -Cont Furosemide prn, c/w Diamox 500mg qd for alkalosis per renal recs.   -Renal consulted. F/u recs.    GI  #Constipation -   Endo:  DM - off meds, a1c wnl    NUTRITION - cardiac diet    PPX - Therapeutic INR    ACCESS - peripheral    DNR/DNI - palliative is following. continued GOC discussions INTERVAL HPI/OVERNIGHT EVENTS: Tolerated BIPAP o/n    SUBJECTIVE: Wants NIV off. Everything feels "horrible", but asked about specifics ROS was otherwise negative.     VITAL SIGNS:  Vital Signs Last 24 Hrs  T(C): 37, Max: 37.7 (06-18 @ 09:44)  T(F): 98.6, Max: 99.9 (06-18 @ 09:44)  HR: 84 (72 - 94)  BP: 110/50 (83/42 - 119/61)  BP(mean): 64 (60 - 96)  RR: 28 (16 - 28)  SpO2: 99% (93% - 100%)  I&O: Net neg 1.8 L for 24hr    TELE: PVCs    PHYSICAL EXAM:    Constitutional: NAD on HFNC  ENMT: dry MM  Neck: distended neck veins  Respiratory: bibasilar crackles, no whezing  Cardiovascular: irreg rhythm, reg rate, harsh systolic murmur  Gastrointestinal: obese, soft, NTND  Extremities: WWP, +edema  Vascular: normal peripheral pulses  Neurological: A&O x self, hospital, year  Musculoskeletal: No joint swellings  Skin: Clustered scaly papules on LLE      LABS:                        10.7   6.8   )-----------( 145      ( 19 Jun 2017 05:00 )             36.7     06-19    140  |  95<L>  |  44<H>  ----------------------------<  97  4.3   |  35<H>  |  2.40<H>    Ca    9.2      19 Jun 2017 05:05  Mg     1.9     06-19    TPro  6.7  /  Alb  2.8<L>  /  TBili  0.4  /  DBili  x   /  AST  10  /  ALT  <5<L>  /  AlkPhos  84  06-19    PT/INR - ( 19 Jun 2017 05:01 )   PT: 21.6 sec;   INR: 1.92          PTT - ( 19 Jun 2017 05:01 )  PTT:34.8 sec      RADIOLOGY & ADDITIONAL TESTS:   PORTABLE CXR - interval improvement in pulmonary congestion   EKG -     MEDICATIONS  (STANDING):  ferrous    sulfate 325milliGRAM(s) Oral every 8 hours  cefTRIAXone   IVPB 1Gram(s) IV Intermittent every 24 hours  cefTRIAXone   IVPB  IV Intermittent   acetazolamide    Tablet 500milliGRAM(s) Oral daily  ALBUTerol/ipratropium for Nebulization 3milliLiter(s) Nebulizer every 6 hours  clopidogrel Tablet 75milliGRAM(s) Oral daily  polyethylene glycol 3350 17Gram(s) Oral daily  docusate sodium 100milliGRAM(s) Oral daily  senna 1Tablet(s) Oral daily  artificial  tears Solution 1Drop(s) Both EYES three times a day  hydrocortisone 2.5% Cream 1Application(s) Topical two times a day  buDESOnide 160 MICROgram(s)/formoterol 4.5 MICROgram(s) Inhaler 2Puff(s) Inhalation two times a day    MEDICATIONS  (PRN):  acetaminophen   Tablet. 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     PLAN  CARDIO:  Valves:  -severe AS, ALEKS 0.5, mean pressure gradient 43, mod pulm HTN, PASP 52. Structural cardiology is following pt and offered BAV as outpatient if patient can be compliant with medical management. Palliative is also following.    -CTS Dr. Barger consulted. F/u recs.      Pump: acute CHF exacerbation 2/2 dietary non-compliance and severe AS . EF 50-55%, severely dilated LA   -Daily weights, strict I/Os (us in place)  -holding Toprol XL 25mg po daily as pt borderline hypotensive   -Cont Furosemide prn, c/w diamox 500mg qd per renal recs. Careful diuresis with close monitoring of BP in setting of severe AS.     Vessel:  s/p PCI x 3 stent (04', 06' and 12')-last cath 3/15' PCI dLM   -Trop 0.04 x 4. Likely 2/2 demand ischemia. Not continuing to trend.   -holding Toprol XL 25mg po daily in setting of hypotension from diuresis  -Per daughter [facility] Call NP Eben Tellez d/c'ed ASA due to dual AC w/ Coumadin, statin was d/c'ed years ago for unclear reasons. Started plavix yesterday.     Electrical:  -s/p PPM 2011 2/2 symptomatic bradycardia. PPM interrogation WNL.  -Daily PT/INR. Coumadin currently held as INR therapeutic.  When INR < 2, will start heparin gtt  -holding Toprol XL 25mg po daily in setting of borderline low BP and diuresis.      Hemodynamics: borderline hypotensive, holding ACE/ARB/and BB in setting of diuresis.      PULM:  #COPD (chronic obstructive pulmonary disease) - c/w duonebs   #Hypercapneic resp failure 2/2 pulmonary congestion 2/2 CHF 2/2 severe AS -  PCO2 is stable. see plan above. C/w Bipap and HFNC. VBGs as needed.     ID - UTI growing kleb. completed 7 day course of abx.     RENAL:  CKD (chronic kidney disease) stage 4, GFR 15-29 ml/min- Baseline Cr per daughter 2-3.   -Cont Furosemide prn, c/w Diamox 500mg qd for alkalosis per renal recs.   -Renal consulted. F/u recs.    GI  #Constipation - 2/2 iron supplement. started bowel regimen (miralax, senna, colace).     Endo:  DM - off meds, a1c wnl    NUTRITION - cardiac diet    PPX - Therapeutic INR    ACCESS - peripheral    DNR/DNI - palliative is following. continued GOC discussions      INTERVAL HPI/OVERNIGHT EVENTS: Structural cardiology does not plan on intervention during this admission, if done at all, would be as outpatient. pt compliant with BIPAP o/n, switched to HFNC this morning. given 60mg IVP lasix last night and another 60 this morning.     SUBJECTIVE: Wants the HFNC off. Diffuse itchiness. No SOB or pain. No BM in last several days. Other ROS neg    VITAL SIGNS:  Vital Signs Last 24 Hrs  T(C): 36.9, Max: 37.3 (06-18 @ 01:00)  T(F): 98.4, Max: 99.1 (06-18 @ 01:00)  HR: 90 (72 - 92)  BP: 127/76 (91/45 - 128/53)  BP(mean): 85 (52 - 96)  RR: 24 (18 - 29)  SpO2: 99% (93% - 100%)  I&O: 24 hr net neg 2.1 L     TELE: couplets    PHYSICAL EXAM:    Constitutional: NAD on HFNC  Eyes: L eye injection   ENMT: dry MM  Neck: distended neck veins  Respiratory: bibasilar crackles, reduced bs at R base  Cardiovascular: irreg, systolic murmur  Gastrointestinal: soft, NTND  Extremities: pedal edema, stasis dermatitis, WWP  Vascular: normal peripheral pulses  Neurological: A&O self, hospital, year  Musculoskeletal: no joint swelling          LABS:                        10.1   7.7   )-----------( 154      ( 18 Jun 2017 03:50 )             34.8     06-18    144  |  98  |  48<H>  ----------------------------<  115<H>  4.1   |  34<H>  |  2.50<H>    Ca    9.3      18 Jun 2017 03:50  Phos  3.9     06-18  Mg     2.0     06-18    PT/INR - ( 18 Jun 2017 03:50 )   PT: 25.0 sec;   INR: 2.22          PTT - ( 18 Jun 2017 03:50 )  PTT:36.4 sec      RADIOLOGY & ADDITIONAL TESTS:  PORTABLE CXR - persistent pulmonary congestion, bilateral pleural effusions  AM EKG pending    MEDICATIONS  (STANDING):  ferrous    sulfate 325milliGRAM(s) Oral every 8 hours  cefTRIAXone   IVPB 1Gram(s) IV Intermittent every 24 hours  cefTRIAXone   IVPB  IV Intermittent   acetazolamide    Tablet 500milliGRAM(s) Oral daily  ALBUTerol/ipratropium for Nebulization 3milliLiter(s) Nebulizer every 6 hours  buDESOnide 160 MICROgram(s)/formoterol 4.5 MICROgram(s) Inhaler 2Puff(s) Inhalation two times a day  clopidogrel Tablet 75milliGRAM(s) Oral daily    MEDICATIONS  (PRN):  acetaminophen   Tablet. 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     PLAN  CARDIO:  Valves:  -severe AS, ALEKS 0.5, mean pressure gradient 43, mod pulm HTN, PASP 52. Structural cardiology is following pt and offered BAV if patient can be compliant with medical management for 1 week however pt is often refusing BIPAP treatment and lab draws. Palliative is also following.    - Start Plavix 75 mg daily  -CTS Dr. Barger consulted. F/u recs.      Pump: acute CHF exacerbation 2/2 dietary non-compliance and severe AS . EF 50-55%, severely dilated LA   -Daily weights, strict I/Os (us in place)  -holding Toprol XL 25mg po daily as pt borderline hypotensive   -Cont Furosemide prn, c/w diamox 500mg qd per renal recs. Careful diuresis with close monitoring of BP in setting of severe AS.     Vessel:  s/p PCI x 3 stent (04', 06' and 12')-last cath 3/15' PCI dLM   -Trop 0.04 x 4. Likely 2/2 demand ischemia. Not continuing to trend.   -holding Toprol XL 25mg po daily in setting of hypotension from diuresis  -[facility] Eben Tellez d/c'ed ASA due to dual AC w/ Coumadin, statin was d/c'ed years ago for unclear reasons.    Electrical:  -s/p PPM 2011 2/2 symptomatic bradycardia. PPM interrogation WNL.  -Daily PT/INR. Coumadin currently held as INR therapeutic.  When INR < 2, will start heparin gtt  -Continue Toprol XL 25mg po daily.     Hemodynamics: borderline hypotensive, holding ACE/ARB/and BB in setting of diuresis.      PULM:  #COPD (chronic obstructive pulmonary disease) - c/w duonebs. starting budesonide.   #Hypercapneic resp failure 2/2 pulmonary congestion 2/2 CHF 2/2 severe AS - see plan above. C/w Bipap and HFNC. VBGs as needed.     ID - UTI growing kleb. c/w ceftriaxone for 7 day course (day 6 today).     RENAL:  CKD (chronic kidney disease) stage 4, GFR 15-29 ml/min- Baseline Cr per daughter 2-3.   -Cont Furosemide prn, c/w Diamox 500mg qd for alkalosis per renal recs.   -Renal consulted. F/u recs.    GI  #Constipation -   Endo:  DM - off meds, a1c wnl    NUTRITION - cardiac diet    PPX - Therapeutic INR    ACCESS - peripheral    DNR/DNI - palliative is following. continued GOC discussions INTERVAL HPI/OVERNIGHT EVENTS: Tolerated BIPAP o/n    SUBJECTIVE: Wants NIV off. Everything feels "horrible", but asked about specifics ROS was otherwise negative.     VITAL SIGNS:  Vital Signs Last 24 Hrs  T(C): 37, Max: 37.7 (06-18 @ 09:44)  T(F): 98.6, Max: 99.9 (06-18 @ 09:44)  HR: 84 (72 - 94)  BP: 110/50 (83/42 - 119/61)  BP(mean): 64 (60 - 96)  RR: 28 (16 - 28)  SpO2: 99% (93% - 100%)  I&O: Net neg 1.8 L for 24hr    TELE: PVCs    PHYSICAL EXAM:    Constitutional: NAD on HFNC  ENMT: dry MM  Neck: distended neck veins  Respiratory: bibasilar crackles, no whezing  Cardiovascular: irreg rhythm, reg rate, harsh systolic murmur  Gastrointestinal: obese, soft, NTND  Extremities: WWP, +edema  Vascular: normal peripheral pulses  Neurological: A&O x self, hospital, year  Musculoskeletal: No joint swellings  Skin: Clustered scaly papules on LLE      LABS:                        10.7   6.8   )-----------( 145      ( 19 Jun 2017 05:00 )             36.7     06-19    140  |  95<L>  |  44<H>  ----------------------------<  97  4.3   |  35<H>  |  2.40<H>    Ca    9.2      19 Jun 2017 05:05  Mg     1.9     06-19    TPro  6.7  /  Alb  2.8<L>  /  TBili  0.4  /  DBili  x   /  AST  10  /  ALT  <5<L>  /  AlkPhos  84  06-19    PT/INR - ( 19 Jun 2017 05:01 )   PT: 21.6 sec;   INR: 1.92          PTT - ( 19 Jun 2017 05:01 )  PTT:34.8 sec      RADIOLOGY & ADDITIONAL TESTS:   PORTABLE CXR - interval improvement in pulmonary congestion   EKG -  Irregularly irregular, reg rate, LAD, RBBB, T wave inversions lateral leads     MEDICATIONS  (STANDING):  ferrous    sulfate 325milliGRAM(s) Oral every 8 hours  cefTRIAXone   IVPB 1Gram(s) IV Intermittent every 24 hours  cefTRIAXone   IVPB  IV Intermittent   acetazolamide    Tablet 500milliGRAM(s) Oral daily  ALBUTerol/ipratropium for Nebulization 3milliLiter(s) Nebulizer every 6 hours  clopidogrel Tablet 75milliGRAM(s) Oral daily  polyethylene glycol 3350 17Gram(s) Oral daily  docusate sodium 100milliGRAM(s) Oral daily  senna 1Tablet(s) Oral daily  artificial  tears Solution 1Drop(s) Both EYES three times a day  hydrocortisone 2.5% Cream 1Application(s) Topical two times a day  buDESOnide 160 MICROgram(s)/formoterol 4.5 MICROgram(s) Inhaler 2Puff(s) Inhalation two times a day    MEDICATIONS  (PRN):  acetaminophen   Tablet. 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, HTN, PVD, and Type 2 DM (no longer on meds), MONICA, and TKR 03' who presented to the hospital due to the inability to get out of bed x 3 days, SOB, and increased LE edema, found to have a CHF exacerbation stepped up to CCU for hypercapneic resp failure.     PLAN  CARDIO:  Valves:  -severe AS, ALEKS 0.5, mean pressure gradient 43, mod pulm HTN, PASP 52. Structural cardiology is following pt and offered BAV as outpatient if patient can be compliant with medical management. Palliative is also following.    -CTS Dr. Barger consulted. F/u recs.      Pump: acute CHF exacerbation 2/2 dietary non-compliance and severe AS . EF 50-55%, severely dilated LA   -Daily weights, strict I/Os (us in place)  -holding Toprol XL 25mg po daily as pt borderline hypotensive   -Cont Furosemide prn, c/w diamox 500mg qd per renal recs. Careful diuresis with close monitoring of BP in setting of severe AS.     Vessel:  s/p PCI x 3 stent (04', 06' and 12')-last cath 3/15' PCI dLM   -Trop 0.04 x 4. Likely 2/2 demand ischemia. Not continuing to trend.   -holding Toprol XL 25mg po daily in setting of hypotension from diuresis  -[facility] Ebentarun Care[facility] d/c'ed ASA due to dual AC w/ Coumadin, statin was d/c'ed years ago for unclear reasons. Started plavix yesterday.     Electrical:  -s/p PPM 2011 2/2 symptomatic bradycardia. PPM interrogation WNL.  -Daily PT/INR. Coumadin currently held as INR therapeutic.  When INR < 2, will start heparin gtt  -holding Toprol XL 25mg po daily in setting of borderline low BP and diuresis.      Hemodynamics: borderline hypotensive, holding ACE/ARB/and BB in setting of diuresis.      PULM:  #COPD (chronic obstructive pulmonary disease) - c/w duonebs   #Hypercapneic resp failure 2/2 pulmonary congestion 2/2 CHF 2/2 severe AS -  PCO2 is stable. see plan above. C/w Bipap and HFNC. VBGs as needed.     ID - UTI growing kleb. completed 7 day course of abx.     RENAL:  CKD (chronic kidney disease) stage 4, GFR 15-29 ml/min- Baseline Cr per daughter 2-3.   -Cont Furosemide prn, c/w Diamox 500mg qd for alkalosis per renal recs.   -Renal consulted. F/u recs.    GI  #Constipation - 2/2 iron supplement. started bowel regimen (miralax, senna, colace).     Endo:  DM - off meds, a1c wnl    NUTRITION - cardiac diet    PPX - Therapeutic INR    ACCESS - peripheral    DNR/DNI - palliative is following. continued GOC discussions      INTERVAL HPI/OVERNIGHT EVENTS: Structural cardiology does not plan on intervention during this admission, if done at all, would be as outpatient. pt compliant with BIPAP o/n, switched to HFNC this morning. given 60mg IVP lasix last night and another 60 this morning.     SUBJECTIVE: Wants the HFNC off. Diffuse itchiness. No SOB or pain. No BM in last several days. Other ROS neg    VITAL SIGNS:  Vital Signs Last 24 Hrs  T(C): 36.9, Max: 37.3 (06-18 @ 01:00)  T(F): 98.4, Max: 99.1 (06-18 @ 01:00)  HR: 90 (72 - 92)  BP: 127/76 (91/45 - 128/53)  BP(mean): 85 (52 - 96)  RR: 24 (18 - 29)  SpO2: 99% (93% - 100%)  I&O: 24 hr net neg 2.1 L     TELE: couplets    PHYSICAL EXAM:    Constitutional: NAD on HFNC  Eyes: L eye injection   ENMT: dry MM  Neck: distended neck veins  Respiratory: bibasilar crackles, reduced bs at R base  Cardiovascular: irreg, systolic murmur  Gastrointestinal: soft, NTND  Extremities: pedal edema, stasis dermatitis, WWP  Vascular: normal peripheral pulses  Neurological: A&O self, hospital, year  Musculoskeletal: no joint swelling          LABS:                        10.1   7.7   )-----------( 154      ( 18 Jun 2017 03:50 )             34.8     06-18    144  |  98  |  48<H>  ----------------------------<  115<H>  4.1   |  34<H>  |  2.50<H>    Ca    9.3      18 Jun 2017 03:50  Phos  3.9     06-18  Mg     2.0     06-18    PT/INR - ( 18 Jun 2017 03:50 )   PT: 25.0 sec;   INR: 2.22          PTT - ( 18 Jun 2017 03:50 )  PTT:36.4 sec      RADIOLOGY & ADDITIONAL TESTS:  PORTABLE CXR - persistent pulmonary congestion, bilateral pleural effusions  AM EKG pending    MEDICATIONS  (STANDING):  ferrous    sulfate 325milliGRAM(s) Oral every 8 hours  cefTRIAXone   IVPB 1Gram(s) IV Intermittent every 24 hours  cefTRIAXone   IVPB  IV Intermittent   acetazolamide    Tablet 500milliGRAM(s) Oral daily  ALBUTerol/ipratropium for Nebulization 3milliLiter(s) Nebulizer every 6 hours  buDESOnide 160 MICROgram(s)/formoterol 4.5 MICROgram(s) Inhaler 2Puff(s) Inhalation two times a day  clopidogrel Tablet 75milliGRAM(s) Oral daily    MEDICATIONS  (PRN):  acetaminophen   Tablet. 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, HTN, PVD, and Type 2 DM (no longer on meds), MONICA, and TKR 03' who presented to the hospital due to the inability to get out of bed x 3 days, SOB, and increased LE edema, found to have a CHF exacerbation stepped up to CCU for hypercapneic resp failure.     PLAN  CARDIO:  Valves:  -severe AS, ALEKS 0.5, mean pressure gradient 43, mod pulm HTN, PASP 52. Structural cardiology is following pt and offered BAV if patient can be compliant with medical management for 1 week however pt is often refusing BIPAP treatment and lab draws. Palliative is also following.    - Start Plavix 75 mg daily  -CTS Dr. Barger consulted. F/u recs.      Pump: acute CHF exacerbation 2/2 dietary non-compliance and severe AS . EF 50-55%, severely dilated LA   -Daily weights, strict I/Os (us in place)  -holding Toprol XL 25mg po daily as pt borderline hypotensive   -Cont Furosemide prn, c/w diamox 500mg qd per renal recs. Careful diuresis with close monitoring of BP in setting of severe AS.     Vessel:  s/p PCI x 3 stent (04', 06' and 12')-last cath 3/15' PCI dLM   -Trop 0.04 x 4. Likely 2/2 demand ischemia. Not continuing to trend.   -holding Toprol XL 25mg po daily in setting of hypotension from diuresis  -Per daughter Rosendo House Call NP Eben Tellez d/c'ed ASA due to dual AC w/ Coumadin, statin was d/c'ed years ago for unclear reasons.    Electrical:  -s/p PPM 2011 2/2 symptomatic bradycardia. PPM interrogation WNL.  -Daily PT/INR. Coumadin currently held as INR therapeutic.  When INR < 2, will start heparin gtt  -Continue Toprol XL 25mg po daily.     Hemodynamics: borderline hypotensive, holding ACE/ARB/and BB in setting of diuresis.      PULM:  #COPD (chronic obstructive pulmonary disease) - c/w duonebs. starting budesonide.   #Hypercapneic resp failure 2/2 pulmonary congestion 2/2 CHF 2/2 severe AS - see plan above. C/w Bipap and HFNC. VBGs as needed.     ID - UTI growing kleb. c/w ceftriaxone for 7 day course (day 6 today).     RENAL:  CKD (chronic kidney disease) stage 4, GFR 15-29 ml/min- Baseline Cr per daughter 2-3.   -Cont Furosemide prn, c/w Diamox 500mg qd for alkalosis per renal recs.   -Renal consulted. F/u recs.    GI  #Constipation -   Endo:  DM - off meds, a1c wnl    NUTRITION - cardiac diet    PPX - Therapeutic INR    ACCESS - peripheral    DNR/DNI - palliative is following. continued GOC discussions INTERVAL HPI/OVERNIGHT EVENTS: Tolerated BIPAP o/n    SUBJECTIVE: Wants NIV off. Everything feels "horrible", but asked about specifics ROS was otherwise negative.     VITAL SIGNS:  Vital Signs Last 24 Hrs  T(C): 37, Max: 37.7 (06-18 @ 09:44)  T(F): 98.6, Max: 99.9 (06-18 @ 09:44)  HR: 84 (72 - 94)  BP: 110/50 (83/42 - 119/61)  BP(mean): 64 (60 - 96)  RR: 28 (16 - 28)  SpO2: 99% (93% - 100%) on HFNC, 84% on 6L nc  I&O: Net neg 1.8 L for 24hr    TELE: PVCs    PHYSICAL EXAM:    Constitutional: NAD on HFNC  ENMT: dry MM  Neck: distended neck veins  Respiratory: bibasilar crackles, no whezing  Cardiovascular: irreg rhythm, reg rate, harsh systolic murmur  Gastrointestinal: obese, soft, NTND  Extremities: WWP, +edema  Vascular: normal peripheral pulses  Neurological: A&O x self, hospital, year  Musculoskeletal: No joint swellings  Skin: Clustered scaly papules on LLE      LABS:                        10.7   6.8   )-----------( 145      ( 19 Jun 2017 05:00 )             36.7     06-19    140  |  95<L>  |  44<H>  ----------------------------<  97  4.3   |  35<H>  |  2.40<H>    Ca    9.2      19 Jun 2017 05:05  Mg     1.9     06-19    TPro  6.7  /  Alb  2.8<L>  /  TBili  0.4  /  DBili  x   /  AST  10  /  ALT  <5<L>  /  AlkPhos  84  06-19    PT/INR - ( 19 Jun 2017 05:01 )   PT: 21.6 sec;   INR: 1.92          PTT - ( 19 Jun 2017 05:01 )  PTT:34.8 sec      RADIOLOGY & ADDITIONAL TESTS:   PORTABLE CXR - interval improvement in pulmonary congestion   EKG -  Irregularly irregular, reg rate, LAD, RBBB, T wave inversions lateral leads     MEDICATIONS  (STANDING):  ferrous    sulfate 325milliGRAM(s) Oral every 8 hours  cefTRIAXone   IVPB 1Gram(s) IV Intermittent every 24 hours  cefTRIAXone   IVPB  IV Intermittent   acetazolamide    Tablet 500milliGRAM(s) Oral daily  ALBUTerol/ipratropium for Nebulization 3milliLiter(s) Nebulizer every 6 hours  clopidogrel Tablet 75milliGRAM(s) Oral daily  polyethylene glycol 3350 17Gram(s) Oral daily  docusate sodium 100milliGRAM(s) Oral daily  senna 1Tablet(s) Oral daily  artificial  tears Solution 1Drop(s) Both EYES three times a day  hydrocortisone 2.5% Cream 1Application(s) Topical two times a day  buDESOnide 160 MICROgram(s)/formoterol 4.5 MICROgram(s) Inhaler 2Puff(s) Inhalation two times a day    MEDICATIONS  (PRN):  acetaminophen   Tablet. 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     PLAN  CARDIO:  Valves:  -severe AS, ALEKS 0.5, mean pressure gradient 43, mod pulm HTN, PASP 52. Structural cardiology is following pt and offered BAV as outpatient if patient can be compliant with medical management. Palliative is also following.    -CTS Dr. Barger consulted. F/u recs.      Pump: acute CHF exacerbation 2/2 dietary non-compliance and severe AS . EF 50-55%, severely dilated LA   -Daily weights, strict I/Os (us in place)  -holding Toprol XL 25mg po daily as pt borderline hypotensive   -Cont Furosemide prn, c/w diamox 500mg qd per renal recs. Careful diuresis with close monitoring of BP in setting of severe AS.     Vessel:  s/p PCI x 3 stent (04', 06' and 12')-last cath 3/15' PCI dLM   -Trop 0.04 x 4. Likely 2/2 demand ischemia. Not continuing to trend.   -holding Toprol XL 25mg po daily in setting of hypotension from diuresis  -Per daughter Rosendo House Call NP Eben Tellez d/c'ed ASA due to dual AC w/ Coumadin, statin was d/c'ed years ago for unclear reasons. Started plavix yesterday.     Electrical:  -s/p PPM 2011 2/2 symptomatic bradycardia. PPM interrogation WNL.  -Daily PT/INR. Coumadin currently held as INR therapeutic.  When INR < 2, will start heparin gtt  -holding Toprol XL 25mg po daily in setting of borderline low BP and diuresis.      Hemodynamics: borderline hypotensive, holding ACE/ARB/and BB in setting of diuresis.      PULM:  #COPD (chronic obstructive pulmonary disease) - c/w duonebs   #Hypercapneic resp failure 2/2 pulmonary congestion 2/2 CHF 2/2 severe AS -  PCO2 is stable. see plan above. C/w Bipap and HFNC. VBGs as needed.     ID - UTI growing kleb. completed 7 day course of abx.     RENAL:  CKD (chronic kidney disease) stage 4, GFR 15-29 ml/min- Baseline Cr per daughter 2-3.   -Cont Furosemide prn, c/w Diamox 500mg qd for alkalosis per renal recs.   -Renal consulted. F/u recs.    GI  #Constipation - 2/2 iron supplement. started bowel regimen (miralax, senna, colace).     Endo:  DM - off meds, a1c wnl    NUTRITION - cardiac diet    PPX - Therapeutic INR    ACCESS - peripheral    DNR/DNI - palliative is following. continued GOC discussions      INTERVAL HPI/OVERNIGHT EVENTS: Structural cardiology does not plan on intervention during this admission, if done at all, would be as outpatient. pt compliant with BIPAP o/n, switched to HFNC this morning. given 60mg IVP lasix last night and another 60 this morning.     SUBJECTIVE: Wants the HFNC off. Diffuse itchiness. No SOB or pain. No BM in last several days. Other ROS neg    VITAL SIGNS:  Vital Signs Last 24 Hrs  T(C): 36.9, Max: 37.3 (06-18 @ 01:00)  T(F): 98.4, Max: 99.1 (06-18 @ 01:00)  HR: 90 (72 - 92)  BP: 127/76 (91/45 - 128/53)  BP(mean): 85 (52 - 96)  RR: 24 (18 - 29)  SpO2: 99% (93% - 100%)  I&O: 24 hr net neg 2.1 L     TELE: couplets    PHYSICAL EXAM:    Constitutional: NAD on HFNC  Eyes: L eye injection   ENMT: dry MM  Neck: distended neck veins  Respiratory: bibasilar crackles, reduced bs at R base  Cardiovascular: irreg, systolic murmur  Gastrointestinal: soft, NTND  Extremities: pedal edema, stasis dermatitis, WWP  Vascular: normal peripheral pulses  Neurological: A&O self, hospital, year  Musculoskeletal: no joint swelling          LABS:                        10.1   7.7   )-----------( 154      ( 18 Jun 2017 03:50 )             34.8     06-18    144  |  98  |  48<H>  ----------------------------<  115<H>  4.1   |  34<H>  |  2.50<H>    Ca    9.3      18 Jun 2017 03:50  Phos  3.9     06-18  Mg     2.0     06-18    PT/INR - ( 18 Jun 2017 03:50 )   PT: 25.0 sec;   INR: 2.22          PTT - ( 18 Jun 2017 03:50 )  PTT:36.4 sec      RADIOLOGY & ADDITIONAL TESTS:  PORTABLE CXR - persistent pulmonary congestion, bilateral pleural effusions  AM EKG pending    MEDICATIONS  (STANDING):  ferrous    sulfate 325milliGRAM(s) Oral every 8 hours  cefTRIAXone   IVPB 1Gram(s) IV Intermittent every 24 hours  cefTRIAXone   IVPB  IV Intermittent   acetazolamide    Tablet 500milliGRAM(s) Oral daily  ALBUTerol/ipratropium for Nebulization 3milliLiter(s) Nebulizer every 6 hours  buDESOnide 160 MICROgram(s)/formoterol 4.5 MICROgram(s) Inhaler 2Puff(s) Inhalation two times a day  clopidogrel Tablet 75milliGRAM(s) Oral daily    MEDICATIONS  (PRN):  acetaminophen   Tablet. 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     PLAN  CARDIO:  Valves:  -severe AS, ALEKS 0.5, mean pressure gradient 43, mod pulm HTN, PASP 52. Structural cardiology is following pt and offered BAV if patient can be compliant with medical management for 1 week however pt is often refusing BIPAP treatment and lab draws. Palliative is also following.    - Start Plavix 75 mg daily  -CTS Dr. Barger consulted. F/u recs.      Pump: acute CHF exacerbation 2/2 dietary non-compliance and severe AS . EF 50-55%, severely dilated LA   -Daily weights, strict I/Os (us in place)  -holding Toprol XL 25mg po daily as pt borderline hypotensive   -Cont Furosemide prn, c/w diamox 500mg qd per renal recs. Careful diuresis with close monitoring of BP in setting of severe AS.     Vessel:  s/p PCI x 3 stent (04', 06' and 12')-last cath 3/15' PCI dLM   -Trop 0.04 x 4. Likely 2/2 demand ischemia. Not continuing to trend.   -holding Toprol XL 25mg po daily in setting of hypotension from diuresis  -Per daughter Rosendo House Call NP Eben Tellez d/c'ed ASA due to dual AC w/ Coumadin, statin was d/c'ed years ago for unclear reasons.    Electrical:  -s/p PPM 2011 2/2 symptomatic bradycardia. PPM interrogation WNL.  -Daily PT/INR. Coumadin currently held as INR therapeutic.  When INR < 2, will start heparin gtt  -Continue Toprol XL 25mg po daily.     Hemodynamics: borderline hypotensive, holding ACE/ARB/and BB in setting of diuresis.      PULM:  #COPD (chronic obstructive pulmonary disease) - c/w duonebs. starting budesonide.   #Hypercapneic resp failure 2/2 pulmonary congestion 2/2 CHF 2/2 severe AS - see plan above. C/w Bipap and HFNC. VBGs as needed.     ID - UTI growing kleb. c/w ceftriaxone for 7 day course (day 6 today).     RENAL:  CKD (chronic kidney disease) stage 4, GFR 15-29 ml/min- Baseline Cr per daughter 2-3.   -Cont Furosemide prn, c/w Diamox 500mg qd for alkalosis per renal recs.   -Renal consulted. F/u recs.    GI  #Constipation -   Endo:  DM - off meds, a1c wnl    NUTRITION - cardiac diet    PPX - Therapeutic INR    ACCESS - peripheral    DNR/DNI - palliative is following. continued GOC discussions

## 2017-06-19 NOTE — PROGRESS NOTE ADULT - SUBJECTIVE AND OBJECTIVE BOX
Pt seen and examined Coverage for Dr. Martinez   on CPAP  stable     REVIEW OF SYSTEMS:  Constitutional: No fever, weight loss or fatigue  Cardiovascular: No chest pain, palpitations, dizziness or leg swelling  Gastrointestinal: No abdominal or epigastric pain. No nausea, vomiting or hematemesis; No diarrhea or constipation. No melena or hematochezia.  Skin: No itching, burning, rashes or lesions       MEDICATIONS:  MEDICATIONS  (STANDING):  ferrous    sulfate 325milliGRAM(s) Oral every 8 hours  acetazolamide    Tablet 500milliGRAM(s) Oral daily  ALBUTerol/ipratropium for Nebulization 3milliLiter(s) Nebulizer every 6 hours  clopidogrel Tablet 75milliGRAM(s) Oral daily  polyethylene glycol 3350 17Gram(s) Oral daily  docusate sodium 100milliGRAM(s) Oral daily  senna 1Tablet(s) Oral daily  artificial  tears Solution 1Drop(s) Both EYES three times a day  hydrocortisone 2.5% Cream 1Application(s) Topical two times a day  furosemide   Injectable 60milliGRAM(s) IV Push every 12 hours  heparin  Infusion. 1800Unit(s)/Hr IV Continuous <Continuous>    MEDICATIONS  (PRN):  acetaminophen   Tablet. 975milliGRAM(s) Oral every 6 hours PRN Moderate Pain (4 - 6)  acetylcysteine 20% Inhalation 5milliLiter(s) Inhalation every 6 hours PRN shortness of breath      Allergies    No Known Allergies    Intolerances        Vital Signs Last 24 Hrs  T(C): 36.3, Max: 37.6 (06-19 @ 12:00)  T(F): 97.4, Max: 99.6 (06-19 @ 12:00)  HR: 74 (72 - 96)  BP: 116/47 (96/49 - 139/76)  BP(mean): 70 (64 - 99)  RR: 21 (16 - 29)  SpO2: 97% (92% - 99%)  I & Os for 24h ending 06-19 @ 07:00  =============================================  IN: 410 ml / OUT: 2300 ml / NET: -1890 ml    I & Os for current day (as of 06-19 @ 19:29)  =============================================  IN: 820 ml / OUT: 1505 ml / NET: -685 ml      PHYSICAL EXAM:    General: obese; in no acute distress, CPAP  HEENT: MMM, conjunctiva and sclera clear  Lungs: decreased BS  Heart: irregular  Gastrointestinal: Soft non-tender obese; Normal bowel sounds; No hepatosplenomegaly  Skin: Warm and dry. No obvious rash  Ext: no cyanosis    LABS:  ABG - ( 18 Jun 2017 00:43 )  pH: 7.36  /  pCO2: 67    /  pO2: 108   / HCO3: 37    / Base Excess: 8.7   /  SaO2: 98                  CBC Full  -  ( 19 Jun 2017 05:00 )  WBC Count : 6.8 K/uL  Hemoglobin : 10.7 g/dL  Hematocrit : 36.7 %  Platelet Count - Automated : 145 K/uL  Mean Cell Volume : 91.3 fL  Mean Cell Hemoglobin : 26.6 pg  Mean Cell Hemoglobin Concentration : 29.2 g/dL  Auto Neutrophil # : x  Auto Lymphocyte # : x  Auto Monocyte # : x  Auto Eosinophil # : x  Auto Basophil # : x  Auto Neutrophil % : x  Auto Lymphocyte % : x  Auto Monocyte % : x  Auto Eosinophil % : x  Auto Basophil % : x    06-19    140  |  95<L>  |  44<H>  ----------------------------<  97  4.3   |  35<H>  |  2.40<H>    Ca    9.2      19 Jun 2017 05:05  Phos  3.9     06-18  Mg     1.9     06-19    TPro  6.7  /  Alb  2.8<L>  /  TBili  0.4  /  DBili  x   /  AST  10  /  ALT  <5<L>  /  AlkPhos  84  06-19    PT/INR - ( 19 Jun 2017 05:01 )   PT: 21.6 sec;   INR: 1.92          PTT - ( 19 Jun 2017 15:43 )  PTT:67.9 sec                  RADIOLOGY & ADDITIONAL STUDIES (The following images were personally reviewed):

## 2017-06-19 NOTE — PROGRESS NOTE ADULT - SUBJECTIVE AND OBJECTIVE BOX
Dr. Tan Florence  Renal Fellow  Beeper # [phone]        Patient seen and examined at bedside.   on high flow nasal cannula  has  been refusing bipap during daytime.     ferrous    sulfate 325milliGRAM(s) every 8 hours  acetaminophen   Tablet. 975milliGRAM(s) every 6 hours PRN  acetylcysteine 20% Inhalation 5milliLiter(s) every 6 hours PRN  acetazolamide    Tablet 500milliGRAM(s) daily  ALBUTerol/ipratropium for Nebulization 3milliLiter(s) every 6 hours  clopidogrel Tablet 75milliGRAM(s) daily  polyethylene glycol 3350 17Gram(s) daily  docusate sodium 100milliGRAM(s) daily  senna 1Tablet(s) daily  artificial  tears Solution 1Drop(s) three times a day  hydrocortisone 2.5% Cream 1Application(s) two times a day  heparin  Infusion. Unit(s)/Hr <Continuous>  furosemide   Injectable 60milliGRAM(s) every 12 hours      Allergies    No Known Allergies    Intolerances        T(C): , Max: 37 (06-19 @ 05:11)  T(F): , Max: 98.6 (06-19 @ 05:11)  HR: 82  BP: 115/57  BP(mean): 87  RR: 23  SpO2: 95%  Wt(kg): --  I & Os for 24h ending 06-19 @ 07:00  =============================================  IN:    Oral Fluid: 360 ml    IV PiggyBack: 50 ml    Total IN: 410 ml  ---------------------------------------------  OUT:    Indwelling Catheter - Urethral: 2300 ml    Total OUT: 2300 ml  ---------------------------------------------  Total NET: -1890 ml    I & Os for current day (as of 06-19 @ 12:01)  =============================================  IN:    Oral Fluid: 360 ml    IV PiggyBack: 100 ml    heparin  Infusion.: 84 ml    Total IN: 544 ml  ---------------------------------------------  OUT:    Indwelling Catheter - Urethral: 630 ml    Total OUT: 630 ml  ---------------------------------------------  Total NET: -86 ml        PHYSICAL EXAM:  Constitutional ill/sick appearing.  No acute distress  Neck: Supple. No JVD.  Respiratory: decrease breath sounds.   Cardiovascular: S1, S2.  Regular rate and rhythm.    Gastrointestinal: soft, non-tender, non-distended, normal bowel sounds  Extremities: Warm.  No lower extremity edema.      ACCESS:     LABS:                        10.7   6.8   )-----------( 145      ( 19 Jun 2017 05:00 )             36.7     06-19    140  |  95<L>  |  44<H>  ----------------------------<  97  4.3   |  35<H>  |  2.40<H>    Ca    9.2      19 Jun 2017 05:05  Phos  3.9     06-18  Mg     1.9     06-19    TPro  6.7  /  Alb  2.8<L>  /  TBili  0.4  /  DBili  x   /  AST  10  /  ALT  <5<L>  /  AlkPhos  84  06-19      PT/INR - ( 19 Jun 2017 05:01 )   PT: 21.6 sec;   INR: 1.92          PTT - ( 19 Jun 2017 05:01 )  PTT:34.8 sec

## 2017-06-19 NOTE — PROGRESS NOTE ADULT - SUBJECTIVE AND OBJECTIVE BOX
Physical Medicine and Rehabilitation Progress Note:    Patient is a 87y old  Female who presents with a chief complaint of unable to get out of bed x 3 days (2017 16:57)      HPI:  88 yo female with pmh of chronic diastolic CHF (EF 50-55% ), mod to severe AS (ALEKS 0.5cm)-not a surgical candidate, pafib (on Coumadin), CAD s/p PCI x 3 stent (, ' and )-last cath 3/15' PCI dLM w/ Impella support, Lupus, COPD (prior 30yr ppk hx), PNA, PPM  for bradycardia, CKD (baseline Cr 2-3), hx of Renal failure requiring HD x 4 session  @ [facility], HTN, PVD, and Type 2 DM (no longer on meds), MONICA, and TKR  presents to [facility] with cc of inability to get OOB x 3 days. Pt states that her "legs were too weak". Pt is a poor historian and HPI obtained from her daughter. Pt's daughter states that her mother was unable to get OOB for 3 days and pt used her Life Alert x3 times to call for help-was told to go to the ED. Reports that she had been "tired" for several day, SOB w/ minimal exertion for "several months" and her mobility has declined since her discharge from rehab in December. Additionally, pt has gained 17 lbs over the past month (from 258 to 275) and non-compliant with her diet. As per daughter, pt has been hospitalized prior 2-3 yrs ago (? [facility]) for CHF exacerbation w/ aggressive diuresis. Of note, pt was evaluation by Dr. Barger for aortic stenosis in . However, due to her deconditioned status during that time, pt was recommended medical management. (2017 16:57)                            10.7   6.8   )-----------( 145      ( 2017 05:00 )             36.7       -    140  |  95<L>  |  44<H>  ----------------------------<  97  4.3   |  35<H>  |  2.40<H>    Ca    9.2      2017 05:05  Phos  3.9     06-18  Mg     1.9         TPro  6.7  /  Alb  2.8<L>  /  TBili  0.4  /  DBili  x   /  AST  10  /  ALT  <5<L>  /  AlkPhos  84      Vital Signs Last 24 Hrs  T(C): 37.6, Max: 37.6 ( @ 12:00)  T(F): 99.6, Max: 99.6 ( @ 12:00)  HR: 90 (72 - 96)  BP: 117/79 (83/42 - 117/79)  BP(mean): 99 (64 - 99)  RR: 22 (16 - 29)  SpO2: 95% (93% - 99%)    MEDICATIONS  (STANDING):  ferrous    sulfate 325milliGRAM(s) Oral every 8 hours  acetazolamide    Tablet 500milliGRAM(s) Oral daily  ALBUTerol/ipratropium for Nebulization 3milliLiter(s) Nebulizer every 6 hours  clopidogrel Tablet 75milliGRAM(s) Oral daily  polyethylene glycol 3350 17Gram(s) Oral daily  docusate sodium 100milliGRAM(s) Oral daily  senna 1Tablet(s) Oral daily  artificial  tears Solution 1Drop(s) Both EYES three times a day  hydrocortisone 2.5% Cream 1Application(s) Topical two times a day  heparin  Infusion. Unit(s)/Hr IV Continuous <Continuous>  furosemide   Injectable 60milliGRAM(s) IV Push every 12 hours    MEDICATIONS  (PRN):  acetaminophen   Tablet. 975milliGRAM(s) Oral every 6 hours PRN Moderate Pain (4 - 6)  acetylcysteine 20% Inhalation 5milliLiter(s) Inhalation every 6 hours PRN shortness of breath    Currently Undergoing Physical Therapy at bedside.    Functional Status Assessment:    Bed Mobility Training:                                                                                                                                                  - Rolling/Turnin person maximum assistance     - Scootin person maximum assistance                                                                           - Sit to Supine:         2 person maximum assistance                                                                         - Supine to Sit:         2 person moderate assistance        PM&R Impression: as above    Disposition Plan Recommendations: subacute rehab placement

## 2017-06-19 NOTE — PROGRESS NOTE ADULT - PROBLEM SELECTOR PLAN 2
Primary respiratory acidosis with secondary metabolic alkalosis.  pt currently dnr/dni  not willing to be on bipap for prolonged periods of time to decrease co2 levels   Continue with diuresis with diamox.

## 2017-06-19 NOTE — PROVIDER CONTACT NOTE (CRITICAL VALUE NOTIFICATION) - SITUATION
Heparin drip turned off at 2:15 PM. At 3:30 PM PTT level rechecked. PTT results reviewed by MD White. Heparin restarted at 4:18 PM at 1800 units/hr per order.

## 2017-06-19 NOTE — PROGRESS NOTE ADULT - SUBJECTIVE AND OBJECTIVE BOX
PUD CCM ATTENDING    agitated in bed, wants to have her legs outside of the bed  able to lift both arms  on high flow 29% and 50 L    PAST MEDICAL & SURGICAL HISTORY:  PVD (peripheral vascular disease)  Iron deficiency anemia  Lupus (systemic lupus erythematosus)  Paroxysmal atrial fibrillation  Aortic stenosis, severe  CKD (chronic kidney disease) stage 4, GFR 15-29 ml/min  Diabetes  HTN (hypertension)  CHF (congestive heart failure)  CAD (coronary artery disease)  COPD (chronic obstructive pulmonary disease)  History of total knee replacement: 2003  Presence of cardiac pacemaker: at [facility] by Dr Escoto    FAMILY HISTORY:  No pertinent family history in first degree relatives    SOCIAL HISTORY:    REVIEW OF SYSTEMS:  -CP  -SOB, "my breathing"  -cough    Vital Signs Last 24 Hrs  T(C): 36.7, Max: 37.4 (06-17 @ 01:43)  T(F): 98, Max: 99.3 (06-17 @ 01:43)  HR: 78 (70 - 92)  BP: 113/45 (98/43 - 128/51)  BP(mean): 66 (52 - 96)  RR: 25 (15 - 29)  SpO2: 100% (93% - 100%)    I&O's Detail  I & Os for 24h ending 17 Jun 2017 07:00  =============================================  IN:    Oral Fluid: 360 ml    IV PiggyBack: 50 ml    Total IN: 410 ml  ---------------------------------------------  OUT:    Indwelling Catheter - Urethral: 2285 ml    Total OUT: 2285 ml  ---------------------------------------------  Total NET: -1875 ml    I & Os for current day (as of 17 Jun 2017 14:10)  =============================================  IN:    Oral Fluid: 360 ml    IV PiggyBack: 50 ml    Total IN: 410 ml  ---------------------------------------------  OUT:    Indwelling Catheter - Urethral: 405 ml    Total OUT: 405 ml  ---------------------------------------------  Total NET: 5 ml    PHYSICAL EXAM:  NIPPV high flow 45L and 29%  very agitated  Obese, uncomfortable, - acute distress; vital signs are monitored continuously  Eyes: -conjunctivitis, -scleritis    Head: no focal deficit, normocephalic,  no trauma  ENMT: moist tongue, no thrush, -nasal discharge, -hoarseness, normal hearing, -cough, -hemoptysis, trachea midline  Neck: supple, - lymphadenopathy,  -masses, -JVD  Respiratory: bilateral diminished breath sounds, -wheezing, -rhonchi, +rales, -crackles  Chest: -accessory muscle use, -paradoxical breathing  Cardiovascular: irregular rate and paced, S1 S2 normal, -S3, -S4, 4/6 murmur, -gallop, -rub  Gastrointestinal: soft, nontender, nondistended, normal bowel sounds, no hepatosplenomegaly  Genitourinary: -flank pain, -dysuria  KISER  Extremities: -clubbing, -cyanosis, +edema    Vascular: peripheral pulses palpable 2+ bilaterally  Neurological: alert, oriented x 2, no focal deficit, -tremor   Skin: warm, dry, -erythema, iv site intact  Lymph nodes; no cervical, supraclavicular or axillary adenopathy  Psychiatric: agitated    MEDICATIONS  (STANDING):  ferrous    sulfate 325milliGRAM(s) Oral every 8 hours  cefTRIAXone   IVPB 1Gram(s) IV Intermittent every 24 hours  cefTRIAXone   IVPB  IV Intermittent   acetazolamide    Tablet 500milliGRAM(s) Oral daily  ALBUTerol/ipratropium for Nebulization 3milliLiter(s) Nebulizer every 6 hours  buDESOnide 160 MICROgram(s)/formoterol 4.5 MICROgram(s) Inhaler 2Puff(s) Inhalation two times a day  clopidogrel Tablet 75milliGRAM(s) Oral daily    MEDICATIONS  (PRN):  acetaminophen   Tablet. 975milliGRAM(s) Oral every 6 hours PRN Moderate Pain (4 - 6)  acetylcysteine 20% Inhalation 5milliLiter(s) Inhalation every 6 hours PRN shortness of breath    Allergies  No Known Allergies  Intolerances    LABS:                        10.9   7.6   )-----------( 146      ( 17 Jun 2017 05:27 )             36.8     06-17    141  |  97  |  47<H>  ----------------------------<  107<H>  4.1   |  33<H>  |  2.60<H>    Ca    9.3      17 Jun 2017 05:27  Mg     2.0     06-17  TPro  7.0  /  Alb  3.0<L>  /  TBili  0.3  /  DBili  <0.2  /  AST  15  /  ALT  6<L>  /  AlkPhos  94  06-17  PT/INR - ( 17 Jun 2017 05:27 )   PT: 32.3 sec;   INR: 2.85     PTT - ( 17 Jun 2017 05:27 )  PTT:41.1 sec    +DVT prophylaxis INR - HEPARIN DRIP  +Sleep    +Nutrition goals ORAL  -Pain DENIED  -Decubital ulcer  +GI prophylaxis (PPV, coagulopathy, Hx)  +Aspiration prophylaxis (45 degrees)  Ventilator synchronized  Tracheal cuff pressure  +Sedation/analgesia stopped once  +ID (phos, CH, mupi, SB)  KLEBSIELLA  -Delirium  +Cardiac   +Prevention  +Education  +Medication reviewed (drug-drug interactions, PDA)  Medical devices  Discussed with CCU, PGY, CCRN     RADIOLOGY & ADDITIONAL STUDIES:  CXRs reviewed - edema and effusions PUD CCM ATTENDING    agitated in bed, wants to have her legs outside of the bed  able to lift both arms  on high flow 29% and 50 L    PAST MEDICAL & SURGICAL HISTORY:  PVD (peripheral vascular disease)  Iron deficiency anemia  Lupus (systemic lupus erythematosus)  Paroxysmal atrial fibrillation  Aortic stenosis, severe  CKD (chronic kidney disease) stage 4, GFR 15-29 ml/min  Diabetes  HTN (hypertension)  CHF (congestive heart failure)  CAD (coronary artery disease)  COPD (chronic obstructive pulmonary disease)  History of total knee replacement: 2003  Presence of cardiac pacemaker: at [facility] by Dr Escoto    FAMILY HISTORY:  No pertinent family history in first degree relatives    SOCIAL HISTORY:    REVIEW OF SYSTEMS:  -CP  -SOB, "my breathing"  -cough    Vital Signs Last 24 Hrs  T(C): 36.7, Max: 37.4 (06-17 @ 01:43)  T(F): 98, Max: 99.3 (06-17 @ 01:43)  HR: 78 (70 - 92)  BP: 113/45 (98/43 - 128/51)  BP(mean): 66 (52 - 96)  RR: 25 (15 - 29)  SpO2: 100% (93% - 100%)    I&O's Detail  I & Os for 24h ending 17 Jun 2017 07:00  =============================================  IN:    Oral Fluid: 360 ml    IV PiggyBack: 50 ml    Total IN: 410 ml  ---------------------------------------------  OUT:    Indwelling Catheter - Urethral: 2285 ml    Total OUT: 2285 ml  ---------------------------------------------  Total NET: -1875 ml    I & Os for current day (as of 17 Jun 2017 14:10)  =============================================  IN:    Oral Fluid: 360 ml    IV PiggyBack: 50 ml    Total IN: 410 ml  ---------------------------------------------  OUT:    Indwelling Catheter - Urethral: 405 ml    Total OUT: 405 ml  ---------------------------------------------  Total NET: 5 ml    PHYSICAL EXAM:  NIPPV high flow 45L and 29%  very agitated  Obese, uncomfortable, - acute distress; vital signs are monitored continuously  Eyes: -conjunctivitis, -scleritis    Head: no focal deficit, normocephalic,  no trauma  ENMT: moist tongue, no thrush, -nasal discharge, -hoarseness, normal hearing, -cough, -hemoptysis, trachea midline  Neck: supple, - lymphadenopathy,  -masses, -JVD  Respiratory: bilateral diminished breath sounds, -wheezing, -rhonchi, +rales, -crackles  Chest: -accessory muscle use, -paradoxical breathing  Cardiovascular: irregular rate and paced, S1 S2 normal, -S3, -S4, 4/6 murmur, -gallop, -rub  Gastrointestinal: soft, nontender, nondistended, normal bowel sounds, no hepatosplenomegaly  Genitourinary: -flank pain, -dysuria  KISER  Extremities: -clubbing, -cyanosis, +edema    Vascular: peripheral pulses palpable 2+ bilaterally  Neurological: alert, oriented x 2, no focal deficit, -tremor   Skin: warm, dry, -erythema, iv site intact  Lymph nodes; no cervical, supraclavicular or axillary adenopathy  Psychiatric: agitated    MEDICATIONS  (STANDING):  ferrous    sulfate 325milliGRAM(s) Oral every 8 hours  cefTRIAXone   IVPB 1Gram(s) IV Intermittent every 24 hours  cefTRIAXone   IVPB  IV Intermittent   acetazolamide    Tablet 500milliGRAM(s) Oral daily  ALBUTerol/ipratropium for Nebulization 3milliLiter(s) Nebulizer every 6 hours  buDESOnide 160 MICROgram(s)/formoterol 4.5 MICROgram(s) Inhaler 2Puff(s) Inhalation two times a day  clopidogrel Tablet 75milliGRAM(s) Oral daily    MEDICATIONS  (PRN):  acetaminophen   Tablet. 975milliGRAM(s) Oral every 6 hours PRN Moderate Pain (4 - 6)  acetylcysteine 20% Inhalation 5milliLiter(s) Inhalation every 6 hours PRN shortness of breath    Allergies  No Known Allergies  Intolerances    LABS:                        10.9   7.6   )-----------( 146      ( 17 Jun 2017 05:27 )             36.8     06-17    141  |  97  |  47<H>  ----------------------------<  107<H>  4.1   |  33<H>  |  2.60<H>    Ca    9.3      17 Jun 2017 05:27  Mg     2.0     06-17  TPro  7.0  /  Alb  3.0<L>  /  TBili  0.3  /  DBili  <0.2  /  AST  15  /  ALT  6<L>  /  AlkPhos  94  06-17  PT/INR - ( 17 Jun 2017 05:27 )   PT: 32.3 sec;   INR: 2.85     PTT - ( 17 Jun 2017 05:27 )  PTT:41.1 sec    +DVT prophylaxis INR - HEPARIN DRIP  +Sleep    +Nutrition goals ORAL  -Pain DENIED  -Decubital ulcer  +GI prophylaxis (PPV, coagulopathy, Hx)  +Aspiration prophylaxis (45 degrees)  Ventilator synchronized  Tracheal cuff pressure  +Sedation/analgesia stopped once  +ID (phos, CH, mupi, SB)  KLEBSIELLA - COMPLETED RX  -Delirium  +Cardiac   +Prevention  +Education  +Medication reviewed (drug-drug interactions, PDA)  Medical devices  Discussed with CCU, PGY, CCRN     RADIOLOGY & ADDITIONAL STUDIES:  CXRs reviewed - edema and effusions

## 2017-06-19 NOTE — CHART NOTE - NSCHARTNOTEFT_GEN_A_CORE
Patient seen this morning.  When I asked her 'how is your breathing'?  She responded angrily "I don't know"!  The team just attempted to place her on nasal cannula but she quickly desaturated to 84% (confirmed with 2 different O2 sat probes).  She was hence placed back on high-flow NC.  She appeared to have some labored breathing, but she was able to talk in full sentences.  O2 saturation back up in the 90's once on High-flow.  Per Dr. Barger, will consider BAV on a different hospital admission.  She needs to be medically optimized first, and recover from her CHF exacerbation.  She can f/U with Dr. Barger in the out-pt setting.  No plan for any SHD intervention at this time.

## 2017-06-19 NOTE — PROGRESS NOTE ADULT - ASSESSMENT
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     PLAN  CARDIO:  Valves:  -severe AS, ALEKS 0.5, mean pressure gradient 43, mod pulm HTN, PASP 52. Structural cardiology is following pt and offered BAV as outpatient if patient can be compliant with medical management. Palliative is also following.    -CTS Dr. Barger consulted. F/u recs.      Pump: acute CHF exacerbation 2/2 dietary non-compliance and severe AS . EF 50-55%, severely dilated LA   -Daily weights, strict I/Os (us in place)  -holding Toprol XL 25mg po daily as pt borderline hypotensive   -Cont Furosemide prn, c/w diamox 500mg qd per renal recs. Careful diuresis with close monitoring of BP in setting of severe AS.     Vessel:  s/p PCI x 3 stent (04', 06' and 12')-last cath 3/15' PCI dLM   -Trop 0.04 x 4. Likely 2/2 demand ischemia. Not continuing to trend.   -holding Toprol XL 25mg po daily in setting of hypotension from diuresis  -Per daughter Rosendo House Call NP Eben Tellez d/c'ed ASA due to dual AC w/ Coumadin, statin was d/c'ed years ago for unclear reasons. Started plavix yesterday.     Electrical:  -s/p PPM 2011 2/2 symptomatic bradycardia. PPM interrogation WNL.  -Daily PT/INR. Coumadin currently held as INR therapeutic.  When INR < 2, will start heparin gtt  -holding Toprol XL 25mg po daily in setting of borderline low BP and diuresis.    Hemodynamics: borderline hypotensive, holding ACE/ARB/and BB in setting of diuresis.      PULM:  #COPD (chronic obstructive pulmonary disease) - c/w duonebs   #Hypercapneic resp failure 2/2 pulmonary congestion 2/2 CHF 2/2 severe AS -  PCO2 is stable. see plan above. C/w Bipap and HFNC. VBGs as needed.     ID - UTI growing kleb. completed 7 day course of abx.

## 2017-06-20 DIAGNOSIS — N18.9 CHRONIC KIDNEY DISEASE, UNSPECIFIED: ICD-10-CM

## 2017-06-20 LAB
ANION GAP SERPL CALC-SCNC: 12 MMOL/L — SIGNIFICANT CHANGE UP (ref 5–17)
APTT BLD: 69.8 SEC — HIGH (ref 27.5–37.4)
APTT BLD: 79.6 SEC — HIGH (ref 27.5–37.4)
APTT BLD: 99.8 SEC — HIGH (ref 27.5–37.4)
BUN SERPL-MCNC: 45 MG/DL — HIGH (ref 7–23)
CALCIUM SERPL-MCNC: 9.3 MG/DL — SIGNIFICANT CHANGE UP (ref 8.4–10.5)
CHLORIDE SERPL-SCNC: 96 MMOL/L — SIGNIFICANT CHANGE UP (ref 96–108)
CO2 SERPL-SCNC: 35 MMOL/L — HIGH (ref 22–31)
CREAT SERPL-MCNC: 2.4 MG/DL — HIGH (ref 0.5–1.3)
GLUCOSE SERPL-MCNC: 101 MG/DL — HIGH (ref 70–99)
HCT VFR BLD CALC: 36.8 % — SIGNIFICANT CHANGE UP (ref 34.5–45)
HGB BLD-MCNC: 11 G/DL — LOW (ref 11.5–15.5)
INR BLD: 1.86 — HIGH (ref 0.88–1.16)
MAGNESIUM SERPL-MCNC: 2.1 MG/DL — SIGNIFICANT CHANGE UP (ref 1.6–2.6)
MCHC RBC-ENTMCNC: 27 PG — SIGNIFICANT CHANGE UP (ref 27–34)
MCHC RBC-ENTMCNC: 29.9 G/DL — LOW (ref 32–36)
MCV RBC AUTO: 90.4 FL — SIGNIFICANT CHANGE UP (ref 80–100)
PLATELET # BLD AUTO: 173 K/UL — SIGNIFICANT CHANGE UP (ref 150–400)
POTASSIUM SERPL-MCNC: 4.4 MMOL/L — SIGNIFICANT CHANGE UP (ref 3.5–5.3)
POTASSIUM SERPL-SCNC: 4.4 MMOL/L — SIGNIFICANT CHANGE UP (ref 3.5–5.3)
PROTHROM AB SERPL-ACNC: 20.9 SEC — HIGH (ref 9.8–12.7)
RBC # BLD: 4.07 M/UL — SIGNIFICANT CHANGE UP (ref 3.8–5.2)
RBC # FLD: 16 % — SIGNIFICANT CHANGE UP (ref 10.3–16.9)
SODIUM SERPL-SCNC: 143 MMOL/L — SIGNIFICANT CHANGE UP (ref 135–145)
WBC # BLD: 7.7 K/UL — SIGNIFICANT CHANGE UP (ref 3.8–10.5)
WBC # FLD AUTO: 7.7 K/UL — SIGNIFICANT CHANGE UP (ref 3.8–10.5)

## 2017-06-20 PROCEDURE — 93010 ELECTROCARDIOGRAM REPORT: CPT

## 2017-06-20 PROCEDURE — 99291 CRITICAL CARE FIRST HOUR: CPT

## 2017-06-20 PROCEDURE — 99233 SBSQ HOSP IP/OBS HIGH 50: CPT

## 2017-06-20 PROCEDURE — 71010: CPT | Mod: 26

## 2017-06-20 RX ORDER — POTASSIUM CHLORIDE 20 MEQ
40 PACKET (EA) ORAL ONCE
Qty: 0 | Refills: 0 | Status: COMPLETED | OUTPATIENT
Start: 2017-06-20 | End: 2017-06-20

## 2017-06-20 RX ORDER — HEPARIN SODIUM 5000 [USP'U]/ML
1350 INJECTION INTRAVENOUS; SUBCUTANEOUS
Qty: 25000 | Refills: 0 | Status: DISCONTINUED | OUTPATIENT
Start: 2017-06-20 | End: 2017-06-21

## 2017-06-20 RX ADMIN — Medication 325 MILLIGRAM(S): at 14:26

## 2017-06-20 RX ADMIN — Medication 3 MILLILITER(S): at 01:02

## 2017-06-20 RX ADMIN — Medication 40 MILLIEQUIVALENT(S): at 11:25

## 2017-06-20 RX ADMIN — Medication 60 MILLIGRAM(S): at 18:15

## 2017-06-20 RX ADMIN — Medication 1 CAPSULE(S): at 18:22

## 2017-06-20 RX ADMIN — HEPARIN SODIUM 15 UNIT(S)/HR: 5000 INJECTION INTRAVENOUS; SUBCUTANEOUS at 14:30

## 2017-06-20 RX ADMIN — Medication 60 MILLIGRAM(S): at 06:17

## 2017-06-20 RX ADMIN — Medication 1 DROP(S): at 22:38

## 2017-06-20 RX ADMIN — Medication 1 APPLICATION(S): at 06:18

## 2017-06-20 RX ADMIN — HEPARIN SODIUM 13.5 UNIT(S)/HR: 5000 INJECTION INTRAVENOUS; SUBCUTANEOUS at 22:57

## 2017-06-20 RX ADMIN — HEPARIN SODIUM 15 UNIT(S)/HR: 5000 INJECTION INTRAVENOUS; SUBCUTANEOUS at 00:25

## 2017-06-20 RX ADMIN — Medication 325 MILLIGRAM(S): at 06:17

## 2017-06-20 RX ADMIN — Medication 1 DROP(S): at 14:30

## 2017-06-20 RX ADMIN — CLOPIDOGREL BISULFATE 75 MILLIGRAM(S): 75 TABLET, FILM COATED ORAL at 11:25

## 2017-06-20 RX ADMIN — Medication 1 DROP(S): at 06:18

## 2017-06-20 RX ADMIN — HEPARIN SODIUM 15 UNIT(S)/HR: 5000 INJECTION INTRAVENOUS; SUBCUTANEOUS at 08:00

## 2017-06-20 RX ADMIN — Medication 1 APPLICATION(S): at 18:15

## 2017-06-20 RX ADMIN — Medication 325 MILLIGRAM(S): at 22:38

## 2017-06-20 RX ADMIN — Medication 3 MILLILITER(S): at 23:52

## 2017-06-20 RX ADMIN — Medication 3 MILLILITER(S): at 06:18

## 2017-06-20 RX ADMIN — Medication 3 MILLILITER(S): at 17:21

## 2017-06-20 RX ADMIN — Medication 3 MILLILITER(S): at 12:32

## 2017-06-20 RX ADMIN — ACETAZOLAMIDE 500 MILLIGRAM(S): 250 TABLET ORAL at 11:25

## 2017-06-20 NOTE — PROGRESS NOTE ADULT - SUBJECTIVE AND OBJECTIVE BOX
CC: ACUTE CHRONIC CONGESTIVEHEART FAILURE UNSPECIFIE      INTERVAL HISTORY:lying comfortably in bed  complains of leg pain (apparently chronic complaint)      ROS: No chest pain, no sob, no abd pain. No n/v/d    PAST MEDICAL & SURGICAL HISTORY:  PVD (peripheral vascular disease)  Iron deficiency anemia  Lupus (systemic lupus erythematosus)  Paroxysmal atrial fibrillation  Aortic stenosis, severe  CKD (chronic kidney disease) stage 4, GFR 15-29 ml/min  Diabetes  HTN (hypertension)  CHF (congestive heart failure)  CAD (coronary artery disease)  COPD (chronic obstructive pulmonary disease)  History of total knee replacement: 2003  Presence of cardiac pacemaker: at [facility] by Dr Escoto      PHYSICAL EXAM:  T(C): 37.3, Max: 37.6 (06-19 @ 12:00)  HR: 84  BP: 117/61 (94/81 - 139/76)  RR: 27  SpO2: 98%  Wt(kg): --  I&O's Summary  I & Os for 24h ending 20 Jun 2017 07:00  =============================================  IN: 1135 ml / OUT: 3820 ml / NET: -2685 ml    I & Os for current day (as of 20 Jun 2017 09:56)  =============================================  IN: 30 ml / OUT: 350 ml / NET: -320 ml    Weight 120 (06-15 @ 14:33)  General: AAO x 3,  NAD.  HEENT: moist mucous membranes, no pallor/cyanosis.  Neck: no JVD visible.  Cardiac: S1, S2. RRR. No murmurs   Respratory rhonchi  Abdomen: soft. nontender. nondistended  Skin: no rashes.  Extremities: +  LE edema b/l  Access:       DATA:                        11.0<L>  7.7   )-----------( 173      ( 20 Jun 2017 06:49 )             36.8     Ferritin, Serum: 92.0 ng/mL (06-12 @ 05:42)      143    |  96     |  45<H>  ----------------------------<  101<H>  Ca:9.3   (20 Jun 2017 06:49)  4.4     |  35<H>  |  2.40<H>      eGFR if Non : 18 <L>  eGFR if African American: 20 <L>    TPro  6.7 g/dL  /  Alb  2.8 g/dL<L>  /  TBili  0.4 mg/dL  /  DBili  x      /  AST  10 U/L  /  ALT  <5 U/L<L>  /  AlkPhos  84 U/L  19 Jun 2017 05:05                    MEDICATIONS  (STANDING):  ferrous    sulfate 325milliGRAM(s) Oral every 8 hours  acetazolamide    Tablet 500milliGRAM(s) Oral daily  ALBUTerol/ipratropium for Nebulization 3milliLiter(s) Nebulizer every 6 hours  clopidogrel Tablet 75milliGRAM(s) Oral daily  polyethylene glycol 3350 17Gram(s) Oral daily  docusate sodium 100milliGRAM(s) Oral daily  senna 1Tablet(s) Oral daily  artificial  tears Solution 1Drop(s) Both EYES three times a day  hydrocortisone 2.5% Cream 1Application(s) Topical two times a day  furosemide   Injectable 60milliGRAM(s) IV Push every 12 hours  heparin  Infusion 1500Unit(s)/Hr IV Continuous <Continuous>    MEDICATIONS  (PRN):  acetaminophen   Tablet. 975milliGRAM(s) Oral every 6 hours PRN Moderate Pain (4 - 6)  acetylcysteine 20% Inhalation 5milliLiter(s) Inhalation every 6 hours PRN shortness of breath

## 2017-06-20 NOTE — PROGRESS NOTE ADULT - ASSESSMENT
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

## 2017-06-20 NOTE — PROGRESS NOTE ADULT - SUBJECTIVE AND OBJECTIVE BOX
LAVINIA MARIE   MRN-[id number]         CC: Patient is a 87y old  Female who presents with a chief complaint of unable to get out of bed x 3 days (11 Jun 2017 16:57) secondary to UTI and acute on chronic CHF. c/o not feeling well, but can't specify    ROS:  UNABLE TO OBTAIN  due to:    DYSPNEA (Y/N): n	  N/V (Y/N): n	  SECRETIONS (Y/N):n	  AGITATION (Y/N):n  PAIN(Y/N):  yes, right groin area (chronic)      -Provocation/Palliation: mov't/tylenol, rest     -Quality/Quantity: sharp     -Radiating: none     -Severity: "bad"     -Timing/Frequency: constant     -Impact on ADLs: bedbound     OTHER REVIEW OF SYSTEMS:  ALLERGIES:  No Known Allergies    OPIATE NAÏVE (Y/N): y  iSTOP REVIEWED (Y/N):y     MEDICATIONS: reviewed  MEDICATIONS  (STANDING):  ferrous    sulfate 325milliGRAM(s) Oral every 8 hours  acetazolamide    Tablet 500milliGRAM(s) Oral daily  ALBUTerol/ipratropium for Nebulization 3milliLiter(s) Nebulizer every 6 hours  clopidogrel Tablet 75milliGRAM(s) Oral daily  polyethylene glycol 3350 17Gram(s) Oral daily  docusate sodium 100milliGRAM(s) Oral daily  senna 1Tablet(s) Oral daily  artificial  tears Solution 1Drop(s) Both EYES three times a day  hydrocortisone 2.5% Cream 1Application(s) Topical two times a day  furosemide   Injectable 60milliGRAM(s) IV Push every 12 hours  heparin  Infusion 1500Unit(s)/Hr IV Continuous <Continuous>    MEDICATIONS  (PRN):  acetaminophen   Tablet. 975milliGRAM(s) Oral every 6 hours PRN Moderate Pain (4 - 6)  acetylcysteine 20% Inhalation 5milliLiter(s) Inhalation every 6 hours PRN shortness of breath      PHYSICAL EXAM:  T(C): 37.3, Max: 37.3 (06-20 @ 10:00)  T(F): 99.2, Max: 99.2 (06-20 @ 12:00)  HR: 82 (72 - 90)  BP: 127/62 (94/81 - 139/76)  RR: 25 (19 - 36)  SpO2: 95% (92% - 100%)    GENERAL: Saw pt as she was refusing to work with P.T. mildly agitated  HEENT: normocephalic, no nasal discharge, moist mucous membranes     	  CVS: afib on ECG          	  RESP: 5LNC, resp even and not labored with speech        	  : f/c             NEURO: no apparent focal deficits    	  PSYCH: mildy agitated           LABS: reviewed                        11.0   7.7   )-----------( 173      ( 20 Jun 2017 06:49 )             36.8     06-20    143  |  96  |  45<H>  ----------------------------<  101<H>  4.4   |  35<H>  |  2.40<H>    Ca    9.3      20 Jun 2017 06:49  Mg     2.1     06-20    TPro  6.7  /  Alb  2.8<L>  /  TBili  0.4  /  DBili  x   /  AST  10  /  ALT  <5<L>  /  AlkPhos  84  06-19    LIVER FUNCTIONS - ( 19 Jun 2017 05:05 )  Alb: 2.8 g/dL / Pro: 6.7 g/dL / ALK PHOS: 84 U/L / ALT: <5 U/L / AST: 10 U/L / GGT: x           PT/INR - ( 20 Jun 2017 06:49 )   PT: 20.9 sec;   INR: 1.86     PTT - ( 20 Jun 2017 06:49 )  PTT:79.6 sec    IMAGING: reviewed  EXAM:  XR CHEST 1 VIEW PORT ROUTINE                          PROCEDURE DATE:  06/20/2017       IMPRESSION:  Pulmonary edema, unchanged.    ADVANCED DIRECTIVES:    DNR     DNI        DECISION MAKER: pt  LEGAL SURROGATE:    PSYCHOSOCIAL-SPIRITUAL ASSESSMENT:       Reviewed    GOALS OF CARE DISCUSSION       Palliative care info/counseling provided	           Documentation of GOC: rehabilitative with ultimate goal of home for BAV   	      AGENCY CHOICE DISCUSSED:          TALIA         REFERRALS	        Unit SW/Case Mgmt       Patient/Family Support       Massage Therapy       Music Therapy       PT/OT    [ ]CRITICAL CARE TIME PROVIDED TO UNSTABLE PT W/ ORGAN FAILURE    Start:               End:  	       Minutes:          [x]> 50% OF THE TIME SPENT IN COUNSELING AND COORDINATING CARE   Start:               End:  	       Minutes:  [ ]PROLONGED SERVICE             FACE TO FACE: [x]PT     [ ]PT & FAMILY   Start:               End:  	       Minutes:

## 2017-06-20 NOTE — PROGRESS NOTE ADULT - ASSESSMENT
88 y/o morbidly obese woman with h/o CHF, CAD with prior PCIs and stents, PPM, severe AS deemed not an appropriate surgical candidate previously in 12/16, prior Impella support, DM, CKD, prior short term HD, PVD, and COPD presenting from home with SOB with min. exertion, progressive functional decline (homebound since 12/16), approx. 17lb wt. gain, and unable to get OOBx3 days found to have acute on chronic heart failure and hypercapneic resp failure

## 2017-06-20 NOTE — PROGRESS NOTE ADULT - SUBJECTIVE AND OBJECTIVE BOX
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 Pt states that her "legs were too weak". Pt is a poor historian and HPI obtained from her daughter. Pt's daughter states that her mother was unable to get OOB for 3 days and pt used her Life Alert x3 times to call for help-was told to go to the ED. Reports that she had been "tired" for several day, SOB w/ minimal exertion for "several months" and her mobility has declined since her discharge from rehab in December. Additionally, pt has gained 17 lbs over the past month (from 258 to 275) and non-compliant with her diet. As per daughter, pt has been hospitalized prior 2-3 yrs ago (? [facility]) for CHF exacerbation w/ aggressive diuresis. Of note, pt was evaluation by Dr. Barger for aortic stenosis in December 16'. However, due to her deconditioned status during that time, pt was recommended medical management.         	  MEDICATIONS:  acetazolamide    Tablet 500milliGRAM(s) Oral daily  furosemide   Injectable 60milliGRAM(s) IV Push every 12 hours      acetylcysteine 20% Inhalation 5milliLiter(s) Inhalation every 6 hours PRN  ALBUTerol/ipratropium for Nebulization 3milliLiter(s) Nebulizer every 6 hours    acetaminophen   Tablet. 975milliGRAM(s) Oral every 6 hours PRN    polyethylene glycol 3350 17Gram(s) Oral daily  docusate sodium 100milliGRAM(s) Oral daily  senna 1Tablet(s) Oral daily      ferrous    sulfate 325milliGRAM(s) Oral every 8 hours  clopidogrel Tablet 75milliGRAM(s) Oral daily  artificial  tears Solution 1Drop(s) Both EYES three times a day  hydrocortisone 2.5% Cream 1Application(s) Topical two times a day  heparin  Infusion 1500Unit(s)/Hr IV Continuous <Continuous>  Nephrocaps 1Capsule(s) Oral daily      Complaint:     Otherwise 12 point review of systems is normal.    PHYSICAL EXAM:  Constitutional: NAD   Eyes: PERRL  ENMT: dry MM  Neck: distended neck veins  Respiratory: reduced bs at bases with bibasilar crackles and end expiratory wheezing  Cardiovascular: irreg rhythm, reg rate, harsh systolic murmur  Gastrointestinal: soft, NT  Extremities: WWP, + edema  Neurological: A&O x 3      ICU Vital Signs Last 24 Hrs  T(C): 37, Max: 37.3 (06-20 @ 10:00)  T(F): 98.6, Max: 99.2 (06-20 @ 12:00)  HR: 70 (70 - 86)  BP: 118/54 (94/81 - 136/71)  BP(mean): 75 (66 - 99)  ABP: --  ABP(mean): --  RR: 18 (18 - 36)  SpO2: 98% (95% - 100%)          ECG:      CHEST X RAY    CT    MRI    MRA    CT ANGIO    CAROTID DUPLEX    DUPLEX     Echocardiogram    Catheterization:    Stress Test:     LABS:	 	  CARDIAC MARKERS:                              11.0   7.7   )-----------( 173      ( 20 Jun 2017 06:49 )             36.8     06-20    143  |  96  |  45<H>  ----------------------------<  101<H>  4.4   |  35<H>  |  2.40<H>    Ca    9.3      20 Jun 2017 06:49  Mg     2.1     06-20    TPro  6.7  /  Alb  2.8<L>  /  TBili  0.4  /  DBili  x   /  AST  10  /  ALT  <5<L>  /  AlkPhos  84  06-19          ASSESSMENT/PLAN: 	  82 yo F chronic diastolic CHF (EF 50-55% 11/16'), mod to severe AS (ALEKS 0.5cm)-not a surgical candidate, pafib (on Coumadin), CAD s/p PCI x 3 stent (04', 06' and 12')-last cath 3/15' PCI dLM w/ Impella support, Lupus, COPD (prior 30yr ppk hx), PNA, 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     PLAN  CARDIO:  Valves:  -severe AS, ALEKS 0.5, mean pressure gradient 43, mod pulm HTN, PASP 52. Structural cardiology is following pt and may perform BAV during admission if pt's resp status improves such that she can lay flat.   -CTS Dr. Barger consulted. F/u recs.

## 2017-06-20 NOTE — PROGRESS NOTE ADULT - PROBLEM SELECTOR PLAN 3
will cont. to follow for support.  Voice amplifier is for pt. use and is for her to keep.  DNR/DNI.  Music and massage therapist to see for added support along with pt/family support counselor

## 2017-06-20 NOTE — PROGRESS NOTE ADULT - SUBJECTIVE AND OBJECTIVE BOX
PUD CCM ATTENDING    tired  high flow changed to nasal cannula  well tolerated    PAST MEDICAL & SURGICAL HISTORY:  PVD (peripheral vascular disease)  Iron deficiency anemia  Lupus (systemic lupus erythematosus)  Paroxysmal atrial fibrillation  Aortic stenosis, severe  CKD (chronic kidney disease) stage 4, GFR 15-29 ml/min  Diabetes  HTN (hypertension)  CHF (congestive heart failure)  CAD (coronary artery disease)  COPD (chronic obstructive pulmonary disease)  History of total knee replacement: 2003  Presence of cardiac pacemaker: at [facility] by Dr Escoto    FAMILY HISTORY:  No pertinent family history in first degree relatives    SOCIAL HISTORY:    REVIEW OF SYSTEMS:  -CP  -SOB  -cough    Vital Signs Last 24 Hrs  T(C): 36.7, Max: 37.4 (06-17 @ 01:43)  T(F): 98, Max: 99.3 (06-17 @ 01:43)  HR: 78 (70 - 92)  BP: 113/45 (98/43 - 128/51)  BP(mean): 66 (52 - 96)  RR: 25 (15 - 29)  SpO2: 100% (93% - 100%)    I&O's Detail  I & Os for 24h ending 17 Jun 2017 07:00  =============================================  IN:    Oral Fluid: 360 ml    IV PiggyBack: 50 ml    Total IN: 410 ml  ---------------------------------------------  OUT:    Indwelling Catheter - Urethral: 2285 ml    Total OUT: 2285 ml  ---------------------------------------------  Total NET: -1875 ml    I & Os for current day (as of 17 Jun 2017 14:10)  =============================================  IN:    Oral Fluid: 360 ml    IV PiggyBack: 50 ml    Total IN: 410 ml  ---------------------------------------------  OUT:    Indwelling Catheter - Urethral: 405 ml    Total OUT: 405 ml  ---------------------------------------------  Total NET: 5 ml    PHYSICAL EXAM:  in bed, lethargic but answering appropriately  no agitation  Obese, comfortable, - acute distress; vital signs are monitored continuously  Eyes: -conjunctivitis, -scleritis    Head: no focal deficit, normocephalic,  no trauma  ENMT: DRY tongue, no thrush, -nasal discharge, -hoarseness, normal hearing, -cough, -hemoptysis, trachea midline  Neck: supple, - lymphadenopathy,  -masses, -JVD  Respiratory: bilateral diminished breath sounds, -wheezing, -rhonchi, +rales, -crackles  Chest: +accessory muscle use, + INTERMITTENT paradoxical breathing  Cardiovascular: irregular rate and paced, S1 S2 normal, -S3, -S4, 4/6 murmur, -gallop, -rub  Gastrointestinal: soft, nontender, nondistended, normal bowel sounds, no hepatosplenomegaly  Genitourinary: -flank pain, -dysuria  KISER  Extremities: -clubbing, -cyanosis, +edema    Vascular: peripheral pulses palpable 2+ bilaterally  Neurological: alert, oriented x 2, no focal deficit, -tremor   Skin: warm, dry, -erythema, iv sites x 2 intact  Lymph nodes; no cervical, supraclavicular or axillary adenopathy  Psychiatric: cooperative    MEDICATIONS  (STANDING):  Lasix iv x 2/day  ferrous    sulfate 325milliGRAM(s) Oral every 8 hours  acetazolamide    Tablet 500milliGRAM(s) Oral daily  ALBUTerol/ipratropium for Nebulization 3milliLiter(s) Nebulizer every 6 hours  buDESOnide 160 MICROgram(s)/formoterol 4.5 MICROgram(s) Inhaler 2Puff(s) Inhalation two times a day  clopidogrel Tablet 75milliGRAM(s) Oral daily    MEDICATIONS  (PRN):  acetaminophen   Tablet. 975milliGRAM(s) Oral every 6 hours PRN Moderate Pain (4 - 6)  acetylcysteine 20% Inhalation 5milliLiter(s) Inhalation every 6 hours PRN shortness of breath    Allergies  No Known Allergies  Intolerances    LABS:                        10.9   7.6   )-----------( 146      ( 17 Jun 2017 05:27 )             36.8     06-17    141  |  97  |  47<H>  ----------------------------<  107<H>  4.1   |  33<H>  |  2.60<H>    Ca    9.3      17 Jun 2017 05:27  Mg     2.0     06-17  TPro  7.0  /  Alb  3.0<L>  /  TBili  0.3  /  DBili  <0.2  /  AST  15  /  ALT  6<L>  /  AlkPhos  94  06-17  PT/INR - ( 17 Jun 2017 05:27 )   PT: 32.3 sec;   INR: 2.85     PTT - ( 17 Jun 2017 05:27 )  PTT:41.1 sec    +DVT prophylaxis INR - HEPARIN DRIP  +Sleep    +Nutrition goals ORAL  -Pain DENIED  -Decubital ulcer  +GI prophylaxis (PPV, coagulopathy, Hx)  +Aspiration prophylaxis (45 degrees)  +Sedation/analgesia stopped once  +ID (phos, CH, mupi, SB)    -Delirium  +Cardiac  clopidogrel 75  +Prevention  +Education  +Medication reviewed (drug-drug interactions, PDA)  Medical devices  Discussed with CCU, PGY, CCRN     RADIOLOGY & ADDITIONAL STUDIES:  CXRs reviewed - edema and effusions - mild improvement

## 2017-06-20 NOTE — PROGRESS NOTE ADULT - SUBJECTIVE AND OBJECTIVE BOX
INTERVAL HPI/OVERNIGHT EVENTS:    SUBJECTIVE:     VITAL SIGNS:  Vital Signs Last 24 Hrs  T(C): 37.1, Max: 37.6 (06-19 @ 12:00)  T(F): 98.8, Max: 99.6 (06-19 @ 12:00)  HR: 82 (72 - 96)  BP: 118/65 (96/61 - 139/76)  BP(mean): 74 (66 - 99)  RR: 28 (19 - 29)  SpO2: 97% (92% - 99%)  I&O: Net neg 2.4L for 24 hr    PHYSICAL EXAM:    Constitutional:  Eyes:  ENMT:  Neck:  Respiratory:  Cardiovascular:  Gastrointestinal:  Extremities:  Vascular:  Neurological:  Musculoskeletal:    LABS:                        10.6   8.6   )-----------( 161      ( 19 Jun 2017 22:30 )             34.6     06-19    140  |  95<L>  |  44<H>  ----------------------------<  97  4.3   |  35<H>  |  2.40<H>    Ca    9.2      19 Jun 2017 05:05  Mg     1.9     06-19    TPro  6.7  /  Alb  2.8<L>  /  TBili  0.4  /  DBili  x   /  AST  10  /  ALT  <5<L>  /  AlkPhos  84  06-19    PT/INR - ( 19 Jun 2017 05:01 )   PT: 21.6 sec;   INR: 1.92          PTT - ( 19 Jun 2017 22:30 )  PTT:128.9 sec    RADIOLOGY & ADDITIONAL TESTS:    MEDICATIONS  (STANDING):  ferrous    sulfate 325milliGRAM(s) Oral every 8 hours  acetazolamide    Tablet 500milliGRAM(s) Oral daily  ALBUTerol/ipratropium for Nebulization 3milliLiter(s) Nebulizer every 6 hours  clopidogrel Tablet 75milliGRAM(s) Oral daily  polyethylene glycol 3350 17Gram(s) Oral daily  docusate sodium 100milliGRAM(s) Oral daily  senna 1Tablet(s) Oral daily  artificial  tears Solution 1Drop(s) Both EYES three times a day  hydrocortisone 2.5% Cream 1Application(s) Topical two times a day  furosemide   Injectable 60milliGRAM(s) IV Push every 12 hours  heparin  Infusion 1500Unit(s)/Hr IV Continuous <Continuous>    MEDICATIONS  (PRN):  acetaminophen   Tablet. 975milliGRAM(s) Oral every 6 hours PRN Moderate Pain (4 - 6)  acetylcysteine 20% Inhalation 5milliLiter(s) Inhalation every 6 hours PRN shortness of breath      Allergies    No Known Allergies    Intolerances INTERVAL HPI/OVERNIGHT EVENTS: GALILEO    SUBJECTIVE: Breathing is better than yesterday. BM yesterday. ROS otherwise negative.     VITAL SIGNS:  Vital Signs Last 24 Hrs  T(C): 37.1, Max: 37.6 (06-19 @ 12:00)  T(F): 98.8, Max: 99.6 (06-19 @ 12:00)  HR: 82 (72 - 96)  BP: 118/65 (96/61 - 139/76)  BP(mean): 74 (66 - 99)  RR: 28 (19 - 29)  SpO2: 97% (92% - 99%)  I&O: Net neg 2.4L for 24 hr    TELE: PVCs    PHYSICAL EXAM:    Constitutional: NAD on HFNC  Eyes: PERRL  ENMT: dry MM  Neck: distended neck veins  Respiratory: reduced bs at bases with bibasilar crackles and end expiratory wheezing  Cardiovascular: irreg rhythm, reg rate, harsh systolic murmur  Gastrointestinal: soft, NT  Extremities: WWP, + edema  Vascular: normal peripheral pulses  Neurological: A&O x 3  Musculoskeletal: No joint swelling   Skin: papules on LLE with interval improvement    LABS:                          11.0   7.7   )-----------( 173      ( 20 Jun 2017 06:49 )             36.8     06-20    143  |  96  |  45<H>  ----------------------------<  101<H>  4.4   |  35<H>  |  2.40<H>    Ca    9.3      20 Jun 2017 06:49  Mg     2.1     06-20    RADIOLOGY & ADDITIONAL TESTS:  PORTABLE CXR - pulmonary congestion with pleural effusions no significant interval change from yesterday  EKG -  Reviewed     MEDICATIONS  (STANDING):  ferrous    sulfate 325milliGRAM(s) Oral every 8 hours  acetazolamide    Tablet 500milliGRAM(s) Oral daily  ALBUTerol/ipratropium for Nebulization 3milliLiter(s) Nebulizer every 6 hours  clopidogrel Tablet 75milliGRAM(s) Oral daily  polyethylene glycol 3350 17Gram(s) Oral daily  docusate sodium 100milliGRAM(s) Oral daily  senna 1Tablet(s) Oral daily  artificial  tears Solution 1Drop(s) Both EYES three times a day  hydrocortisone 2.5% Cream 1Application(s) Topical two times a day  furosemide   Injectable 60milliGRAM(s) IV Push every 12 hours  heparin  Infusion 1500Unit(s)/Hr IV Continuous <Continuous>    MEDICATIONS  (PRN):  acetaminophen   Tablet. 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     PLAN  CARDIO:  Valves:  -severe AS, ALEKS 0.5, mean pressure gradient 43, mod pulm HTN, PASP 52. Structural cardiology is following pt and may perform BAV during admission if pt's resp status improves such that she can lay flat.   -CTS Dr. Barger consulted. F/u recs.      Pump: acute CHF exacerbation 2/2 dietary non-compliance and severe AS . EF 50-55%, severely dilated LA   -Daily weights, strict I/Os (us in place)  -holding Toprol XL 25mg po daily as pt borderline hypotensive   -Cont Furosemide 60 q12hr, c/w diamox 500mg qd per renal recs. Careful diuresis with close monitoring of BP in setting of severe AS.     Vessel:  s/p PCI x 3 stent (04', 06' and 12')-last cath 3/15' PCI dLM   -Trop 0.04 x 4. Likely 2/2 demand ischemia. Not continuing to trend.   -holding Toprol XL 25mg po daily in setting of low BP from diuresis  -Per daughter Rosendo House Call NP Eben Careyt d/c'ed ASA due to dual AC w/ Coumadin, statin was d/c'ed years ago for unclear reasons. Started plavix during this admission.     Electrical:  -afib with slow ventricular response s/p PPM 2011 2/2 symptomatic bradycardia. PPM interrogation WNL.  -c/w hep gtt in case pt undergoes BAL   -holding Toprol XL 25mg po daily in setting of borderline low BP and diuresis.      Hemodynamics: borderline hypotensive, holding ACE/ARB/and BB in setting of diuresis.      PULM:  #COPD (chronic obstructive pulmonary disease) - c/w duonebs prn  #Hypercapneic resp failure 2/2 pulmonary congestion 2/2 CHF 2/2 severe AS -  PCO2 is stable. see plan above. C/w Bipap and HFNC. VBGs as needed.     ID - UTI growing kleb. completed 7 day course of abx.     RENAL:  CKD (chronic kidney disease) stage 4, GFR 15-29 ml/min- Baseline Cr per daughter 2-3.   -Cont Furosemide q12hr, c/w Diamox 500mg qd for alkalosis per renal recs.   -Renal consulted. F/u recs.    GI  #Constipation - 2/2 iron supplement. started bowel regimen (miralax, senna, colace).     Endo:  DM - off meds, a1c wnl    NUTRITION - cardiac diet    PPX - Therapeutic INR    ACCESS - peripheral    DNR/DNI - palliative is following, will need reversal if agrees to BAL. continued GOC discussions      INTERVAL HPI/OVERNIGHT EVENTS: Structural cardiology does not plan on intervention during this admission, if done at all, would be as outpatient. pt compliant with BIPAP o/n, switched to HFNC this morning. given 60mg IVP lasix last night and another 60 this morning.     SUBJECTIVE: Wants the HFNC off. Diffuse itchiness. No SOB or pain. No BM in last several days. Other ROS neg    VITAL SIGNS:  Vital Signs Last 24 Hrs  T(C): 36.9, Max: 37.3 (06-18 @ 01:00)  T(F): 98.4, Max: 99.1 (06-18 @ 01:00)  HR: 90 (72 - 92)  BP: 127/76 (91/45 - 128/53)  BP(mean): 85 (52 - 96)  RR: 24 (18 - 29)  SpO2: 99% (93% - 100%)  I&O: 24 hr net neg 2.1 L     TELE: couplets    PHYSICAL EXAM:    Constitutional: NAD on HFNC  Eyes: L eye injection   ENMT: dry MM  Neck: distended neck veins  Respiratory: bibasilar crackles, reduced bs at R base  Cardiovascular: irreg, systolic murmur  Gastrointestinal: soft, NTND  Extremities: pedal edema, stasis dermatitis, WWP  Vascular: normal peripheral pulses  Neurological: A&O self, hospital, year  Musculoskeletal: no joint swelling          LABS:                        10.1   7.7   )-----------( 154      ( 18 Jun 2017 03:50 )             34.8     06-18    144  |  98  |  48<H>  ----------------------------<  115<H>  4.1   |  34<H>  |  2.50<H>    Ca    9.3      18 Jun 2017 03:50  Phos  3.9     06-18  Mg     2.0     06-18    PT/INR - ( 18 Jun 2017 03:50 )   PT: 25.0 sec;   INR: 2.22          PTT - ( 18 Jun 2017 03:50 )  PTT:36.4 sec      RADIOLOGY & ADDITIONAL TESTS:  PORTABLE CXR - persistent pulmonary congestion, bilateral pleural effusions  AM EKG pending    MEDICATIONS  (STANDING):  ferrous    sulfate 325milliGRAM(s) Oral every 8 hours  cefTRIAXone   IVPB 1Gram(s) IV Intermittent every 24 hours  cefTRIAXone   IVPB  IV Intermittent   acetazolamide    Tablet 500milliGRAM(s) Oral daily  ALBUTerol/ipratropium for Nebulization 3milliLiter(s) Nebulizer every 6 hours  buDESOnide 160 MICROgram(s)/formoterol 4.5 MICROgram(s) Inhaler 2Puff(s) Inhalation two times a day  clopidogrel Tablet 75milliGRAM(s) Oral daily    MEDICATIONS  (PRN):  acetaminophen   Tablet. 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     PLAN  CARDIO:  Valves:  -severe AS, ALEKS 0.5, mean pressure gradient 43, mod pulm HTN, PASP 52. Structural cardiology is following pt and offered BAV if patient can be compliant with medical management for 1 week however pt is often refusing BIPAP treatment and lab draws. Palliative is also following.    - Start Plavix 75 mg daily  -CTS Dr. Barger consulted. F/u recs.      Pump: acute CHF exacerbation 2/2 dietary non-compliance and severe AS . EF 50-55%, severely dilated LA   -Daily weights, strict I/Os (us in place)  -holding Toprol XL 25mg po daily as pt borderline hypotensive   -Cont Furosemide prn, c/w diamox 500mg qd per renal recs. Careful diuresis with close monitoring of BP in setting of severe AS.     Vessel:  s/p PCI x 3 stent (04', 06' and 12')-last cath 3/15' PCI dLM   -Trop 0.04 x 4. Likely 2/2 demand ischemia. Not continuing to trend.   -holding Toprol XL 25mg po daily in setting of hypotension from diuresis  -Per daughter Rosendo House Call NP Eben Tellez d/c'ed ASA due to dual AC w/ Coumadin, statin was d/c'ed years ago for unclear reasons.    Electrical:  -s/p PPM 2011 2/2 symptomatic bradycardia. PPM interrogation WNL.  -Daily PT/INR. Coumadin currently held as INR therapeutic.  When INR < 2, will start heparin gtt  -Continue Toprol XL 25mg po daily.     Hemodynamics: borderline hypotensive, holding ACE/ARB/and BB in setting of diuresis.      PULM:  #COPD (chronic obstructive pulmonary disease) - c/w duonebs. starting budesonide.   #Hypercapneic resp failure 2/2 pulmonary congestion 2/2 CHF 2/2 severe AS - see plan above. C/w Bipap and HFNC. VBGs as needed.     ID - UTI growing kleb. c/w ceftriaxone for 7 day course (day 6 today).     RENAL:  CKD (chronic kidney disease) stage 4, GFR 15-29 ml/min- Baseline Cr per daughter 2-3.   -Cont Furosemide prn, c/w Diamox 500mg qd for alkalosis per renal recs.   -Renal consulted. F/u recs.    GI  #Constipation -   Endo:  DM - off meds, a1c wnl    NUTRITION - cardiac diet    PPX - Therapeutic INR    ACCESS - peripheral    DNR/DNI - palliative is following. continued GOC discussions INTERVAL HPI/OVERNIGHT EVENTS: GALILEO    SUBJECTIVE: Breathing is better than yesterday. BM yesterday. ROS otherwise negative.     VITAL SIGNS:  Vital Signs Last 24 Hrs  T(C): 37.1, Max: 37.6 (06-19 @ 12:00)  T(F): 98.8, Max: 99.6 (06-19 @ 12:00)  HR: 82 (72 - 96)  BP: 118/65 (96/61 - 139/76)  BP(mean): 74 (66 - 99)  RR: 28 (19 - 29)  SpO2: 97% (92% - 99%), satting 99% on 8L nc  I&O: Net neg 2.4L for 24 hr    TELE: PVCs    PHYSICAL EXAM:    Constitutional: NAD on HFNC  Eyes: PERRL  ENMT: dry MM  Neck: distended neck veins  Respiratory: reduced bs at bases with bibasilar crackles and end expiratory wheezing  Cardiovascular: irreg rhythm, reg rate, harsh systolic murmur  Gastrointestinal: soft, NT  Extremities: WWP, + edema  Vascular: normal peripheral pulses  Neurological: A&O x 3  Musculoskeletal: No joint swelling   Skin: papules on LLE with interval improvement    LABS:                          11.0   7.7   )-----------( 173      ( 20 Jun 2017 06:49 )             36.8     06-20    143  |  96  |  45<H>  ----------------------------<  101<H>  4.4   |  35<H>  |  2.40<H>    Ca    9.3      20 Jun 2017 06:49  Mg     2.1     06-20    RADIOLOGY & ADDITIONAL TESTS:  PORTABLE CXR - pulmonary congestion with pleural effusions no significant interval change from yesterday  EKG -  Reviewed     MEDICATIONS  (STANDING):  ferrous    sulfate 325milliGRAM(s) Oral every 8 hours  acetazolamide    Tablet 500milliGRAM(s) Oral daily  ALBUTerol/ipratropium for Nebulization 3milliLiter(s) Nebulizer every 6 hours  clopidogrel Tablet 75milliGRAM(s) Oral daily  polyethylene glycol 3350 17Gram(s) Oral daily  docusate sodium 100milliGRAM(s) Oral daily  senna 1Tablet(s) Oral daily  artificial  tears Solution 1Drop(s) Both EYES three times a day  hydrocortisone 2.5% Cream 1Application(s) Topical two times a day  furosemide   Injectable 60milliGRAM(s) IV Push every 12 hours  heparin  Infusion 1500Unit(s)/Hr IV Continuous <Continuous>    MEDICATIONS  (PRN):  acetaminophen   Tablet. 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     PLAN  CARDIO:  Valves:  -severe AS, ALEKS 0.5, mean pressure gradient 43, mod pulm HTN, PASP 52. Structural cardiology is following pt and may perform BAV during admission if pt's resp status improves such that she can lay flat.   -CTS Dr. Barger consulted. F/u recs.      Pump: acute CHF exacerbation 2/2 dietary non-compliance and severe AS . EF 50-55%, severely dilated LA   -Daily weights, strict I/Os (us in place)  -holding Toprol XL 25mg po daily as pt borderline hypotensive   -Cont Furosemide 60 q12hr, c/w diamox 500mg qd per renal recs. Careful diuresis with close monitoring of BP in setting of severe AS.     Vessel:  s/p PCI x 3 stent (04', 06' and 12')-last cath 3/15' PCI dLM   -Trop 0.04 x 4. Likely 2/2 demand ischemia. Not continuing to trend.   -holding Toprol XL 25mg po daily in setting of low BP from diuresis  -Per daughter Rosendo House Call NP Eben Tellez d/c'ed ASA due to dual AC w/ Coumadin, statin was d/c'ed years ago for unclear reasons. Started plavix during this admission.     Electrical:  -afib with slow ventricular response s/p PPM 2011 2/2 symptomatic bradycardia. PPM interrogation WNL.  -c/w hep gtt in case pt undergoes BAL   -holding Toprol XL 25mg po daily in setting of borderline low BP and diuresis.      Hemodynamics: borderline hypotensive, holding ACE/ARB/and BB in setting of diuresis.      PULM:  #Hypercapneic resp failure 2/2 pulmonary congestion 2/2 CHF 2/2 severe AS -  PCO2 is stable. see plan above. C/w Bipap and HFNC. VBGs as needed.   #COPD (chronic obstructive pulmonary disease) - c/w duonebs prn    ID - UTI growing kleb. completed 7 day course of abx.     RENAL:  CKD (chronic kidney disease) stage 4, GFR 15-29 ml/min- Baseline Cr per daughter 2-3.   -Cont Furosemide q12hr, c/w Diamox 500mg qd for alkalosis per renal recs.   -Renal consulted. F/u recs.    GI  #Constipation - 2/2 iron supplement. started bowel regimen (miralax, senna, colace).     Endo:  DM - off meds, a1c wnl    NUTRITION - cardiac diet    PPX - Therapeutic INR    ACCESS - peripheral    DNR/DNI - palliative is following, will need reversal if agrees to BAL. continued GOC discussions      INTERVAL HPI/OVERNIGHT EVENTS: Structural cardiology does not plan on intervention during this admission, if done at all, would be as outpatient. pt compliant with BIPAP o/n, switched to HFNC this morning. given 60mg IVP lasix last night and another 60 this morning.     SUBJECTIVE: Wants the HFNC off. Diffuse itchiness. No SOB or pain. No BM in last several days. Other ROS neg    VITAL SIGNS:  Vital Signs Last 24 Hrs  T(C): 36.9, Max: 37.3 (06-18 @ 01:00)  T(F): 98.4, Max: 99.1 (06-18 @ 01:00)  HR: 90 (72 - 92)  BP: 127/76 (91/45 - 128/53)  BP(mean): 85 (52 - 96)  RR: 24 (18 - 29)  SpO2: 99% (93% - 100%)  I&O: 24 hr net neg 2.1 L     TELE: couplets    PHYSICAL EXAM:    Constitutional: NAD on HFNC  Eyes: L eye injection   ENMT: dry MM  Neck: distended neck veins  Respiratory: bibasilar crackles, reduced bs at R base  Cardiovascular: irreg, systolic murmur  Gastrointestinal: soft, NTND  Extremities: pedal edema, stasis dermatitis, WWP  Vascular: normal peripheral pulses  Neurological: A&O self, hospital, year  Musculoskeletal: no joint swelling          LABS:                        10.1   7.7   )-----------( 154      ( 18 Jun 2017 03:50 )             34.8     06-18    144  |  98  |  48<H>  ----------------------------<  115<H>  4.1   |  34<H>  |  2.50<H>    Ca    9.3      18 Jun 2017 03:50  Phos  3.9     06-18  Mg     2.0     06-18    PT/INR - ( 18 Jun 2017 03:50 )   PT: 25.0 sec;   INR: 2.22          PTT - ( 18 Jun 2017 03:50 )  PTT:36.4 sec      RADIOLOGY & ADDITIONAL TESTS:  PORTABLE CXR - persistent pulmonary congestion, bilateral pleural effusions  AM EKG pending    MEDICATIONS  (STANDING):  ferrous    sulfate 325milliGRAM(s) Oral every 8 hours  cefTRIAXone   IVPB 1Gram(s) IV Intermittent every 24 hours  cefTRIAXone   IVPB  IV Intermittent   acetazolamide    Tablet 500milliGRAM(s) Oral daily  ALBUTerol/ipratropium for Nebulization 3milliLiter(s) Nebulizer every 6 hours  buDESOnide 160 MICROgram(s)/formoterol 4.5 MICROgram(s) Inhaler 2Puff(s) Inhalation two times a day  clopidogrel Tablet 75milliGRAM(s) Oral daily    MEDICATIONS  (PRN):  acetaminophen   Tablet. 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     PLAN  CARDIO:  Valves:  -severe AS, ALEKS 0.5, mean pressure gradient 43, mod pulm HTN, PASP 52. Structural cardiology is following pt and offered BAV if patient can be compliant with medical management for 1 week however pt is often refusing BIPAP treatment and lab draws. Palliative is also following.    - Start Plavix 75 mg daily  -CTS Dr. Barger consulted. F/u recs.      Pump: acute CHF exacerbation 2/2 dietary non-compliance and severe AS . EF 50-55%, severely dilated LA   -Daily weights, strict I/Os (us in place)  -holding Toprol XL 25mg po daily as pt borderline hypotensive   -Cont Furosemide prn, c/w diamox 500mg qd per renal recs. Careful diuresis with close monitoring of BP in setting of severe AS.     Vessel:  s/p PCI x 3 stent (04', 06' and 12')-last cath 3/15' PCI dLM   -Trop 0.04 x 4. Likely 2/2 demand ischemia. Not continuing to trend.   -holding Toprol XL 25mg po daily in setting of hypotension from diuresis  -Per daughter [employer] [facility] NP Eben Tellez d/c'ed ASA due to dual AC w/ Coumadin, statin was d/c'ed years ago for unclear reasons.    Electrical:  -s/p PPM 2011 2/2 symptomatic bradycardia. PPM interrogation WNL.  -Daily PT/INR. Coumadin currently held as INR therapeutic.  When INR < 2, will start heparin gtt  -Continue Toprol XL 25mg po daily.     Hemodynamics: borderline hypotensive, holding ACE/ARB/and BB in setting of diuresis.      PULM:  #COPD (chronic obstructive pulmonary disease) - c/w duonebs. starting budesonide.   #Hypercapneic resp failure 2/2 pulmonary congestion 2/2 CHF 2/2 severe AS - see plan above. C/w Bipap and HFNC. VBGs as needed.     ID - UTI growing kleb. c/w ceftriaxone for 7 day course (day 6 today).     RENAL:  CKD (chronic kidney disease) stage 4, GFR 15-29 ml/min- Baseline Cr per daughter 2-3.   -Cont Furosemide prn, c/w Diamox 500mg qd for alkalosis per renal recs.   -Renal consulted. F/u recs.    GI  #Constipation -   Endo:  DM - off meds, a1c wnl    NUTRITION - cardiac diet    PPX - Therapeutic INR    ACCESS - peripheral    DNR/DNI - palliative is following. continued GOC discussions

## 2017-06-21 LAB
ANION GAP SERPL CALC-SCNC: 13 MMOL/L — SIGNIFICANT CHANGE UP (ref 5–17)
APTT BLD: 58 SEC — HIGH (ref 27.5–37.4)
APTT BLD: 61.7 SEC — HIGH (ref 27.5–37.4)
APTT BLD: 88.2 SEC — HIGH (ref 27.5–37.4)
BUN SERPL-MCNC: 46 MG/DL — HIGH (ref 7–23)
CALCIUM SERPL-MCNC: 9.3 MG/DL — SIGNIFICANT CHANGE UP (ref 8.4–10.5)
CHLORIDE SERPL-SCNC: 94 MMOL/L — LOW (ref 96–108)
CO2 SERPL-SCNC: 35 MMOL/L — HIGH (ref 22–31)
CREAT SERPL-MCNC: 2.4 MG/DL — HIGH (ref 0.5–1.3)
GLUCOSE SERPL-MCNC: 81 MG/DL — SIGNIFICANT CHANGE UP (ref 70–99)
HCT VFR BLD CALC: 39.4 % — SIGNIFICANT CHANGE UP (ref 34.5–45)
HGB BLD-MCNC: 11.9 G/DL — SIGNIFICANT CHANGE UP (ref 11.5–15.5)
INR BLD: 1.61 — HIGH (ref 0.88–1.16)
MAGNESIUM SERPL-MCNC: 2 MG/DL — SIGNIFICANT CHANGE UP (ref 1.6–2.6)
MCHC RBC-ENTMCNC: 27.5 PG — SIGNIFICANT CHANGE UP (ref 27–34)
MCHC RBC-ENTMCNC: 30.2 G/DL — LOW (ref 32–36)
MCV RBC AUTO: 91 FL — SIGNIFICANT CHANGE UP (ref 80–100)
PLATELET # BLD AUTO: 174 K/UL — SIGNIFICANT CHANGE UP (ref 150–400)
POTASSIUM SERPL-MCNC: 4.5 MMOL/L — SIGNIFICANT CHANGE UP (ref 3.5–5.3)
POTASSIUM SERPL-SCNC: 4.5 MMOL/L — SIGNIFICANT CHANGE UP (ref 3.5–5.3)
PROTHROM AB SERPL-ACNC: 18 SEC — HIGH (ref 9.8–12.7)
RBC # BLD: 4.33 M/UL — SIGNIFICANT CHANGE UP (ref 3.8–5.2)
RBC # FLD: 15.7 % — SIGNIFICANT CHANGE UP (ref 10.3–16.9)
SODIUM SERPL-SCNC: 142 MMOL/L — SIGNIFICANT CHANGE UP (ref 135–145)
WBC # BLD: 8.1 K/UL — SIGNIFICANT CHANGE UP (ref 3.8–10.5)
WBC # FLD AUTO: 8.1 K/UL — SIGNIFICANT CHANGE UP (ref 3.8–10.5)

## 2017-06-21 PROCEDURE — 93010 ELECTROCARDIOGRAM REPORT: CPT

## 2017-06-21 PROCEDURE — 99291 CRITICAL CARE FIRST HOUR: CPT

## 2017-06-21 PROCEDURE — 71010: CPT | Mod: 26

## 2017-06-21 RX ORDER — HEPARIN SODIUM 5000 [USP'U]/ML
1350 INJECTION INTRAVENOUS; SUBCUTANEOUS
Qty: 25000 | Refills: 0 | Status: DISCONTINUED | OUTPATIENT
Start: 2017-06-21 | End: 2017-06-22

## 2017-06-21 RX ORDER — HEPARIN SODIUM 5000 [USP'U]/ML
1250 INJECTION INTRAVENOUS; SUBCUTANEOUS
Qty: 25000 | Refills: 0 | Status: DISCONTINUED | OUTPATIENT
Start: 2017-06-21 | End: 2017-06-21

## 2017-06-21 RX ORDER — ACETAMINOPHEN 500 MG
1000 TABLET ORAL DAILY
Qty: 0 | Refills: 0 | Status: COMPLETED | OUTPATIENT
Start: 2017-06-21 | End: 2017-06-21

## 2017-06-21 RX ADMIN — ACETAZOLAMIDE 500 MILLIGRAM(S): 250 TABLET ORAL at 13:03

## 2017-06-21 RX ADMIN — Medication 325 MILLIGRAM(S): at 06:44

## 2017-06-21 RX ADMIN — HEPARIN SODIUM 12.5 UNIT(S)/HR: 5000 INJECTION INTRAVENOUS; SUBCUTANEOUS at 08:00

## 2017-06-21 RX ADMIN — Medication 325 MILLIGRAM(S): at 13:03

## 2017-06-21 RX ADMIN — Medication 60 MILLIGRAM(S): at 17:30

## 2017-06-21 RX ADMIN — Medication 3 MILLILITER(S): at 05:12

## 2017-06-21 RX ADMIN — Medication 1 DROP(S): at 06:44

## 2017-06-21 RX ADMIN — Medication 1000 MILLIGRAM(S): at 18:00

## 2017-06-21 RX ADMIN — Medication 1 APPLICATION(S): at 06:44

## 2017-06-21 RX ADMIN — POLYETHYLENE GLYCOL 3350 17 GRAM(S): 17 POWDER, FOR SOLUTION ORAL at 13:04

## 2017-06-21 RX ADMIN — HEPARIN SODIUM 13.5 UNIT(S)/HR: 5000 INJECTION INTRAVENOUS; SUBCUTANEOUS at 13:50

## 2017-06-21 RX ADMIN — Medication 1000 MILLIGRAM(S): at 18:30

## 2017-06-21 RX ADMIN — CLOPIDOGREL BISULFATE 75 MILLIGRAM(S): 75 TABLET, FILM COATED ORAL at 13:03

## 2017-06-21 RX ADMIN — Medication 1 DROP(S): at 14:05

## 2017-06-21 RX ADMIN — Medication 60 MILLIGRAM(S): at 06:43

## 2017-06-21 RX ADMIN — SENNA PLUS 1 TABLET(S): 8.6 TABLET ORAL at 13:03

## 2017-06-21 RX ADMIN — Medication 1 APPLICATION(S): at 17:30

## 2017-06-21 RX ADMIN — Medication 1 CAPSULE(S): at 13:03

## 2017-06-21 RX ADMIN — Medication 100 MILLIGRAM(S): at 13:04

## 2017-06-21 RX ADMIN — HEPARIN SODIUM 12.5 UNIT(S)/HR: 5000 INJECTION INTRAVENOUS; SUBCUTANEOUS at 06:45

## 2017-06-21 RX ADMIN — Medication 3 MILLILITER(S): at 16:18

## 2017-06-21 RX ADMIN — Medication 3 MILLILITER(S): at 09:31

## 2017-06-21 NOTE — PROGRESS NOTE ADULT - SUBJECTIVE AND OBJECTIVE BOX
INTERVAL HPI/OVERNIGHT EVENTS: Patient refused bipap o/n in favor of high flow.     ICU Vital Signs Last 24 Hrs  T(C): 36.2, Max: 37.3 (06-20 @ 10:00)  T(F): 97.1, Max: 99.2 (06-20 @ 12:00)  HR: 74 (68 - 84)  BP: 126/54 (94/81 - 137/72)  BP(mean): 74 (54 - 102)  ABP: --  ABP(mean): --  RR: 20 (17 - 36)  SpO2: 96% (95% - 99%)    I&O's Summary    I & Os for current day (as of 21 Jun 2017 07:27)  =============================================  IN: 543 ml / OUT: 3185 ml / NET: -2642 ml    PHYSICAL EXAM:    Constitutional: NAD on HFNC  Eyes: PERRL  ENMT: dry MM  Neck: distended neck veins  Respiratory: reduced bs at bases with bibasilar crackles and end expiratory wheezing  Cardiovascular: irreg rhythm, reg rate, harsh systolic murmur  Gastrointestinal: soft, NT  Extremities: WWP, + edema  Vascular: normal peripheral pulses  Neurological: A&O x 3  Musculoskeletal: No joint swelling   Skin: papules on LLE with interval improvement    MEDICATIONS  (STANDING):  ferrous    sulfate 325milliGRAM(s) Oral every 8 hours  acetazolamide    Tablet 500milliGRAM(s) Oral daily  ALBUTerol/ipratropium for Nebulization 3milliLiter(s) Nebulizer every 6 hours  clopidogrel Tablet 75milliGRAM(s) Oral daily  polyethylene glycol 3350 17Gram(s) Oral daily  docusate sodium 100milliGRAM(s) Oral daily  senna 1Tablet(s) Oral daily  artificial  tears Solution 1Drop(s) Both EYES three times a day  hydrocortisone 2.5% Cream 1Application(s) Topical two times a day  furosemide   Injectable 60milliGRAM(s) IV Push every 12 hours  Nephrocaps 1Capsule(s) Oral daily  heparin  Infusion 1250Unit(s)/Hr IV Continuous <Continuous>    MEDICATIONS  (PRN):  acetaminophen   Tablet. 975milliGRAM(s) Oral every 6 hours PRN Moderate Pain (4 - 6)  acetylcysteine 20% Inhalation 5milliLiter(s) Inhalation every 6 hours PRN shortness of breath      Allergies    No Known Allergies    Intolerances        LABS:                        11.9   8.1   )-----------( 174      ( 21 Jun 2017 04:25 )             39.4     06-21    142  |  94<L>  |  46<H>  ----------------------------<  81  4.5   |  35<H>  |  2.40<H>    Ca    9.3      21 Jun 2017 04:25  Mg     2.0     06-21      PT/INR - ( 21 Jun 2017 04:25 )   PT: 18.0 sec;   INR: 1.61          PTT - ( 21 Jun 2017 04:25 )  PTT:88.2 sec      RADIOLOGY & ADDITIONAL TESTS:    ASSESSMENT & PLAN  84 yo F chronic diastolic CHF (EF 50-55% 11/16'), mod to severe AS (ALEKS 0.5cm)-not a surgical candidate, pafib (on Coumadin), CAD s/p PCI x 3 stent (04', 06' and 12')-last cath 3/15' PCI dLM w/ Impella support, Lupus, COPD (prior 30yr ppk hx), PNA, 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     PLAN  CARDIO:  Valves:  -severe AS, ALEKS 0.5, mean pressure gradient 43, mod pulm HTN, PASP 52. Structural cardiology is following pt and may perform BAV during admission if pt's resp status improves such that she can lay flat.   -CTS Dr. Barger consulted. F/u recs.      Pump: acute CHF exacerbation 2/2 dietary non-compliance and severe AS . EF 50-55%, severely dilated LA   -Daily weights, strict I/Os (us in place)  -holding Toprol XL 25mg po daily as pt borderline hypotensive   -Cont Furosemide 60 q12hr, c/w diamox 500mg qd per renal recs. Careful diuresis with close monitoring of BP in setting of severe AS.     Vessel:  s/p PCI x 3 stent (04', 06' and 12')-last cath 3/15' PCI dLM   -Trop 0.04 x 4. Likely 2/2 demand ischemia. Not continuing to trend.   -holding Toprol XL 25mg po daily in setting of low BP from diuresis  -Per daughter [facility] NP Eben Careyt d/c'ed ASA due to dual AC w/ Coumadin, statin was d/c'ed years ago for unclear reasons. Started plavix during this admission.     Electrical:  -afib with slow ventricular response s/p PPM 2011 2/2 symptomatic bradycardia. PPM interrogation WNL.  -c/w hep gtt in case pt undergoes BAL   -holding Toprol XL 25mg po daily in setting of borderline low BP and diuresis.      Hemodynamics: borderline hypotensive, holding ACE/ARB/and BB in setting of diuresis.      PULM:  #Hypercapneic resp failure 2/2 pulmonary congestion 2/2 CHF 2/2 severe AS -  PCO2 is stable. see plan above. C/w Bipap and HFNC. VBGs as needed.   #COPD (chronic obstructive pulmonary disease) - c/w duonebs prn    ID - UTI growing kleb. completed 7 day course of abx.     RENAL:  CKD (chronic kidney disease) stage 4, GFR 15-29 ml/min- Baseline Cr per daughter 2-3.   -Cont Furosemide q12hr, c/w Diamox 500mg qd for alkalosis per renal recs.   -Renal consulted. F/u recs.    GI  #Constipation - 2/2 iron supplement. started bowel regimen (miralax, senna, colace).     Endo:  DM - off meds, a1c wnl    NUTRITION - cardiac diet    PPX - Therapeutic INR    ACCESS - peripheral    DNR/DNI - palliative is following, will need reversal if agrees to BAL. continued GOC discussions INTERVAL HPI/OVERNIGHT EVENTS: Patient refused bipap o/n in favor of high flow.     ICU Vital Signs Last 24 Hrs  T(C): 36.2, Max: 37.3 (06-20 @ 10:00)  T(F): 97.1, Max: 99.2 (06-20 @ 12:00)  HR: 74 (68 - 84)  BP: 126/54 (94/81 - 137/72)  BP(mean): 74 (54 - 102)  ABP: --  ABP(mean): --  RR: 20 (17 - 36)  SpO2: 96% (95% - 99%)    I&O's Summary    I & Os for current day (as of 21 Jun 2017 07:27)  =============================================  IN: 543 ml / OUT: 3185 ml / NET: -2642 ml    PHYSICAL EXAM:    Constitutional: NAD on HFNC  Eyes: PERRL  ENMT: dry MM  Neck: distended neck veins  Respiratory: reduced bs at bases with bibasilar crackles and end expiratory wheezing  Cardiovascular: irreg rhythm, reg rate, harsh crescendo-decrescendo systolic murmur  Gastrointestinal: soft, NT  Extremities: WWP, + edema  Vascular: normal peripheral pulses  Neurological: A&O x 3  Musculoskeletal: No joint swelling   Skin: papules on LLE with interval improvement    MEDICATIONS  (STANDING):  ferrous    sulfate 325milliGRAM(s) Oral every 8 hours  acetazolamide    Tablet 500milliGRAM(s) Oral daily  ALBUTerol/ipratropium for Nebulization 3milliLiter(s) Nebulizer every 6 hours  clopidogrel Tablet 75milliGRAM(s) Oral daily  polyethylene glycol 3350 17Gram(s) Oral daily  docusate sodium 100milliGRAM(s) Oral daily  senna 1Tablet(s) Oral daily  artificial  tears Solution 1Drop(s) Both EYES three times a day  hydrocortisone 2.5% Cream 1Application(s) Topical two times a day  furosemide   Injectable 60milliGRAM(s) IV Push every 12 hours  Nephrocaps 1Capsule(s) Oral daily  heparin  Infusion 1250Unit(s)/Hr IV Continuous <Continuous>    MEDICATIONS  (PRN):  acetaminophen   Tablet. 975milliGRAM(s) Oral every 6 hours PRN Moderate Pain (4 - 6)  acetylcysteine 20% Inhalation 5milliLiter(s) Inhalation every 6 hours PRN shortness of breath      Allergies    No Known Allergies    Intolerances        LABS:                        11.9   8.1   )-----------( 174      ( 21 Jun 2017 04:25 )             39.4     06-21    142  |  94<L>  |  46<H>  ----------------------------<  81  4.5   |  35<H>  |  2.40<H>    Ca    9.3      21 Jun 2017 04:25  Mg     2.0     06-21      PT/INR - ( 21 Jun 2017 04:25 )   PT: 18.0 sec;   INR: 1.61          PTT - ( 21 Jun 2017 04:25 )  PTT:88.2 sec      RADIOLOGY & ADDITIONAL TESTS:    ASSESSMENT & PLAN  84 yo F chronic diastolic CHF (EF 50-55% 11/16'), mod to severe AS (ALEKS 0.5cm)-not a surgical candidate, pafib (on Coumadin), CAD s/p PCI x 3 stent (04', 06' and 12')-last cath 3/15' PCI dLM w/ Impella support, Lupus, COPD (prior 30yr ppk hx), PNA, 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     PLAN  CARDIO:  Valves:  -severe AS, ALEKS 0.5, mean pressure gradient 43, mod pulm HTN, PASP 52. Structural cardiology is following pt and may perform BAV during admission if pt's resp status improves such that she can lay flat.   -CTS Dr. Barger consulted. F/u recs.      Pump: acute CHF exacerbation 2/2 dietary non-compliance and severe AS . EF 50-55%, severely dilated LA; net neg >2.7L yest  -Daily weights, strict I/Os (us in place)  -holding Toprol XL 25mg po daily as pt borderline hypotensive and diuresing  -Cont Furosemide 60 q12hr, c/w diamox 500mg qd per renal recs. Careful diuresis with close monitoring of BP in setting of severe AS.     Vessel:  s/p PCI x 3 stent (04', 06' and 12')-last cath 3/15' PCI dLM   -Trop 0.04 x 4. Likely 2/2 demand ischemia. Not continuing to trend.   -holding Toprol XL 25mg po daily in setting of low BP from diuresis  -Per daughter [facility] NP Eben Rosalinda d/c'ed ASA due to dual AC w/ Coumadin, statin was d/c'ed years ago for unclear reasons. Started plavix during this admission.     Electrical:  -afib with slow ventricular response s/p PPM 2011 2/2 symptomatic bradycardia. PPM interrogation WNL.  -c/w hep gtt in case pt undergoes BAL   -holding Toprol XL 25mg po daily in setting of borderline low BP and diuresis.      Hemodynamics: borderline hypotensive, holding ACE/ARB/and BB in setting of diuresis.      PULM:  #Hypercapnic resp failure 2/2 pulmonary congestion 2/2 CHF 2/2 severe AS -  PCO2 is stable. see plan above. Satting well on NC 4L. VBGs as needed.   #COPD (chronic obstructive pulmonary disease) - c/w duonebs prn    ID - UTI growing kleb. s/p 7 day course of abx.     RENAL:  #CKD (chronic kidney disease) stage 4, GFR 15-29 ml/min- Baseline Cr per daughter 2-3.   -Cont Furosemide q12hr, c/w Diamox 500mg qd for alkalosis per renal recs.   -Renal consulted. F/u recs.    GI  #Constipation - 2/2 iron supplement. c/w bowel regimen (miralax, senna, colace).     Endo:  #DM - off meds, a1c wnl    NUTRITION - cardiac diet  PPX - heparin gtt  ACCESS - peripheral  DNR/DNI - palliative is following, will need reversal if agrees to BAL. continued GOC discussions

## 2017-06-21 NOTE — PROGRESS NOTE ADULT - SUBJECTIVE AND OBJECTIVE BOX
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 Pt states that her "legs were too weak". Pt is a poor historian and HPI obtained from her daughter. Pt's daughter states that her mother was unable to get OOB for 3 days and pt used her Life Alert x3 times to call for help-was told to go to the ED. Reports that she had been "tired" for several day, SOB w/ minimal exertion for "several months" and her mobility has declined since her discharge from rehab in December. Additionally, pt has gained 17 lbs over the past month (from 258 to 275) and non-compliant with her diet. As per daughter, pt has been hospitalized prior 2-3 yrs ago (? [facility]) for CHF exacerbation w/ aggressive diuresis. Of note, pt was evaluation by Dr. Barger for aortic stenosis in December 16'. However, due to her deconditioned status during that time, pt was recommended medical management.     Pt S&E@BS in AM in CCU    Pt refused Bi PAP        I&O's Summary    I & Os for current day (as of 21 Jun 2017 07:27)  =============================================  IN: 543 ml / OUT: 3185 ml / NET: -2642 ml    PHYSICAL EXAM:    Constitutional: NAD   Eyes: PERRL  ENMT: dry MM  Neck: distended neck veins  Respiratory: reduced bs at bases with bibasilar crackles and end expiratory wheezing  Cardiovascular: irreg rhythm, reg rate, harsh crescendo-decrescendo systolic murmur  Gastrointestinal: soft, NT  Extremities: WWP, + edema  Vascular: normal peripheral pulses  Neurological: A&O x 3      MEDICATIONS  (STANDING):  ferrous    sulfate 325milliGRAM(s) Oral every 8 hours  acetazolamide    Tablet 500milliGRAM(s) Oral daily  ALBUTerol/ipratropium for Nebulization 3milliLiter(s) Nebulizer every 6 hours  clopidogrel Tablet 75milliGRAM(s) Oral daily  polyethylene glycol 3350 17Gram(s) Oral daily  docusate sodium 100milliGRAM(s) Oral daily  senna 1Tablet(s) Oral daily  artificial  tears Solution 1Drop(s) Both EYES three times a day  hydrocortisone 2.5% Cream 1Application(s) Topical two times a day  furosemide   Injectable 60milliGRAM(s) IV Push every 12 hours  Nephrocaps 1Capsule(s) Oral daily  heparin  Infusion 1250Unit(s)/Hr IV Continuous <Continuous>    MEDICATIONS  (PRN):  acetaminophen   Tablet. 975milliGRAM(s) Oral every 6 hours PRN Moderate Pain (4 - 6)  acetylcysteine 20% Inhalation 5milliLiter(s) Inhalation every 6 hours PRN shortness of breath      Allergies    No Known Allergies    Intolerances        LABS:                        11.9   8.1   )-----------( 174      ( 21 Jun 2017 04:25 )             39.4     06-21    142  |  94<L>  |  46<H>  ----------------------------<  81  4.5   |  35<H>  |  2.40<H>    Ca    9.3      21 Jun 2017 04:25  Mg     2.0     06-21      PT/INR - ( 21 Jun 2017 04:25 )   PT: 18.0 sec;   INR: 1.61          PTT - ( 21 Jun 2017 04:25 )  PTT:88.2 sec      RADIOLOGY & ADDITIONAL TESTS:    ASSESSMENT & PLAN  82 yo F chronic diastolic CHF (EF 50-55% 11/16'), mod to severe AS (ALEKS 0.5cm)-not a surgical candidate, pafib (on Coumadin), CAD s/p PCI x 3 stent (04', 06' and 12')-last cath 3/15' PCI dLM w/ Impella support, Lupus, COPD (prior 30yr ppk hx), PNA, 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     PLAN  CARDIO:  Valves:  -severe AS, ALEKS 0.5, mean pressure gradient 43, mod pulm HTN, PASP 52. Structural cardiology is following pt and may perform BAV during admission if pt's resp status improves such that she can lay flat.   -CTS Dr. Barger consulted. F/u recs.      Pump: acute CHF exacerbation 2/2 dietary non-compliance and severe AS . EF 50-55%, severely dilated LA; net neg >2.7L yest  -Daily weights, strict I/Os (us in place)  -holding Toprol XL 25mg po daily as pt borderline hypotensive and diuresing  -Cont Furosemide 60 q12hr, c/w diamox 500mg qd per renal recs. Careful diuresis with close monitoring of BP in setting of severe AS.     Vessel:  s/p PCI x 3 stent (04', 06' and 12')-last cath 3/15' PCI dLM   -Trop 0.04 x 4. Likely 2/2 demand ischemia. Not continuing to trend.   -holding Toprol XL 25mg po daily in setting of low BP from diuresis  -Per daughter [facility] NP Eben Tellez d/c'ed ASA due to dual AC w/ Coumadin, statin was d/c'ed years ago for unclear reasons. Started plavix during this admission.     Electrical:  -afib with slow ventricular response s/p PPM 2011 2/2 symptomatic bradycardia. PPM interrogation WNL.  -c/w hep gtt in case pt undergoes BAL   -holding Toprol XL 25mg po daily in setting of borderline low BP and diuresis.      Hemodynamics: borderline hypotensive, holding ACE/ARB/and BB in setting of diuresis.      PULM:  #Hypercapnic resp failure 2/2 pulmonary congestion 2/2 CHF 2/2 severe AS -  PCO2 is stable. see plan above. Satting well on NC 4L. VBGs as needed.   #COPD (chronic obstructive pulmonary disease) - c/w duonebs prn    ID - UTI growing kleb. s/p 7 day course of abx.     RENAL:  #CKD (chronic kidney disease) stage 4, GFR 15-29 ml/min- Baseline Cr per daughter 2-3.   -Cont Furosemide q12hr, c/w Diamox 500mg qd for alkalosis per renal recs.   -Renal consulted. F/u recs.    GI  #Constipation - 2/2 iron supplement. c/w bowel regimen (miralax, senna, colace).     Endo:  #DM - off meds, a1c wnl    NUTRITION - cardiac diet  PPX - heparin gtt  ACCESS - peripheral  DNR/DNI - palliative is following, will need reversal if agrees to BAL. continued GOC discussions

## 2017-06-21 NOTE — PROGRESS NOTE ADULT - ASSESSMENT
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

## 2017-06-21 NOTE — PROGRESS NOTE ADULT - SUBJECTIVE AND OBJECTIVE BOX
CC: ACUTE CHRONIC CONGESTIVEHEART FAILURE UNSPECIFIE      INTERVAL HISTORY: clinically stable  says things "could be better."      ROS: No chest pain, no sob, no abd pain. No n/v/d    PAST MEDICAL & SURGICAL HISTORY:  PVD (peripheral vascular disease)  Iron deficiency anemia  Lupus (systemic lupus erythematosus)  Paroxysmal atrial fibrillation  Aortic stenosis, severe  CKD (chronic kidney disease) stage 4, GFR 15-29 ml/min  Diabetes  HTN (hypertension)  CHF (congestive heart failure)  CAD (coronary artery disease)  COPD (chronic obstructive pulmonary disease)  History of total knee replacement: 2003  Presence of cardiac pacemaker: at [facility] by Dr Escoto      PHYSICAL EXAM:  T(C): 36.2, Max: 37.3 (06-20 @ 10:00)  HR: 84  BP: 131/70 (100/61 - 137/72)  RR: 23  SpO2: 95%  Wt(kg): --  I&O's Summary  I & Os for 24h ending 21 Jun 2017 07:00  =============================================  IN: 555.5 ml / OUT: 3360 ml / NET: -2804.5 ml    I & Os for current day (as of 21 Jun 2017 09:20)  =============================================  IN: 25 ml / OUT: 355 ml / NET: -330 ml    Weight 120 (06-15 @ 14:33)  General: AAO x 3,  NAD.  HEENT: moist mucous membranes, no pallor/cyanosis.  Neck  Cardiac: S1, S2. RRR. No murmurs   Respratory:decreased BS  Abdomen: soft. nontender. nondistended  Skin: no rashes.  Extremities: decreasing LE edema b/l  Access:       DATA:                        11.9   8.1   )-----------( 174      ( 21 Jun 2017 04:25 )             39.4     Ferritin, Serum: 92.0 ng/mL (06-12 @ 05:42)      142    |  94<L>  |  46<H>  ----------------------------<  81     Ca:9.3   (21 Jun 2017 04:25)  4.5     |  35<H>  |  2.40<H>      eGFR if Non : 18 <L>  eGFR if African American: 20 <L>    TPro  6.7 g/dL  /  Alb  2.8 g/dL<L>  /  TBili  0.4 mg/dL  /  DBili  x      /  AST  10 U/L  /  ALT  <5 U/L<L>  /  AlkPhos  84 U/L  19 Jun 2017 05:05                    MEDICATIONS  (STANDING):  ferrous    sulfate 325milliGRAM(s) Oral every 8 hours  acetazolamide    Tablet 500milliGRAM(s) Oral daily  ALBUTerol/ipratropium for Nebulization 3milliLiter(s) Nebulizer every 6 hours  clopidogrel Tablet 75milliGRAM(s) Oral daily  polyethylene glycol 3350 17Gram(s) Oral daily  docusate sodium 100milliGRAM(s) Oral daily  senna 1Tablet(s) Oral daily  artificial  tears Solution 1Drop(s) Both EYES three times a day  hydrocortisone 2.5% Cream 1Application(s) Topical two times a day  furosemide   Injectable 60milliGRAM(s) IV Push every 12 hours  Nephrocaps 1Capsule(s) Oral daily  heparin  Infusion 1250Unit(s)/Hr IV Continuous <Continuous>    MEDICATIONS  (PRN):  acetaminophen   Tablet. 975milliGRAM(s) Oral every 6 hours PRN Moderate Pain (4 - 6)  acetylcysteine 20% Inhalation 5milliLiter(s) Inhalation every 6 hours PRN shortness of breath

## 2017-06-21 NOTE — PROGRESS NOTE ADULT - SUBJECTIVE AND OBJECTIVE BOX
INTERVAL HPI/OVERNIGHT EVENTS:    being monitored in CCU  on diuresis  116/49  high flow 48%, 45 L    PAST MEDICAL & SURGICAL HISTORY:  PVD (peripheral vascular disease)  Iron deficiency anemia  Lupus (systemic lupus erythematosus)  Paroxysmal atrial fibrillation  Aortic stenosis, severe  CKD (chronic kidney disease) stage 4, GFR 15-29 ml/min  Diabetes  HTN (hypertension)  CHF (congestive heart failure)  CAD (coronary artery disease)  COPD (chronic obstructive pulmonary disease)  History of total knee replacement: 2003  Presence of cardiac pacemaker: at [facility] by Dr Escoto    FAMILY HISTORY:  No pertinent family history in first degree relatives    SOCIAL HISTORY:    REVIEW OF SYSTEMS:  no significant change; discussed with daughter; does not want to talk  Constitutional: () weight change, () fever,  () chills, () fatigue, () night sweats  Eyes: () discharge, () eye pain, () visual change  ENT:  () hearing difficulty, () vertigo, () sinus pain,  () throat pain, () epistaxis, () dysphagia, () hoarseness  Neck: () pain, () stiffness, () swelling  Respiratory: () cough, () wheezing, () hemoptysis      Cardiovascular: () chest pain, ()palpitations, () dizziness   Gastrointestinal: () abdominal pain, () nausea, () vomiting, () hematemesis, () diarrhea,  () constipation, () melena  Genitourinary:  () dysuria, () frequency, () hematuria, () incontinence      Neurologic: () headache, () memory loss, () loss of strength, () numbness, () tremor     Skin: () itching, () burning, () rash, () lesions   Lymphatic: () enlarged lymph nodes  Endocrine: () hair loss, () temperature intolerance         Musculoskeletal: () back pain, () joint pain,  () extremity pain  Psychiatric: () visual change, () auditory change, () depression, () anxiety, () suicidal  Sleep: () disorder, () insomnia, () sleep deprivation  Heme/Lymph: () easy bruising, () bleeding gums            Allergy and Immunologic: () hives, () eczema    Vital Signs Last 24 Hrs  T(C): 37.6, Max: 37.6 (06-21 @ 13:00)  T(F): 99.6, Max: 99.6 (06-21 @ 13:00)  HR: 76 (68 - 86)  BP: 116/49 (101/53 - 137/72)  BP(mean): 79 (54 - 111)  RR: 23 (17 - 43)  SpO2: 96% (84% - 98%)    I&O's Detail  I & Os for 24h ending 21 Jun 2017 07:00  =============================================  IN:    heparin Infusion: 225 ml    IV PiggyBack: 120 ml    heparin Infusion: 108 ml    Oral Fluid: 90 ml    heparin Infusion: 12.5 ml    Total IN: 555.5 ml  ---------------------------------------------  OUT:    Indwelling Catheter - Urethral: 3360 ml    Total OUT: 3360 ml  ---------------------------------------------  Total NET: -2804.5 ml    I & Os for current day (as of 21 Jun 2017 16:36)  =============================================  IN:    Oral Fluid: 120 ml    heparin Infusion: 87.5 ml    heparin Infusion: 27 ml    Total IN: 234.5 ml  ---------------------------------------------  OUT:    Indwelling Catheter - Urethral: 1385 ml    Total OUT: 1385 ml  ---------------------------------------------  Total NET: -1150.5 ml    PHYSICAL EXAM:  in bed, agitated; daughter is feeding her  Well nourished, well developed, uncomfortable, - acute distress; vital signs are monitored continuously  Eyes: PERRLA, EOMI, -conjunctivitis, -scleritis   Head: no focal deficit, normocephalic,  no trauma  ENMT: moist tongue, no thrush, -nasal discharge, -hoarseness, normal hearing, -cough, -hemoptysis, trachea midline  Neck: supple, - lymphadenopathy,  -masses, -JVD  Respiratory: bilateral diminished breath sounds, -wheezing, -rhonchi, +rales, -crackles  Chest: -accessory muscle use, -paradoxical breathing  Cardiovascular: irregular rate and afib, S1 S2 normal, -S3, -S4, -murmur, -gallop, -rub  Gastrointestinal: soft, nontender, nondistended, normal bowel sounds, no hepatosplenomegaly  Genitourinary: -flank pain, -dysuria  Extremities: -clubbing, -cyanosis, EDEMA   Vascular: peripheral pulses palpable 2+ bilaterally  Neurological: alert, oriented x 2, no focal deficit, -tremor   Skin: warm, dry, -erythema, iv sites intact  Lymph nodes; no cervical, supraclavicular or axillary adenopathy  Psychiatric: agitated    MEDICATIONS  (STANDING):  ferrous    sulfate 325milliGRAM(s) Oral every 8 hours  acetazolamide    Tablet 500milliGRAM(s) Oral daily  ALBUTerol/ipratropium for Nebulization 3milliLiter(s) Nebulizer every 6 hours  clopidogrel Tablet 75milliGRAM(s) Oral daily  docusate sodium 100milliGRAM(s) Oral daily  hydrocortisone 2.5% Cream 1Application(s) Topical two times a day  furosemide   Injectable 60milliGRAM(s) IV Push every 12 hours  Nephrocaps 1Capsule(s) Oral daily  heparin  Infusion 1350Unit(s)/Hr IV Continuous <Continuous>    MEDICATIONS  (PRN):  acetaminophen   Tablet. 975milliGRAM(s) Oral every 6 hours PRN Moderate Pain (4 - 6)  acetylcysteine 20% Inhalation 5milliLiter(s) Inhalation every 6 hours PRN shortness of breath  artificial  tears Solution 1Drop(s) Both EYES three times a day PRN Dry Eyes    Allergies  No Known Allergies  Intolerances    LABS:                        11.9   8.1   )-----------( 174      ( 21 Jun 2017 04:25 )             39.4     06-21    142  |  94<L>  |  46<H>  ----------------------------<  81  4.5   |  35<H>  |  2.40<H>    Ca    9.3      21 Jun 2017 04:25  Mg     2.0     06-21  PT/INR - ( 21 Jun 2017 04:25 )   PT: 18.0 sec;   INR: 1.61     PTT - ( 21 Jun 2017 13:07 )  PTT:58.0 sec    +DVT prophylaxis  HEPARIN DRIP  +Sleep PRETTY GOOD  +Nutrition goals ORAL  -Pain  DENIED  -Decubital ulcer  +GI prophylaxis (PPV, coagulopathy, Hx)  +Aspiration prophylaxis (45 degrees)    Ventilator synchronized  high flow  Tracheal cuff pressure    +Sedation/analgesia stopped once  +ID (phos, CH, mupi, SB)  -Delirium  +Cardiac off beta for now  +Prevention  +Education  +Medication reviewed (drug-drug interactions, PDA)  Medical devices  Discussed with CCU, PGY, CCRN, daughter    RADIOLOGY & ADDITIONAL STUDIES:    CXR: bilateral fluid

## 2017-06-22 DIAGNOSIS — G25.81 RESTLESS LEGS SYNDROME: ICD-10-CM

## 2017-06-22 LAB
ANION GAP SERPL CALC-SCNC: 13 MMOL/L — SIGNIFICANT CHANGE UP (ref 5–17)
APTT BLD: 52.1 SEC — HIGH (ref 27.5–37.4)
APTT BLD: 59 SEC — HIGH (ref 27.5–37.4)
APTT BLD: 60.2 SEC — HIGH (ref 27.5–37.4)
APTT BLD: 61.7 SEC — HIGH (ref 27.5–37.4)
BLD GP AB SCN SERPL QL: NEGATIVE — SIGNIFICANT CHANGE UP
BUN SERPL-MCNC: 51 MG/DL — HIGH (ref 7–23)
CALCIUM SERPL-MCNC: 9.6 MG/DL — SIGNIFICANT CHANGE UP (ref 8.4–10.5)
CHLORIDE SERPL-SCNC: 92 MMOL/L — LOW (ref 96–108)
CO2 SERPL-SCNC: 34 MMOL/L — HIGH (ref 22–31)
CREAT SERPL-MCNC: 2.3 MG/DL — HIGH (ref 0.5–1.3)
GLUCOSE SERPL-MCNC: 84 MG/DL — SIGNIFICANT CHANGE UP (ref 70–99)
HCT VFR BLD CALC: 36.1 % — SIGNIFICANT CHANGE UP (ref 34.5–45)
HGB BLD-MCNC: 10.7 G/DL — LOW (ref 11.5–15.5)
MAGNESIUM SERPL-MCNC: 1.9 MG/DL — SIGNIFICANT CHANGE UP (ref 1.6–2.6)
MCHC RBC-ENTMCNC: 26.7 PG — LOW (ref 27–34)
MCHC RBC-ENTMCNC: 29.6 G/DL — LOW (ref 32–36)
MCV RBC AUTO: 90 FL — SIGNIFICANT CHANGE UP (ref 80–100)
PHOSPHATE SERPL-MCNC: 4.2 MG/DL — SIGNIFICANT CHANGE UP (ref 2.5–4.5)
PLATELET # BLD AUTO: 182 K/UL — SIGNIFICANT CHANGE UP (ref 150–400)
POTASSIUM SERPL-MCNC: 4.1 MMOL/L — SIGNIFICANT CHANGE UP (ref 3.5–5.3)
POTASSIUM SERPL-SCNC: 4.1 MMOL/L — SIGNIFICANT CHANGE UP (ref 3.5–5.3)
RBC # BLD: 4.01 M/UL — SIGNIFICANT CHANGE UP (ref 3.8–5.2)
RBC # FLD: 15.7 % — SIGNIFICANT CHANGE UP (ref 10.3–16.9)
RH IG SCN BLD-IMP: POSITIVE — SIGNIFICANT CHANGE UP
SODIUM SERPL-SCNC: 139 MMOL/L — SIGNIFICANT CHANGE UP (ref 135–145)
WBC # BLD: 8.3 K/UL — SIGNIFICANT CHANGE UP (ref 3.8–10.5)
WBC # FLD AUTO: 8.3 K/UL — SIGNIFICANT CHANGE UP (ref 3.8–10.5)

## 2017-06-22 PROCEDURE — 71010: CPT | Mod: 26

## 2017-06-22 PROCEDURE — 99291 CRITICAL CARE FIRST HOUR: CPT

## 2017-06-22 PROCEDURE — 93010 ELECTROCARDIOGRAM REPORT: CPT

## 2017-06-22 RX ORDER — POTASSIUM CHLORIDE 20 MEQ
40 PACKET (EA) ORAL ONCE
Qty: 0 | Refills: 0 | Status: COMPLETED | OUTPATIENT
Start: 2017-06-22 | End: 2017-06-22

## 2017-06-22 RX ORDER — HEPARIN SODIUM 5000 [USP'U]/ML
1400 INJECTION INTRAVENOUS; SUBCUTANEOUS
Qty: 25000 | Refills: 0 | Status: DISCONTINUED | OUTPATIENT
Start: 2017-06-22 | End: 2017-06-22

## 2017-06-22 RX ORDER — HEPARIN SODIUM 5000 [USP'U]/ML
1500 INJECTION INTRAVENOUS; SUBCUTANEOUS
Qty: 25000 | Refills: 0 | Status: DISCONTINUED | OUTPATIENT
Start: 2017-06-22 | End: 2017-06-23

## 2017-06-22 RX ORDER — MAGNESIUM SULFATE 500 MG/ML
2 VIAL (ML) INJECTION ONCE
Qty: 0 | Refills: 0 | Status: COMPLETED | OUTPATIENT
Start: 2017-06-22 | End: 2017-06-22

## 2017-06-22 RX ORDER — FUROSEMIDE 40 MG
40 TABLET ORAL ONCE
Qty: 0 | Refills: 0 | Status: COMPLETED | OUTPATIENT
Start: 2017-06-22 | End: 2017-06-22

## 2017-06-22 RX ORDER — GABAPENTIN 400 MG/1
100 CAPSULE ORAL EVERY OTHER DAY
Qty: 0 | Refills: 0 | Status: DISCONTINUED | OUTPATIENT
Start: 2017-06-22 | End: 2017-07-04

## 2017-06-22 RX ADMIN — Medication 3 MILLILITER(S): at 05:32

## 2017-06-22 RX ADMIN — Medication 325 MILLIGRAM(S): at 21:15

## 2017-06-22 RX ADMIN — Medication 3 MILLILITER(S): at 00:15

## 2017-06-22 RX ADMIN — HEPARIN SODIUM 13.5 UNIT(S)/HR: 5000 INJECTION INTRAVENOUS; SUBCUTANEOUS at 02:30

## 2017-06-22 RX ADMIN — Medication 1 CAPSULE(S): at 12:20

## 2017-06-22 RX ADMIN — Medication 60 MILLIGRAM(S): at 18:50

## 2017-06-22 RX ADMIN — Medication 3 MILLILITER(S): at 23:37

## 2017-06-22 RX ADMIN — Medication 325 MILLIGRAM(S): at 14:05

## 2017-06-22 RX ADMIN — Medication 40 MILLIGRAM(S): at 13:10

## 2017-06-22 RX ADMIN — Medication 1 APPLICATION(S): at 06:26

## 2017-06-22 RX ADMIN — Medication 40 MILLIEQUIVALENT(S): at 13:10

## 2017-06-22 RX ADMIN — Medication 100 MILLIGRAM(S): at 12:20

## 2017-06-22 RX ADMIN — Medication 60 MILLIGRAM(S): at 06:29

## 2017-06-22 RX ADMIN — Medication 1 APPLICATION(S): at 18:50

## 2017-06-22 RX ADMIN — HEPARIN SODIUM 14 UNIT(S)/HR: 5000 INJECTION INTRAVENOUS; SUBCUTANEOUS at 21:15

## 2017-06-22 RX ADMIN — CLOPIDOGREL BISULFATE 75 MILLIGRAM(S): 75 TABLET, FILM COATED ORAL at 12:20

## 2017-06-22 RX ADMIN — Medication 50 GRAM(S): at 04:01

## 2017-06-22 RX ADMIN — Medication 3 MILLILITER(S): at 11:20

## 2017-06-22 RX ADMIN — ACETAZOLAMIDE 500 MILLIGRAM(S): 250 TABLET ORAL at 12:20

## 2017-06-22 NOTE — PROGRESS NOTE ADULT - ASSESSMENT
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

## 2017-06-22 NOTE — PROGRESS NOTE ADULT - SUBJECTIVE AND OBJECTIVE BOX
INTERVAL HPI/OVERNIGHT EVENTS:    in CCU  being diuresed    PAST MEDICAL & SURGICAL HISTORY:  PVD (peripheral vascular disease)  Iron deficiency anemia  Lupus (systemic lupus erythematosus)  Paroxysmal atrial fibrillation  Aortic stenosis, severe  CKD (chronic kidney disease) stage 4, GFR 15-29 ml/min  Diabetes  HTN (hypertension)  CHF (congestive heart failure)  CAD (coronary artery disease)  COPD (chronic obstructive pulmonary disease)  History of total knee replacement:   Presence of cardiac pacemaker: at [facility] by Dr Escoto    FAMILY HISTORY:  No pertinent family history in first degree relatives    SOCIAL HISTORY:    REVIEW OF SYSTEMS:  does not want to answer; daughter at bedside  Constitutional: () weight change, () fever,  () chills, () fatigue, () night sweats  Eyes: () discharge, () eye pain, () visual change  ENT:  () hearing difficulty, () vertigo, () sinus pain,  () throat pain, () epistaxis, () dysphagia, () hoarseness  Neck: () pain, () stiffness, () swelling  Respiratory: () cough, () wheezing, () hemoptysis      Cardiovascular: () chest pain, ()palpitations, () dizziness   Gastrointestinal: () abdominal pain, () nausea, () vomiting, () hematemesis, () diarrhea,  () constipation, () melena  Genitourinary:  () dysuria, () frequency, () hematuria, () incontinence      Neurologic: () headache, () memory loss, () loss of strength, () numbness, () tremor     Skin: () itching, () burning, () rash, () lesions   Lymphatic: () enlarged lymph nodes  Endocrine: () hair loss, () temperature intolerance         Musculoskeletal: () back pain, () joint pain,  () extremity pain  Psychiatric: () visual change, () auditory change, () depression, () anxiety, () suicidal  Sleep: () disorder, () insomnia, () sleep deprivation  Heme/Lymph: () easy bruising, () bleeding gums            Allergy and Immunologic: () hives, () eczema    Vital Signs Last 24 Hrs  T(C): 37.3, Max: 37.6 ( @ 13:00)  T(F): 99.1, Max: 99.6 ( @ 13:00)  HR: 78 (66 - 86)  BP: 113/54 (93/53 - 124/50)  BP(mean): 65 (65 - 97)  RR: 28 (16 - 43)  SpO2: 88% (74% - 97%)  ON NASAL CANNULA    I&O's Detail  I & Os for 24h ending 2017 07:00  =============================================  IN:    Oral Fluid: 170 ml    heparin Infusion: 162 ml    heparin Infusion: 87.5 ml    heparin Infusion: 69.5 ml    IV PiggyBack: 50 ml    Total IN: 539 ml  ---------------------------------------------  OUT:    Indwelling Catheter - Urethral: 3350 ml    Total OUT: 3350 ml  ---------------------------------------------  Total NET: -2811 ml    I & Os for current day (as of 2017 12:17)  =============================================  IN:    Oral Fluid: 200 ml    heparin Infusion: 70 ml    Total IN: 270 ml  ---------------------------------------------  OUT:    Indwelling Catheter - Urethral: 775 ml    Total OUT: 775 ml  ---------------------------------------------  Total NET: -505 ml    PHYSICAL EXAM:  in bed, agitated, leg pain is chronic   Obese, uncomfortable, - acute distress; vital signs are monitored continuously  Eyes: PERRLA, EOMI, -conjunctivitis, -scleritis   Head: no focal deficit, normocephalic,  no trauma  ENMT: moist tongue, no thrush, -nasal discharge, -hoarseness, normal hearing, -cough, -hemoptysis, trachea midline  Neck: supple, - lymphadenopathy,  -masses, -JVD  Respiratory: bilateral diminished breath sounds, -wheezing, -rhonchi, -rales, -crackles  Chest: -accessory muscle use, -paradoxical breathing  Cardiovascular: irregular rate and afib, S1 S2 normal, -S3, -S4, 4/6 murmur, -gallop, -rub  Gastrointestinal: soft, nontender, nondistended, normal bowel sounds, no hepatosplenomegaly  Genitourinary: -flank pain, -dysuria KISER  Extremities: -clubbing, -cyanosis, ++edema AND MILD ERYTHEMA   Vascular: peripheral pulses palpable 2+ bilaterally  Neurological: alert, oriented x 3, no focal deficit, -tremor   Skin: warm, dry, -erythema, iv site intact  Lymph nodes; no cervical, supraclavicular or axillary adenopathy  Psychiatric: cooperative, appropriate mood    MEDICATIONS  (STANDING):  ferrous    sulfate 325milliGRAM(s) Oral every 8 hours  acetazolamide    Tablet 500milliGRAM(s) Oral daily  ALBUTerol/ipratropium for Nebulization 3milliLiter(s) Nebulizer every 6 hours  clopidogrel Tablet 75milliGRAM(s) Oral daily  docusate sodium 100milliGRAM(s) Oral daily  hydrocortisone 2.5% Cream 1Application(s) Topical two times a day  furosemide   Injectable 60milliGRAM(s) IV Push every 12 hours  Nephrocaps 1Capsule(s) Oral daily  heparin  Infusion 1400Unit(s)/Hr IV Continuous <Continuous>    MEDICATIONS  (PRN):  acetylcysteine 20% Inhalation 5milliLiter(s) Inhalation every 6 hours PRN shortness of breath  artificial  tears Solution 1Drop(s) Both EYES three times a day PRN Dry Eyes    Allergies  No Known Allergies  Intolerances    LABS:                        10.7   8.3   )-----------( 182      ( 2017 02:31 )             36.1     06-    139  |  92<L>  |  51<H>  ----------------------------<  84  4.1   |  34<H>  |  2.30<H>    Ca    9.6      2017 02:30  Phos  4.2     06-22  Mg     1.9     06-22  PT/INR - ( 2017 04:25 )   PT: 18.0 sec;   INR: 1.61     PTT - ( 2017 08:50 )  PTT:60.2 sec    +DVT prophylaxis HEPARIN DRIP  +Sleep  GOOD  +Nutrition goals  ORAL  -Pain RIGHT HIP  -Decubital ulcer  +GI prophylaxis (PPV, coagulopathy, Hx)  +Aspiration prophylaxis (45 degrees)  +Sedation/analgesia stopped once  +ID (phos, CH, mupi, SB)  -Delirium  +Cardiac  +Prevention  +Education  DAILY WEIGHT AT HOME  +Medication reviewed (drug-drug interactions, PDA)  Medical devices IV KISER  Discussed with ICU, PGY, CCRN, daughter    RADIOLOGY & ADDITIONAL STUDIES:      EXAM:  XR CHEST 1 VIEW PORT ROUTINE                          PROCEDURE DATE:  2017          INTERPRETATION:  Indication: CHF    A single portable view of the chest is submitted. Comparison is made to   the most recent prior study dated 2017 at 6:55 AM. Again noted are   bilateral effusions and infiltrates compatible with congestive failure.   Findings are similar to the prior study.   Impression:    No significant interval change    PROCEDURE DATE:  2017                        INTERPRETATION:  Patient Height: 167.6 cm  Patient Weight: 131.5 kg  Heart Rate: 108 bpm  Systolic Pressure: 100 mmHg  Diastolic Pressure: 60 mmHg  BSA: 2.3 m^2  Interpretation Summary  There is moderate concentric left ventricular hypertrophy. Hypokinesis of   the   basal septal region. Normal motion in the remaining regions.  The left   ventricular ejection fraction is 50%.The left atrium is severely   dilated.The   right atrium is markedly dilated.The right ventricle is not well   visualized.Calcified aortic valve. There is Severe aortic stenosis.The   calculated aortic valve area using the continuity equation is 0.55   cm2.The   peak pressure gradient is 71 mmHg.The mean pressure gradient is 43   mmHg.The   calculated stroke volume index is 27 cc/m2 (normal >35cc/m2).The   dimensionless   index (ratio of LVOTvelocity to aortic velocity) was calculated to be   0.17.   There ismoderate mitral annular calcification. There is mild mitral   regurgitation.  There is mild to moderate tricuspid regurgitation.There   is   moderate pulmonary hypertension. The pulmonary artery systolic pressure   is   estimated to be 52 mmHg.  No aortic root dilatation.There is a pacemaker   wire   in the right heart.The IVC is dilated (>2.1 cm) with an abnormal   inspiratory   collapse (<50%) consistent with elevated right atrial pressure.  There   is no   pericardial effusion.Left pleural effusion noted.  Procedure Details  A complete two-dimensional transthoracic echocardiogram was performed (2D,  M-mode, spectral and color flow doppler).  Study Quality: Good.  After verbal consent obtained, contrast injection of 2ml of diluted   Definity  contrast (1.3ml Definity diluted in 8.7ml Saline) were given to enhance  endocardial resolution.  Left Ventricle  There is moderate concentric left ventricular hypertrophy.  Hypokinesis of the basal septal region. Normal motion in the remaining  regions.  The left ventricular ejection fraction is 50%.  Left Atrium  The left atrium is severely dilated.  Right Atrium  The right atrium is markedly dilated.  Right Ventricle  The right ventricle is not well visualized.  There is a pacemaker wire in the right heart.  Aortic Valve  Calcified aortic valve.  No aortic regurgitation noted.  The calculated stroke volume index is 27 cc/m2 (normal >35cc/m2).  The peak pressure gradient is 71 mmHg.  The mean pressure gradient is 43 mmHg.  The calculated aortic valve area using the continuity equation is 0.55   cm2.  The dimensionless index (ratio of LVOTvelocity to aortic velocity) was  calculated to be 0.17.  There is Severe aortic stenosis.  Mitral Valve  There is moderate mitral annular calcification.  There is mild mitral regurgitation.  Tricuspid Valve  Structurally normal tricuspid valve.  There is mild to moderate tricuspid regurgitation.  The pulmonary artery systolic pressure is estimated to be 52 mmHg.  There is moderate pulmonary hypertension.  Pulmonic Valve  No pulmonic regurgitation noted.  Arteries and Venous System  No aortic root dilatation.  The IVC is dilated (>2.1 cm) with an abnormal inspiratory collapse (<50%)  consistent with elevated right atrial pressure.  Pericardium/ Pleura  There is no pericardial effusion.  Left pleural effusion noted.  Doppler Measurements & Calculations  MV dec time: 0.1 sec  Ao V2 mean: 295.8 cm/sec  Ao mean P.8 mmHg  Ao mean PG (full): 39.5 mmHg  Ao V2 VTI: 109.0 cm  ALEKS(I,A): 0.6 cm^2  ALEKS(I,D): 0.6 cm^2  LV max P.4 mmHg  LV mean P.3 mmHg  LV V1 max: 77.9 cm/sec  LV V1 mean: 53.4 cm/sec  LV V1 VTI: 20.1 cm  SV(Ao): 1115.9 ml  SI(Ao): 476.4 ml/m^2  SV(LVOT): 64.8 ml  SI(LVOT): 27.7 ml/m^2  TR Max graciela: 315.4 cm/sec  MMode 2D Measurements & Calculations  IVSd: 1.6 cm  LVIDd: 5.2 cm  LVIDs: 4.1 cm  LVPWd: 1.6 cm  IVS/LVPW: 1.0  FS: 20.9 %  EDV(Teich): 128.4 ml  ESV(Teich): 74.2 ml  LV mass(C)d: 373.3 grams  LV mass(C)dI: 159.4 grams/m^2  SI(cubed): 30.0 ml/m^2  Ao root diam: 3.6 cm  Ao root area: 10.2 cm^2  LA dimension: 5.2 cm  LA/Ao: 1.4  LVOT diam: 2.0 cm  LVOT area: 3.2 cm^2  LVOT area (M): 3.1 cm^2  LVLd ap4: 5.8 cm  EDV(MOD-sp4): 97 ml  LVLs ap4: 5.1 cm  ESV(MOD-sp4): 50 ml  EF(MOD-sp4): 48.5 %  SV(MOD-sp4): 47ml  SI(MOD-sp4): 20.1 ml/m^2

## 2017-06-22 NOTE — PROGRESS NOTE ADULT - SUBJECTIVE AND OBJECTIVE BOX
on bipap o/n. Pt has been refusing working with physical therapy.      breathing is unchanged from yesterday, some pain around neck region,    ICU Vital Signs Last 24 Hrs  T(C): 36.4, Max: 37.3 (06-22 @ 09:00)  T(F): 97.6, Max: 99.1 (06-22 @ 09:00)  HR: 74 (66 - 84)  BP: 90/45 (90/45 - 121/56)  BP(mean): 69 (65 - 97)  ABP: --  ABP(mean): --  RR: 21 (16 - 54)  SpO2: 89% (87% - 97%)      TELE:  GALILEO    PHYSICAL EXAM:    Constitutional: NAD on HFNC  Eyes: PERRL  ENMT: dry MM  Neck: distended neck veins  Respiratory: bibasilar crackles  Cardiovascular: irreg rhythm, reg rate, harsh systolic murmur  Gastrointestinal: soft, NT  Extremities: WWP, pedal edema  Vascular: normal peripheral pulses  Neurological: A&O x 3  Musculoskeletal: No joint swelling  Skin - scaly papules on LLE with interval improvement     LABS:                        10.7   8.3   )-----------( 182      ( 22 Jun 2017 02:31 )             36.1     06-22    139  |  92<L>  |  51<H>  ----------------------------<  84  4.1   |  34<H>  |  2.30<H>    Ca    9.6      22 Jun 2017 02:30  Phos  4.2     06-22  Mg     1.9     06-22      PT/INR - ( 21 Jun 2017 04:25 )   PT: 18.0 sec;   INR: 1.61          PTT - ( 22 Jun 2017 02:39 )  PTT:52.1 sec      RADIOLOGY & ADDITIONAL TESTS:  AM EKG - reviewed  PORTABLE CXR - pulmonary congestion with bilateral pleural effusions     MEDICATIONS  (STANDING):  ferrous    sulfate 325milliGRAM(s) Oral every 8 hours  acetazolamide    Tablet 500milliGRAM(s) Oral daily  ALBUTerol/ipratropium for Nebulization 3milliLiter(s) Nebulizer every 6 hours  clopidogrel Tablet 75milliGRAM(s) Oral daily  docusate sodium 100milliGRAM(s) Oral daily  hydrocortisone 2.5% Cream 1Application(s) Topical two times a day  furosemide   Injectable 60milliGRAM(s) IV Push every 12 hours  Nephrocaps 1Capsule(s) Oral daily  heparin  Infusion 1400Unit(s)/Hr IV Continuous <Continuous>    MEDICATIONS  (PRN):  acetylcysteine 20% Inhalation 5milliLiter(s) Inhalation every 6 hours PRN shortness of breath  artificial  tears Solution 1Drop(s) Both EYES three times a day PRN Dry Eyes      Allergies    No Known Allergies    Intolerances    84 yo F chronic diastolic CHF (EF 50-55% 11/16'), mod to severe AS (ALEKS 0.5cm)-not a surgical candidate, pafib (on Coumadin), CAD s/p PCI x 3 stent (04', 06' and 12')-last cath 3/15' PCI dLM w/ Impella support, Lupus, COPD (prior 30yr ppk hx), PNA, 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, HTN, PVD, and Type 2 DM (no longer on meds), MONICA, and TKR 03' who presented to the hospital due to the inability to get out of bed x 3 days, SOB, and increased LE edema, found to have a CHF exacerbation stepped up to CCU for hypercapnic resp failure.     PLAN  CARDIO:  Valves:  -severe AS, ALEKS 0.5, mean pressure gradient 43, mod pulm HTN, PASP 52. Structural cardiology is following pt and may perform BAV during admission if pt's resp status improves such that she can lay flat.   -CTS Dr. Barger consulted. F/u recs.      Pump: acute CHF exacerbation 2/2 dietary non-compliance and severe AS . EF 50-55%, severely dilated LA; net neg 2.5L yest  -Daily weights, strict I/Os (us in place)  -holding Toprol XL 25mg po daily as pt borderline hypotensive and diuresing  -Cont Furosemide 60 q12hr, c/w diamox 500mg qd per renal recs. Careful diuresis with close monitoring of BP in setting of severe AS.     Vessel:  s/p PCI x 3 stent (04', 06' and 12')-last cath 3/15' PCI dLM   -Trop 0.04 x 4. Likely 2/2 demand ischemia. Not continuing to trend.   -holding Toprol XL 25mg po daily in setting of low BP from diuresis  -Per daughter Rosendo House Call NP Eben Tellez d/c'ed ASA due to dual AC w/ Coumadin, statin was d/c'ed years ago for unclear reasons. Started plavix during this admission.     Electrical:  -afib with slow ventricular response s/p PPM 2011 2/2 symptomatic bradycardia. PPM interrogation WNL.  -c/w hep gtt in case pt undergoes BAL   -holding Toprol XL 25mg po daily in setting of borderline low BP and diuresis.      Hemodynamics: borderline hypotensive, holding ACE/ARB/and BB in setting of diuresis.      PULM:  #Hypercapnic resp failure 2/2 pulmonary congestion 2/2 CHF 2/2 severe AS -  trial of nc today. Pt has been refusing working with PT, will re-emphasize importance of moving and getting out of bed.     #COPD (chronic obstructive pulmonary disease) - c/w duonebs prn    ID - RESOLVED UTI growing kleb. s/p 7 day course of abx.     RENAL:  #CKD (chronic kidney disease) stage 4, GFR 15-29 ml/min- Baseline Cr per daughter 2-3.   -Cont Furosemide q12hr, c/w Diamox 500mg qd for alkalosis per renal recs.   -Renal consulted. F/u recs.    GI  #Constipation - 2/2 iron supplement. RESOLVED c/w bowel regimen (miralax, senna, colace).     Endo:  #DM - off meds, a1c wnl    NUTRITION - cardiac diet  PPX - heparin gtt  ACCESS - peripheral  DNR/DNI - palliative is following, will need reversal if agrees to BAL. continued GOC discussions

## 2017-06-22 NOTE — PROGRESS NOTE ADULT - SUBJECTIVE AND OBJECTIVE BOX
INTERVAL HPI/OVERNIGHT EVENTS:    SUBJECTIVE:    VITAL SIGNS:  Vital Signs Last 24 Hrs  T(C): 37.1, Max: 37.6 (06-21 @ 13:00)  T(F): 98.8, Max: 99.6 (06-21 @ 13:00)  HR: 77 (66 - 86)  BP: 114/54 (93/53 - 131/70)  BP(mean): 87 (65 - 111)  RR: 19 (16 - 43)  SpO2: 95% (74% - 97%)  I&O: 24 hr net neg 2.5L     TELE:       PHYSICAL EXAM:    Constitutional:  Eyes:  ENMT:  Neck:  Respiratory:  Cardiovascular:  Gastrointestinal:  Extremities:  Vascular:  Neurological:  Musculoskeletal:    LABS:                        10.7   8.3   )-----------( 182      ( 22 Jun 2017 02:31 )             36.1     06-22    139  |  92<L>  |  51<H>  ----------------------------<  84  4.1   |  34<H>  |  2.30<H>    Ca    9.6      22 Jun 2017 02:30  Phos  4.2     06-22  Mg     1.9     06-22      PT/INR - ( 21 Jun 2017 04:25 )   PT: 18.0 sec;   INR: 1.61          PTT - ( 22 Jun 2017 02:39 )  PTT:52.1 sec      RADIOLOGY & ADDITIONAL TESTS:  AM EKG - reviewed  PORTABLE CXR -     MEDICATIONS  (STANDING):  ferrous    sulfate 325milliGRAM(s) Oral every 8 hours  acetazolamide    Tablet 500milliGRAM(s) Oral daily  ALBUTerol/ipratropium for Nebulization 3milliLiter(s) Nebulizer every 6 hours  clopidogrel Tablet 75milliGRAM(s) Oral daily  docusate sodium 100milliGRAM(s) Oral daily  hydrocortisone 2.5% Cream 1Application(s) Topical two times a day  furosemide   Injectable 60milliGRAM(s) IV Push every 12 hours  Nephrocaps 1Capsule(s) Oral daily  heparin  Infusion 1400Unit(s)/Hr IV Continuous <Continuous>    MEDICATIONS  (PRN):  acetylcysteine 20% Inhalation 5milliLiter(s) Inhalation every 6 hours PRN shortness of breath  artificial  tears Solution 1Drop(s) Both EYES three times a day PRN Dry Eyes      Allergies    No Known Allergies    Intolerances    84 yo F chronic diastolic CHF (EF 50-55% 11/16'), mod to severe AS (ALEKS 0.5cm)-not a surgical candidate, pafib (on Coumadin), CAD s/p PCI x 3 stent (04', 06' and 12')-last cath 3/15' PCI dLM w/ Impella support, Lupus, COPD (prior 30yr ppk hx), PNA, 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, HTN, PVD, and Type 2 DM (no longer on meds), MONICA, and TKR 03' who presented to the hospital due to the inability to get out of bed x 3 days, SOB, and increased LE edema, found to have a CHF exacerbation stepped up to CCU for hypercapnic resp failure.     PLAN  CARDIO:  Valves:  -severe AS, ALEKS 0.5, mean pressure gradient 43, mod pulm HTN, PASP 52. Structural cardiology is following pt and may perform BAV during admission if pt's resp status improves such that she can lay flat.   -CTS Dr. Barger consulted. F/u recs.      Pump: acute CHF exacerbation 2/2 dietary non-compliance and severe AS . EF 50-55%, severely dilated LA; net neg >2.7L yest  -Daily weights, strict I/Os (us in place)  -holding Toprol XL 25mg po daily as pt borderline hypotensive and diuresing  -Cont Furosemide 60 q12hr, c/w diamox 500mg qd per renal recs. Careful diuresis with close monitoring of BP in setting of severe AS.     Vessel:  s/p PCI x 3 stent (04', 06' and 12')-last cath 3/15' PCI dLM   -Trop 0.04 x 4. Likely 2/2 demand ischemia. Not continuing to trend.   -holding Toprol XL 25mg po daily in setting of low BP from diuresis  -Per daughter [place] [facility] NP Eben Tellez d/c'ed ASA due to dual AC w/ Coumadin, statin was d/c'ed years ago for unclear reasons. Started plavix during this admission.     Electrical:  -afib with slow ventricular response s/p PPM 2011 2/2 symptomatic bradycardia. PPM interrogation WNL.  -c/w hep gtt in case pt undergoes BAL   -holding Toprol XL 25mg po daily in setting of borderline low BP and diuresis.      Hemodynamics: borderline hypotensive, holding ACE/ARB/and BB in setting of diuresis.      PULM:  #Hypercapnic resp failure 2/2 pulmonary congestion 2/2 CHF 2/2 severe AS -  PCO2 is stable. see plan above. Satting well on NC 4L. VBGs as needed.   #COPD (chronic obstructive pulmonary disease) - c/w duonebs prn    ID - UTI growing kleb. s/p 7 day course of abx.     RENAL:  #CKD (chronic kidney disease) stage 4, GFR 15-29 ml/min- Baseline Cr per daughter 2-3.   -Cont Furosemide q12hr, c/w Diamox 500mg qd for alkalosis per renal recs.   -Renal consulted. F/u recs.    GI  #Constipation - 2/2 iron supplement. c/w bowel regimen (miralax, senna, colace).     Endo:  #DM - off meds, a1c wnl    NUTRITION - cardiac diet  PPX - heparin gtt  ACCESS - peripheral  DNR/DNI - palliative is following, will need reversal if agrees to BAL. continued GOC discussions INTERVAL HPI/OVERNIGHT EVENTS:  on bipap o/n.     SUBJECTIVE: breathing is unchanged from yesterday, some pain around neck region, BM o/n. ROS otherwise neg.     VITAL SIGNS:  Vital Signs Last 24 Hrs  T(C): 37.1, Max: 37.6 (06-21 @ 13:00)  T(F): 98.8, Max: 99.6 (06-21 @ 13:00)  HR: 77 (66 - 86)  BP: 114/54 (93/53 - 131/70)  BP(mean): 87 (65 - 111)  RR: 19 (16 - 43)  SpO2: 95% (74% - 97%)  I&O: 24 hr net neg 2.5L     TELE:  GALILEO    PHYSICAL EXAM:    Constitutional: NAD on HFNC  Eyes: PERRL  ENMT: dry MM  Neck: distended neck veins  Respiratory: bibasilar crackles  Cardiovascular: irreg rhythm, reg rate, harsh systolic murmur  Gastrointestinal: soft, NT  Extremities: WWP, pedal edema  Vascular: normal peripheral pulses  Neurological: A&O x 3  Musculoskeletal: No joint swelling  Skin - scaly papules on LLE with interval improvement     LABS:                        10.7   8.3   )-----------( 182      ( 22 Jun 2017 02:31 )             36.1     06-22    139  |  92<L>  |  51<H>  ----------------------------<  84  4.1   |  34<H>  |  2.30<H>    Ca    9.6      22 Jun 2017 02:30  Phos  4.2     06-22  Mg     1.9     06-22      PT/INR - ( 21 Jun 2017 04:25 )   PT: 18.0 sec;   INR: 1.61          PTT - ( 22 Jun 2017 02:39 )  PTT:52.1 sec      RADIOLOGY & ADDITIONAL TESTS:  AM EKG - reviewed  PORTABLE CXR - pulmonary congestion with bilateral pleural effusions     MEDICATIONS  (STANDING):  ferrous    sulfate 325milliGRAM(s) Oral every 8 hours  acetazolamide    Tablet 500milliGRAM(s) Oral daily  ALBUTerol/ipratropium for Nebulization 3milliLiter(s) Nebulizer every 6 hours  clopidogrel Tablet 75milliGRAM(s) Oral daily  docusate sodium 100milliGRAM(s) Oral daily  hydrocortisone 2.5% Cream 1Application(s) Topical two times a day  furosemide   Injectable 60milliGRAM(s) IV Push every 12 hours  Nephrocaps 1Capsule(s) Oral daily  heparin  Infusion 1400Unit(s)/Hr IV Continuous <Continuous>    MEDICATIONS  (PRN):  acetylcysteine 20% Inhalation 5milliLiter(s) Inhalation every 6 hours PRN shortness of breath  artificial  tears Solution 1Drop(s) Both EYES three times a day PRN Dry Eyes      Allergies    No Known Allergies    Intolerances    84 yo F chronic diastolic CHF (EF 50-55% 11/16'), mod to severe AS (ALEKS 0.5cm)-not a surgical candidate, pafib (on Coumadin), CAD s/p PCI x 3 stent (04', 06' and 12')-last cath 3/15' PCI dLM w/ Impella support, Lupus, COPD (prior 30yr ppk hx), PNA, 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     PLAN  CARDIO:  Valves:  -severe AS, ALEKS 0.5, mean pressure gradient 43, mod pulm HTN, PASP 52. Structural cardiology is following pt and may perform BAV during admission if pt's resp status improves such that she can lay flat.   -CTS Dr. Barger consulted. F/u recs.      Pump: acute CHF exacerbation 2/2 dietary non-compliance and severe AS . EF 50-55%, severely dilated LA; net neg >2.7L yest  -Daily weights, strict I/Os (us in place)  -holding Toprol XL 25mg po daily as pt borderline hypotensive and diuresing  -Cont Furosemide 60 q12hr, c/w diamox 500mg qd per renal recs. Careful diuresis with close monitoring of BP in setting of severe AS.     Vessel:  s/p PCI x 3 stent (04', 06' and 12')-last cath 3/15' PCI dLM   -Trop 0.04 x 4. Likely 2/2 demand ischemia. Not continuing to trend.   -holding Toprol XL 25mg po daily in setting of low BP from diuresis  -Per daughter [place] [facility] NP Eben Tellez d/c'ed ASA due to dual AC w/ Coumadin, statin was d/c'ed years ago for unclear reasons. Started plavix during this admission.     Electrical:  -afib with slow ventricular response s/p PPM 2011 2/2 symptomatic bradycardia. PPM interrogation WNL.  -c/w hep gtt in case pt undergoes BAL   -holding Toprol XL 25mg po daily in setting of borderline low BP and diuresis.      Hemodynamics: borderline hypotensive, holding ACE/ARB/and BB in setting of diuresis.      PULM:  #Hypercapnic resp failure 2/2 pulmonary congestion 2/2 CHF 2/2 severe AS -  PCO2 is stable. see plan above. Satting well on NC 4L. VBGs as needed.   #COPD (chronic obstructive pulmonary disease) - c/w duonebs prn    ID - RESOLVED UTI growing kleb. s/p 7 day course of abx.     RENAL:  #CKD (chronic kidney disease) stage 4, GFR 15-29 ml/min- Baseline Cr per daughter 2-3.   -Cont Furosemide q12hr, c/w Diamox 500mg qd for alkalosis per renal recs.   -Renal consulted. F/u recs.    GI  #Constipation - 2/2 iron supplement. RESOLVED c/w bowel regimen (miralax, senna, colace).     Endo:  #DM - off meds, a1c wnl    NUTRITION - cardiac diet  PPX - heparin gtt  ACCESS - peripheral  DNR/DNI - palliative is following, will need reversal if agrees to BAL. continued GOC discussions INTERVAL HPI/OVERNIGHT EVENTS:  on bipap o/n. Pt has been refusing working with physical therapy.     SUBJECTIVE: breathing is unchanged from yesterday, some pain around neck region, BM o/n. ROS otherwise neg.     VITAL SIGNS:  Vital Signs Last 24 Hrs  T(C): 37.1, Max: 37.6 (06-21 @ 13:00)  T(F): 98.8, Max: 99.6 (06-21 @ 13:00)  HR: 77 (66 - 86)  BP: 114/54 (93/53 - 131/70)  BP(mean): 87 (65 - 111)  RR: 19 (16 - 43)  SpO2: 95% (74% - 97%), desatted to 86% on nc  I&O: 24 hr net neg 2.5L     TELE:  GALILEO    PHYSICAL EXAM:    Constitutional: NAD on HFNC  Eyes: PERRL  ENMT: dry MM  Neck: distended neck veins  Respiratory: bibasilar crackles  Cardiovascular: irreg rhythm, reg rate, harsh systolic murmur  Gastrointestinal: soft, NT  Extremities: WWP, pedal edema  Vascular: normal peripheral pulses  Neurological: A&O x 3  Musculoskeletal: No joint swelling  Skin - scaly papules on LLE with interval improvement     LABS:                        10.7   8.3   )-----------( 182      ( 22 Jun 2017 02:31 )             36.1     06-22    139  |  92<L>  |  51<H>  ----------------------------<  84  4.1   |  34<H>  |  2.30<H>    Ca    9.6      22 Jun 2017 02:30  Phos  4.2     06-22  Mg     1.9     06-22      PT/INR - ( 21 Jun 2017 04:25 )   PT: 18.0 sec;   INR: 1.61          PTT - ( 22 Jun 2017 02:39 )  PTT:52.1 sec      RADIOLOGY & ADDITIONAL TESTS:  AM EKG - reviewed  PORTABLE CXR - pulmonary congestion with bilateral pleural effusions     MEDICATIONS  (STANDING):  ferrous    sulfate 325milliGRAM(s) Oral every 8 hours  acetazolamide    Tablet 500milliGRAM(s) Oral daily  ALBUTerol/ipratropium for Nebulization 3milliLiter(s) Nebulizer every 6 hours  clopidogrel Tablet 75milliGRAM(s) Oral daily  docusate sodium 100milliGRAM(s) Oral daily  hydrocortisone 2.5% Cream 1Application(s) Topical two times a day  furosemide   Injectable 60milliGRAM(s) IV Push every 12 hours  Nephrocaps 1Capsule(s) Oral daily  heparin  Infusion 1400Unit(s)/Hr IV Continuous <Continuous>    MEDICATIONS  (PRN):  acetylcysteine 20% Inhalation 5milliLiter(s) Inhalation every 6 hours PRN shortness of breath  artificial  tears Solution 1Drop(s) Both EYES three times a day PRN Dry Eyes      Allergies    No Known Allergies    Intolerances    84 yo F chronic diastolic CHF (EF 50-55% 11/16'), mod to severe AS (ALEKS 0.5cm)-not a surgical candidate, pafib (on Coumadin), CAD s/p PCI x 3 stent (04', 06' and 12')-last cath 3/15' PCI dLM w/ Impella support, Lupus, COPD (prior 30yr ppk hx), PNA, 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     PLAN  CARDIO:  Valves:  -severe AS, ALEKS 0.5, mean pressure gradient 43, mod pulm HTN, PASP 52. Structural cardiology is following pt and may perform BAV during admission if pt's resp status improves such that she can lay flat.   -CTS Dr. Barger consulted. F/u recs.      Pump: acute CHF exacerbation 2/2 dietary non-compliance and severe AS . EF 50-55%, severely dilated LA; net neg 2.5L yest  -Daily weights, strict I/Os (us in place)  -holding Toprol XL 25mg po daily as pt borderline hypotensive and diuresing  -Cont Furosemide 60 q12hr, c/w diamox 500mg qd per renal recs. Careful diuresis with close monitoring of BP in setting of severe AS.     Vessel:  s/p PCI x 3 stent (04', 06' and 12')-last cath 3/15' PCI dLM   -Trop 0.04 x 4. Likely 2/2 demand ischemia. Not continuing to trend.   -holding Toprol XL 25mg po daily in setting of low BP from diuresis  -Per daughter [place] [facility] NP Eben Tellez d/c'ed ASA due to dual AC w/ Coumadin, statin was d/c'ed years ago for unclear reasons. Started plavix during this admission.     Electrical:  -afib with slow ventricular response s/p PPM 2011 2/2 symptomatic bradycardia. PPM interrogation WNL.  -c/w hep gtt in case pt undergoes BAL   -holding Toprol XL 25mg po daily in setting of borderline low BP and diuresis.      Hemodynamics: borderline hypotensive, holding ACE/ARB/and BB in setting of diuresis.      PULM:  #Hypercapnic resp failure 2/2 pulmonary congestion 2/2 CHF 2/2 severe AS -  trial of nc today. Pt has been refusing working with PT, will re-emphasize importance of moving and getting out of bed.     #COPD (chronic obstructive pulmonary disease) - c/w duonebs prn    ID - RESOLVED UTI growing kleb. s/p 7 day course of abx.     RENAL:  #CKD (chronic kidney disease) stage 4, GFR 15-29 ml/min- Baseline Cr per daughter 2-3.   -Cont Furosemide q12hr, c/w Diamox 500mg qd for alkalosis per renal recs.   -Renal consulted. F/u recs.    GI  #Constipation - 2/2 iron supplement. RESOLVED c/w bowel regimen (miralax, senna, colace).     Endo:  #DM - off meds, a1c wnl    NUTRITION - cardiac diet  PPX - heparin gtt  ACCESS - peripheral  DNR/DNI - palliative is following, will need reversal if agrees to BAL. continued GOC discussions

## 2017-06-22 NOTE — PROGRESS NOTE ADULT - SUBJECTIVE AND OBJECTIVE BOX
CC: ACUTE CHRONIC CONGESTIVEHEART FAILURE UNSPECIFIE      INTERVAL HISTORY:grouchy  does not offer any complaints       ROS: No chest pain, no sob, no abd pain. No n/v/d    PAST MEDICAL & SURGICAL HISTORY:  PVD (peripheral vascular disease)  Iron deficiency anemia  Lupus (systemic lupus erythematosus)  Paroxysmal atrial fibrillation  Aortic stenosis, severe  CKD (chronic kidney disease) stage 4, GFR 15-29 ml/min  Diabetes  HTN (hypertension)  CHF (congestive heart failure)  CAD (coronary artery disease)  COPD (chronic obstructive pulmonary disease)  History of total knee replacement: 2003  Presence of cardiac pacemaker: at [facility] by Dr Escoto      PHYSICAL EXAM:  T(C): 37.1, Max: 37.6 (06-21 @ 13:00)  HR: 72  BP: 117/59 (93/53 - 124/50)  RR: 21  SpO2: 95%  Wt(kg): --  I&O's Summary    I & Os for current day (as of 22 Jun 2017 09:30)  =============================================  IN: 539 ml / OUT: 3350 ml / NET: -2811 ml    Weight 120 (06-15 @ 14:33)  General: AAO x 3,  NAD.  HEENT: moist mucous membranes, no pallor/cyanosis.  Neck: no JVD visible.  Cardiac: S1, S2. RRR. No murmurs   Respratory:+ rhonchi   Abdomen: soft. nontender. nondistended  Skin: no rashes.  Extremities: decreasing LE edema b/l  Access:       DATA:                        10.7<L>  8.3   )-----------( 182      ( 22 Jun 2017 02:31 )             36.1     Ferritin, Serum: 92.0 ng/mL (06-12 @ 05:42)      139    |  92<L>  |  51<H>  ----------------------------<  84     Ca:9.6   (22 Jun 2017 02:30)  4.1     |  34<H>  |  2.30<H>      eGFR if Non : 18 <L>  eGFR if : 21 <L>                        MEDICATIONS  (STANDING):  ferrous    sulfate 325milliGRAM(s) Oral every 8 hours  acetazolamide    Tablet 500milliGRAM(s) Oral daily  ALBUTerol/ipratropium for Nebulization 3milliLiter(s) Nebulizer every 6 hours  clopidogrel Tablet 75milliGRAM(s) Oral daily  docusate sodium 100milliGRAM(s) Oral daily  hydrocortisone 2.5% Cream 1Application(s) Topical two times a day  furosemide   Injectable 60milliGRAM(s) IV Push every 12 hours  Nephrocaps 1Capsule(s) Oral daily  heparin  Infusion 1400Unit(s)/Hr IV Continuous <Continuous>    MEDICATIONS  (PRN):  acetylcysteine 20% Inhalation 5milliLiter(s) Inhalation every 6 hours PRN shortness of breath  artificial  tears Solution 1Drop(s) Both EYES three times a day PRN Dry Eyes

## 2017-06-23 DIAGNOSIS — J96.92 RESPIRATORY FAILURE, UNSPECIFIED WITH HYPERCAPNIA: ICD-10-CM

## 2017-06-23 LAB
APTT BLD: 51.9 SEC — HIGH (ref 27.5–37.4)
APTT BLD: 86 SEC — HIGH (ref 27.5–37.4)
HCT VFR BLD CALC: 36.2 % — SIGNIFICANT CHANGE UP (ref 34.5–45)
HGB BLD-MCNC: 11.3 G/DL — LOW (ref 11.5–15.5)
MAGNESIUM SERPL-MCNC: 2.2 MG/DL — SIGNIFICANT CHANGE UP (ref 1.6–2.6)
MCHC RBC-ENTMCNC: 27.4 PG — SIGNIFICANT CHANGE UP (ref 27–34)
MCHC RBC-ENTMCNC: 31.2 G/DL — LOW (ref 32–36)
MCV RBC AUTO: 87.7 FL — SIGNIFICANT CHANGE UP (ref 80–100)
PLATELET # BLD AUTO: 194 K/UL — SIGNIFICANT CHANGE UP (ref 150–400)
RBC # BLD: 4.13 M/UL — SIGNIFICANT CHANGE UP (ref 3.8–5.2)
RBC # FLD: 15.8 % — SIGNIFICANT CHANGE UP (ref 10.3–16.9)
WBC # BLD: 10.2 K/UL — SIGNIFICANT CHANGE UP (ref 3.8–10.5)
WBC # FLD AUTO: 10.2 K/UL — SIGNIFICANT CHANGE UP (ref 3.8–10.5)

## 2017-06-23 PROCEDURE — 99291 CRITICAL CARE FIRST HOUR: CPT

## 2017-06-23 PROCEDURE — 71010: CPT | Mod: 26

## 2017-06-23 PROCEDURE — 93010 ELECTROCARDIOGRAM REPORT: CPT

## 2017-06-23 RX ORDER — HEPARIN SODIUM 5000 [USP'U]/ML
1650 INJECTION INTRAVENOUS; SUBCUTANEOUS
Qty: 25000 | Refills: 0 | Status: DISCONTINUED | OUTPATIENT
Start: 2017-06-23 | End: 2017-06-23

## 2017-06-23 RX ORDER — HEPARIN SODIUM 5000 [USP'U]/ML
1550 INJECTION INTRAVENOUS; SUBCUTANEOUS
Qty: 25000 | Refills: 0 | Status: DISCONTINUED | OUTPATIENT
Start: 2017-06-23 | End: 2017-06-24

## 2017-06-23 RX ADMIN — Medication 325 MILLIGRAM(S): at 05:22

## 2017-06-23 RX ADMIN — Medication 3 MILLILITER(S): at 23:45

## 2017-06-23 RX ADMIN — Medication 325 MILLIGRAM(S): at 13:45

## 2017-06-23 RX ADMIN — CLOPIDOGREL BISULFATE 75 MILLIGRAM(S): 75 TABLET, FILM COATED ORAL at 11:57

## 2017-06-23 RX ADMIN — Medication 3 MILLILITER(S): at 06:53

## 2017-06-23 RX ADMIN — Medication 1 CAPSULE(S): at 11:58

## 2017-06-23 RX ADMIN — Medication 3 MILLILITER(S): at 17:53

## 2017-06-23 RX ADMIN — HEPARIN SODIUM 15.5 UNIT(S)/HR: 5000 INJECTION INTRAVENOUS; SUBCUTANEOUS at 23:25

## 2017-06-23 RX ADMIN — Medication 60 MILLIGRAM(S): at 05:20

## 2017-06-23 RX ADMIN — Medication 3 MILLILITER(S): at 11:46

## 2017-06-23 RX ADMIN — GABAPENTIN 100 MILLIGRAM(S): 400 CAPSULE ORAL at 11:57

## 2017-06-23 RX ADMIN — HEPARIN SODIUM 15 UNIT(S)/HR: 5000 INJECTION INTRAVENOUS; SUBCUTANEOUS at 13:45

## 2017-06-23 RX ADMIN — ACETAZOLAMIDE 500 MILLIGRAM(S): 250 TABLET ORAL at 11:58

## 2017-06-23 RX ADMIN — Medication 60 MILLIGRAM(S): at 17:53

## 2017-06-23 NOTE — PROGRESS NOTE ADULT - ASSESSMENT
1) acute/chronic diastolic chf  -goal for o>i as toelrated  2) nonrheumatic aortic stenosis  -possible BAV on 6-  3) atrial fibrillation; presence of pacemaker  -ep consulted earlier this admisison  -heparin gtt  -clarify reason patient no on bb of mariangel av-node blocking rx  4) CAD s/p PCI   -remains on plavix  -clarify reason patient not on statin  5) Lupus - monitor  6) COPD - per pulm  7) CKD - Renal following  8) HTN - avoid labile bp  9) PVD - monitor  10) Type 2 DM - monitor blood sugar  11) normocytic anemia - serial h/h  12) pressure injury of skin (right buttock) - call wound care   13) ppx: therapeutic ac; consider ppi if no contrainidcations  14) K>4< Mg>2  d/w Charbel earlier today

## 2017-06-23 NOTE — PROGRESS NOTE ADULT - SUBJECTIVE AND OBJECTIVE BOX
CC: ACUTE CHRONIC CONGESTIVEHEART FAILURE UNSPECIFIE      INTERVAL HISTORY: resting comfortably  in NAD      ROS: No chest pain, no sob, no abd pain. No n/v/d    PAST MEDICAL & SURGICAL HISTORY:  PVD (peripheral vascular disease)  Iron deficiency anemia  Lupus (systemic lupus erythematosus)  Paroxysmal atrial fibrillation  Aortic stenosis, severe  CKD (chronic kidney disease) stage 4, GFR 15-29 ml/min  Diabetes  HTN (hypertension)  CHF (congestive heart failure)  CAD (coronary artery disease)  COPD (chronic obstructive pulmonary disease)  History of total knee replacement: 2003  Presence of cardiac pacemaker: at [facility] by Dr Escoto      PHYSICAL EXAM:  T(C): 37.7, Max: 37.7 (06-23 @ 09:00)  HR: 86  BP: 103/45 (90/45 - 121/56)  RR: 22  SpO2: 95%  Wt(kg): --  I&O's Summary  I & Os for 24h ending 23 Jun 2017 07:00  =============================================  IN: 1003 ml / OUT: 3130 ml / NET: -2127 ml    I & Os for current day (as of 23 Jun 2017 09:14)  =============================================  IN: 355 ml / OUT: 140 ml / NET: 215 ml    Weight   General: AAO x 3,  NAD.  HEENT: moist mucous membranes, no pallor/cyanosis.  Neck: no JVD visible.  Cardiac: decreased BS B/L   Respratory: CTA b/l, no access muscle use.   Abdomen: soft. nontender. nondistended  Skin: no rashes.  Extremities: decreased  LE edema b/l  Access:       DATA:                        11.1<L>  10.0  )-----------( 212      ( 23 Jun 2017 06:16 )             36.5     Ferritin, Serum: 92.0 ng/mL (06-12 @ 05:42)      139    |  90<L>  |  53<H>  ----------------------------<  108<H>  Ca:10.2  (23 Jun 2017 06:17)  4.0     |  35<H>  |  2.40<H>      eGFR if Non : 18 <L>  eGFR if : 20 <L>                        MEDICATIONS  (STANDING):  ferrous    sulfate 325milliGRAM(s) Oral every 8 hours  acetazolamide    Tablet 500milliGRAM(s) Oral daily  ALBUTerol/ipratropium for Nebulization 3milliLiter(s) Nebulizer every 6 hours  clopidogrel Tablet 75milliGRAM(s) Oral daily  docusate sodium 100milliGRAM(s) Oral daily  hydrocortisone 2.5% Cream 1Application(s) Topical two times a day  furosemide   Injectable 60milliGRAM(s) IV Push every 12 hours  Nephrocaps 1Capsule(s) Oral daily  gabapentin 100milliGRAM(s) Oral every other day  heparin  Infusion 1500Unit(s)/Hr IV Continuous <Continuous>    MEDICATIONS  (PRN):  acetylcysteine 20% Inhalation 5milliLiter(s) Inhalation every 6 hours PRN shortness of breath  artificial  tears Solution 1Drop(s) Both EYES three times a day PRN Dry Eyes

## 2017-06-23 NOTE — PROGRESS NOTE ADULT - SUBJECTIVE AND OBJECTIVE BOX
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 6/11/17 with c/c of inability to get OOB x 3 days admitted for CHF exacerbation 2/2 dietary non-compliance with Severe AS, originally 5 lachman and being diuresed; patient complicated with hypercarbic respiratory failure and altered mental status so transferred to CCU and placed on Bipap (no plan for intubation as patient DNR/DNI) which she tolerated. Patient aggressively diuresed with IV lasix net negative ~25 liters with good urine output. Completed course of ceftriaxone for complicated UTI. Patient seen by Structural heart Dr. Barger, and plan for  BAV early next week. Patient also on heparin gtt for paroxysmal afib, PTTs therapeutic. Patient titrated down from Bipap --> HFNC --->Nasal cannula. Patient mental status at apparent baseline.   If urine output decreases to less than 100cc/hr for few hours, ok to give extra 60mg IV lasix.     Subjective:    Overnight Events:     TELEMETRY:    EKG:      VITAL SIGNS:  T(C): 37.5, Max: 37.7 (06-23 @ 09:00)  HR: 80 (72 - 98)  BP: 103/47 (87/49 - 121/56)  RR: 27 (20 - 30)  SpO2: 93% (83% - 98%)  Wt(kg): --    I&O's Summary  I & Os for 24h ending 23 Jun 2017 07:00  =============================================  IN: 1003 ml / OUT: 3130 ml / NET: -2127 ml    I & Os for current day (as of 23 Jun 2017 17:01)  =============================================  IN: 431.5 ml / OUT: 865 ml / NET: -433.5 ml        PHYSICAL EXAM:    General: A/ox 3, No acute Distress  Neck: Supple, NO JVD  Cardiac: S1 S2, No M/R/G  Pulmonary: CTAB, Breathing unlabored, No Rhonchi/Rales/Wheezing  Abdomen: Soft, Non -tender, +BS x 4 quads  Extremities: No Rashes, No edema  Neuro: A/o x 3, No focal deficits          LABS:                          11.3   10.2  )-----------( 194      ( 23 Jun 2017 10:45 )             36.2                              06-23    139  |  90<L>  |  53<H>  ----------------------------<  108<H>  4.0   |  35<H>  |  2.40<H>    Ca    10.2      23 Jun 2017 06:17  Phos  4.2     06-22  Mg     2.2     06-23                              PT/INR - ( 23 Jun 2017 06:17 )   PT: 15.6 sec;   INR: 1.40          PTT - ( 23 Jun 2017 13:27 )  PTT:51.9 sec  CAPILLARY BLOOD GLUCOSE    CARDIAC MARKERS ( 23 Jun 2017 06:17 )  x     / 0.06 ng/mL / 12 U/L / x     / 1.5 ng/mL            No Known Allergies    MEDICATIONS  (STANDING):  ferrous    sulfate 325milliGRAM(s) Oral every 8 hours  acetazolamide    Tablet 500milliGRAM(s) Oral daily  ALBUTerol/ipratropium for Nebulization 3milliLiter(s) Nebulizer every 6 hours  clopidogrel Tablet 75milliGRAM(s) Oral daily  hydrocortisone 2.5% Cream 1Application(s) Topical two times a day  furosemide   Injectable 60milliGRAM(s) IV Push every 12 hours  Nephrocaps 1Capsule(s) Oral daily  gabapentin 100milliGRAM(s) Oral every other day  heparin  Infusion 1650Unit(s)/Hr IV Continuous <Continuous>    MEDICATIONS  (PRN):  acetylcysteine 20% Inhalation 5milliLiter(s) Inhalation every 6 hours PRN shortness of breath  artificial  tears Solution 1Drop(s) Both EYES three times a day PRN Dry Eyes        DIAGNOSTIC TESTS: CARDIOLOGY NP TRANSFER ACCEPTANCE NOTE    Hospital Course:  Hospital Course: 86 yo female with PHMx of chronic diastolic CHF (EF 50-55% 11/16'), severe  AS (ALEKS 0.5cm)-not a surgical candidate, PAF (on Coumadin), CAD s/p PCI x 3 stent (04', 06' and 12') -last cath 3/15' PCI dLM w/ Impella support, Lupus (not on meds), COPD (prior 30yr ppk hx), PNA, 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 Patient aggressively diuresed with IV lasix net negative ~25 liters with good urine output. Completed course of ceftriaxone for complicated UTI. Patient seen by Structural heart Dr. Barger, and plan for  BAV early next week. Patient also on heparin gtt for paroxysmal afib, PTTs therapeutic. Patient titrated down from Bipap --> HFNC --->Nasal cannula. Patient mental status at apparent baseline.   If urine output decreases to less than 100cc/hr for few hours, ok to give extra 60mg IV lasix.       Subjective: Pt seen and examined at bedside. Denies chest pain or sob.    Overnight Events: scant blood coming from Leahy catheter site, H/h stable    TELEMETRY: Afib 70s          VITAL SIGNS:  T(C): 37.5, Max: 37.7 (06-23 @ 09:00)  HR: 80 (72 - 98)  BP: 103/47 (87/49 - 121/56)  RR: 27 (20 - 30)  SpO2: 93% (83% - 98%)  Wt(kg): --    I&O's Summary  I & Os for 24h ending 23 Jun 2017 07:00  =============================================  IN: 1003 ml / OUT: 3130 ml / NET: -2127 ml    I & Os for current day (as of 23 Jun 2017 17:01)  =============================================  IN: 431.5 ml / OUT: 865 ml / NET: -433.5 ml        PHYSICAL EXAM:    General: A/ox 3, No acute Distress  Neck: Supple, + JVD  Cardiac: Irregular rhythm, S1 S2, Grade III/VII systolic murmur  Pulmonary: Bibasilar crackles R>L, Breathing unlabored, No Rhonchi/Rales/Wheezing  Abdomen: Soft, Non -tender, +BS x 4 quads  Extremities: No Rashes, WWP, 1+ romeo LE edema, chronic LE venous stasis, 1+ DP romeo pulses  Neuro: A/o x 3, No focal deficits          LABS:                          11.3   10.2  )-----------( 194      ( 23 Jun 2017 10:45 )             36.2                              06-23    139  |  90<L>  |  53<H>  ----------------------------<  108<H>  4.0   |  35<H>  |  2.40<H>    Ca    10.2      23 Jun 2017 06:17  Phos  4.2     06-22  Mg     2.2     06-23      PT/INR - ( 23 Jun 2017 06:17 )   PT: 15.6 sec;   INR: 1.40        PTT - ( 23 Jun 2017 13:27 )  PTT:51.9 sec  CAPILLARY BLOOD GLUCOSE    CARDIAC MARKERS ( 23 Jun 2017 06:17 )  x     / 0.06 ng/mL / 12 U/L / x     / 1.5 ng/mL            No Known Allergies    MEDICATIONS  (STANDING):  ferrous    sulfate 325milliGRAM(s) Oral every 8 hours  acetazolamide    Tablet 500milliGRAM(s) Oral daily  ALBUTerol/ipratropium for Nebulization 3milliLiter(s) Nebulizer every 6 hours  clopidogrel Tablet 75milliGRAM(s) Oral daily  hydrocortisone 2.5% Cream 1Application(s) Topical two times a day  furosemide   Injectable 60milliGRAM(s) IV Push every 12 hours  Nephrocaps 1Capsule(s) Oral daily  gabapentin 100milliGRAM(s) Oral every other day  heparin  Infusion 1650Unit(s)/Hr IV Continuous <Continuous>    MEDICATIONS  (PRN):  acetylcysteine 20% Inhalation 5milliLiter(s) Inhalation every 6 hours PRN shortness of breath  artificial  tears Solution 1Drop(s) Both EYES three times a day PRN Dry Eyes        DIAGNOSTIC TESTS: CARDIOLOGY NP TRANSFER ACCEPTANCE NOTE    Hospital Course:  Hospital Course: 86 yo female with PHMx of chronic diastolic CHF (EF 50-55% 11/16'), severe  AS (ALEKS 0.5cm)-not a surgical candidate, PAF (on Coumadin), CAD s/p PCI x 3 stent (04', 06' and 12') -last cath 3/15' PCI dLM w/ Impella support, Lupus (not on meds), COPD (prior 30yr ppk hx), PNA, 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 6/11/17 with c/c of inability to get OOB x 3 days admitted for CHF exacerbation 2/2 dietary non-compliance with Severe AS, originally 5 lachman and being diuresed; patient complicated with hypercarbic respiratory failure and altered mental status so transferred to CCU and placed on BIPAP (no plan for intubation as patient DNR/DNI) which she tolerated. Patient aggressively diuresed with IV lasix net negative ~25 liters with good urine output. Completed course of ceftriaxone for complicated UTI. Patient seen by Structural heart Dr. Barger, and plan for  BAV early next week. Patient also on heparin gtt for paroxysmal afib, PTTs therapeutic. Patient titrated down from Bipap --> HFNC --->Nasal cannula. Patient mental status at apparent baseline. Stepped down from CCU to 5 Lachman hemodynamically stable for further management.       Subjective: Pt seen and examined at bedside. Denies chest pain or sob.    Overnight Events: scant blood coming from Leahy catheter site, H/h stable    TELEMETRY: Afib 70s        VITAL SIGNS:  T(C): 37.5, Max: 37.7 (06-23 @ 09:00)  HR: 80 (72 - 98)  BP: 103/47 (87/49 - 121/56)  RR: 27 (20 - 30)  SpO2: 93% (83% - 98%)  Wt(kg): --    I&O's Summary  I & Os for 24h ending 23 Jun 2017 07:00  =============================================  IN: 1003 ml / OUT: 3130 ml / NET: -2127 ml    I & Os for current day (as of 23 Jun 2017 17:01)  =============================================  IN: 431.5 ml / OUT: 865 ml / NET: -433.5 ml        PHYSICAL EXAM:    General: A/ox 3, No acute Distress  Neck: Supple, + JVD  Cardiac: Irregular rhythm, S1 S2, Grade III/VII systolic murmur  Pulmonary: Bibasilar crackles R>L, Breathing unlabored, No Rhonchi/Rales/Wheezing  Abdomen: Soft, Non -tender, +BS x 4 quads  Extremities: No Rashes, WWP, 1+ romeo LE edema, chronic LE venous stasis, 1+ DP romeo pulses  Neuro: A/o x 3, No focal deficits          LABS:                          11.3   10.2  )-----------( 194      ( 23 Jun 2017 10:45 )             36.2                              06-23    139  |  90<L>  |  53<H>  ----------------------------<  108<H>  4.0   |  35<H>  |  2.40<H>    Ca    10.2      23 Jun 2017 06:17  Phos  4.2     06-22  Mg     2.2     06-23      PT/INR - ( 23 Jun 2017 06:17 )   PT: 15.6 sec;   INR: 1.40        PTT - ( 23 Jun 2017 13:27 )  PTT:51.9 sec  CAPILLARY BLOOD GLUCOSE    CARDIAC MARKERS ( 23 Jun 2017 06:17 )  x     / 0.06 ng/mL / 12 U/L / x     / 1.5 ng/mL            No Known Allergies    MEDICATIONS  (STANDING):  ferrous    sulfate 325milliGRAM(s) Oral every 8 hours  acetazolamide    Tablet 500milliGRAM(s) Oral daily  ALBUTerol/ipratropium for Nebulization 3milliLiter(s) Nebulizer every 6 hours  clopidogrel Tablet 75milliGRAM(s) Oral daily  hydrocortisone 2.5% Cream 1Application(s) Topical two times a day  furosemide   Injectable 60milliGRAM(s) IV Push every 12 hours  Nephrocaps 1Capsule(s) Oral daily  gabapentin 100milliGRAM(s) Oral every other day  heparin  Infusion 1650Unit(s)/Hr IV Continuous <Continuous>    MEDICATIONS  (PRN):  acetylcysteine 20% Inhalation 5milliLiter(s) Inhalation every 6 hours PRN shortness of breath  artificial  tears Solution 1Drop(s) Both EYES three times a day PRN Dry Eyes        DIAGNOSTIC TESTS:

## 2017-06-23 NOTE — PROGRESS NOTE ADULT - ASSESSMENT
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 Rx's per Cardiac team

## 2017-06-23 NOTE — PROGRESS NOTE ADULT - PROBLEM SELECTOR PLAN 4
Supratherapeutic INR on admission 4.8. INR 3.95 today.  -hold Coumadin tonight  -s/p PPM 2011 2/2 symptomatic bradycardia. PPM interrogation WNL, .  -Daily PT/INR   -Continue Toprol XL 25mg po daily s/p PCI x 3 stent (04', 06' and 12')-last cath 3/15' PCI dLM   -Trop 0.04 x 4. Likely 2/2 demand ischemia. Not continuing to trend.   -Lipid panel WNL  -holding Toprol XL 25mg po daily in setting of low BP from diuresis  -Per daughter [facility] NP Eben Tellez d/c'ed ASA due to dual AC w/ Coumadin, statin was d/c'ed years ago for unclear reasons.   -Started Plavix during this admission in CCU.

## 2017-06-23 NOTE — PROGRESS NOTE ADULT - PROBLEM SELECTOR PLAN 10
DVT prophylaxis: on systemic AC. Holding AC 2/2 Supra therapeutic    Pt signed DNR/DNI form and daughter is in agreement    PT consult: TALIA DVT prophylaxis: on Heparin drip    DNR/DNI    PT consult: TALIA

## 2017-06-23 NOTE — PROGRESS NOTE ADULT - PROBLEM SELECTOR PLAN 7
Baseline Cr per daughter 2-3. On admission, Cr 2.8  -Cont Furosemide 40mg IV BID, start Diamox 250mg qd for alkalosis per renal recs. May add Metolazone 5mg qd if I/O not at goal per Dr Gonzalez.  -Strict I/Os, daily weights  -us catheter in place  -Renally dose all meds  -Renal consulted. F/u recs. (Dr. Ramirez-her outpt Nephrologist)  - UA with positive nitrites, WBCs and bacteria. UCx w/ GNR, Klesiella. Started on Ceftriaxone 1g qd (day 2/5). Baseline Cr per daughter 2-3. On admission, Cr 2.8  -Cont Furosemide q12hr, c/w Diamox 500mg qd for alkalosis per renal recs.   -Renal consulted. F/u recs. (Dr. Ramirez-her outpt Nephrologist)  -Leahy catheter in place, monitor daily weights, strictr I/o's  -Renally dose all meds  - Completed Ceftriaxone 7 days course for complicated UTI w/ Klesiella on UCx.

## 2017-06-23 NOTE — PROGRESS NOTE ADULT - ASSESSMENT
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

## 2017-06-23 NOTE — PROGRESS NOTE ADULT - PROBLEM SELECTOR PLAN 2
Non rheumatic heart disease. Echo w/ ALEKS 0.5, mean pressure gradient 43   -CTS Dr. Barger consulted. F/u recs.  -Per CTS team, to be medically optimized in regards to CHF prior to proceeding w/ TAVR w/u. Pt to follow up with out-patient for her continued work-up.  -Dr Soto to discuss w/ Dr Barger regarding plan.  -Consider possible ischemic w/u if TAVR is needed Critical AS, ALEKS 0.5, mean pressure gradient 43, mod pulm HTN, PASP 52.   -CTS Structural Heart is following pt and may perform BAV during admission if pt's resp status improves such that she can lay flat.   -CTS Dr. Barger consulted. F/u recs: will plan for surgery early next week.

## 2017-06-23 NOTE — ADVANCED PRACTICE NURSE CONSULT - ASSESSMENT
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 Pt has scattered, raised hyperkeratotic areas over right leg, dry skin also noted. 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, HTN, PVD, and Type 2 DM (no longer on meds), MONICA, and TKR 03' who presented to the hospital due to the inability to get out of bed x 3 days, SOB, and increased LE edema, found to have a CHF exacerbation stepped up to CCU for hypercapnic resp failure. Pt has scattered, raised hyperkeratotic areas over right leg, dry skin also noted. Pt also has a stage 2 ulcer to left buttock, approx 1x1 cm, currently covered with foam dressing.

## 2017-06-23 NOTE — PROGRESS NOTE ADULT - PROBLEM SELECTOR PLAN 1
2/2 dietary non-compliance; EF 50-55% on last TTE 2016. BNP on admission 19365.  Lungs bibasilar crackles, edema noted from abdomen to bilateral legs and feet  -Daily weights, strict I/Os (us in place), FR 1L/day  -Echo w/ EF 50%, mod conc LVH, basal septal hypokinesis (seen previously on Echo 12/2015), severely dilated LA, severe AS, ALEKS 0.5, mean pressure gradient 43, mod pulm HTN, PASP 52.  -TSH, Lipid panel, HgA1c WNL.  -Trop 0.04 x 4. Likely 2/2 demand ischemia. Cont to trend until downtrends per Dr Soto.   -Continue Toprol XL 25mg po daily  -Cont Furosemide 40mg IV BID, start Diamox 250mg qd per renal recs. May add Metolazone 5mg qd if I/O not at goal per Dr Gonzalez.  -VQ scan per Dr Soto, after pt was taken down to VQ scan, pt refused to go through with procedure after discussion at length.  -F/u CXR acute CHF exacerbation 2/2 dietary non-compliance and critical AS. EF 50-55%, severely dilated LA; net neg 2.5L yest  -Daily weights, strict I/Os (us in place)  -holding Toprol XL 25mg po daily as pt borderline hypotensive and diuresing  -Cont Furosemide 60 q12hr, c/w diamox 500mg qd per renal recs. Careful diuresis with close monitoring of BP in setting of severe AS. Net goal neg 1.5-2.5L as BP tolerates.

## 2017-06-23 NOTE — ADVANCED PRACTICE NURSE CONSULT - RECOMMEDATIONS
Recommend moisturizing legs daily. Spoke with WERNER Gonzalez. Recommend moisturizing legs daily, keep site to left buttock covered to protect. Spoke with WERNER Gonzalez.

## 2017-06-23 NOTE — PROGRESS NOTE ADULT - PROBLEM SELECTOR PLAN 5
former smoker (quit 5 yrs ago) 30 yr ppk hx  - Pulm Dr. Gaming consulted Afib with slow ventricular response s/p PPM 2011 2/2 symptomatic bradycardia. Currently rate controlled.  -PPM interrogation WNL.  -c/w hep gtt in case pt undergoes BAV   -holding Toprol XL 25mg po daily in setting of borderline low BP and diuresis.

## 2017-06-23 NOTE — PROGRESS NOTE ADULT - SUBJECTIVE AND OBJECTIVE BOX
cc: follow-up evaluation and management of acute/chronic diastolic chf and aortic stenosis  subjective:  events noted; patient currently resting    PAST MEDICAL & SURGICAL HISTORY:  PVD (peripheral vascular disease)  Iron deficiency anemia  Lupus (systemic lupus erythematosus)  Paroxysmal atrial fibrillation  Aortic stenosis, severe  CKD (chronic kidney disease) stage 4, GFR 15-29 ml/min  Diabetes  HTN (hypertension)  CHF (congestive heart failure)  CAD (coronary artery disease)  COPD (chronic obstructive pulmonary disease)  History of total knee replacement: 2003  Presence of cardiac pacemaker: at [facility] by Dr Escoto      MEDICATIONS  (STANDING):  ferrous    sulfate 325milliGRAM(s) Oral every 8 hours  acetazolamide    Tablet 500milliGRAM(s) Oral daily  ALBUTerol/ipratropium for Nebulization 3milliLiter(s) Nebulizer every 6 hours  clopidogrel Tablet 75milliGRAM(s) Oral daily  hydrocortisone 2.5% Cream 1Application(s) Topical two times a day  furosemide   Injectable 60milliGRAM(s) IV Push every 12 hours  Nephrocaps 1Capsule(s) Oral daily  gabapentin 100milliGRAM(s) Oral every other day  heparin  Infusion 1650Unit(s)/Hr IV Continuous <Continuous>    MEDICATIONS  (PRN):  acetylcysteine 20% Inhalation 5milliLiter(s) Inhalation every 6 hours PRN shortness of breath  artificial  tears Solution 1Drop(s) Both EYES three times a day PRN Dry Eyes    ICU Vital Signs Last 24 Hrs  T(C): 37.5, Max: 37.7 (06-23 @ 09:00)  T(F): 99.5, Max: 99.9 (06-23 @ 09:00)  HR: 81 (72 - 98)  BP: 121/67 (87/49 - 121/67)  BP(mean): 84 (57 - 98)  ABP: --  ABP(mean): --  RR: 20 (20 - 29)  SpO2: 95% (83% - 98%)    PHYSICAL EXAM:    General: No acute Distress; supine; hfnc in position  Neck: jvd and hjr present  carotid - radiation of murmur heard  Cardiac: Irregular; S1 present; decreased S2, systolic murmur at base  Pulmonary: rales present bilat; no accessory muscle use  Abdomen: Soft abd  Extremities: bilat le edema present - improved    LABS:                                     11.3   10.2  )-----------( 194      ( 23 Jun 2017 10:45 )             36.2   06-23    139  |  90<L>  |  53<H>  ----------------------------<  108<H>  4.0   |  35<H>  |  2.40<H>    Ca    10.2      23 Jun 2017 06:17  Phos  4.2     06-22  Mg     2.2     06-23      DIAGNOSTIC TESTS:     6- ecg reviewed on 6-    EXAM:  XR CHEST 1 VIEW PORT ROUTINE                          PROCEDURE DATE:  06/23/2017                     INTERPRETATION:  Indication: chf    A single portable view of the chest is submitted. Comparison is made to   the most recent prior study dated 6/20/2017. Bilateral pulmonary   infiltrates and effusions are similar to the previous examination.   Impression:    No significant interval change            "Thank you for the opportunity to participate in the care of this   patient."        MASOUD WORKMAN M.D., ATTENDING RADIOLOGIST  This document has been electronically signed. Jun 23 2017  4:33PM

## 2017-06-23 NOTE — PROGRESS NOTE ADULT - SUBJECTIVE AND OBJECTIVE BOX
coverage for Dr. Martinez    Pt seen and examined     REVIEW OF SYSTEMS:  Constitutional: No fever,   Cardiovascular: No chest pain, palpitations, dizziness or leg swelling  Gastrointestinal: No abdominal or epigastric pain. No nausea, vomiting or hematemesis; No diarrhea or constipation. No melena or hematochezia.  Skin: No itching, burning, rashes or lesions       MEDICATIONS:  MEDICATIONS  (STANDING):  ferrous    sulfate 325milliGRAM(s) Oral every 8 hours  acetazolamide    Tablet 500milliGRAM(s) Oral daily  ALBUTerol/ipratropium for Nebulization 3milliLiter(s) Nebulizer every 6 hours  clopidogrel Tablet 75milliGRAM(s) Oral daily  hydrocortisone 2.5% Cream 1Application(s) Topical two times a day  furosemide   Injectable 60milliGRAM(s) IV Push every 12 hours  Nephrocaps 1Capsule(s) Oral daily  gabapentin 100milliGRAM(s) Oral every other day  heparin  Infusion 1650Unit(s)/Hr IV Continuous <Continuous>    MEDICATIONS  (PRN):  acetylcysteine 20% Inhalation 5milliLiter(s) Inhalation every 6 hours PRN shortness of breath  artificial  tears Solution 1Drop(s) Both EYES three times a day PRN Dry Eyes      Allergies    No Known Allergies    Intolerances        Vital Signs Last 24 Hrs  T(C): 37.5, Max: 37.7 (06-23 @ 09:00)  T(F): 99.5, Max: 99.9 (06-23 @ 09:00)  HR: 80 (72 - 98)  BP: 103/47 (87/49 - 121/56)  BP(mean): 58 (57 - 98)  RR: 27 (20 - 39)  SpO2: 93% (83% - 98%)  I & Os for 24h ending 06-23 @ 07:00  =============================================  IN: 1003 ml / OUT: 3130 ml / NET: -2127 ml    I & Os for current day (as of 06-23 @ 16:37)  =============================================  IN: 431.5 ml / OUT: 865 ml / NET: -433.5 ml      PHYSICAL EXAM:    General: ; in no acute distress  HEENT: MMM, conjunctiva and sclera clear  Lungs: bibasilar rales  Heart: irregular, + murmur  Gastrointestinal: Soft non-tender non-distended; Normal bowel sounds; No hepatosplenomegaly  Skin: Warm and dry. No obvious rash    LABS:      CBC Full  -  ( 23 Jun 2017 10:45 )  WBC Count : 10.2 K/uL  Hemoglobin : 11.3 g/dL  Hematocrit : 36.2 %  Platelet Count - Automated : 194 K/uL  Mean Cell Volume : 87.7 fL  Mean Cell Hemoglobin : 27.4 pg  Mean Cell Hemoglobin Concentration : 31.2 g/dL  Auto Neutrophil # : x  Auto Lymphocyte # : x  Auto Monocyte # : x  Auto Eosinophil # : x  Auto Basophil # : x  Auto Neutrophil % : x  Auto Lymphocyte % : x  Auto Monocyte % : x  Auto Eosinophil % : x  Auto Basophil % : x    06-23    139  |  90<L>  |  53<H>  ----------------------------<  108<H>  4.0   |  35<H>  |  2.40<H>    Ca    10.2      23 Jun 2017 06:17  Phos  4.2     06-22  Mg     2.2     06-23      PT/INR - ( 23 Jun 2017 06:17 )   PT: 15.6 sec;   INR: 1.40          PTT - ( 23 Jun 2017 13:27 )  PTT:51.9 sec                  RADIOLOGY & ADDITIONAL STUDIES (The following images were personally reviewed):

## 2017-06-23 NOTE — PROGRESS NOTE ADULT - SUBJECTIVE AND OBJECTIVE BOX
INTERVAL HPI/OVERNIGHT EVENTS:    back on high flow 50 L and 46%  being diuresed    PAST MEDICAL & SURGICAL HISTORY:  PVD (peripheral vascular disease)  Iron deficiency anemia  Lupus (systemic lupus erythematosus)  Paroxysmal atrial fibrillation  Aortic stenosis, severe  CKD (chronic kidney disease) stage 4, GFR 15-29 ml/min  Diabetes  HTN (hypertension)  CHF (congestive heart failure)  CAD (coronary artery disease)  COPD (chronic obstructive pulmonary disease)  History of total knee replacement:   Presence of cardiac pacemaker: at [facility] by Dr Escoto    FAMILY HISTORY:  No pertinent family history in first degree relatives    SOCIAL HISTORY:    REVIEW OF SYSTEMS:  does not want to answer  Constitutional: () weight change, () fever,  () chills, () fatigue, () night sweats  Eyes: () discharge, () eye pain, () visual change  ENT:  () hearing difficulty, () vertigo, () sinus pain,  () throat pain, () epistaxis, () dysphagia, () hoarseness  Neck: () pain, () stiffness, () swelling  Respiratory: () cough, () wheezing, () hemoptysis      Cardiovascular: () chest pain, ()palpitations, () dizziness   Gastrointestinal: () abdominal pain, () nausea, () vomiting, () hematemesis, () diarrhea,  () constipation, () melena  Genitourinary:  () dysuria, () frequency, () hematuria, () incontinence      Neurologic: () headache, () memory loss, () loss of strength, () numbness, () tremor     Skin: () itching, () burning, () rash, () lesions   Lymphatic: () enlarged lymph nodes  Endocrine: () hair loss, () temperature intolerance         Musculoskeletal: () back pain, () joint pain,  () extremity pain  Psychiatric: () visual change, () auditory change, () depression, () anxiety, () suicidal  Sleep: () disorder, () insomnia, () sleep deprivation  Heme/Lymph: () easy bruising, () bleeding gums            Allergy and Immunologic: () hives, () eczema    Vital Signs Last 24 Hrs  T(C): 37.3, Max: 37.6 ( @ 13:00)  T(F): 99.1, Max: 99.6 ( @ 13:00)  HR: 78 (66 - 86)  BP: 113/54 (93/53 - 124/50)  BP(mean): 65 (65 - 97)  RR: 28 (16 - 43)  SpO2: 88% (74% - 97%)  ON NASAL CANNULA    I&O's Detail  I & Os for 24h ending 2017 07:00  =============================================  IN:    Oral Fluid: 170 ml    heparin Infusion: 162 ml    heparin Infusion: 87.5 ml    heparin Infusion: 69.5 ml    IV PiggyBack: 50 ml    Total IN: 539 ml  ---------------------------------------------  OUT:    Indwelling Catheter - Urethral: 3350 ml    Total OUT: 3350 ml  ---------------------------------------------  Total NET: -2811 ml    I & Os for current day (as of 2017 12:17)  =============================================  IN:    Oral Fluid: 200 ml    heparin Infusion: 70 ml    Total IN: 270 ml  ---------------------------------------------  OUT:    Indwelling Catheter - Urethral: 775 ml    Total OUT: 775 ml  ---------------------------------------------  Total NET: -505 ml    PHYSICAL EXAM:  in bed, leg pain is chronic   Obese, uncomfortable, - acute distress; vital signs are monitored continuously  Eyes: PERRLA, EOMI, -conjunctivitis, -scleritis   Head: no focal deficit, normocephalic,  no trauma  ENMT: moist tongue, no thrush, -nasal discharge, -hoarseness, normal hearing, -cough, -hemoptysis, trachea midline  Neck: supple, - lymphadenopathy,  -masses, -JVD  Respiratory: bilateral diminished breath sounds, -wheezing, -rhonchi, -rales, -crackles  Chest: -accessory muscle use, -paradoxical breathing  Cardiovascular: irregular rate and afib, S1 S2 normal, -S3, -S4, 4/6 murmur, -gallop, -rub  Gastrointestinal: soft, nontender, nondistended, normal bowel sounds, no hepatosplenomegaly  Genitourinary: -flank pain, -dysuria KISER  Extremities: -clubbing, -cyanosis, ++edema AND MILD ERYTHEMA   Vascular: peripheral pulses palpable 2+ bilaterally  Neurological: alert, oriented x 3, no focal deficit, -tremor   Skin: warm, dry, -erythema, iv site intact  Lymph nodes; no cervical, supraclavicular or axillary adenopathy  Psychiatric: cooperative, appropriate mood    MEDICATIONS  (STANDING):  ferrous    sulfate 325milliGRAM(s) Oral every 8 hours  acetazolamide    Tablet 500milliGRAM(s) Oral daily  ALBUTerol/ipratropium for Nebulization 3milliLiter(s) Nebulizer every 6 hours  clopidogrel Tablet 75milliGRAM(s) Oral daily  docusate sodium 100milliGRAM(s) Oral daily  hydrocortisone 2.5% Cream 1Application(s) Topical two times a day  furosemide   Injectable 60milliGRAM(s) IV Push every 12 hours  Nephrocaps 1Capsule(s) Oral daily  heparin  Infusion 1400Unit(s)/Hr IV Continuous <Continuous>    MEDICATIONS  (PRN):  acetylcysteine 20% Inhalation 5milliLiter(s) Inhalation every 6 hours PRN shortness of breath  artificial  tears Solution 1Drop(s) Both EYES three times a day PRN Dry Eyes    Allergies  No Known Allergies  Intolerances    LABS:                        10.7   8.3   )-----------( 182      ( 2017 02:31 )             36.1     06-    139  |  92<L>  |  51<H>  ----------------------------<  84  4.1   |  34<H>  |  2.30<H>    Ca    9.6      2017 02:30  Phos  4.2     06-22  Mg     1.9     06-22  PT/INR - ( 2017 04:25 )   PT: 18.0 sec;   INR: 1.61     PTT - ( 2017 08:50 )  PTT:60.2 sec    +DVT prophylaxis HEPARIN DRIP  +Sleep  GOOD  +Nutrition goals  ORAL  -Pain RIGHT HIP  -Decubital ulcer  +GI prophylaxis (PPV, coagulopathy, Hx)  +Aspiration prophylaxis (45 degrees)  +Sedation/analgesia stopped once  +ID (phos, CH, mupi, SB)  -Delirium  +Cardiac  +Prevention  +Education  DAILY WEIGHT AT HOME  +Medication reviewed (drug-drug interactions, PDA)  Medical devices IV KISER  Discussed with ICU, PGY, CCRN, daughter    RADIOLOGY & ADDITIONAL STUDIES:      EXAM:  XR CHEST 1 VIEW PORT ROUTINE                          PROCEDURE DATE:  2017          INTERPRETATION:  Indication: CHF    A single portable view of the chest is submitted. Comparison is made to   the most recent prior study dated 2017 at 6:55 AM. Again noted are   bilateral effusions and infiltrates compatible with congestive failure.   Findings are similar to the prior study.   Impression:    No significant interval change    PROCEDURE DATE:  2017                        INTERPRETATION:  Patient Height: 167.6 cm  Patient Weight: 131.5 kg  Heart Rate: 108 bpm  Systolic Pressure: 100 mmHg  Diastolic Pressure: 60 mmHg  BSA: 2.3 m^2  Interpretation Summary  There is moderate concentric left ventricular hypertrophy. Hypokinesis of   the   basal septal region. Normal motion in the remaining regions.  The left   ventricular ejection fraction is 50%.The left atrium is severely   dilated.The   right atrium is markedly dilated.The right ventricle is not well   visualized.Calcified aortic valve. There is Severe aortic stenosis.The   calculated aortic valve area using the continuity equation is 0.55   cm2.The   peak pressure gradient is 71 mmHg.The mean pressure gradient is 43   mmHg.The   calculated stroke volume index is 27 cc/m2 (normal >35cc/m2).The   dimensionless   index (ratio of LVOTvelocity to aortic velocity) was calculated to be   0.17.   There ismoderate mitral annular calcification. There is mild mitral   regurgitation.  There is mild to moderate tricuspid regurgitation.There   is   moderate pulmonary hypertension. The pulmonary artery systolic pressure   is   estimated to be 52 mmHg.  No aortic root dilatation.There is a pacemaker   wire   in the right heart.The IVC is dilated (>2.1 cm) with an abnormal   inspiratory   collapse (<50%) consistent with elevated right atrial pressure.  There   is no   pericardial effusion.Left pleural effusion noted.  Procedure Details  A complete two-dimensional transthoracic echocardiogram was performed (2D,  M-mode, spectral and color flow doppler).  Study Quality: Good.  After verbal consent obtained, contrast injection of 2ml of diluted   Definity  contrast (1.3ml Definity diluted in 8.7ml Saline) were given to enhance  endocardial resolution.  Left Ventricle  There is moderate concentric left ventricular hypertrophy.  Hypokinesis of the basal septal region. Normal motion in the remaining  regions.  The left ventricular ejection fraction is 50%.  Left Atrium  The left atrium is severely dilated.  Right Atrium  The right atrium is markedly dilated.  Right Ventricle  The right ventricle is not well visualized.  There is a pacemaker wire in the right heart.  Aortic Valve  Calcified aortic valve.  No aortic regurgitation noted.  The calculated stroke volume index is 27 cc/m2 (normal >35cc/m2).  The peak pressure gradient is 71 mmHg.  The mean pressure gradient is 43 mmHg.  The calculated aortic valve area using the continuity equation is 0.55   cm2.  The dimensionless index (ratio of LVOTvelocity to aortic velocity) was  calculated to be 0.17.  There is Severe aortic stenosis.  Mitral Valve  There is moderate mitral annular calcification.  There is mild mitral regurgitation.  Tricuspid Valve  Structurally normal tricuspid valve.  There is mild to moderate tricuspid regurgitation.  The pulmonary artery systolic pressure is estimated to be 52 mmHg.  There is moderate pulmonary hypertension.  Pulmonic Valve  No pulmonic regurgitation noted.  Arteries and Venous System  No aortic root dilatation.  The IVC is dilated (>2.1 cm) with an abnormal inspiratory collapse (<50%)  consistent with elevated right atrial pressure.  Pericardium/ Pleura  There is no pericardial effusion.  Left pleural effusion noted.  Doppler Measurements & Calculations  MV dec time: 0.1 sec  Ao V2 mean: 295.8 cm/sec  Ao mean P.8 mmHg  Ao mean PG (full): 39.5 mmHg  Ao V2 VTI: 109.0 cm  ALEKS(I,A): 0.6 cm^2  ALEKS(I,D): 0.6 cm^2  LV max P.4 mmHg  LV mean P.3 mmHg  LV V1 max: 77.9 cm/sec  LV V1 mean: 53.4 cm/sec  LV V1 VTI: 20.1 cm  SV(Ao): 1115.9 ml  SI(Ao): 476.4 ml/m^2  SV(LVOT): 64.8 ml  SI(LVOT): 27.7 ml/m^2  TR Max graciela: 315.4 cm/sec  MMode 2D Measurements & Calculations  IVSd: 1.6 cm  LVIDd: 5.2 cm  LVIDs: 4.1 cm  LVPWd: 1.6 cm  IVS/LVPW: 1.0  FS: 20.9 %  EDV(Teich): 128.4 ml  ESV(Teich): 74.2 ml  LV mass(C)d: 373.3 grams  LV mass(C)dI: 159.4 grams/m^2  SI(cubed): 30.0 ml/m^2  Ao root diam: 3.6 cm  Ao root area: 10.2 cm^2  LA dimension: 5.2 cm  LA/Ao: 1.4  LVOT diam: 2.0 cm  LVOT area: 3.2 cm^2  LVOT area (M): 3.1 cm^2  LVLd ap4: 5.8 cm  EDV(MOD-sp4): 97 ml  LVLs ap4: 5.1 cm  ESV(MOD-sp4): 50 ml  EF(MOD-sp4): 48.5 %  SV(MOD-sp4): 47ml  SI(MOD-sp4): 20.1 ml/m^2

## 2017-06-23 NOTE — PROGRESS NOTE ADULT - ASSESSMENT
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 Admitted for CHF exacerbation, stepped down from CCU . 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 Admitted for CHF exacerbation, transferred from [facility] to CCU for hypercapnic resp failure s/p BIPAP and IV diuresis, now stepped down for further management.

## 2017-06-23 NOTE — PROGRESS NOTE ADULT - PROBLEM SELECTOR PLAN 3
s/p PCI x 3 stent (04', 06' and 12')-last cath 3/15' PCI dLM w/ Impella support  -Trop 0.04 x4. Will cont to trend cardiac enzymes until downtrend.  -Cont on telemetry   -Continue Toprol XL 25mg po daily   -Lipid panel WNL  -Per daughter [facility] Call UMAIR Tellez d/c'ed ASA due to dual AC w/ Coumadin, statin was d/c'ed years ago for unclear reasons. Call placed to UMAIR Tellez for clarification, awaiting call back. Hypercapnic resp failure 2/2 pulmonary congestion 2/2 CHF 2/2 severe AS. S/p BIPAP, now weaned to High Flow NC 40% FiO2.  - Trial of NC O2 w/ desat to 80% in AM today. Placed back on HFNC. May require BIPAP at night.  - Pt has been refusing working with PT, will re-emphasize importance of moving and getting out of bed.   COPD - c/w patrickonesuman prn

## 2017-06-23 NOTE — PROGRESS NOTE ADULT - SUBJECTIVE AND OBJECTIVE BOX
O/N Events: Adjusted heparin gtt, GALILEO otherwise; CT surgery Dr. Barger came and wanted to have surgery next week.   Subjective:    VITALS  Vital Signs Last 24 Hrs  T(C): 36.9, Max: 37.3 (06-22 @ 09:00)  T(F): 98.4, Max: 99.1 (06-22 @ 09:00)  HR: 78 (72 - 86)  BP: 106/45 (90/45 - 121/56)  BP(mean): 67 (63 - 98)  RR: 25 (20 - 54)  SpO2: 98% (87% - 98%)    I&O's Summary    I & Os for current day (as of 23 Jun 2017 07:08)  =============================================  IN: 988 ml / OUT: 2950 ml / NET: -1962 ml      CAPILLARY BLOOD GLUCOSE      PHYSICAL EXAM  General: A&Ox 3; NAD  Head: NC/AT;   Eyes: PERRL; EOMI; anicteric sclera  Neck: Supple; no JVD  Respiratory: CTA B/L; no wheezes/crackles/rales auscultated w/ good air movement  Cardiovascular: Regular rhythm/rate; S1/S2; no gallops or murmurs auscultated  Gastrointestinal: Soft; NTND w/out rebound tenderness or guarding; bowel sounds normal  Extremities: WWP; no edema or cyanosis; radial/pedal pulses palpable  Neurological:  CNII-XII grossly intact; no obvious focal deficits    MEDICATIONS  (STANDING):  ferrous    sulfate 325milliGRAM(s) Oral every 8 hours  acetazolamide    Tablet 500milliGRAM(s) Oral daily  ALBUTerol/ipratropium for Nebulization 3milliLiter(s) Nebulizer every 6 hours  clopidogrel Tablet 75milliGRAM(s) Oral daily  docusate sodium 100milliGRAM(s) Oral daily  hydrocortisone 2.5% Cream 1Application(s) Topical two times a day  furosemide   Injectable 60milliGRAM(s) IV Push every 12 hours  Nephrocaps 1Capsule(s) Oral daily  gabapentin 100milliGRAM(s) Oral every other day  heparin  Infusion 1500Unit(s)/Hr IV Continuous <Continuous>    MEDICATIONS  (PRN):  acetylcysteine 20% Inhalation 5milliLiter(s) Inhalation every 6 hours PRN shortness of breath  artificial  tears Solution 1Drop(s) Both EYES three times a day PRN Dry Eyes      LABS                        11.1   10.0  )-----------( 212      ( 23 Jun 2017 06:16 )             36.5     06-22    139  |  92<L>  |  51<H>  ----------------------------<  84  4.1   |  34<H>  |  2.30<H>    Ca    9.6      22 Jun 2017 02:30  Phos  4.2     06-22  Mg     1.9     06-22        PT/INR - ( 23 Jun 2017 06:17 )   PT: 15.6 sec;   INR: 1.40          PTT - ( 23 Jun 2017 06:17 )  PTT:67.4 sec          IMAGING/EKG/ETC  EKG:   Xray:  CT:   ECHO:  Doppler:    82 yo F chronic diastolic CHF (EF 50-55% 11/16'), mod to severe AS (ALEKS 0.5cm)-not a surgical candidate, pafib (on Coumadin), CAD s/p PCI x 3 stent (04', 06' and 12')-last cath 3/15' PCI dLM w/ Impella support, Lupus, COPD (prior 30yr ppk hx), PNA, 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     PLAN  CARDIO:  Valves:  -severe AS, ALEKS 0.5, mean pressure gradient 43, mod pulm HTN, PASP 52. Structural cardiology is following pt and may perform BAV during admission if pt's resp status improves such that she can lay flat.   -CTS Dr. Barger consulted. F/u recs.      Pump: acute CHF exacerbation 2/2 dietary non-compliance and severe AS . EF 50-55%, severely dilated LA; net neg 2.5L yest  -Daily weights, strict I/Os (us in place)  -holding Toprol XL 25mg po daily as pt borderline hypotensive and diuresing  -Cont Furosemide 60 q12hr, c/w diamox 500mg qd per renal recs. Careful diuresis with close monitoring of BP in setting of severe AS.     Vessel:  s/p PCI x 3 stent (04', 06' and 12')-last cath 3/15' PCI dLM   -Trop 0.04 x 4. Likely 2/2 demand ischemia. Not continuing to trend.   -holding Toprol XL 25mg po daily in setting of low BP from diuresis  -[facility] Eben Tellez d/c'ed ASA due to dual AC w/ Coumadin, statin was d/c'ed years ago for unclear reasons. Started plavix during this admission.     Electrical:  -afib with slow ventricular response s/p PPM 2011 2/2 symptomatic bradycardia. PPM interrogation WNL.  -c/w hep gtt in case pt undergoes BAL   -holding Toprol XL 25mg po daily in setting of borderline low BP and diuresis.      Hemodynamics: borderline hypotensive, holding ACE/ARB/and BB in setting of diuresis.      PULM:  #Hypercapnic resp failure 2/2 pulmonary congestion 2/2 CHF 2/2 severe AS -  trial of nc today. Pt has been refusing working with PT, will re-emphasize importance of moving and getting out of bed.     #COPD (chronic obstructive pulmonary disease) - c/w duonebs prn    ID - RESOLVED UTI growing kleb. s/p 7 day course of abx.     RENAL:  #CKD (chronic kidney disease) stage 4, GFR 15-29 ml/min- Baseline Cr per daughter 2-3.   -Cont Furosemide q12hr, c/w Diamox 500mg qd for alkalosis per renal recs.   -Renal consulted. F/u recs.    GI  #Constipation - 2/2 iron supplement. RESOLVED c/w bowel regimen (miralax, senna, colace).     Endo:  #DM - off meds, a1c wnl    NUTRITION - cardiac diet  PPX - heparin gtt  ACCESS - peripheral  DNR/DNI - palliative is following, will need reversal if agrees to BAL. continued GOC discussions O/N Events: Adjusted heparin gtt, GAILLEO otherwise; CT surgery Dr. Barger came and wanted to have surgery next week.   Subjective:    VITALS  Vital Signs Last 24 Hrs  T(C): 36.9, Max: 37.3 (06-22 @ 09:00)  T(F): 98.4, Max: 99.1 (06-22 @ 09:00)  HR: 78 (72 - 86)  BP: 106/45 (90/45 - 121/56)  BP(mean): 67 (63 - 98)  RR: 25 (20 - 54)  SpO2: 98% (87% - 98%)    I&O's Summary    I & Os for current day (as of 23 Jun 2017 07:08)  =============================================  IN: 988 ml / OUT: 2950 ml / NET: -1962 ml      CAPILLARY BLOOD GLUCOSE      PHYSICAL EXAM  Constitutional: NAD on HFNC  Eyes: PERRL  ENMT: dry MM  Neck: distended neck veins  Respiratory: bibasilar crackles  Cardiovascular: irreg rhythm, reg rate, harsh systolic murmur  Gastrointestinal: soft, NT  Extremities: WWP, pedal edema  Vascular: normal peripheral pulses  Neurological: A&O x 3  Musculoskeletal: No joint swelling  Skin - scaly papules on LLE with interval improvement       MEDICATIONS  (STANDING):  ferrous    sulfate 325milliGRAM(s) Oral every 8 hours  acetazolamide    Tablet 500milliGRAM(s) Oral daily  ALBUTerol/ipratropium for Nebulization 3milliLiter(s) Nebulizer every 6 hours  clopidogrel Tablet 75milliGRAM(s) Oral daily  docusate sodium 100milliGRAM(s) Oral daily  hydrocortisone 2.5% Cream 1Application(s) Topical two times a day  furosemide   Injectable 60milliGRAM(s) IV Push every 12 hours  Nephrocaps 1Capsule(s) Oral daily  gabapentin 100milliGRAM(s) Oral every other day  heparin  Infusion 1500Unit(s)/Hr IV Continuous <Continuous>    MEDICATIONS  (PRN):  acetylcysteine 20% Inhalation 5milliLiter(s) Inhalation every 6 hours PRN shortness of breath  artificial  tears Solution 1Drop(s) Both EYES three times a day PRN Dry Eyes      LABS                        11.1   10.0  )-----------( 212      ( 23 Jun 2017 06:16 )             36.5     06-22    139  |  92<L>  |  51<H>  ----------------------------<  84  4.1   |  34<H>  |  2.30<H>    Ca    9.6      22 Jun 2017 02:30  Phos  4.2     06-22  Mg     1.9     06-22        PT/INR - ( 23 Jun 2017 06:17 )   PT: 15.6 sec;   INR: 1.40          PTT - ( 23 Jun 2017 06:17 )  PTT:67.4 sec          IMAGING/EKG/ETC  EKG:   Xray:  CT:   ECHO:  Doppler:    82 yo F chronic diastolic CHF (EF 50-55% 11/16'), mod to severe AS (ALEKS 0.5cm)-not a surgical candidate, pafib (on Coumadin), CAD s/p PCI x 3 stent (04', 06' and 12')-last cath 3/15' PCI dLM w/ Impella support, Lupus, COPD (prior 30yr ppk hx), PNA, 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     PLAN  CARDIO:  Valves:  -severe AS, ALEKS 0.5, mean pressure gradient 43, mod pulm HTN, PASP 52. Structural cardiology is following pt and may perform BAV during admission if pt's resp status improves such that she can lay flat.   -CTS Dr. Barger consulted. F/u recs.      Pump: acute CHF exacerbation 2/2 dietary non-compliance and severe AS . EF 50-55%, severely dilated LA; net neg 2.5L yest  -Daily weights, strict I/Os (us in place)  -holding Toprol XL 25mg po daily as pt borderline hypotensive and diuresing  -Cont Furosemide 60 q12hr, c/w diamox 500mg qd per renal recs. Careful diuresis with close monitoring of BP in setting of severe AS.     Vessel:  s/p PCI x 3 stent (04', 06' and 12')-last cath 3/15' PCI dLM   -Trop 0.04 x 4. Likely 2/2 demand ischemia. Not continuing to trend.   -holding Toprol XL 25mg po daily in setting of low BP from diuresis  -Per daughter [facility] NP Eben Tellez d/c'ed ASA due to dual AC w/ Coumadin, statin was d/c'ed years ago for unclear reasons. Started plavix during this admission.     Electrical:  -afib with slow ventricular response s/p PPM 2011 2/2 symptomatic bradycardia. PPM interrogation WNL.  -c/w hep gtt in case pt undergoes BAL   -holding Toprol XL 25mg po daily in setting of borderline low BP and diuresis.      Hemodynamics: borderline hypotensive, holding ACE/ARB/and BB in setting of diuresis.      PULM:  #Hypercapnic resp failure 2/2 pulmonary congestion 2/2 CHF 2/2 severe AS -  trial of nc today. Pt has been refusing working with PT, will re-emphasize importance of moving and getting out of bed.     #COPD (chronic obstructive pulmonary disease) - c/w duonebs prn    ID - RESOLVED UTI growing kleb. s/p 7 day course of abx.     RENAL:  #CKD (chronic kidney disease) stage 4, GFR 15-29 ml/min- Baseline Cr per daughter 2-3.   -Cont Furosemide q12hr, c/w Diamox 500mg qd for alkalosis per renal recs.   -Renal consulted. F/u recs.    GI  #Constipation - 2/2 iron supplement. RESOLVED c/w bowel regimen (miralax, senna, colace).     Endo:  #DM - off meds, a1c wnl    NUTRITION - cardiac diet  PPX - heparin gtt  ACCESS - peripheral  DNR/DNI - palliative is following, will need reversal if agrees to BAL. continued GOC discussions O/N Events: Adjusted heparin gtt, GALILEO otherwise; CT surgery Dr. Barger came and wanted to have surgery next week.   Subjective: Patient agitated this morning, speaking in full sentences. Otherwise no acute issues.     VITALS  Vital Signs Last 24 Hrs  T(C): 36.9, Max: 37.3 (06-22 @ 09:00)  T(F): 98.4, Max: 99.1 (06-22 @ 09:00)  HR: 78 (72 - 86)  BP: 106/45 (90/45 - 121/56)  BP(mean): 67 (63 - 98)  RR: 25 (20 - 54)  SpO2: 98% (87% - 98%)    I&O's Summary    I & Os for current day (as of 23 Jun 2017 07:08)  =============================================  IN: 988 ml / OUT: 2950 ml / NET: -1962 ml  O/N: In 175, Out: 1440 net: -1265 shift 80-120cc/hr or urine    CAPILLARY BLOOD GLUCOSE      PHYSICAL EXAM  Constitutional: NAD on HFNC  Eyes: PERRL  ENMT: dry MM  Neck: distended neck veins  Respiratory: bibasilar crackles  Cardiovascular: irreg rhythm, reg rate, harsh systolic murmur radiates to carotids. s1 and s2 appreciated.   Gastrointestinal: soft, NT  Extremities: WWP, pedal edema  Vascular: normal peripheral pulses  Neurological: A&O x 3  Musculoskeletal: No joint swelling  Skin - scaly papules on LLE with interval improvement       MEDICATIONS  (STANDING):  ferrous    sulfate 325milliGRAM(s) Oral every 8 hours  acetazolamide    Tablet 500milliGRAM(s) Oral daily  ALBUTerol/ipratropium for Nebulization 3milliLiter(s) Nebulizer every 6 hours  clopidogrel Tablet 75milliGRAM(s) Oral daily  docusate sodium 100milliGRAM(s) Oral daily  hydrocortisone 2.5% Cream 1Application(s) Topical two times a day  furosemide   Injectable 60milliGRAM(s) IV Push every 12 hours  Nephrocaps 1Capsule(s) Oral daily  gabapentin 100milliGRAM(s) Oral every other day  heparin  Infusion 1500Unit(s)/Hr IV Continuous <Continuous>    MEDICATIONS  (PRN):  acetylcysteine 20% Inhalation 5milliLiter(s) Inhalation every 6 hours PRN shortness of breath  artificial  tears Solution 1Drop(s) Both EYES three times a day PRN Dry Eyes      LABS                        11.1   10.0  )-----------( 212      ( 23 Jun 2017 06:16 )             36.5     06-22    139  |  92<L>  |  51<H>  ----------------------------<  84  4.1   |  34<H>  |  2.30<H>    Ca    9.6      22 Jun 2017 02:30  Phos  4.2     06-22  Mg     1.9     06-22        PT/INR - ( 23 Jun 2017 06:17 )   PT: 15.6 sec;   INR: 1.40          PTT - ( 23 Jun 2017 06:17 )  PTT:67.4 sec          IMAGING/EKG/ETC  EKG:   Xray:  CT:   ECHO:  Doppler:    82 yo F chronic diastolic CHF (EF 50-55% 11/16'), mod to severe AS (ALEKS 0.5cm)-not a surgical candidate, pafib (on Coumadin), CAD s/p PCI x 3 stent (04', 06' and 12')-last cath 3/15' PCI dLM w/ Impella support, Lupus, COPD (prior 30yr ppk hx), PNA, 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     PLAN  CARDIO:  Valves:  -severe AS, ALEKS 0.5, mean pressure gradient 43, mod pulm HTN, PASP 52. Structural cardiology is following pt and may perform BAV during admission if pt's resp status improves such that she can lay flat.   -CTS Dr. Barger consulted. F/u recs.      Pump: acute CHF exacerbation 2/2 dietary non-compliance and severe AS . EF 50-55%, severely dilated LA; net neg 2.5L yest  -Daily weights, strict I/Os (us in place)  -holding Toprol XL 25mg po daily as pt borderline hypotensive and diuresing  -Cont Furosemide 60 q12hr, c/w diamox 500mg qd per renal recs. Careful diuresis with close monitoring of BP in setting of severe AS.     Vessel:  s/p PCI x 3 stent (04', 06' and 12')-last cath 3/15' PCI dLM   -Trop 0.04 x 4. Likely 2/2 demand ischemia. Not continuing to trend.   -holding Toprol XL 25mg po daily in setting of low BP from diuresis  -Per daughter [facility] NP Eben Tellez d/c'ed ASA due to dual AC w/ Coumadin, statin was d/c'ed years ago for unclear reasons. Started plavix during this admission.     Electrical:  -afib with slow ventricular response s/p PPM 2011 2/2 symptomatic bradycardia. PPM interrogation WNL.  -c/w hep gtt in case pt undergoes BAL   -holding Toprol XL 25mg po daily in setting of borderline low BP and diuresis.      Hemodynamics: borderline hypotensive, holding ACE/ARB/and BB in setting of diuresis.      PULM:  #Hypercapnic resp failure 2/2 pulmonary congestion 2/2 CHF 2/2 severe AS -  trial of nc today. Pt has been refusing working with PT, will re-emphasize importance of moving and getting out of bed.     #COPD (chronic obstructive pulmonary disease) - c/w duonebs prn    ID - RESOLVED UTI growing kleb. s/p 7 day course of abx.     RENAL:  #CKD (chronic kidney disease) stage 4, GFR 15-29 ml/min- Baseline Cr per daughter 2-3.   -Cont Furosemide q12hr, c/w Diamox 500mg qd for alkalosis per renal recs.   -Renal consulted. F/u recs.    GI  #Constipation - 2/2 iron supplement. RESOLVED c/w bowel regimen (miralax, senna, colace).     Endo:  #DM - off meds, a1c wnl    NUTRITION - cardiac diet  PPX - heparin gtt  ACCESS - peripheral  DNR/DNI - palliative is following, will need reversal if agrees to BAL. 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 6/11/17 with c/c of inability to get OOB x 3 days admitted for CHF exacerbation 2/2 dietary non-compliance with Severe AS.     Pt states that her "legs were too weak". Pt is a poor historian and HPI obtained from her daughter. Pt's daughter states that her mother was unable to get OOB for 3 days, had been "tired" for several day, SOB w/ minimal exertion for "several months" and her mobility has declined since her discharge from rehab in December. Additionally, pt has gained 17 lbs over the past month (from 258 to 275) and non-compliant with low salt diet. On admission she was noted w/ elevated BNP 28805, Trop 0.04 (have flattened, likely 2/2 demand ischemia), INR 4.98, Coumadin was held. CXR w/ pulm vasc congestion, small R pleural effusion. EKG showed showed Afib w/ V-pacing. She was admitted to telemetry 5 [facility] for acute on chronic diastolic CHF exacerbation. She was started on IV diuresis w/ Lasix 40mg BID. Renal was consulted for CKD, requiring HD in the past, rec adding Diamox in setting of elevated HCO3 on BMP. Pulm was consulted and rec obtaining CT chest after diuresis. CTS was consulted for critical AS w ALEKS 0.5, mean pressure gradient 43, with plan for aortic valvuloplasty after medically optimized w/ diuresis. Pt was taken to VQ scan but unable to complete test 2/2 increased somnolence during test. ABG performed showed resp acidosis w/ pH 7.2/CO2 84/HCO3 33. She was placed on BIPAP and tolerated for a few hrs but woke up alert and upset took own BIPAP mask off, refused to continue BIPAP. Pt was placed on Venti Mask FiO2 40%, maintaining SpO2 97%. Repeat ABG showed some improvement but w/ persistent resp acidosis pH 7.25/CO2 76/HCO3 33. She was placed back on BIPAP and transferred to CCU pending CT scan of head.    O/N Events: Adjusted heparin gtt, GALILEO otherwise; CT surgery Dr. Barger came and wanted to have surgery next week.   Subjective: Patient agitated this morning, speaking in full sentences. Otherwise no acute issues.     VITALS  Vital Signs Last 24 Hrs  T(C): 36.9, Max: 37.3 (06-22 @ 09:00)  T(F): 98.4, Max: 99.1 (06-22 @ 09:00)  HR: 78 (72 - 86)  BP: 106/45 (90/45 - 121/56)  BP(mean): 67 (63 - 98)  RR: 25 (20 - 54)  SpO2: 98% (87% - 98%)    I&O's Summary    I & Os for current day (as of 23 Jun 2017 07:08)  =============================================  IN: 988 ml / OUT: 2950 ml / NET: -1962 ml  O/N: In 175, Out: 1440 net: -1265 shift 80-120cc/hr or urine    CAPILLARY BLOOD GLUCOSE      PHYSICAL EXAM  Constitutional: NAD on HFNC  Eyes: PERRL  ENMT: dry MM  Neck: distended neck veins  Respiratory: bibasilar crackles  Cardiovascular: irreg rhythm, reg rate, harsh systolic murmur radiates to carotids. s1 and s2 appreciated.   Gastrointestinal: soft, NT  Extremities: WWP, pedal edema  Vascular: normal peripheral pulses  Neurological: A&O x 3  Musculoskeletal: No joint swelling  Skin - scaly papules on LLE with interval improvement       MEDICATIONS  (STANDING):  ferrous    sulfate 325milliGRAM(s) Oral every 8 hours  acetazolamide    Tablet 500milliGRAM(s) Oral daily  ALBUTerol/ipratropium for Nebulization 3milliLiter(s) Nebulizer every 6 hours  clopidogrel Tablet 75milliGRAM(s) Oral daily  docusate sodium 100milliGRAM(s) Oral daily  hydrocortisone 2.5% Cream 1Application(s) Topical two times a day  furosemide   Injectable 60milliGRAM(s) IV Push every 12 hours  Nephrocaps 1Capsule(s) Oral daily  gabapentin 100milliGRAM(s) Oral every other day  heparin  Infusion 1500Unit(s)/Hr IV Continuous <Continuous>    MEDICATIONS  (PRN):  acetylcysteine 20% Inhalation 5milliLiter(s) Inhalation every 6 hours PRN shortness of breath  artificial  tears Solution 1Drop(s) Both EYES three times a day PRN Dry Eyes      LABS                        11.1   10.0  )-----------( 212      ( 23 Jun 2017 06:16 )             36.5     06-22    139  |  92<L>  |  51<H>  ----------------------------<  84  4.1   |  34<H>  |  2.30<H>    Ca    9.6      22 Jun 2017 02:30  Phos  4.2     06-22  Mg     1.9     06-22        PT/INR - ( 23 Jun 2017 06:17 )   PT: 15.6 sec;   INR: 1.40          PTT - ( 23 Jun 2017 06:17 )  PTT:67.4 sec          IMAGING/EKG/ETC  EKG:   Xray:  CT:   ECHO:  Doppler:    84 yo F chronic diastolic CHF (EF 50-55% 11/16'), mod to severe AS (ALEKS 0.5cm)-not a surgical candidate, pafib (on Coumadin), CAD s/p PCI x 3 stent (04', 06' and 12')-last cath 3/15' PCI dLM w/ Impella support, Lupus, COPD (prior 30yr ppk hx), PNA, 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     PLAN  CARDIO:  Valves:  -severe AS, ALEKS 0.5, mean pressure gradient 43, mod pulm HTN, PASP 52. Structural cardiology is following pt and may perform BAV during admission if pt's resp status improves such that she can lay flat.   -CTS Dr. Barger consulted. F/u recs.      Pump: acute CHF exacerbation 2/2 dietary non-compliance and severe AS . EF 50-55%, severely dilated LA; net neg 2.5L yest  -Daily weights, strict I/Os (us in place)  -holding Toprol XL 25mg po daily as pt borderline hypotensive and diuresing  -Cont Furosemide 60 q12hr, c/w diamox 500mg qd per renal recs. Careful diuresis with close monitoring of BP in setting of severe AS.     Vessel:  s/p PCI x 3 stent (04', 06' and 12')-last cath 3/15' PCI dLM   -Trop 0.04 x 4. Likely 2/2 demand ischemia. Not continuing to trend.   -holding Toprol XL 25mg po daily in setting of low BP from diuresis  -Per daughter [employer] [facility] NP Eben Tellez d/c'ed ASA due to dual AC w/ Coumadin, statin was d/c'ed years ago for unclear reasons. Started plavix during this admission.     Electrical:  -afib with slow ventricular response s/p PPM 2011 2/2 symptomatic bradycardia. PPM interrogation WNL.  -c/w hep gtt in case pt undergoes BAL   -holding Toprol XL 25mg po daily in setting of borderline low BP and diuresis.      Hemodynamics: borderline hypotensive, holding ACE/ARB/and BB in setting of diuresis.      PULM:  #Hypercapnic resp failure 2/2 pulmonary congestion 2/2 CHF 2/2 severe AS -  trial of nc today. Pt has been refusing working with PT, will re-emphasize importance of moving and getting out of bed.     #COPD (chronic obstructive pulmonary disease) - c/w duonebs prn    ID - RESOLVED UTI growing kleb. s/p 7 day course of abx.     RENAL:  #CKD (chronic kidney disease) stage 4, GFR 15-29 ml/min- Baseline Cr per daughter 2-3.   -Cont Furosemide q12hr, c/w Diamox 500mg qd for alkalosis per renal recs.   -Renal consulted. F/u recs.    GI  #Constipation - 2/2 iron supplement. RESOLVED c/w bowel regimen (miralax, senna, colace).     Endo:  #DM - off meds, a1c wnl    NUTRITION - cardiac diet  PPX - heparin gtt  ACCESS - peripheral  DNR/DNI - palliative is following, will need reversal if agrees to BAL. 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 Patient aggressively diuresed with IV lasix net negative ~25 liters with good urine output. Completed course of ceftriaxone for complicated UTI. Patient seen by Structural heart Dr. Barger, and plan for  BAV early next week. Patient also on heparin gtt for paroxysmal afib, PTTs therapeutic. Patient titrated down from Bipap --> HFNC --->Nasal cannula. Patient mental status at apparent baseline.   If urine output decreases to less than 100cc/hr for few hours, ok to give extra 60mg IV lasix.     Renal Following: Dr. Familia Martinez following  Pulmonary Following: Dr. Braden      O/N Events: Adjusted heparin gtt, GALILEO otherwise; Structural heart surgery Dr. Barger came and wanted to have surgery next week.   Subjective: Patient agitated this morning, speaking in full sentences. Otherwise no acute issues.     VITALS  Vital Signs Last 24 Hrs  T(C): 36.9, Max: 37.3 (06-22 @ 09:00)  T(F): 98.4, Max: 99.1 (06-22 @ 09:00)  HR: 78 (72 - 86)  BP: 106/45 (90/45 - 121/56)  BP(mean): 67 (63 - 98)  RR: 25 (20 - 54)  SpO2: 98% (87% - 98%)    I&O's Summary    I & Os for current day (as of 23 Jun 2017 07:08)  =============================================  IN: 988 ml / OUT: 2950 ml / NET: -1962 ml  O/N: In 175, Out: 1440 net: -1265 shift 80-120cc/hr or urine    CAPILLARY BLOOD GLUCOSE      PHYSICAL EXAM  Constitutional: NAD on HFNC  Eyes: PERRL  ENMT: dry MM  Neck: distended neck veins  Respiratory: bibasilar crackles  Cardiovascular: irreg rhythm, reg rate, harsh systolic murmur radiates to carotids. s1 and s2 appreciated.   Gastrointestinal: soft, NT  Extremities: WWP, pedal edema  Vascular: normal peripheral pulses  Neurological: A&O x 3  Musculoskeletal: No joint swelling  Skin - scaly papules on LLE with interval improvement       MEDICATIONS  (STANDING):  ferrous    sulfate 325milliGRAM(s) Oral every 8 hours  acetazolamide    Tablet 500milliGRAM(s) Oral daily  ALBUTerol/ipratropium for Nebulization 3milliLiter(s) Nebulizer every 6 hours  clopidogrel Tablet 75milliGRAM(s) Oral daily  docusate sodium 100milliGRAM(s) Oral daily  hydrocortisone 2.5% Cream 1Application(s) Topical two times a day  furosemide   Injectable 60milliGRAM(s) IV Push every 12 hours  Nephrocaps 1Capsule(s) Oral daily  gabapentin 100milliGRAM(s) Oral every other day  heparin  Infusion 1500Unit(s)/Hr IV Continuous <Continuous>    MEDICATIONS  (PRN):  acetylcysteine 20% Inhalation 5milliLiter(s) Inhalation every 6 hours PRN shortness of breath  artificial  tears Solution 1Drop(s) Both EYES three times a day PRN Dry Eyes      LABS                        11.1   10.0  )-----------( 212      ( 23 Jun 2017 06:16 )             36.5     06-22    139  |  92<L>  |  51<H>  ----------------------------<  84  4.1   |  34<H>  |  2.30<H>    Ca    9.6      22 Jun 2017 02:30  Phos  4.2     06-22  Mg     1.9     06-22        PT/INR - ( 23 Jun 2017 06:17 )   PT: 15.6 sec;   INR: 1.40          PTT - ( 23 Jun 2017 06:17 )  PTT:67.4 sec          IMAGING/EKG/ETC  EKG:   Xray:  CT:   ECHO:  Doppler:    82 yo F chronic diastolic CHF (EF 50-55% 11/16'), mod to severe AS (ALEKS 0.5cm)-not a surgical candidate, pafib (on Coumadin), CAD s/p PCI x 3 stent (04', 06' and 12')-last cath 3/15' PCI dLM w/ Impella support, Lupus, COPD (prior 30yr ppk hx), PNA, 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     PLAN  CARDIO:  Valves:  -severe AS, ALEKS 0.5, mean pressure gradient 43, mod pulm HTN, PASP 52. Structural cardiology is following pt and may perform BAV during admission if pt's resp status improves such that she can lay flat.   -CTS Dr. Barger consulted. F/u recs: will plan for surgery early next week.     Pump: acute CHF exacerbation 2/2 dietary non-compliance and severe AS . EF 50-55%, severely dilated LA; net neg 2.5L yest  -Daily weights, strict I/Os (us in place)  -holding Toprol XL 25mg po daily as pt borderline hypotensive and diuresing  -Cont Furosemide 60 q12hr, c/w diamox 500mg qd per renal recs. Careful diuresis with close monitoring of BP in setting of severe AS.     Vessel:  s/p PCI x 3 stent (04', 06' and 12')-last cath 3/15' PCI dLM   -Trop 0.04 x 4. Likely 2/2 demand ischemia. Not continuing to trend.   -holding Toprol XL 25mg po daily in setting of low BP from diuresis  -Per daughter Rosendo House Call NP Eben Tellez d/c'ed ASA due to dual AC w/ Coumadin, statin was d/c'ed years ago for unclear reasons. Started plavix during this admission.     Electrical:  -afib with slow ventricular response s/p PPM 2011 2/2 symptomatic bradycardia. PPM interrogation WNL.  -c/w hep gtt in case pt undergoes BAL   -holding Toprol XL 25mg po daily in setting of borderline low BP and diuresis.      Hemodynamics: borderline hypotensive, holding ACE/ARB/and BB in setting of diuresis.      PULM:  #Hypercapnic resp failure 2/2 pulmonary congestion 2/2 CHF 2/2 severe AS -  trial of nc today. Pt has been refusing working with PT, will re-emphasize importance of moving and getting out of bed.     #COPD (chronic obstructive pulmonary disease) - c/w duonebs prn    ID - RESOLVED UTI growing kleb. s/p 7 day course of abx.     RENAL:  #CKD (chronic kidney disease) stage 4, GFR 15-29 ml/min- Baseline Cr per daughter 2-3.   -Cont Furosemide q12hr, c/w Diamox 500mg qd for alkalosis per renal recs.   -Renal consulted. F/u recs.    GI  #Constipation - 2/2 iron supplement. RESOLVED c/w bowel regimen (miralax, senna, colace).     Endo:  #DM - off meds, a1c wnl    NUTRITION - cardiac diet  PPX - heparin gtt  ACCESS - peripheral  DNR/DNI - palliative is following, will need reversal if agrees to BAL. continued GOC discussions

## 2017-06-24 LAB
ANION GAP SERPL CALC-SCNC: 12 MMOL/L — SIGNIFICANT CHANGE UP (ref 5–17)
APTT BLD: 57.6 SEC — HIGH (ref 27.5–37.4)
APTT BLD: 78.6 SEC — HIGH (ref 27.5–37.4)
APTT BLD: 79.1 SEC — HIGH (ref 27.5–37.4)
BUN SERPL-MCNC: 57 MG/DL — HIGH (ref 7–23)
CALCIUM SERPL-MCNC: 9.9 MG/DL — SIGNIFICANT CHANGE UP (ref 8.4–10.5)
CHLORIDE SERPL-SCNC: 90 MMOL/L — LOW (ref 96–108)
CO2 SERPL-SCNC: 34 MMOL/L — HIGH (ref 22–31)
CREAT SERPL-MCNC: 2.4 MG/DL — HIGH (ref 0.5–1.3)
GLUCOSE SERPL-MCNC: 109 MG/DL — HIGH (ref 70–99)
HCT VFR BLD CALC: 34.6 % — SIGNIFICANT CHANGE UP (ref 34.5–45)
HGB BLD-MCNC: 10.7 G/DL — LOW (ref 11.5–15.5)
MAGNESIUM SERPL-MCNC: 2.1 MG/DL — SIGNIFICANT CHANGE UP (ref 1.6–2.6)
MCHC RBC-ENTMCNC: 27.4 PG — SIGNIFICANT CHANGE UP (ref 27–34)
MCHC RBC-ENTMCNC: 30.9 G/DL — LOW (ref 32–36)
MCV RBC AUTO: 88.5 FL — SIGNIFICANT CHANGE UP (ref 80–100)
PLATELET # BLD AUTO: 193 K/UL — SIGNIFICANT CHANGE UP (ref 150–400)
POTASSIUM SERPL-MCNC: 3.8 MMOL/L — SIGNIFICANT CHANGE UP (ref 3.5–5.3)
POTASSIUM SERPL-SCNC: 3.8 MMOL/L — SIGNIFICANT CHANGE UP (ref 3.5–5.3)
RBC # BLD: 3.91 M/UL — SIGNIFICANT CHANGE UP (ref 3.8–5.2)
RBC # FLD: 16.1 % — SIGNIFICANT CHANGE UP (ref 10.3–16.9)
SODIUM SERPL-SCNC: 136 MMOL/L — SIGNIFICANT CHANGE UP (ref 135–145)
WBC # BLD: 9.7 K/UL — SIGNIFICANT CHANGE UP (ref 3.8–10.5)
WBC # FLD AUTO: 9.7 K/UL — SIGNIFICANT CHANGE UP (ref 3.8–10.5)

## 2017-06-24 PROCEDURE — 99233 SBSQ HOSP IP/OBS HIGH 50: CPT

## 2017-06-24 PROCEDURE — 93010 ELECTROCARDIOGRAM REPORT: CPT

## 2017-06-24 RX ORDER — METOPROLOL TARTRATE 50 MG
12.5 TABLET ORAL DAILY
Qty: 0 | Refills: 0 | Status: DISCONTINUED | OUTPATIENT
Start: 2017-06-24 | End: 2017-07-06

## 2017-06-24 RX ORDER — HEPARIN SODIUM 5000 [USP'U]/ML
1600 INJECTION INTRAVENOUS; SUBCUTANEOUS
Qty: 25000 | Refills: 0 | Status: DISCONTINUED | OUTPATIENT
Start: 2017-06-24 | End: 2017-06-27

## 2017-06-24 RX ORDER — METOPROLOL TARTRATE 50 MG
12.5 TABLET ORAL DAILY
Qty: 0 | Refills: 0 | Status: DISCONTINUED | OUTPATIENT
Start: 2017-06-24 | End: 2017-06-24

## 2017-06-24 RX ORDER — POTASSIUM CHLORIDE 20 MEQ
20 PACKET (EA) ORAL ONCE
Qty: 0 | Refills: 0 | Status: COMPLETED | OUTPATIENT
Start: 2017-06-24 | End: 2017-06-24

## 2017-06-24 RX ADMIN — CLOPIDOGREL BISULFATE 75 MILLIGRAM(S): 75 TABLET, FILM COATED ORAL at 12:13

## 2017-06-24 RX ADMIN — Medication 3 MILLILITER(S): at 12:10

## 2017-06-24 RX ADMIN — Medication 60 MILLIGRAM(S): at 18:11

## 2017-06-24 RX ADMIN — Medication 325 MILLIGRAM(S): at 21:51

## 2017-06-24 RX ADMIN — Medication 3 MILLILITER(S): at 05:40

## 2017-06-24 RX ADMIN — ACETAZOLAMIDE 500 MILLIGRAM(S): 250 TABLET ORAL at 12:13

## 2017-06-24 RX ADMIN — Medication 12.5 MILLIGRAM(S): at 15:39

## 2017-06-24 RX ADMIN — Medication 325 MILLIGRAM(S): at 05:40

## 2017-06-24 RX ADMIN — Medication 60 MILLIGRAM(S): at 05:39

## 2017-06-24 RX ADMIN — Medication 325 MILLIGRAM(S): at 15:38

## 2017-06-24 RX ADMIN — Medication 1 CAPSULE(S): at 12:13

## 2017-06-24 RX ADMIN — HEPARIN SODIUM 16 UNIT(S)/HR: 5000 INJECTION INTRAVENOUS; SUBCUTANEOUS at 18:22

## 2017-06-24 RX ADMIN — Medication 20 MILLIEQUIVALENT(S): at 12:13

## 2017-06-24 RX ADMIN — Medication 3 MILLILITER(S): at 18:10

## 2017-06-24 RX ADMIN — Medication 1 APPLICATION(S): at 18:11

## 2017-06-24 NOTE — PROGRESS NOTE ADULT - PROBLEM SELECTOR PLAN 5
Afib with slow ventricular response s/p PPM 2011 2/2 symptomatic bradycardia. Currently rate controlled.  -PPM interrogation WNL.  -c/w hep gtt in case pt undergoes BAV   -holding Toprol XL 25mg po daily in setting of borderline low BP and diuresis.

## 2017-06-24 NOTE — PROGRESS NOTE ADULT - ASSESSMENT
Patient is an 87 year old female with chronic renal failure, congestive heart failure, lupus, COPD, CAD, and moderate to severe AS.

## 2017-06-24 NOTE — PROGRESS NOTE ADULT - ASSESSMENT
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 Admitted for CHF exacerbation, transferred from [facility] to CCU for hypercapnic resp failure s/p BIPAP and IV diuresis, now stepped down for further management.

## 2017-06-24 NOTE — PROGRESS NOTE ADULT - ASSESSMENT
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 Admitted for CHF exacerbation, transferred from [facility] to CCU for hypercapnic resp failure s/p BIPAP and IV diuresis, now stepped down for further management.

## 2017-06-24 NOTE — PROGRESS NOTE ADULT - ASSESSMENT
1) acute/chronic diastolic chf  -diuretics per renal recs  -goal for o>i  2) nonrheumatic aortic stenosis  -possible BAV on 6- - f/u with [facility] for recs  3) atrial fibrillation; presence of pacemaker  -ac  -consider avn-blocking rx if no contrainidcation - check with Pulm, ccu-team, and shd  4) CAD s/p PCI   -consider statin  5) Lupus - monitor  6) COPD - pulm following  7) CKD - monitor cr/uop  8) HTN - avoid lability  9) PVD - bb and statin as above  10) Type 2 DM - monitor; not on ace/arb with ckd  11) normocytic anemia - monitor  12) pressure injury of skin (right buttock) - recommend wound care consult   13) ppx: therapeutic ac; consider ppi  14) K>4< Mg>2  d/w Housestaff (Nneka) and [place]  Critical importance of compliance with recommendations discussed - risks, benefits, alterantives of above plan d/w patient and daughter at bedside; questions answered.

## 2017-06-24 NOTE — PROGRESS NOTE ADULT - SUBJECTIVE AND OBJECTIVE BOX
INTERVAL HPI/OVERNIGHT EVENTS:    intermittent agitation  improving CXR    PAST MEDICAL & SURGICAL HISTORY:  PVD (peripheral vascular disease)  Iron deficiency anemia  Lupus (systemic lupus erythematosus)  Paroxysmal atrial fibrillation  Aortic stenosis, severe  CKD (chronic kidney disease) stage 4, GFR 15-29 ml/min  Diabetes  HTN (hypertension)  CHF (congestive heart failure)  CAD (coronary artery disease)  COPD (chronic obstructive pulmonary disease)  History of total knee replacement: 2003  Presence of cardiac pacemaker: at [facility] by Dr Escoto    FAMILY HISTORY:  No pertinent family history in first degree relatives    SOCIAL HISTORY:    REVIEW OF SYSTEMS:  with daughter at bed side  no significant change  Constitutional: () weight change, () fever,  () chills, () fatigue, () night sweats  Eyes: () discharge, () eye pain, () visual change  ENT:  () hearing difficulty, () vertigo, () sinus pain,  () throat pain, () epistaxis, () dysphagia, () hoarseness  Neck: () pain, () stiffness, () swelling  Respiratory: () cough, () wheezing, () hemoptysis      Cardiovascular: () chest pain, ()palpitations, () dizziness   Gastrointestinal: () abdominal pain, () nausea, () vomiting, () hematemesis, () diarrhea,  () constipation, () melena  Genitourinary:  () dysuria, () frequency, () hematuria, () incontinence      Neurologic: () headache, () memory loss, () loss of strength, () numbness, () tremor     Skin: () itching, () burning, () rash, () lesions   Lymphatic: () enlarged lymph nodes  Endocrine: () hair loss, () temperature intolerance         Musculoskeletal: () back pain, () joint pain,  () extremity pain  Psychiatric: () visual change, () auditory change, () depression, () anxiety, () suicidal  Sleep: () disorder, () insomnia, () sleep deprivation  Heme/Lymph: () easy bruising, () bleeding gums            Allergy and Immunologic: () hives, () eczema    Vital Signs Last 24 Hrs  T(C): 36.8, Max: 36.9 (06-23 @ 22:12)  T(F): 98.3, Max: 98.4 (06-23 @ 22:12)  HR: 87 (68 - 87)  BP: 117/61 (99/46 - 121/67)  BP(mean): 96 (58 - 96)  RR: 20 (20 - 28)  SpO2: 97% (92% - 97%)  ON NASAL CANNULA    I&O's Detail  I & Os for 24h ending 24 Jun 2017 07:00  =============================================  IN:    Oral Fluid: 340 ml    heparin Infusion: 91.5 ml    heparin Infusion: 77.5 ml    heparin Infusion: 66 ml    Total IN: 575 ml  ---------------------------------------------  OUT:    Indwelling Catheter - Urethral: 2285 ml    Total OUT: 2285 ml  ---------------------------------------------  Total NET: -1710 ml    I & Os for current day (as of 24 Jun 2017 14:34)  =============================================  IN:    Oral Fluid: 320 ml    heparin Infusion: 48 ml    Total IN: 368 ml  ---------------------------------------------  OUT:    Total OUT: 0 ml  ---------------------------------------------  Total NET: 368 ml    PHYSICAL EXAM:  in bed  Well nourished, well developed, comfortable, - acute distress; vital signs are monitored continuously  Eyes: PERRLA, EOMI, -conjunctivitis, -scleritis   Head: no focal deficit, normocephalic,  no trauma  ENMT: moist tongue, no thrush, -nasal discharge, -hoarseness, normal hearing, -cough, -hemoptysis, trachea midline  Neck: supple, - lymphadenopathy,  -masses, -JVD  Respiratory: bilateral diminished breath sounds, -wheezing, -rhonchi, -rales, -crackles  Chest: -accessory muscle use, -paradoxical breathing  Cardiovascular: irregular rate and afib and PACED, S1 S2 normal, -S3, -S4, -murmur, -gallop, -rub  Gastrointestinal: soft, nontender, nondistended, normal bowel sounds, no hepatosplenomegaly  Genitourinary: -flank pain, -dysuria  Extremities: -clubbing, -cyanosis, +edema    Vascular: peripheral pulses palpable 2+ bilaterally  Neurological: alert, oriented x 3, no focal deficit, -tremor   Skin: warm, dry, -erythema, iv sites intact  Lymph nodes; no cervical, supraclavicular or axillary adenopathy  Psychiatric: cooperative on/off    MEDICATIONS  (STANDING):  ferrous    sulfate 325milliGRAM(s) Oral every 8 hours  acetazolamide    Tablet 500milliGRAM(s) Oral daily  ALBUTerol/ipratropium for Nebulization 3milliLiter(s) Nebulizer every 6 hours  clopidogrel Tablet 75milliGRAM(s) Oral daily  hydrocortisone 2.5% Cream 1Application(s) Topical two times a day  furosemide   Injectable 60milliGRAM(s) IV Push every 12 hours  Nephrocaps 1Capsule(s) Oral daily  gabapentin 100milliGRAM(s) Oral every other day  heparin  Infusion 1600Unit(s)/Hr IV Continuous <Continuous>    MEDICATIONS  (PRN):  acetylcysteine 20% Inhalation 5milliLiter(s) Inhalation every 6 hours PRN shortness of breath  artificial  tears Solution 1Drop(s) Both EYES three times a day PRN Dry Eyes      Allergies    No Known Allergies    Intolerances        LABS:                        10.7   9.7   )-----------( 193      ( 24 Jun 2017 07:27 )             34.6     06-24    136  |  90<L>  |  57<H>  ----------------------------<  109<H>  3.8   |  34<H>  |  2.40<H>    Ca    9.9      24 Jun 2017 07:27  Mg     2.1     06-24  PT/INR - ( 23 Jun 2017 06:17 )   PT: 15.6 sec;   INR: 1.40     PTT - ( 24 Jun 2017 07:28 )  PTT:57.6 sec    +DVT prophylaxis HEPARIN  +Sleep   +Nutrition goals  ORAL  -Pain DENIED, sometimes hip  -Decubital ulcer  +GI prophylaxis (PPV, coagulopathy, Hx)  +Aspiration prophylaxis (45 degrees)  +Sedation/analgesia stopped once  +ID (phos, CH, mupi, SB)  -Delirium  +Cardiac  +Prevention  +Education  +Medication reviewed (drug-drug interactions, PDA)  Medical devices    Discussed with CICU, PGY, CCRN, daughter    RADIOLOGY & ADDITIONAL STUDIES:    EXAM:  XR CHEST 1 VIEW PORT ROUTINE                          PROCEDURE DATE:  06/23/2017         INTERPRETATION:  Indication: chf    A single portable view of the chest is submitted. Comparison is made to   the most recent prior study dated 6/20/2017. Bilateral pulmonary   infiltrates and effusions are similar to the previous examination.   Impression:    No significant interval change

## 2017-06-24 NOTE — PROGRESS NOTE ADULT - PROBLEM SELECTOR PLAN 3
Hypercapnic resp failure 2/2 pulmonary congestion 2/2 CHF 2/2 severe AS.   - Saturating well on 4LNC  - BIPAP at night    #COPD - c/w duonebs prn

## 2017-06-24 NOTE — PROGRESS NOTE ADULT - PROBLEM SELECTOR PLAN 4
s/p PCI x 3 stent (04', 06' and 12')-last cath 3/15' PCI dLM   -Trop 0.04 x 4. Likely 2/2 demand ischemia. Not continuing to trend.   -Lipid panel WNL  -holding Toprol XL 25mg po daily in setting of low BP from diuresis  -Continue with Plavix

## 2017-06-24 NOTE — PROGRESS NOTE ADULT - SUBJECTIVE AND OBJECTIVE BOX
coverage for Dr. Martinez    Pt seen and examined no complaints    REVIEW OF SYSTEMS:  Constitutional: No fever, weight loss or fatigue  Cardiovascular: No chest pain, palpitations, dizziness or leg swelling  Gastrointestinal: No abdominal or epigastric pain. No nausea, vomiting or hematemesis; No diarrhea or constipation. No melena or hematochezia.  Skin: No itching, burning, rashes or lesions       MEDICATIONS:  MEDICATIONS  (STANDING):  ferrous    sulfate 325milliGRAM(s) Oral every 8 hours  acetazolamide    Tablet 500milliGRAM(s) Oral daily  ALBUTerol/ipratropium for Nebulization 3milliLiter(s) Nebulizer every 6 hours  clopidogrel Tablet 75milliGRAM(s) Oral daily  hydrocortisone 2.5% Cream 1Application(s) Topical two times a day  furosemide   Injectable 60milliGRAM(s) IV Push every 12 hours  Nephrocaps 1Capsule(s) Oral daily  gabapentin 100milliGRAM(s) Oral every other day  potassium chloride    Tablet ER 20milliEquivalent(s) Oral once  heparin  Infusion 1600Unit(s)/Hr IV Continuous <Continuous>    MEDICATIONS  (PRN):  acetylcysteine 20% Inhalation 5milliLiter(s) Inhalation every 6 hours PRN shortness of breath  artificial  tears Solution 1Drop(s) Both EYES three times a day PRN Dry Eyes      Allergies    No Known Allergies    Intolerances        Vital Signs Last 24 Hrs  T(C): 36.7, Max: 37.5 (06-23 @ 13:00)  T(F): 98, Max: 99.5 (06-23 @ 13:00)  HR: 75 (68 - 86)  BP: 99/46 (87/49 - 121/67)  BP(mean): 66 (57 - 85)  RR: 20 (20 - 28)  SpO2: 94% (92% - 96%)    I & Os for current day (as of 06-24 @ 09:58)  =============================================  IN: 575 ml / OUT: 2285 ml / NET: -1710 ml      PHYSICAL EXAM:    General: Well developed; well nourished; in no acute distress  HEENT: MMM, conjunctiva and sclera clear  Lungs: clear  Heart: irregular, loud murmur  Gastrointestinal: Soft non-tender non-distended; Normal bowel sounds; No hepatosplenomegaly  Skin: Warm and dry. No obvious rash    LABS:      CBC Full  -  ( 24 Jun 2017 07:27 )  WBC Count : 9.7 K/uL  Hemoglobin : 10.7 g/dL  Hematocrit : 34.6 %  Platelet Count - Automated : 193 K/uL  Mean Cell Volume : 88.5 fL  Mean Cell Hemoglobin : 27.4 pg  Mean Cell Hemoglobin Concentration : 30.9 g/dL  Auto Neutrophil # : x  Auto Lymphocyte # : x  Auto Monocyte # : x  Auto Eosinophil # : x  Auto Basophil # : x  Auto Neutrophil % : x  Auto Lymphocyte % : x  Auto Monocyte % : x  Auto Eosinophil % : x  Auto Basophil % : x    06-24    136  |  90<L>  |  57<H>  ----------------------------<  109<H>  3.8   |  34<H>  |  2.40<H>    Ca    9.9      24 Jun 2017 07:27  Mg     2.1     06-24      PT/INR - ( 23 Jun 2017 06:17 )   PT: 15.6 sec;   INR: 1.40          PTT - ( 24 Jun 2017 07:28 )  PTT:57.6 sec                  RADIOLOGY & ADDITIONAL STUDIES (The following images were personally reviewed):

## 2017-06-24 NOTE — PROGRESS NOTE ADULT - PROBLEM SELECTOR PLAN 2
Critical AS, ALEKS 0.5, mean pressure gradient 43, mod pulm HTN, PASP 52.   -CTS Structural Heart is following pt and may perform BAV during admission if pt's resp status improves such that she can lay flat.   -CTS Dr. Barger consulted. F/u recs: will plan for surgery early next week.

## 2017-06-24 NOTE — PROGRESS NOTE ADULT - SUBJECTIVE AND OBJECTIVE BOX
cc: follow-up evaluation and management of acute/chronic diastolic chf and aortic stenosis  subjective:  no acute complaints; daughter at bedisde    PAST MEDICAL & SURGICAL HISTORY:  PVD (peripheral vascular disease)  Iron deficiency anemia  Lupus (systemic lupus erythematosus)  Paroxysmal atrial fibrillation  Aortic stenosis, severe  CKD (chronic kidney disease) stage 4, GFR 15-29 ml/min  Diabetes  HTN (hypertension)  CHF (congestive heart failure)  CAD (coronary artery disease)  COPD (chronic obstructive pulmonary disease)  History of total knee replacement: 2003  Presence of cardiac pacemaker: at [facility] by Dr Escoto    MEDICATIONS  (STANDING):  ferrous    sulfate 325milliGRAM(s) Oral every 8 hours  acetazolamide    Tablet 500milliGRAM(s) Oral daily  ALBUTerol/ipratropium for Nebulization 3milliLiter(s) Nebulizer every 6 hours  clopidogrel Tablet 75milliGRAM(s) Oral daily  hydrocortisone 2.5% Cream 1Application(s) Topical two times a day  furosemide   Injectable 60milliGRAM(s) IV Push every 12 hours  Nephrocaps 1Capsule(s) Oral daily  gabapentin 100milliGRAM(s) Oral every other day  heparin  Infusion 1600Unit(s)/Hr IV Continuous <Continuous>    MEDICATIONS  (PRN):  acetylcysteine 20% Inhalation 5milliLiter(s) Inhalation every 6 hours PRN shortness of breath  artificial  tears Solution 1Drop(s) Both EYES three times a day PRN Dry Eyes    ICU Vital Signs Last 24 Hrs  T(C): 36.8, Max: 36.9 (06-23 @ 22:12)  T(F): 98.3, Max: 98.4 (06-23 @ 22:12)  HR: 87 (68 - 87)  BP: 117/61 (99/46 - 121/67)  BP(mean): 96 (58 - 96)  ABP: --  ABP(mean): --  RR: 20 (20 - 28)  SpO2: 97% (92% - 97%)    PHYSICAL EXAM:  General: nad; supine; elevated head of bed  Neck: jvd ~8-10 cm jvd; hjr present  carotid - radiation of murmur heard toward carotid area  Cardiac: Irregular; S1 present; decreased S2, systolic murmur present  Pulmonary: rales present bilat  Abdomen: edema present    LABS:                                                           10.7   9.7   )-----------( 193      ( 24 Jun 2017 07:27 )             34.6   06-24    136  |  90<L>  |  57<H>  ----------------------------<  109<H>  3.8   |  34<H>  |  2.40<H>    Ca    9.9      24 Jun 2017 07:27  Mg     2.1     06-24        DIAGNOSTIC TESTS:     6- ecg reviewed on 6-    EXAM:  XR CHEST 1 VIEW PORT ROUTINE                          PROCEDURE DATE:  06/23/2017                     INTERPRETATION:  Indication: chf    A single portable view of the chest is submitted. Comparison is made to   the most recent prior study dated 6/20/2017. Bilateral pulmonary   infiltrates and effusions are similar to the previous examination.   Impression:    No significant interval change            "Thank you for the opportunity to participate in the care of this   patient."        MASOUD WORKMAN M.D., ATTENDING RADIOLOGIST  This document has been electronically signed. Jun 23 2017  4:33PM

## 2017-06-24 NOTE — PROGRESS NOTE ADULT - SUBJECTIVE AND OBJECTIVE BOX
Patient is an 87 year old female with chronic renal failure, congestive heart failure, lupus, COPD, CAD, and moderate to severe AS. Patient seen and examined at bedside. Patient states she is feeling better. She states her shortness of breath has improved.     ferrous    sulfate 325milliGRAM(s) every 8 hours  acetylcysteine 20% Inhalation 5milliLiter(s) every 6 hours PRN  acetazolamide    Tablet 500milliGRAM(s) daily  ALBUTerol/ipratropium for Nebulization 3milliLiter(s) every 6 hours  clopidogrel Tablet 75milliGRAM(s) daily  hydrocortisone 2.5% Cream 1Application(s) two times a day  furosemide   Injectable 60milliGRAM(s) every 12 hours  Nephrocaps 1Capsule(s) daily  artificial  tears Solution 1Drop(s) three times a day PRN  gabapentin 100milliGRAM(s) every other day  heparin  Infusion 1600Unit(s)/Hr <Continuous>    Allergies    No Known Allergies    Intolerances    T(C): , Max: 36.9 (06-23 @ 22:12)  T(F): , Max: 98.4 (06-23 @ 22:12)  HR: 87  BP: 117/61  BP(mean): 96  RR: 20  SpO2: 97%    I & Os for 24h ending 06-24 @ 07:00  =============================================  IN:    Oral Fluid: 340 ml    heparin Infusion: 91.5 ml    heparin Infusion: 77.5 ml    heparin Infusion: 66 ml    Total IN: 575 ml  ---------------------------------------------  OUT:    Indwelling Catheter - Urethral: 2285 ml    Total OUT: 2285 ml  ---------------------------------------------  Total NET: -1710 ml    I & Os for current day (as of 06-24 @ 14:38)  =============================================  IN:    Oral Fluid: 320 ml    heparin Infusion: 48 ml    Total IN: 368 ml  ---------------------------------------------  OUT:    Total OUT: 0 ml  ---------------------------------------------  Total NET: 368 ml    LABS:                        10.7   9.7   )-----------( 193      ( 24 Jun 2017 07:27 )             34.6     06-24    136  |  90<L>  |  57<H>  ----------------------------<  109<H>  3.8   |  34<H>  |  2.40<H>    Ca    9.9      24 Jun 2017 07:27  Mg     2.1     06-24    PT/INR - ( 23 Jun 2017 06:17 )   PT: 15.6 sec;   INR: 1.40        PTT - ( 24 Jun 2017 07:28 )  PTT:57.6 sec

## 2017-06-24 NOTE — PROGRESS NOTE ADULT - PROBLEM SELECTOR PLAN 7
Baseline Cr per daughter 2-3. On admission, Cr 2.8  -Cont Furosemide q12hr, c/w Diamox 500mg qd for alkalosis per renal recs.   -Renal consulted. F/u recs. (Dr. Ramirez-her outpt Nephrologist)  -Leahy catheter in place, monitor daily weights, strictr I/o's  -Renally dose all meds  - Completed Ceftriaxone 7 days course for complicated UTI w/ Klesiella on UCx.

## 2017-06-24 NOTE — PROGRESS NOTE ADULT - PROBLEM SELECTOR PLAN 1
Patient is a 87 year old female with a history of chronic kidney disease.   Patient's renal function is stable with a creatinine of 2.4.   Patient remains non-oliguric   avoid nephrotoxic medications, IV contrast, and NSAIDs

## 2017-06-24 NOTE — PROVIDER CONTACT NOTE (OTHER) - BACKGROUND
muscles and appears to be more lethargic. Pt is communicating less than she had earlier. MD White called ot bedside to assess pt. Pt does not appear to be in any acute distress. Pt had a few bites of
pmh COPD, CAD, CHF

## 2017-06-24 NOTE — PROGRESS NOTE ADULT - PROBLEM SELECTOR PLAN 1
acute CHF exacerbation 2/2 dietary non-compliance and critical AS. EF 50-55%, severely dilated LA; net neg 2.5L yest  -Daily weights, strict I/Os (us in place)  -holding Toprol XL 25mg po daily as pt borderline hypotensive and diuresing  -Cont Furosemide 60 q12hr, c/w diamox 500mg qd per renal recs. Careful diuresis with close monitoring of BP in setting of severe AS. Net goal neg 1.5-2.5L as BP tolerates.

## 2017-06-25 LAB
ANION GAP SERPL CALC-SCNC: 12 MMOL/L — SIGNIFICANT CHANGE UP (ref 5–17)
APTT BLD: 75.5 SEC — HIGH (ref 27.5–37.4)
BUN SERPL-MCNC: 57 MG/DL — HIGH (ref 7–23)
CALCIUM SERPL-MCNC: 9.3 MG/DL — SIGNIFICANT CHANGE UP (ref 8.4–10.5)
CHLORIDE SERPL-SCNC: 93 MMOL/L — LOW (ref 96–108)
CO2 SERPL-SCNC: 38 MMOL/L — HIGH (ref 22–31)
CREAT SERPL-MCNC: 2.4 MG/DL — HIGH (ref 0.5–1.3)
GLUCOSE SERPL-MCNC: 112 MG/DL — HIGH (ref 70–99)
HCT VFR BLD CALC: 34.2 % — LOW (ref 34.5–45)
HGB BLD-MCNC: 10.4 G/DL — LOW (ref 11.5–15.5)
MAGNESIUM SERPL-MCNC: 2.1 MG/DL — SIGNIFICANT CHANGE UP (ref 1.6–2.6)
MCHC RBC-ENTMCNC: 27.2 PG — SIGNIFICANT CHANGE UP (ref 27–34)
MCHC RBC-ENTMCNC: 30.4 G/DL — LOW (ref 32–36)
MCV RBC AUTO: 89.3 FL — SIGNIFICANT CHANGE UP (ref 80–100)
PLATELET # BLD AUTO: 209 K/UL — SIGNIFICANT CHANGE UP (ref 150–400)
POTASSIUM SERPL-MCNC: 3.4 MMOL/L — LOW (ref 3.5–5.3)
POTASSIUM SERPL-SCNC: 3.4 MMOL/L — LOW (ref 3.5–5.3)
RBC # BLD: 3.83 M/UL — SIGNIFICANT CHANGE UP (ref 3.8–5.2)
RBC # FLD: 16.2 % — SIGNIFICANT CHANGE UP (ref 10.3–16.9)
SODIUM SERPL-SCNC: 143 MMOL/L — SIGNIFICANT CHANGE UP (ref 135–145)
WBC # BLD: 10.3 K/UL — SIGNIFICANT CHANGE UP (ref 3.8–10.5)
WBC # FLD AUTO: 10.3 K/UL — SIGNIFICANT CHANGE UP (ref 3.8–10.5)

## 2017-06-25 PROCEDURE — 71010: CPT | Mod: 26

## 2017-06-25 PROCEDURE — 99233 SBSQ HOSP IP/OBS HIGH 50: CPT

## 2017-06-25 RX ORDER — IPRATROPIUM/ALBUTEROL SULFATE 18-103MCG
3 AEROSOL WITH ADAPTER (GRAM) INHALATION EVERY 4 HOURS
Qty: 0 | Refills: 0 | Status: DISCONTINUED | OUTPATIENT
Start: 2017-06-25 | End: 2017-07-06

## 2017-06-25 RX ORDER — FUROSEMIDE 40 MG
80 TABLET ORAL EVERY 12 HOURS
Qty: 0 | Refills: 0 | Status: DISCONTINUED | OUTPATIENT
Start: 2017-06-25 | End: 2017-06-26

## 2017-06-25 RX ORDER — POTASSIUM CHLORIDE 20 MEQ
40 PACKET (EA) ORAL EVERY 4 HOURS
Qty: 0 | Refills: 0 | Status: COMPLETED | OUTPATIENT
Start: 2017-06-25 | End: 2017-06-25

## 2017-06-25 RX ADMIN — Medication 3 MILLILITER(S): at 17:54

## 2017-06-25 RX ADMIN — Medication 3 MILLILITER(S): at 07:17

## 2017-06-25 RX ADMIN — CLOPIDOGREL BISULFATE 75 MILLIGRAM(S): 75 TABLET, FILM COATED ORAL at 12:32

## 2017-06-25 RX ADMIN — Medication 325 MILLIGRAM(S): at 21:24

## 2017-06-25 RX ADMIN — Medication 3 MILLILITER(S): at 10:24

## 2017-06-25 RX ADMIN — Medication 3 MILLILITER(S): at 01:02

## 2017-06-25 RX ADMIN — GABAPENTIN 100 MILLIGRAM(S): 400 CAPSULE ORAL at 12:33

## 2017-06-25 RX ADMIN — Medication 60 MILLIGRAM(S): at 07:18

## 2017-06-25 RX ADMIN — Medication 3 MILLILITER(S): at 14:48

## 2017-06-25 RX ADMIN — ACETAZOLAMIDE 500 MILLIGRAM(S): 250 TABLET ORAL at 12:32

## 2017-06-25 RX ADMIN — Medication 40 MILLIEQUIVALENT(S): at 14:48

## 2017-06-25 RX ADMIN — Medication 325 MILLIGRAM(S): at 14:48

## 2017-06-25 RX ADMIN — Medication 1 APPLICATION(S): at 17:54

## 2017-06-25 RX ADMIN — HEPARIN SODIUM 16 UNIT(S)/HR: 5000 INJECTION INTRAVENOUS; SUBCUTANEOUS at 07:17

## 2017-06-25 RX ADMIN — Medication 1 APPLICATION(S): at 07:18

## 2017-06-25 RX ADMIN — Medication 12.5 MILLIGRAM(S): at 07:14

## 2017-06-25 RX ADMIN — Medication 3 MILLILITER(S): at 21:24

## 2017-06-25 RX ADMIN — Medication 1 CAPSULE(S): at 12:33

## 2017-06-25 RX ADMIN — Medication 80 MILLIGRAM(S): at 17:54

## 2017-06-25 RX ADMIN — Medication 40 MILLIEQUIVALENT(S): at 10:05

## 2017-06-25 RX ADMIN — Medication 325 MILLIGRAM(S): at 07:14

## 2017-06-25 NOTE — PROGRESS NOTE ADULT - ASSESSMENT
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 Admitted for CHF exacerbation, transferred from [facility] to CCU for hypercapnic resp failure s/p BIPAP and IV diuresis, now stepped down for further management.

## 2017-06-25 NOTE — PROGRESS NOTE ADULT - PROBLEM SELECTOR PLAN 1
Patient is a 87 year old female with a history of chronic kidney disease.   Patient's renal function is stable with a creatinine of 2.4.   Patient's urine output was approximately 1.1L   avoid nephrotoxic medications, IV contrast, and NSAIDs

## 2017-06-25 NOTE — PROGRESS NOTE ADULT - PROBLEM SELECTOR PLAN 3
Hypercapnic resp failure 2/2 pulmonary congestion 2/2 CHF 2/2 severe AS.   - Saturating well on 4LNC  - BIPAP at night    #COPD - duonebs changed to Q4hr schedule as pt was wheezing this AM.

## 2017-06-25 NOTE — PROGRESS NOTE ADULT - SUBJECTIVE AND OBJECTIVE BOX
CARDIOLOGY NP PROGRESS NOTE    Subjective: "I'm fine. I wish everyone would leave me alone". Denies chest pain, SOB, palpitations, or dizziness.     Overnight Events:     TELEMETRY: afib rate 70s-80s, no events overnight  EKG: afib rate 82, no acute ST or T wave changes.       VITAL SIGNS:  T(C): 37.2, Max: 37.2 (06-25 @ 10:00)  HR: 86 (72 - 87)  BP: 117/58 (99/46 - 121/57)  RR: 19 (18 - 22)  SpO2: 93% (92% - 98%)  Wt(kg): --    I&O's Summary  I & Os for 24h ending 25 Jun 2017 07:00  =============================================  IN: 1148 ml / OUT: 1950 ml / NET: -802 ml    I & Os for current day (as of 25 Jun 2017 11:14)  =============================================  IN: 32 ml / OUT: 350 ml / NET: -318 ml        PHYSICAL EXAM:    General: A/ox 3, No acute Distress  Neck: Supple, NO JVD  Cardiac: S1 S2, No M/R/G  Pulmonary: CTAB, Breathing unlabored, acitve  Abdomen: Soft, Non -tender, +BS x 4 quads  Extremities: No Rashes, No edema  Neuro: A/o x 3, No focal deficits          LABS:                          10.4   10.3  )-----------( 209      ( 25 Jun 2017 07:07 )             34.2                              06-25    143  |  93<L>  |  57<H>  ----------------------------<  112<H>  3.4<L>   |  38<H>  |  2.40<H>    Ca    9.3      25 Jun 2017 07:07  Mg     2.1     06-25                              PTT - ( 25 Jun 2017 07:08 )  PTT:75.5 sec  CAPILLARY BLOOD GLUCOSE              No Known Allergies    MEDICATIONS  (STANDING):  ferrous    sulfate 325milliGRAM(s) Oral every 8 hours  acetazolamide    Tablet 500milliGRAM(s) Oral daily  clopidogrel Tablet 75milliGRAM(s) Oral daily  hydrocortisone 2.5% Cream 1Application(s) Topical two times a day  Nephrocaps 1Capsule(s) Oral daily  gabapentin 100milliGRAM(s) Oral every other day  heparin  Infusion 1600Unit(s)/Hr IV Continuous <Continuous>  metoprolol succinate ER 12.5milliGRAM(s) Oral daily  ALBUTerol/ipratropium for Nebulization 3milliLiter(s) Nebulizer every 4 hours  potassium chloride    Tablet ER 40milliEquivalent(s) Oral every 4 hours  furosemide   Injectable 80milliGRAM(s) IV Push every 12 hours    MEDICATIONS  (PRN):  acetylcysteine 20% Inhalation 5milliLiter(s) Inhalation every 6 hours PRN shortness of breath  artificial  tears Solution 1Drop(s) Both EYES three times a day PRN Dry Eyes        DIAGNOSTIC TESTS: CARDIOLOGY NP PROGRESS NOTE    Subjective: "I'm fine. I wish everyone would leave me alone. I'm ready to go home". Denies chest pain, SOB, palpitations, or dizziness.     Overnight Events: none     TELEMETRY: afib rate 70s-80s, no events overnight  EKG: afib rate 82, no acute ST or T wave changes.       VITAL SIGNS:  T(C): 37.2, Max: 37.2 (06-25 @ 10:00)  HR: 86 (72 - 87)  BP: 117/58 (99/46 - 121/57)  RR: 19 (18 - 22)  SpO2: 93% (92% - 98%)  Wt(kg): --    I&O's Summary  I & Os for 24h ending 25 Jun 2017 07:00  =============================================  IN: 1148 ml / OUT: 1950 ml / NET: -802 ml    I & Os for current day (as of 25 Jun 2017 11:14)  =============================================  IN: 32 ml / OUT: 350 ml / NET: -318 ml        PHYSICAL EXAM:    General: A/ox 3, No acute Distress  Neck: Supple, NO JVD  Cardiac: S1 S2, No M/R/G  Pulmonary: CTAB, Breathing unlabored,inspiratory and expiratory wheezing. Bibasilar rales noted.   Abdomen: Soft, Non -tender, +BS x 4 quads  Extremities: No Rashes, No edema  Neuro: A/o x 3, No focal deficits          LABS:                          10.4   10.3  )-----------( 209      ( 25 Jun 2017 07:07 )             34.2                              06-25    143  |  93<L>  |  57<H>  ----------------------------<  112<H>  3.4<L>   |  38<H>  |  2.40<H>    Ca    9.3      25 Jun 2017 07:07  Mg     2.1     06-25                              PTT - ( 25 Jun 2017 07:08 )  PTT:75.5 sec  CAPILLARY BLOOD GLUCOSE              No Known Allergies    MEDICATIONS  (STANDING):  ferrous    sulfate 325milliGRAM(s) Oral every 8 hours  acetazolamide    Tablet 500milliGRAM(s) Oral daily  clopidogrel Tablet 75milliGRAM(s) Oral daily  hydrocortisone 2.5% Cream 1Application(s) Topical two times a day  Nephrocaps 1Capsule(s) Oral daily  gabapentin 100milliGRAM(s) Oral every other day  heparin  Infusion 1600Unit(s)/Hr IV Continuous <Continuous>  metoprolol succinate ER 12.5milliGRAM(s) Oral daily  ALBUTerol/ipratropium for Nebulization 3milliLiter(s) Nebulizer every 4 hours  potassium chloride    Tablet ER 40milliEquivalent(s) Oral every 4 hours  furosemide   Injectable 80milliGRAM(s) IV Push every 12 hours    MEDICATIONS  (PRN):  acetylcysteine 20% Inhalation 5milliLiter(s) Inhalation every 6 hours PRN shortness of breath  artificial  tears Solution 1Drop(s) Both EYES three times a day PRN Dry Eyes        DIAGNOSTIC TESTS: CARDIOLOGY NP PROGRESS NOTE    Subjective: "I'm fine. I wish everyone would leave me alone. I'm ready to go home". Denies chest pain, SOB, palpitations, or dizziness.     Overnight Events: none     TELEMETRY: afib rate 70s-80s, no events overnight  EKG: afib rate 82, no acute ST or T wave changes.       VITAL SIGNS:  T(C): 37.2, Max: 37.2 (06-25 @ 10:00)  HR: 86 (72 - 87)  BP: 117/58 (99/46 - 121/57)  RR: 19 (18 - 22)  SpO2: 93% (92% - 98%)  Wt(kg): --    I&O's Summary  I & Os for 24h ending 25 Jun 2017 07:00  =============================================  IN: 1148 ml / OUT: 1950 ml / NET: -802 ml    I & Os for current day (as of 25 Jun 2017 11:14)  =============================================  IN: 32 ml / OUT: 350 ml / NET: -318 ml        PHYSICAL EXAM:    General: A/ox 3, No acute Distress  Neck: Supple, NO JVD  Cardiac: S1 S2, No M/R/G  Pulmonary: CTAB, Breathing unlabored,inspiratory and expiratory wheezing. Bibasilar rales noted.   Abdomen: Soft, Non -tender, +BS x 4 quads  Extremities: No Rashes, No edema  Vascular: R/L DP/PT 1+/1+  Neuro: A/o x 3, No focal deficits          LABS:                          10.4   10.3  )-----------( 209      ( 25 Jun 2017 07:07 )             34.2                              06-25    143  |  93<L>  |  57<H>  ----------------------------<  112<H>  3.4<L>   |  38<H>  |  2.40<H>    Ca    9.3      25 Jun 2017 07:07  Mg     2.1     06-25                              PTT - ( 25 Jun 2017 07:08 )  PTT:75.5 sec  CAPILLARY BLOOD GLUCOSE              No Known Allergies    MEDICATIONS  (STANDING):  ferrous    sulfate 325milliGRAM(s) Oral every 8 hours  acetazolamide    Tablet 500milliGRAM(s) Oral daily  clopidogrel Tablet 75milliGRAM(s) Oral daily  hydrocortisone 2.5% Cream 1Application(s) Topical two times a day  Nephrocaps 1Capsule(s) Oral daily  gabapentin 100milliGRAM(s) Oral every other day  heparin  Infusion 1600Unit(s)/Hr IV Continuous <Continuous>  metoprolol succinate ER 12.5milliGRAM(s) Oral daily  ALBUTerol/ipratropium for Nebulization 3milliLiter(s) Nebulizer every 4 hours  potassium chloride    Tablet ER 40milliEquivalent(s) Oral every 4 hours  furosemide   Injectable 80milliGRAM(s) IV Push every 12 hours    MEDICATIONS  (PRN):  acetylcysteine 20% Inhalation 5milliLiter(s) Inhalation every 6 hours PRN shortness of breath  artificial  tears Solution 1Drop(s) Both EYES three times a day PRN Dry Eyes        DIAGNOSTIC TESTS:

## 2017-06-25 NOTE — PROGRESS NOTE ADULT - ASSESSMENT
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 Admitted for CHF exacerbation, transferred from [facility] to CCU for hypercapnic resp failure s/p BIPAP and IV diuresis, now stepped down for further management.    Rx per Cardio

## 2017-06-25 NOTE — PROGRESS NOTE ADULT - PROBLEM SELECTOR PLAN 1
acute CHF exacerbation 2/2 dietary non-compliance and critical AS. EF 50-55%, severely dilated LA; net neg 2.5L yest  -Daily weights, strict I/Os (us in place)  -continue Toprol XL 12.5mg po daily  -Cont diamox 500mg qd and increase Furosemide 80mg IV Q12hr per renal recs. Careful diuresis with close monitoring of BP in setting of severe AS. Net goal neg 1.5-2.5L as BP tolerates.

## 2017-06-25 NOTE — PROGRESS NOTE ADULT - ASSESSMENT
1) acute/chronic diastolic chf  -o>i as tolerated  2) nonrheumatic aortic stenosis  -possible BAV pending  3) atrial fibrillation; presence of pacemaker  -ac as tolerated - monitor h/h  -on low dose bb  4) CAD s/p PCI   -rec statin if no contraindications  5) Lupus - monitor  6) COPD - management per pulm  7) CKD - renal following  8) HTN - monitor on rx as tolerated  9) PVD - now on bb; statin as above  10) Type 2 DM - monitor blood sugar; holding ace/arb with ckd  11) normocytic anemia - serial h/h  12) pressure injury of skin (right buttock) - recommend wound care consult   13) ppx: therapeutic ac; recommend ppi if no contraindication  14) replete for K>4< Mg>2  d/w [facility]

## 2017-06-25 NOTE — PROGRESS NOTE ADULT - SUBJECTIVE AND OBJECTIVE BOX
cc: follow-up evaluation and management of acute/chronic diastolic chf and aortic stenosis  subjective:  events noted; pt resting now    PAST MEDICAL & SURGICAL HISTORY:  PVD (peripheral vascular disease)  Iron deficiency anemia  Lupus (systemic lupus erythematosus)  Paroxysmal atrial fibrillation  Aortic stenosis, severe  CKD (chronic kidney disease) stage 4, GFR 15-29 ml/min  Diabetes  HTN (hypertension)  CHF (congestive heart failure)  CAD (coronary artery disease)  COPD (chronic obstructive pulmonary disease)  History of total knee replacement: 2003  Presence of cardiac pacemaker: at [facility] by Dr Escoto    MEDICATIONS  (STANDING):  ferrous    sulfate 325milliGRAM(s) Oral every 8 hours  acetazolamide    Tablet 500milliGRAM(s) Oral daily  clopidogrel Tablet 75milliGRAM(s) Oral daily  hydrocortisone 2.5% Cream 1Application(s) Topical two times a day  Nephrocaps 1Capsule(s) Oral daily  gabapentin 100milliGRAM(s) Oral every other day  heparin  Infusion 1600Unit(s)/Hr IV Continuous <Continuous>  metoprolol succinate ER 12.5milliGRAM(s) Oral daily  ALBUTerol/ipratropium for Nebulization 3milliLiter(s) Nebulizer every 4 hours  furosemide   Injectable 80milliGRAM(s) IV Push every 12 hours    MEDICATIONS  (PRN):  acetylcysteine 20% Inhalation 5milliLiter(s) Inhalation every 6 hours PRN shortness of breath  artificial  tears Solution 1Drop(s) Both EYES three times a day PRN Dry Eyes      ICU Vital Signs Last 24 Hrs  T(C): 37.2, Max: 37.6 (06-25 @ 15:32)  T(F): 99, Max: 99.6 (06-25 @ 15:32)  HR: 94 (72 - 94)  BP: 96/48 (96/48 - 121/57)  BP(mean): 60 (60 - 82)  ABP: --  ABP(mean): --  RR: 17 (16 - 22)  SpO2: 94% (92% - 98%)      PHYSICAL EXAM:  General: nad  Neck: jvd ~8-10 cm jvd; hjr present; supple  carotid - radiation of murmur   Cardiac: Irregular; S1 present; decreased S2, systolic murmur present - loudest at base  Pulmonary: rales present bilat; no wheeze  Abdomen: soft  extremities: edema present bilat    LABS:                          10.4   10.3  )-----------( 209      ( 25 Jun 2017 07:07 )             34.2   06-25    143  |  93<L>  |  57<H>  ----------------------------<  112<H>  3.4<L>   |  38<H>  |  2.40<H>    Ca    9.3      25 Jun 2017 07:07  Mg     2.1     06-25        DIAGNOSTIC TESTS:     EXAM:  XR CHEST 1 VIEW PORT URGENT                          PROCEDURE DATE:  06/25/2017                     INTERPRETATION:  Clinical History: Congestion    Portable examination the chest demonstrates no interval change congestion   and/or infiltrates in comparison to prior examination of the chest   6/23/2017. Bilateral effusions. No interval change position remaining   support devices. Cardiomegaly.    Impression: No interval change lung pathology            "Thank you for the opportunity to participate in the care of this   patient."        KATHARINA MOROCHO M.D., ATTENDING RADIOLOGIST  This document has been electronically signed. Jun 25 2017 10:38AM      6- ecg reviewed on 6-    EXAM:  XR CHEST 1 VIEW PORT ROUTINE                          PROCEDURE DATE:  06/23/2017                     INTERPRETATION:  Indication: chf    A single portable view of the chest is submitted. Comparison is made to   the most recent prior study dated 6/20/2017. Bilateral pulmonary   infiltrates and effusions are similar to the previous examination.   Impression:    No significant interval change            "Thank you for the opportunity to participate in the care of this   patient."        MASOUD WORKMAN M.D., ATTENDING RADIOLOGIST  This document has been electronically signed. Jun 23 2017  4:33PM

## 2017-06-25 NOTE — PROGRESS NOTE ADULT - SUBJECTIVE AND OBJECTIVE BOX
Pt seen and examined no CP no SOB    REVIEW OF SYSTEMS:  Constitutional: No fever, weight loss or fatigue  Cardiovascular: No chest pain, palpitations, dizziness or leg swelling  Gastrointestinal: No abdominal or epigastric pain. No nausea, vomiting or hematemesis; No diarrhea or constipation. No melena or hematochezia.  Skin: No itching, burning, rashes or lesions       MEDICATIONS:  MEDICATIONS  (STANDING):  ferrous    sulfate 325milliGRAM(s) Oral every 8 hours  acetazolamide    Tablet 500milliGRAM(s) Oral daily  clopidogrel Tablet 75milliGRAM(s) Oral daily  hydrocortisone 2.5% Cream 1Application(s) Topical two times a day  Nephrocaps 1Capsule(s) Oral daily  gabapentin 100milliGRAM(s) Oral every other day  heparin  Infusion 1600Unit(s)/Hr IV Continuous <Continuous>  metoprolol succinate ER 12.5milliGRAM(s) Oral daily  ALBUTerol/ipratropium for Nebulization 3milliLiter(s) Nebulizer every 4 hours  potassium chloride    Tablet ER 40milliEquivalent(s) Oral every 4 hours  furosemide   Injectable 80milliGRAM(s) IV Push every 12 hours    MEDICATIONS  (PRN):  acetylcysteine 20% Inhalation 5milliLiter(s) Inhalation every 6 hours PRN shortness of breath  artificial  tears Solution 1Drop(s) Both EYES three times a day PRN Dry Eyes      Allergies    No Known Allergies    Intolerances        Vital Signs Last 24 Hrs  T(C): 37.2, Max: 37.2 (06-25 @ 10:00)  T(F): 99, Max: 99 (06-25 @ 10:00)  HR: 86 (72 - 86)  BP: 117/58 (110/50 - 121/57)  BP(mean): 73 (73 - 85)  RR: 19 (18 - 22)  SpO2: 93% (92% - 98%)  I & Os for 24h ending 06-25 @ 07:00  =============================================  IN: 1148 ml / OUT: 1950 ml / NET: -802 ml    I & Os for current day (as of 06-25 @ 12:30)  =============================================  IN: 32 ml / OUT: 350 ml / NET: -318 ml      PHYSICAL EXAM:    General: obese  in no acute distress  HEENT: MMM, conjunctiva and sclera clear  Lungs:Rhonchi + bibasilar rales  Heart: + murmur  Gastrointestinal: Soft non-tender non-distended; Normal bowel sounds; No hepatosplenomegaly  Skin: Warm and dry. No obvious rash    LABS:      CBC Full  -  ( 25 Jun 2017 07:07 )  WBC Count : 10.3 K/uL  Hemoglobin : 10.4 g/dL  Hematocrit : 34.2 %  Platelet Count - Automated : 209 K/uL  Mean Cell Volume : 89.3 fL  Mean Cell Hemoglobin : 27.2 pg  Mean Cell Hemoglobin Concentration : 30.4 g/dL  Auto Neutrophil # : x  Auto Lymphocyte # : x  Auto Monocyte # : x  Auto Eosinophil # : x  Auto Basophil # : x  Auto Neutrophil % : x  Auto Lymphocyte % : x  Auto Monocyte % : x  Auto Eosinophil % : x  Auto Basophil % : x    06-25    143  |  93<L>  |  57<H>  ----------------------------<  112<H>  3.4<L>   |  38<H>  |  2.40<H>    Ca    9.3      25 Jun 2017 07:07  Mg     2.1     06-25      PTT - ( 25 Jun 2017 07:08 )  PTT:75.5 sec                  RADIOLOGY & ADDITIONAL STUDIES (The following images were personally reviewed):XR CHEST 1 VIEW PORT URGENT   No interval change lung pathology

## 2017-06-25 NOTE — PROGRESS NOTE ADULT - SUBJECTIVE AND OBJECTIVE BOX
Patient is an 87 year old female with chronic renal failure, congestive heart failure, lupus, COPD, CAD, and moderate to severe AS. Patient seen and examined at bedside. patient states her shortness of breath has improved, however patients xray chest still shows pulmonary edema and b/l effusions.     ferrous    sulfate 325milliGRAM(s) every 8 hours  acetylcysteine 20% Inhalation 5milliLiter(s) every 6 hours PRN  acetazolamide    Tablet 500milliGRAM(s) daily  clopidogrel Tablet 75milliGRAM(s) daily  hydrocortisone 2.5% Cream 1Application(s) two times a day  Nephrocaps 1Capsule(s) daily  artificial  tears Solution 1Drop(s) three times a day PRN  gabapentin 100milliGRAM(s) every other day  heparin  Infusion 1600Unit(s)/Hr <Continuous>  metoprolol succinate ER 12.5milliGRAM(s) daily  ALBUTerol/ipratropium for Nebulization 3milliLiter(s) every 4 hours  potassium chloride    Tablet ER 40milliEquivalent(s) every 4 hours  furosemide   Injectable 80milliGRAM(s) every 12 hours    Allergies    No Known Allergies    Intolerances    T(C): , Max: 37.2 (06-25 @ 10:00)  T(F): , Max: 99 (06-25 @ 10:00)  HR: 88  BP: 96/48  BP(mean): 60  RR: 16  SpO2: 96%    I & Os for 24h ending 06-25 @ 07:00  =============================================  IN:    Oral Fluid: 780 ml    heparin Infusion: 368 ml    Total IN: 1148 ml  ---------------------------------------------  OUT:    Indwelling Catheter - Urethral: 1050 ml    Voided: 900 ml    Total OUT: 1950 ml  ---------------------------------------------  Total NET: -802 ml    I & Os for current day (as of 06-25 @ 13:21)  =============================================  IN:    Oral Fluid: 240 ml    heparin Infusion: 96 ml    Total IN: 336 ml  ---------------------------------------------  OUT:    Indwelling Catheter - Urethral: 350 ml    Total OUT: 350 ml  ---------------------------------------------  Total NET: -14 ml    LABS:                        10.4   10.3  )-----------( 209      ( 25 Jun 2017 07:07 )             34.2     06-25    143  |  93<L>  |  57<H>  ----------------------------<  112<H>  3.4<L>   |  38<H>  |  2.40<H>    Ca    9.3      25 Jun 2017 07:07  Mg     2.1     06-25    PTT - ( 25 Jun 2017 07:08 )  PTT:75.5 sec

## 2017-06-25 NOTE — PROGRESS NOTE ADULT - PROBLEM SELECTOR PLAN 10
DVT prophylaxis: on Heparin drip    DNR/DNI    PT consult: TALIA DVT prophylaxis: on Heparin drip    DNR/DNI    PT consult: f/u with PT for recommendations

## 2017-06-25 NOTE — PROGRESS NOTE ADULT - SUBJECTIVE AND OBJECTIVE BOX
PUD FOR DR STEINER    INTERVAL HPI/OVERNIGHT EVENTS:    awaiting AS balloon dilation    PAST MEDICAL & SURGICAL HISTORY:  PVD (peripheral vascular disease)  Iron deficiency anemia  Lupus (systemic lupus erythematosus)  Paroxysmal atrial fibrillation  Aortic stenosis, severe  CKD (chronic kidney disease) stage 4, GFR 15-29 ml/min  Diabetes  HTN (hypertension)  CHF (congestive heart failure)  CAD (coronary artery disease)  COPD (chronic obstructive pulmonary disease)  History of total knee replacement: 2003  Presence of cardiac pacemaker: at [facility] by Dr Escoto    FAMILY HISTORY:  No pertinent family history in first degree relatives      SOCIAL HISTORY:    REVIEW OF SYSTEMS:  no significant change  Constitutional: () weight change, () fever,  () chills, () fatigue, () night sweats  Eyes: () discharge, () eye pain, () visual change  ENT:  () hearing difficulty, () vertigo, () sinus pain,  () throat pain, () epistaxis, () dysphagia, () hoarseness  Neck: () pain, () stiffness, () swelling  Respiratory: () cough, () wheezing, () hemoptysis      Cardiovascular: () chest pain, ()palpitations, () dizziness   Gastrointestinal: () abdominal pain, () nausea, () vomiting, () hematemesis, () diarrhea,  () constipation, () melena  Genitourinary:  () dysuria, () frequency, () hematuria, () incontinence      Neurologic: () headache, () memory loss, () loss of strength, () numbness, () tremor     Skin: () itching, () burning, () rash, () lesions   Lymphatic: () enlarged lymph nodes  Endocrine: () hair loss, () temperature intolerance         Musculoskeletal: () back pain, () joint pain,  () extremity pain  Psychiatric: () visual change, () auditory change, (+) depression, (+) anxiety, () suicidal  Sleep: () disorder, () insomnia, () sleep deprivation  Heme/Lymph: () easy bruising, () bleeding gums            Allergy and Immunologic: () hives, () eczema    Vital Signs Last 24 Hrs  T(C): 37.6, Max: 37.6 (06-25 @ 15:32)  T(F): 99.6, Max: 99.6 (06-25 @ 15:32)  HR: 87 (72 - 88)  BP: 96/48 (96/48 - 121/57)  BP(mean): 60 (60 - 85)  RR: 17 (16 - 22)  SpO2: 96% (92% - 98%)            I&O's Detail  I & Os for 24h ending 25 Jun 2017 07:00  =============================================  IN:    Oral Fluid: 780 ml    heparin Infusion: 368 ml    Total IN: 1148 ml  ---------------------------------------------  OUT:    Indwelling Catheter - Urethral: 1050 ml    Voided: 900 ml    Total OUT: 1950 ml  ---------------------------------------------  Total NET: -802 ml    I & Os for current day (as of 25 Jun 2017 16:53)  =============================================  IN:    Oral Fluid: 240 ml    heparin Infusion: 160 ml    Total IN: 400 ml  ---------------------------------------------  OUT:    Indwelling Catheter - Urethral: 900 ml    Total OUT: 900 ml  ---------------------------------------------  Total NET: -500 ml      PHYSICAL EXAM:  in bed, on monitor, aflutter on heparin drip  Well nourished, well developed, comfortable, - acute distress; vital signs are monitored continuously  Eyes: PERRLA, EOMI, -conjunctivitis, -scleritis   Head: no focal deficit, normocephalic,  no trauma  ENMT: moist tongue, no thrush, -nasal discharge, -hoarseness, normal hearing, -cough, -hemoptysis, trachea midline  Neck: supple, - lymphadenopathy,  -masses, -JVD  Respiratory: bilateral diminished breath sounds, -wheezing, -rhonchi, +rales, -crackles  Chest: -accessory muscle use, -paradoxical breathing  Cardiovascular: regular rate and sinus rhythm, S1 S2 normal, -S3, -S4, 4/6 murmur, -gallop, -rub  Gastrointestinal: soft, nontender, nondistended, normal bowel sounds, no hepatosplenomegaly  Genitourinary: -flank pain, -dysuria  Extremities: -clubbing, -cyanosis, -edema    Vascular: peripheral pulses palpable 2+ bilaterally  Neurological: alert, oriented x 3, no focal deficit, -tremor   Skin: warm, dry, -erythema, iv sites intact  Lymph nodes; no cervical, supraclavicular or axillary adenopathy  Psychiatric: cooperative, appropriate mood    MEDICATIONS  (STANDING):  ferrous    sulfate 325milliGRAM(s) Oral every 8 hours  acetazolamide    Tablet 500milliGRAM(s) Oral daily  clopidogrel Tablet 75milliGRAM(s) Oral daily  hydrocortisone 2.5% Cream 1Application(s) Topical two times a day  Nephrocaps 1Capsule(s) Oral daily  gabapentin 100milliGRAM(s) Oral every other day  heparin  Infusion 1600Unit(s)/Hr IV Continuous <Continuous>  metoprolol succinate ER 12.5milliGRAM(s) Oral daily  ALBUTerol/ipratropium for Nebulization 3milliLiter(s) Nebulizer every 4 hours  furosemide   Injectable 80milliGRAM(s) IV Push every 12 hours    MEDICATIONS  (PRN):  acetylcysteine 20% Inhalation 5milliLiter(s) Inhalation every 6 hours PRN shortness of breath  artificial  tears Solution 1Drop(s) Both EYES three times a day PRN Dry Eyes      Allergies    No Known Allergies    Intolerances        LABS:                        10.4   10.3  )-----------( 209      ( 25 Jun 2017 07:07 )             34.2     06-25    143  |  93<L>  |  57<H>  ----------------------------<  112<H>  3.4<L>   |  38<H>  |  2.40<H>    Ca    9.3      25 Jun 2017 07:07  Mg     2.1     06-25      PTT - ( 25 Jun 2017 07:08 )  PTT:75.5 sec    +DVT prophylaxis  HEPARIN DRIP  +Sleep  YEAH  +Nutrition goals  ORAL  -Pain  DENIED  -Decubital ulcer  +GI prophylaxis (PPV, coagulopathy, Hx)  +Aspiration prophylaxis (45 degrees)  +Sedation/analgesia stopped once  +ID (phos, CH, mupi, SB)  -Delirium  +Cardiac Beta/  +Prevention  +Education  +Medication reviewed (drug-drug interactions, PDA)  Medical devices  PACER    Discussed with ICU, PGY, CCRN, family    RADIOLOGY & ADDITIONAL STUDIES:    CXR bilateral infiltrates, improving

## 2017-06-26 LAB
ANION GAP SERPL CALC-SCNC: 12 MMOL/L — SIGNIFICANT CHANGE UP (ref 5–17)
APTT BLD: 72.1 SEC — HIGH (ref 27.5–37.4)
APTT BLD: 77.9 SEC — HIGH (ref 27.5–37.4)
APTT BLD: 84 SEC — HIGH (ref 27.5–37.4)
BUN SERPL-MCNC: 63 MG/DL — HIGH (ref 7–23)
CALCIUM SERPL-MCNC: 9.5 MG/DL — SIGNIFICANT CHANGE UP (ref 8.4–10.5)
CHLORIDE SERPL-SCNC: 92 MMOL/L — LOW (ref 96–108)
CO2 SERPL-SCNC: 36 MMOL/L — HIGH (ref 22–31)
CREAT SERPL-MCNC: 2.4 MG/DL — HIGH (ref 0.5–1.3)
GLUCOSE SERPL-MCNC: 126 MG/DL — HIGH (ref 70–99)
HCT VFR BLD CALC: 34.5 % — SIGNIFICANT CHANGE UP (ref 34.5–45)
HGB BLD-MCNC: 10.3 G/DL — LOW (ref 11.5–15.5)
MAGNESIUM SERPL-MCNC: 2 MG/DL — SIGNIFICANT CHANGE UP (ref 1.6–2.6)
MCHC RBC-ENTMCNC: 26.4 PG — LOW (ref 27–34)
MCHC RBC-ENTMCNC: 29.9 G/DL — LOW (ref 32–36)
MCV RBC AUTO: 88.5 FL — SIGNIFICANT CHANGE UP (ref 80–100)
PLATELET # BLD AUTO: 208 K/UL — SIGNIFICANT CHANGE UP (ref 150–400)
POTASSIUM SERPL-MCNC: 4.3 MMOL/L — SIGNIFICANT CHANGE UP (ref 3.5–5.3)
POTASSIUM SERPL-SCNC: 4.3 MMOL/L — SIGNIFICANT CHANGE UP (ref 3.5–5.3)
RBC # BLD: 3.9 M/UL — SIGNIFICANT CHANGE UP (ref 3.8–5.2)
RBC # FLD: 16.1 % — SIGNIFICANT CHANGE UP (ref 10.3–16.9)
SODIUM SERPL-SCNC: 140 MMOL/L — SIGNIFICANT CHANGE UP (ref 135–145)
WBC # BLD: 9.8 K/UL — SIGNIFICANT CHANGE UP (ref 3.8–10.5)
WBC # FLD AUTO: 9.8 K/UL — SIGNIFICANT CHANGE UP (ref 3.8–10.5)

## 2017-06-26 RX ORDER — FUROSEMIDE 40 MG
60 TABLET ORAL EVERY 12 HOURS
Qty: 0 | Refills: 0 | Status: DISCONTINUED | OUTPATIENT
Start: 2017-06-26 | End: 2017-06-28

## 2017-06-26 RX ORDER — ACETAMINOPHEN 500 MG
650 TABLET ORAL EVERY 6 HOURS
Qty: 0 | Refills: 0 | Status: DISCONTINUED | OUTPATIENT
Start: 2017-06-26 | End: 2017-07-06

## 2017-06-26 RX ORDER — ATORVASTATIN CALCIUM 80 MG/1
40 TABLET, FILM COATED ORAL AT BEDTIME
Qty: 0 | Refills: 0 | Status: DISCONTINUED | OUTPATIENT
Start: 2017-06-26 | End: 2017-07-06

## 2017-06-26 RX ADMIN — Medication 650 MILLIGRAM(S): at 11:03

## 2017-06-26 RX ADMIN — ACETAZOLAMIDE 500 MILLIGRAM(S): 250 TABLET ORAL at 11:59

## 2017-06-26 RX ADMIN — Medication 3 MILLILITER(S): at 15:00

## 2017-06-26 RX ADMIN — Medication 60 MILLIGRAM(S): at 18:32

## 2017-06-26 RX ADMIN — Medication 3 MILLILITER(S): at 21:37

## 2017-06-26 RX ADMIN — Medication 1 APPLICATION(S): at 06:17

## 2017-06-26 RX ADMIN — Medication 12.5 MILLIGRAM(S): at 12:00

## 2017-06-26 RX ADMIN — Medication 325 MILLIGRAM(S): at 06:16

## 2017-06-26 RX ADMIN — Medication 650 MILLIGRAM(S): at 11:51

## 2017-06-26 RX ADMIN — Medication 3 MILLILITER(S): at 06:16

## 2017-06-26 RX ADMIN — Medication 1 CAPSULE(S): at 11:59

## 2017-06-26 RX ADMIN — Medication 3 MILLILITER(S): at 18:32

## 2017-06-26 RX ADMIN — Medication 650 MILLIGRAM(S): at 22:38

## 2017-06-26 RX ADMIN — Medication 80 MILLIGRAM(S): at 06:16

## 2017-06-26 RX ADMIN — CLOPIDOGREL BISULFATE 75 MILLIGRAM(S): 75 TABLET, FILM COATED ORAL at 11:59

## 2017-06-26 RX ADMIN — Medication 3 MILLILITER(S): at 10:05

## 2017-06-26 RX ADMIN — ATORVASTATIN CALCIUM 40 MILLIGRAM(S): 80 TABLET, FILM COATED ORAL at 21:37

## 2017-06-26 RX ADMIN — Medication 325 MILLIGRAM(S): at 21:37

## 2017-06-26 RX ADMIN — Medication 325 MILLIGRAM(S): at 15:00

## 2017-06-26 RX ADMIN — Medication 650 MILLIGRAM(S): at 21:38

## 2017-06-26 NOTE — PROGRESS NOTE ADULT - ASSESSMENT
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 Admitted for CHF exacerbation, transferred from [facility] to CCU for hypercapnic resp failure s/p BIPAP and IV diuresis, now stepped down for further management.

## 2017-06-26 NOTE — PROGRESS NOTE ADULT - SUBJECTIVE AND OBJECTIVE BOX
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, HTN, PVD, and Type 2 DM (no longer on meds), MONICA, and TKR 03' presents to [facility] with cc of inability to get OOB x 3 days. Pt states that her "legs were too weak". Pt is a poor historian and HPI obtained from her daughter. Pt's daughter states that her mother was unable to get OOB for 3 days and pt used her Life Alert x3 times to call for help-was told to go to the ED. Reports that she had been "tired" for several day, SOB w/ minimal exertion for "several months" and her mobility has declined since her discharge from rehab in December. Additionally, pt has gained 17 lbs over the past month (from 258 to 275) and non-compliant with her diet. As per daughter, pt has been hospitalized prior 2-3 yrs ago (? [facility]) for CHF exacerbation w/ aggressive diuresis. Of note, pt was evaluation by Dr. Barger for aortic stenosis in December 16'. However, due to her deconditioned status during that time, pt was recommended medical management.       Subjective: Reports feeling "lousy" without specific complaints. Denies chest pain, dizziness, palpitations or SOB.    Overnight Events: no events.     TELEMETRY: Rate controlled afib.  EKG:    ICU Vital Signs Last 24 Hrs  T(C): 36.8, Max: 37.2 (06-25 @ 21:41)  T(F): 98.3, Max: 99 (06-25 @ 21:41)  HR: 80 (74 - 94)  BP: 109/56 (86/42 - 109/56)  BP(mean): 70 (53 - 78)  ABP: --  ABP(mean): --  RR: 18 (15 - 19)  SpO2: 93% (90% - 97%)    I&O's Summary  I & Os for 24h ending 26 Jun 2017 07:00  =============================================  IN: 822 ml / OUT: 1550 ml / NET: -728 ml    I & Os for current day (as of 26 Jun 2017 09:56)  =============================================  IN: 120 ml / OUT: 0 ml / NET: 120 ml        PHYSICAL EXAM:    General: A/ox 3, No acute Distress, agitated.  Neck: Supple, +JVD  Cardiac: S1 S2, irregularly irregular, +4/6 systolic ejection murmur.   Pulmonary: _crackles/diminished breath sounds at bases  Abdomen: Soft, Non -tender, +BS x 4 quads  Extremities: 1+ pitting LUCIANA.   Neuro: A/o x 3,         LABS:                          10.3   9.8   )-----------( 208      ( 26 Jun 2017 05:44 )             34.5                              06-26    140  |  92<L>  |  63<H>  ----------------------------<  126<H>  4.3   |  36<H>  |  2.40<H>    Ca    9.5      26 Jun 2017 05:44  Mg     2.0     06-26                              PTT - ( 26 Jun 2017 05:44 )  PTT:72.1 sec  CAPILLARY BLOOD GLUCOSE              No Known Allergies    MEDICATIONS  (STANDING):  ferrous    sulfate 325milliGRAM(s) Oral every 8 hours  acetazolamide    Tablet 500milliGRAM(s) Oral daily  clopidogrel Tablet 75milliGRAM(s) Oral daily  hydrocortisone 2.5% Cream 1Application(s) Topical two times a day  Nephrocaps 1Capsule(s) Oral daily  gabapentin 100milliGRAM(s) Oral every other day  heparin  Infusion 1600Unit(s)/Hr IV Continuous <Continuous>  metoprolol succinate ER 12.5milliGRAM(s) Oral daily  ALBUTerol/ipratropium for Nebulization 3milliLiter(s) Nebulizer every 4 hours  furosemide   Injectable 80milliGRAM(s) IV Push every 12 hours    MEDICATIONS  (PRN):  acetylcysteine 20% Inhalation 5milliLiter(s) Inhalation every 6 hours PRN shortness of breath  artificial  tears Solution 1Drop(s) Both EYES three times a day PRN Dry Eyes  acetaminophen   Tablet. 650milliGRAM(s) Oral every 6 hours PRN Mild Pain (1 - 3)        DIAGNOSTIC TESTS:

## 2017-06-26 NOTE — PROGRESS NOTE ADULT - ASSESSMENT
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 Admitted for CHF exacerbation, transferred from [facility] to CCU for hypercapnic resp failure s/p BIPAP and IV diuresis, now stepped down for further management.

## 2017-06-26 NOTE — PROGRESS NOTE ADULT - PROBLEM SELECTOR PLAN 1
continue Lasix  increased to 80mg IV q12 yesterday  may decrease back to 60mg IV q12 if remains hypotensive

## 2017-06-26 NOTE — PROGRESS NOTE ADULT - SUBJECTIVE AND OBJECTIVE BOX
CC/ HPI:  88 yo female with chronic diastolic CHF (EF 50-55% 11/16'), mod to severe aortic stenosis, pAFIB, CAD s/p PCI x 3 stent, Lupus (not on meds), COPD, CKD (baseline Cr 2-3), admitted for CHF exacerbation, admitted with hypercapnic respiratory failure s/p BIPAP and IV diuresis, now stepped down for further management, denies acute respiratory complaint        PAST MEDICAL & SURGICAL HISTORY:  PVD (peripheral vascular disease)  Iron deficiency anemia  Lupus (systemic lupus erythematosus)  Paroxysmal atrial fibrillation  Aortic stenosis, severe  CKD (chronic kidney disease) stage 4, GFR 15-29 ml/min  Diabetes  HTN (hypertension)  CHF (congestive heart failure)  CAD (coronary artery disease)  COPD (chronic obstructive pulmonary disease)  History of total knee replacement: 2003  Cardiac pacemaker: at [facility] by Dr Escoto, 2011    SOCHX:  + tobacco,  -  alcohol    FMHX: FA/MO  - contributory     ROS reviewed below with positive findings marked (+) :  GEN:  fever, chills ENT: tracheostomy,   epistaxis,  sinusitis COR: +CAD, +CHF,  +HTN, +dysrhythmia PUL: +COPD, ILD, asthma, pneumonia GI: PEG, dysphagia, hemorrhage, other PETEY: +kidney disease, electrolyte disorder HEM:  +anemia, thrombus, coagulopathy, cancer ENDO:  thyroid disease, +diabetes mellitus CNS:  dementia, stroke, seizure, PSY:  depression, anxiety, other      MEDICATIONS  (STANDING):  ferrous    sulfate 325milliGRAM(s) Oral every 8 hours  acetazolamide    Tablet 500milliGRAM(s) Oral daily  clopidogrel Tablet 75milliGRAM(s) Oral daily  hydrocortisone 2.5% Cream 1Application(s) Topical two times a day  Nephrocaps 1Capsule(s) Oral daily  gabapentin 100milliGRAM(s) Oral every other day  heparin  Infusion 1600Unit(s)/Hr IV Continuous <Continuous>  metoprolol succinate ER 12.5milliGRAM(s) Oral daily  ALBUTerol/ipratropium for Nebulization 3milliLiter(s) Nebulizer every 4 hours  furosemide   Injectable 80milliGRAM(s) IV Push every 12 hours    MEDICATIONS  (PRN):  acetylcysteine 20% Inhalation 5milliLiter(s) Inhalation every 6 hours PRN shortness of breath  artificial  tears Solution 1Drop(s) Both EYES three times a day PRN Dry Eyes  acetaminophen   Tablet. 650milliGRAM(s) Oral every 6 hours PRN Mild Pain (1 - 3)      Vital Signs Last 24 Hrs  T(C): 36.8, Max: 37.6 (06-25 @ 15:32)  T(F): 98.3, Max: 99.6 (06-25 @ 15:32)  HR: 82 (74 - 94)  BP: 101/52 (86/42 - 102/53)  BP(mean): 65 (58 - 78)  RR: 18 (15 - 19)  SpO2: 94% (94% - 97%)    GENERAL:         comfortable,  - distress.  HEENT:            - trauma,  - icterus,  - injection,  - nasal discharge.  NECK:              - jugular venous distention, - thyromegaly.  LYMPH:           - lymphadenopathy, - masses.  RESP:              + rales,   - rhonchi,   - wheezes.   COR:                S1S2  - gallops,  - rubs.  ABD:                bowel sounds,   soft, - tender, - distended, - organomegaly.  EXT/MSC:         - cyanosis,  - clubbing,  + edema.    NEURO:             alert,   responds to stimuli.                          10.3   9.8   )-----------( 208      ( 26 Jun 2017 05:44 )             34.5     06-26    140  |  92<L>  |  63<H>  ----------------------------<  126<H>  4.3   |  36<H>  |  2.40<H>      CXR (6/25)  Bilateral infiltrate/congestion/pleural effusion      ASSESSMENT/PLAN    1)  Cardiomyopathy  2)  Aortic stenosis  3)  Chronic obstructive pulmonary disease  4)  Pulmonary hypertension    Bronchodilators:  Atrovent/ albuterol q 4 – 6 hours as needed  Corticosteroids:  budesonide  Cardiac/HTN: diuresis as needed  GI: Rx/ prophylaxis c PPI/H2B  Heme: Rx/VT on AC  Discussed with medical team

## 2017-06-26 NOTE — PROGRESS NOTE ADULT - SUBJECTIVE AND OBJECTIVE BOX
CARDIOLOGY NP PROGRESS NOTE    Subjective: Reports feeling "lousy" without specific complaints. Denies chest pain, dizziness, palpitations or SOB.    Overnight Events: no events.     TELEMETRY: Rate controlled afib.  EKG:      VITAL SIGNS:  T(C): 36.8, Max: 37.6 (06-25 @ 15:32)  HR: 82 (74 - 94)  BP: 101/52 (86/42 - 102/53)  RR: 18 (15 - 19)  SpO2: 94% (94% - 97%)  Wt(kg): --    I&O's Summary  I & Os for 24h ending 26 Jun 2017 07:00  =============================================  IN: 822 ml / OUT: 1550 ml / NET: -728 ml    I & Os for current day (as of 26 Jun 2017 09:56)  =============================================  IN: 120 ml / OUT: 0 ml / NET: 120 ml        PHYSICAL EXAM:    General: A/ox 3, No acute Distress, agitated.  Neck: Supple, +JVD  Cardiac: S1 S2, irregularly irregular, +4/6 systolic ejection murmur.   Pulmonary: _crackles/diminished breath sounds at bases  Abdomen: Soft, Non -tender, +BS x 4 quads  Extremities: 1+ pitting LUCIANA.   Neuro: A/o x 3, No focal deficits          LABS:                          10.3   9.8   )-----------( 208      ( 26 Jun 2017 05:44 )             34.5                              06-26    140  |  92<L>  |  63<H>  ----------------------------<  126<H>  4.3   |  36<H>  |  2.40<H>    Ca    9.5      26 Jun 2017 05:44  Mg     2.0     06-26                              PTT - ( 26 Jun 2017 05:44 )  PTT:72.1 sec  CAPILLARY BLOOD GLUCOSE              No Known Allergies    MEDICATIONS  (STANDING):  ferrous    sulfate 325milliGRAM(s) Oral every 8 hours  acetazolamide    Tablet 500milliGRAM(s) Oral daily  clopidogrel Tablet 75milliGRAM(s) Oral daily  hydrocortisone 2.5% Cream 1Application(s) Topical two times a day  Nephrocaps 1Capsule(s) Oral daily  gabapentin 100milliGRAM(s) Oral every other day  heparin  Infusion 1600Unit(s)/Hr IV Continuous <Continuous>  metoprolol succinate ER 12.5milliGRAM(s) Oral daily  ALBUTerol/ipratropium for Nebulization 3milliLiter(s) Nebulizer every 4 hours  furosemide   Injectable 80milliGRAM(s) IV Push every 12 hours    MEDICATIONS  (PRN):  acetylcysteine 20% Inhalation 5milliLiter(s) Inhalation every 6 hours PRN shortness of breath  artificial  tears Solution 1Drop(s) Both EYES three times a day PRN Dry Eyes  acetaminophen   Tablet. 650milliGRAM(s) Oral every 6 hours PRN Mild Pain (1 - 3)        DIAGNOSTIC TESTS:

## 2017-06-26 NOTE — PROGRESS NOTE ADULT - PROBLEM SELECTOR PLAN 5
Afib with slow ventricular response s/p PPM 2011 2/2 symptomatic bradycardia. Currently rate controlled.  -PPM interrogation WNL.  -c/w hep gtt in case pt undergoes BAV   - Continue Toprol 12.5 mg daily.

## 2017-06-26 NOTE — PROGRESS NOTE ADULT - PROBLEM SELECTOR PLAN 7
Baseline Cr per daughter 2-3. Currently at baseline  - Renal following to assit with diuresis.   -Cont Furosemide every 8 hours and  Diamox 500mg qd for alkalosis per renal recs.   -Renal consulted. F/u recs. (Dr. Ramirez-her outpt Nephrologist)  -Leahy catheter in place, monitor daily weights, strictr I/o's  -Renally dose all meds  - Completed Ceftriaxone 7 days course for complicated UTI w/ Klesiella on UCx. No leukocytosis or fevers.

## 2017-06-26 NOTE — PROGRESS NOTE ADULT - SUBJECTIVE AND OBJECTIVE BOX
cc: follow-up evaluation and management of acute/chronic diastolic chf and aortic stenosis  subjective:  denied new complaints    PAST MEDICAL & SURGICAL HISTORY:  PVD (peripheral vascular disease)  Iron deficiency anemia  Lupus (systemic lupus erythematosus)  Paroxysmal atrial fibrillation  Aortic stenosis, severe  CKD (chronic kidney disease) stage 4, GFR 15-29 ml/min  Diabetes  HTN (hypertension)  CHF (congestive heart failure)  CAD (coronary artery disease)  COPD (chronic obstructive pulmonary disease)  History of total knee replacement: 2003  Presence of cardiac pacemaker: at [facility] by Dr Escoto    MEDICATIONS  (STANDING):  ferrous    sulfate 325milliGRAM(s) Oral every 8 hours  acetazolamide    Tablet 500milliGRAM(s) Oral daily  clopidogrel Tablet 75milliGRAM(s) Oral daily  hydrocortisone 2.5% Cream 1Application(s) Topical two times a day  Nephrocaps 1Capsule(s) Oral daily  gabapentin 100milliGRAM(s) Oral every other day  heparin  Infusion 1600Unit(s)/Hr IV Continuous <Continuous>  metoprolol succinate ER 12.5milliGRAM(s) Oral daily  ALBUTerol/ipratropium for Nebulization 3milliLiter(s) Nebulizer every 4 hours  furosemide   Injectable 60milliGRAM(s) IV Push every 12 hours  atorvastatin 40milliGRAM(s) Oral at bedtime    MEDICATIONS  (PRN):  acetylcysteine 20% Inhalation 5milliLiter(s) Inhalation every 6 hours PRN shortness of breath  artificial  tears Solution 1Drop(s) Both EYES three times a day PRN Dry Eyes  acetaminophen   Tablet. 650milliGRAM(s) Oral every 6 hours PRN Mild Pain (1 - 3)    ICU Vital Signs Last 24 Hrs  T(C): 36.3, Max: 37.2 (06-25 @ 21:41)  T(F): 97.4, Max: 99 (06-25 @ 21:41)  HR: 78 (74 - 94)  BP: 104/52 (86/42 - 109/56)  BP(mean): 72 (53 - 78)  ABP: --  ABP(mean): --  RR: 18 (15 - 19)  SpO2: 97% (90% - 97%)    PHYSICAL EXAM:  General: nad  heent - no nasal congestion  carotid - radiation of murmur auscultated  Cardiac: Irregular; S1 present; decreased S2, systolic murmur  Pulmonary: bilat breath sounds present  Abdomen: soft; no guarding  extremities: edema present bilat    LABS:                          10.3   9.8   )-----------( 208      ( 26 Jun 2017 05:44 )             34.5   06-26    140  |  92<L>  |  63<H>  ----------------------------<  126<H>  4.3   |  36<H>  |  2.40<H>    Ca    9.5      26 Jun 2017 05:44  Mg     2.0     06-26          DIAGNOSTIC TESTS:     EXAM:  XR CHEST 1 VIEW PORT URGENT                          PROCEDURE DATE:  06/25/2017                     INTERPRETATION:  Clinical History: Congestion    Portable examination the chest demonstrates no interval change congestion   and/or infiltrates in comparison to prior examination of the chest   6/23/2017. Bilateral effusions. No interval change position remaining   support devices. Cardiomegaly.    Impression: No interval change lung pathology            "Thank you for the opportunity to participate in the care of this   patient."        KATHARINA MOROCHO M.D., ATTENDING RADIOLOGIST  This document has been electronically signed. Jun 25 2017 10:38AM      6- ecg reviewed on 6-    EXAM:  XR CHEST 1 VIEW PORT ROUTINE                          PROCEDURE DATE:  06/23/2017                     INTERPRETATION:  Indication: chf    A single portable view of the chest is submitted. Comparison is made to   the most recent prior study dated 6/20/2017. Bilateral pulmonary   infiltrates and effusions are similar to the previous examination.   Impression:    No significant interval change            "Thank you for the opportunity to participate in the care of this   patient."        MASOUD WORKMAN M.D., ATTENDING RADIOLOGIST  This document has been electronically signed. Jun 23 2017  4:33PM

## 2017-06-26 NOTE — PROGRESS NOTE ADULT - PROBLEM SELECTOR PLAN 1
Admitted with acute CHF exacerbation 2/2 dietary non-compliance and critical AS. EF 50-55%, severely dilated LA.  -Daily weights, strict I/Os (us in place)  -continue Toprol XL 12.5mg po daily  -Cont diamox 500mg qd and Furosemide 80mg IV Q12hr per renal recs. Careful diuresis with close monitoring of BP in setting of severe AS. Net goal neg 1.5-2.5L as BP tolerates.  - f/u CTS recs in regards to BAV prior to discharge.

## 2017-06-26 NOTE — PROGRESS NOTE ADULT - PROBLEM SELECTOR PLAN 4
s/p PCI x 3 stent (04', 06' and 12')-last cath 3/15' PCI dLM   -Trop 0.04 x 4. Likely 2/2 demand ischemia. Not continuing to trend.   - Continue Toprol 12.5 mg and Plavix 75 mg daily.  - Not on statin?

## 2017-06-26 NOTE — PROGRESS NOTE ADULT - SUBJECTIVE AND OBJECTIVE BOX
CC: ACUTE CHRONIC CONGESTIVEHEART FAILURE UNSPECIFIE      INTERVAL HISTORY: cranky   in NAD      ROS: No chest pain, no sob, no abd pain. No n/v/d    PAST MEDICAL & SURGICAL HISTORY:  PVD (peripheral vascular disease)  Iron deficiency anemia  Lupus (systemic lupus erythematosus)  Paroxysmal atrial fibrillation  Aortic stenosis, severe  CKD (chronic kidney disease) stage 4, GFR 15-29 ml/min  Diabetes  HTN (hypertension)  CHF (congestive heart failure)  CAD (coronary artery disease)  COPD (chronic obstructive pulmonary disease)  History of total knee replacement: 2003  Presence of cardiac pacemaker: at [facility] by Dr Escoto      PHYSICAL EXAM:  T(C): 36.8, Max: 37.6 (06-25 @ 15:32)  HR: 85  BP: 102/53 (86/42 - 102/53)  RR: 18  SpO2: 96%  Wt(kg): --  I&O's Summary  I & Os for 24h ending 26 Jun 2017 07:00  =============================================  IN: 822 ml / OUT: 1550 ml / NET: -728 ml    I & Os for current day (as of 26 Jun 2017 09:20)  =============================================  IN: 120 ml / OUT: 0 ml / NET: 120 ml    Weight 84 (06-26 @ 05:05)  General: AAO x 3,  NAD.  HEENT: moist mucous membranes, no pallor/cyanosis.  Neck: no JVD visible.  Cardiac: S1, S2. RRR. No murmurs   Respratory:no rales  Abdomen: soft. nontender. nondistended  Skin: no rashes.  Extremities: + LE edema b/l  Access:       DATA:                        10.3<L>  9.8   )-----------( 208      ( 26 Jun 2017 05:44 )             34.5     Ferritin, Serum: 92.0 ng/mL (06-12 @ 05:42)      140    |  92<L>  |  63<H>  ----------------------------<  126<H>  Ca:9.5   (26 Jun 2017 05:44)  4.3     |  36<H>  |  2.40<H>      eGFR if Non : 18 <L>  eGFR if : 20 <L>                        MEDICATIONS  (STANDING):  ferrous    sulfate 325milliGRAM(s) Oral every 8 hours  acetazolamide    Tablet 500milliGRAM(s) Oral daily  clopidogrel Tablet 75milliGRAM(s) Oral daily  hydrocortisone 2.5% Cream 1Application(s) Topical two times a day  Nephrocaps 1Capsule(s) Oral daily  gabapentin 100milliGRAM(s) Oral every other day  heparin  Infusion 1600Unit(s)/Hr IV Continuous <Continuous>  metoprolol succinate ER 12.5milliGRAM(s) Oral daily  ALBUTerol/ipratropium for Nebulization 3milliLiter(s) Nebulizer every 4 hours  furosemide   Injectable 80milliGRAM(s) IV Push every 12 hours    MEDICATIONS  (PRN):  acetylcysteine 20% Inhalation 5milliLiter(s) Inhalation every 6 hours PRN shortness of breath  artificial  tears Solution 1Drop(s) Both EYES three times a day PRN Dry Eyes  acetaminophen   Tablet. 650milliGRAM(s) Oral every 6 hours PRN Mild Pain (1 - 3)

## 2017-06-26 NOTE — PROGRESS NOTE ADULT - ASSESSMENT
1) acute/chronic diastolic chf  -diuretics per renal recs; monitor bp  2) nonrheumatic aortic stenosis  -possible BAV - awaiting Charbel recs  3) atrial fibrillation; presence of pacemaker  -monitor on rx as tolerated  4) CAD s/p PCI   -monitor on statin  5) Lupus - monitor  6) COPD - f/u pulm recs  7) CKD - monitor cr/uop  8) HTN - avoid labile bp  9) PVD - on bb/statin  10) Type 2 DM - monitor blood sugar; hold ace/arb with ckd  11) normocytic anemia - monitor h/h  12) pressure injury of skin (right buttock) - recommend wound care consult follow-up  13) ppx: therapeutic ac; start ppi if ok with consultants and no contrainidcations  14)  K>4< Mg>2  d/w Charbel earlier today

## 2017-06-26 NOTE — PROGRESS NOTE ADULT - ASSESSMENT
Patient is an 87 year old female with chronic renal failure, congestive heart failure, lupus, COPD, CAD, and moderate to severe AS

## 2017-06-27 ENCOUNTER — APPOINTMENT (OUTPATIENT)
Dept: CARDIOTHORACIC SURGERY | Facility: HOSPITAL | Age: 82
End: 2017-06-27

## 2017-06-27 DIAGNOSIS — I35.0 NONRHEUMATIC AORTIC (VALVE) STENOSIS: ICD-10-CM

## 2017-06-27 LAB
ALBUMIN SERPL ELPH-MCNC: 2.9 G/DL — LOW (ref 3.3–5)
ALP SERPL-CCNC: 78 U/L — SIGNIFICANT CHANGE UP (ref 40–120)
ALT FLD-CCNC: <5 U/L — LOW (ref 10–45)
ANION GAP SERPL CALC-SCNC: 10 MMOL/L — SIGNIFICANT CHANGE UP (ref 5–17)
ANION GAP SERPL CALC-SCNC: 16 MMOL/L — SIGNIFICANT CHANGE UP (ref 5–17)
APTT BLD: 178.7 SEC — CRITICAL HIGH (ref 27.5–37.4)
APTT BLD: 74.6 SEC — HIGH (ref 27.5–37.4)
AST SERPL-CCNC: 16 U/L — SIGNIFICANT CHANGE UP (ref 10–40)
BILIRUB SERPL-MCNC: 0.7 MG/DL — SIGNIFICANT CHANGE UP (ref 0.2–1.2)
BLD GP AB SCN SERPL QL: NEGATIVE — SIGNIFICANT CHANGE UP
BUN SERPL-MCNC: 61 MG/DL — HIGH (ref 7–23)
BUN SERPL-MCNC: 66 MG/DL — HIGH (ref 7–23)
CALCIUM SERPL-MCNC: 9 MG/DL — SIGNIFICANT CHANGE UP (ref 8.4–10.5)
CALCIUM SERPL-MCNC: 9.4 MG/DL — SIGNIFICANT CHANGE UP (ref 8.4–10.5)
CHLORIDE SERPL-SCNC: 90 MMOL/L — LOW (ref 96–108)
CHLORIDE SERPL-SCNC: 91 MMOL/L — LOW (ref 96–108)
CO2 SERPL-SCNC: 32 MMOL/L — HIGH (ref 22–31)
CO2 SERPL-SCNC: 38 MMOL/L — HIGH (ref 22–31)
CREAT SERPL-MCNC: 2.3 MG/DL — HIGH (ref 0.5–1.3)
CREAT SERPL-MCNC: 2.4 MG/DL — HIGH (ref 0.5–1.3)
GAS PNL BLDA: SIGNIFICANT CHANGE UP
GAS PNL BLDA: SIGNIFICANT CHANGE UP
GLUCOSE SERPL-MCNC: 110 MG/DL — HIGH (ref 70–99)
GLUCOSE SERPL-MCNC: 117 MG/DL — HIGH (ref 70–99)
HCT VFR BLD CALC: 33 % — LOW (ref 34.5–45)
HCT VFR BLD CALC: 34.8 % — SIGNIFICANT CHANGE UP (ref 34.5–45)
HGB BLD-MCNC: 10 G/DL — LOW (ref 11.5–15.5)
HGB BLD-MCNC: 10.5 G/DL — LOW (ref 11.5–15.5)
INR BLD: 1.41 — HIGH (ref 0.88–1.16)
LACTATE SERPL-SCNC: 1.1 MMOL/L — SIGNIFICANT CHANGE UP (ref 0.5–2)
MAGNESIUM SERPL-MCNC: 1.8 MG/DL — SIGNIFICANT CHANGE UP (ref 1.6–2.6)
MAGNESIUM SERPL-MCNC: 2 MG/DL — SIGNIFICANT CHANGE UP (ref 1.6–2.6)
MCHC RBC-ENTMCNC: 26.5 PG — LOW (ref 27–34)
MCHC RBC-ENTMCNC: 26.9 PG — LOW (ref 27–34)
MCHC RBC-ENTMCNC: 30.2 G/DL — LOW (ref 32–36)
MCHC RBC-ENTMCNC: 30.3 G/DL — LOW (ref 32–36)
MCV RBC AUTO: 87.3 FL — SIGNIFICANT CHANGE UP (ref 80–100)
MCV RBC AUTO: 89 FL — SIGNIFICANT CHANGE UP (ref 80–100)
PHOSPHATE SERPL-MCNC: 3.8 MG/DL — SIGNIFICANT CHANGE UP (ref 2.5–4.5)
PLATELET # BLD AUTO: 221 K/UL — SIGNIFICANT CHANGE UP (ref 150–400)
PLATELET # BLD AUTO: 233 K/UL — SIGNIFICANT CHANGE UP (ref 150–400)
POTASSIUM SERPL-MCNC: 3.9 MMOL/L — SIGNIFICANT CHANGE UP (ref 3.5–5.3)
POTASSIUM SERPL-MCNC: 4.1 MMOL/L — SIGNIFICANT CHANGE UP (ref 3.5–5.3)
POTASSIUM SERPL-SCNC: 3.9 MMOL/L — SIGNIFICANT CHANGE UP (ref 3.5–5.3)
POTASSIUM SERPL-SCNC: 4.1 MMOL/L — SIGNIFICANT CHANGE UP (ref 3.5–5.3)
PROT SERPL-MCNC: 7 G/DL — SIGNIFICANT CHANGE UP (ref 6–8.3)
PROTHROM AB SERPL-ACNC: 15.8 SEC — HIGH (ref 9.8–12.7)
RBC # BLD: 3.78 M/UL — LOW (ref 3.8–5.2)
RBC # BLD: 3.91 M/UL — SIGNIFICANT CHANGE UP (ref 3.8–5.2)
RBC # FLD: 16 % — SIGNIFICANT CHANGE UP (ref 10.3–16.9)
RBC # FLD: 16.2 % — SIGNIFICANT CHANGE UP (ref 10.3–16.9)
RH IG SCN BLD-IMP: POSITIVE — SIGNIFICANT CHANGE UP
SODIUM SERPL-SCNC: 138 MMOL/L — SIGNIFICANT CHANGE UP (ref 135–145)
SODIUM SERPL-SCNC: 139 MMOL/L — SIGNIFICANT CHANGE UP (ref 135–145)
WBC # BLD: 12.6 K/UL — HIGH (ref 3.8–10.5)
WBC # BLD: 9.1 K/UL — SIGNIFICANT CHANGE UP (ref 3.8–10.5)
WBC # FLD AUTO: 12.6 K/UL — HIGH (ref 3.8–10.5)
WBC # FLD AUTO: 9.1 K/UL — SIGNIFICANT CHANGE UP (ref 3.8–10.5)

## 2017-06-27 PROCEDURE — 94010 BREATHING CAPACITY TEST: CPT | Mod: 26

## 2017-06-27 PROCEDURE — 71010: CPT | Mod: 26

## 2017-06-27 PROCEDURE — 93010 ELECTROCARDIOGRAM REPORT: CPT

## 2017-06-27 RX ORDER — MAGNESIUM SULFATE 500 MG/ML
1 VIAL (ML) INJECTION ONCE
Qty: 0 | Refills: 0 | Status: COMPLETED | OUTPATIENT
Start: 2017-06-27 | End: 2017-06-28

## 2017-06-27 RX ORDER — POTASSIUM CHLORIDE 20 MEQ
20 PACKET (EA) ORAL ONCE
Qty: 0 | Refills: 0 | Status: COMPLETED | OUTPATIENT
Start: 2017-06-27 | End: 2017-06-27

## 2017-06-27 RX ORDER — PROPOFOL 10 MG/ML
5 INJECTION, EMULSION INTRAVENOUS
Qty: 1000 | Refills: 0 | Status: DISCONTINUED | OUTPATIENT
Start: 2017-06-27 | End: 2017-06-28

## 2017-06-27 RX ORDER — HEPARIN SODIUM 5000 [USP'U]/ML
1000 INJECTION INTRAVENOUS; SUBCUTANEOUS
Qty: 25000 | Refills: 0 | Status: DISCONTINUED | OUTPATIENT
Start: 2017-06-28 | End: 2017-06-28

## 2017-06-27 RX ORDER — SODIUM CHLORIDE 9 MG/ML
1000 INJECTION, SOLUTION INTRAVENOUS
Qty: 0 | Refills: 0 | Status: DISCONTINUED | OUTPATIENT
Start: 2017-06-27 | End: 2017-07-06

## 2017-06-27 RX ORDER — BUDESONIDE, MICRONIZED 100 %
0.25 POWDER (GRAM) MISCELLANEOUS EVERY 12 HOURS
Qty: 0 | Refills: 0 | Status: DISCONTINUED | OUTPATIENT
Start: 2017-06-27 | End: 2017-07-06

## 2017-06-27 RX ADMIN — Medication 3 MILLILITER(S): at 22:30

## 2017-06-27 RX ADMIN — ACETAZOLAMIDE 500 MILLIGRAM(S): 250 TABLET ORAL at 12:39

## 2017-06-27 RX ADMIN — Medication 100 MILLIEQUIVALENT(S): at 23:40

## 2017-06-27 RX ADMIN — Medication 12.5 MILLIGRAM(S): at 10:03

## 2017-06-27 RX ADMIN — Medication 1 CAPSULE(S): at 10:03

## 2017-06-27 RX ADMIN — Medication 3 MILLILITER(S): at 10:57

## 2017-06-27 RX ADMIN — CLOPIDOGREL BISULFATE 75 MILLIGRAM(S): 75 TABLET, FILM COATED ORAL at 10:03

## 2017-06-27 RX ADMIN — PROPOFOL 2.52 MICROGRAM(S)/KG/MIN: 10 INJECTION, EMULSION INTRAVENOUS at 23:00

## 2017-06-27 RX ADMIN — Medication 60 MILLIGRAM(S): at 06:09

## 2017-06-27 RX ADMIN — Medication 1 APPLICATION(S): at 06:14

## 2017-06-27 RX ADMIN — Medication 3 MILLILITER(S): at 01:31

## 2017-06-27 RX ADMIN — Medication 325 MILLIGRAM(S): at 06:09

## 2017-06-27 RX ADMIN — Medication 3 MILLILITER(S): at 06:09

## 2017-06-27 RX ADMIN — Medication 325 MILLIGRAM(S): at 12:38

## 2017-06-27 RX ADMIN — Medication 3 MILLILITER(S): at 14:00

## 2017-06-27 RX ADMIN — GABAPENTIN 100 MILLIGRAM(S): 400 CAPSULE ORAL at 10:03

## 2017-06-27 NOTE — PROGRESS NOTE ADULT - ASSESSMENT
87y Female y/o PMH of HTN, HLD, DM, pAF (on coumadin), PPM, PVD, known CAD s/p PCI (2004/2006/2012), cardiac cath on 3/15 w/ MANDI to distal LM required impella support, CKD and CHF with known aortic stenosis who presented to [facility] w/ the complaint of not being able to get out of bed. Pt was evaluated in the [facility] ED and admitted to  for management of acute on chronic diastolic CHF exacerbation. Pt was seen by Dr. Barger as an outpatient in 12/16 for evaluation of valvular heart disease. Echo was reviewed by Dr. Barger which revealed moderate-severe aortic stenosis VS low gradient severe aortic stenosis. Due to her deconditioned state at the time pt was recommended for medical management and optimization. Currently Dr. Barger was consulted for further evaluation for her known valvular heart disease.  She was medically optimized, and now deemed a candidate for a BAV today with Dr. Barger.

## 2017-06-27 NOTE — PROGRESS NOTE ADULT - ASSESSMENT
86 yo female with pmh of chronic diastolic CHF (EF 50-55% 11/16'), mod to severe AS (ALEKS 0.5cm)-not a surgical candidate, pafib (on Coumadin), CAD s/p PCI x 3 stent (04', 06' and 12')-last cath 3/15' PCI dLM w/ Impella support, Lupus (not on meds), COPD (prior 30yr ppk hx), PNA, PPM 2011 for bradycardia, CKD (baseline Cr 2-3), hx of Renal failure requiring HD x 4 session 8/16' @ [facility], HTN, PVD, and Type 2 DM (no longer on meds), MONICA, and TKR 03' presents to [facility] c/o of inability to get OOB x 3 days,s ob and increased LE edema. Admitted for CHF exacerbation, transferred from [facility] to CCU for hypercapnic resp failure s/p BIPAP and IV diuresis step back down to 5-[facility], continued diuresis and now scheduled for BAV with Dr Barger today.

## 2017-06-27 NOTE — PROGRESS NOTE ADULT - PROBLEM SELECTOR PLAN 5
Hx of Afib with slow ventricular response s/p PPM 2011 2/2 symptomatic bradycardia. - Continue rate control with Metoprolol  - Hold Coumadin and continue heparin gtt in anticipation for BAV today.       - Continue Toprol 12.5 mg daily.

## 2017-06-27 NOTE — PROGRESS NOTE ADULT - SUBJECTIVE AND OBJECTIVE BOX
CC/ HPI:  86 yo female with chronic diastolic CHF (EF 50-55% 11/16'), mod to severe aortic stenosis, pAFIB, CAD s/p PCI x 3 stent, Lupus (not on meds), COPD, CKD (baseline Cr 2-3), admitted for CHF exacerbation, admitted with hypercapnic respiratory failure s/p BIPAP and IV diuresis, seen this morning resting in bed, denies acute respiratory complaint        PAST MEDICAL & SURGICAL HISTORY:  PVD (peripheral vascular disease)  Iron deficiency anemia  Lupus (systemic lupus erythematosus)  Paroxysmal atrial fibrillation  Aortic stenosis, severe  CKD (chronic kidney disease) stage 4, GFR 15-29 ml/min  Diabetes  HTN (hypertension)  CHF (congestive heart failure)  CAD (coronary artery disease)  COPD (chronic obstructive pulmonary disease)  History of total knee replacement: 2003  Cardiac pacemaker: at [facility] by Dr Escoto, 2011    SOCHX:  + tobacco,  -  alcohol    FMHX: FA/MO  - contributory     ROS reviewed below with positive findings marked (+) :  GEN:  fever, chills ENT: tracheostomy,   epistaxis,  sinusitis COR: +CAD, +CHF,  +HTN, +dysrhythmia PUL: +COPD, ILD, asthma, pneumonia GI: PEG, dysphagia, hemorrhage, other PETEY: +kidney disease, electrolyte disorder HEM:  +anemia, thrombus, coagulopathy, cancer ENDO:  thyroid disease, +diabetes mellitus CNS:  dementia, stroke, seizure, PSY:  depression, anxiety, other        MEDICATIONS  (STANDING):  ferrous    sulfate 325 milliGRAM(s) Oral every 8 hours  acetazolamide    Tablet 500 milliGRAM(s) Oral daily  clopidogrel Tablet 75 milliGRAM(s) Oral daily  hydrocortisone 2.5% Cream 1 Application(s) Topical two times a day  Nephrocaps 1 Capsule(s) Oral daily  gabapentin 100 milliGRAM(s) Oral every other day  heparin  Infusion 1600 Unit(s)/Hr (16 mL/Hr) IV Continuous <Continuous>  metoprolol succinate ER 12.5 milliGRAM(s) Oral daily  ALBUTerol/ipratropium for Nebulization 3 milliLiter(s) Nebulizer every 4 hours  furosemide   Injectable 60 milliGRAM(s) IV Push every 12 hours  atorvastatin 40 milliGRAM(s) Oral at bedtime    MEDICATIONS  (PRN):  acetylcysteine 20% Inhalation 5 milliLiter(s) Inhalation every 6 hours PRN shortness of breath  artificial  tears Solution 1 Drop(s) Both EYES three times a day PRN Dry Eyes  acetaminophen   Tablet. 650 milliGRAM(s) Oral every 6 hours PRN Mild Pain (1 - 3)      Vital Signs Last 24 Hrs  T(C): 36.6 (27 Jun 2017 14:26), Max: 36.6 (27 Jun 2017 14:26)  T(F): 97.9 (27 Jun 2017 14:26), Max: 97.9 (27 Jun 2017 14:26)  HR: 92 (27 Jun 2017 14:40) (74 - 92)  BP: 117/65 (27 Jun 2017 14:40) (87/49 - 117/65)  BP(mean): 76 (27 Jun 2017 14:40) (58 - 76)  RR: 16 (27 Jun 2017 12:40) (15 - 18)  SpO2: 90% (27 Jun 2017 14:40) (90% - 97%)    GENERAL:         comfortable,  - distress.  HEENT:            - trauma,  - icterus,  - injection,  - nasal discharge.  NECK:              - jugular venous distention, - thyromegaly.  LYMPH:           - lymphadenopathy, - masses.  RESP:              + crackles,   - rhonchi,   - wheezes.   COR:                S1S2 RRR + murmurs,  - gallops,  - rubs.  ABD:                bowel sounds,   soft, - tender, - distended, - organomegaly.  EXT/MSC:         - cyanosis,  - clubbing,  + edema.    NEURO:             alert,   responds to stimuli.                          10.5   9.1   )-----------( 221      ( 27 Jun 2017 05:35 )             34.8         CXR (6/25)  Bilateral infiltrate/congestion/pleural effusion      ASSESSMENT/PLAN    1)  Cardiomyopathy  2)  Aortic stenosis  3)  Chronic obstructive pulmonary disease  4)  Pulmonary hypertension    Bronchodilators:  Atrovent/ albuterol q 4 – 6 hours as needed  Corticosteroids:  budesonide  Cardiac/HTN: diuresis as needed  GI: Rx/ prophylaxis c PPI/H2B  Heme: Rx/VT on AC  Discuss with medical team

## 2017-06-27 NOTE — PROGRESS NOTE ADULT - ASSESSMENT
88 yo female with pmh of chronic diastolic CHF (EF 50-55% 11/16'), mod to severe AS (ALEKS 0.5cm)-not a surgical candidate, pafib (on Coumadin), CAD s/p PCI x 3 stent (04', 06' and 12')-last cath 3/15' PCI dLM w/ Impella support, Lupus (not on meds), COPD (prior 30yr ppk hx), PNA, PPM 2011 for bradycardia, CKD (baseline Cr 2-3), hx of Renal failure requiring HD x 4 session 8/16' @ [facility], HTN, PVD, and Type 2 DM (no longer on meds), MONICA, and TKR 03' presents to [facility] c/o of inability to get OOB x 3 days,s ob and increased LE edema. Admitted for CHF exacerbation, transferred from [facility] to CCU for hypercapnic resp failure s/p BIPAP and IV diuresis step back down to 5-[facility], continued diuresis and now scheduled for BAV with Dr Barger today.

## 2017-06-27 NOTE — PROGRESS NOTE ADULT - PROBLEM SELECTOR PLAN 2
Critical AS, ALEKS 0.5, mean pressure gradient 43, mod pulm HTN, PASP 52.   -[facility] is following pt and may perform BAV during admission if pt's resp status improves such that she can lay flat.   - BAV with Charbel today. Keep NPO.

## 2017-06-27 NOTE — PROGRESS NOTE ADULT - SUBJECTIVE AND OBJECTIVE BOX
CARDIOLOGY NP PROGRESS NOTE    Subjective: Pt seen and examined at bedside. Denies chest pain, dizziness, palpitations or SOB.     Overnight Events: No events.     TELEMETRY: Afib, rate controlled.   EKG:      VITAL SIGNS:  T(C): 36.2 (06-27-17 @ 10:59), Max: 36.9 (06-26-17 @ 13:00)  HR: 78 (06-27-17 @ 09:23) (74 - 84)  BP: 110/59 (06-27-17 @ 09:23) (87/49 - 110/59)  RR: 16 (06-27-17 @ 09:23) (15 - 18)  SpO2: 96% (06-27-17 @ 09:23) (90% - 97%)  Wt(kg): --    I&O's Summary    26 Jun 2017 07:01  -  27 Jun 2017 07:00  --------------------------------------------------------  IN: 304 mL / OUT: 1100 mL / NET: -796 mL          PHYSICAL EXAM:    General: A/ox 3, No acute Distress, agitated,   Neck: Supple, +JVD  Cardiac: +4/6 systolic ejection murmur  Pulmonary: Very diminished breath sounds bilaterally.  Abdomen: Soft, Non -tender, +BS x 4 quads  Extremities: 1+ pitting LUCIANA, chronic venous stasis changes.   Neuro: A/o x 3, No focal deficits          LABS:                          10.5   9.1   )-----------( 221      ( 27 Jun 2017 05:35 )             34.8                              06-27    139  |  91<L>  |  66<H>  ----------------------------<  110<H>  4.1   |  38<H>  |  2.40<H>    Ca    9.4      27 Jun 2017 05:35  Mg     2.0     06-27                              PTT - ( 27 Jun 2017 05:35 )  PTT:74.6 sec  CAPILLARY BLOOD GLUCOSE                  No Known Allergies    MEDICATIONS  (STANDING):  ferrous    sulfate 325 milliGRAM(s) Oral every 8 hours  acetazolamide    Tablet 500 milliGRAM(s) Oral daily  clopidogrel Tablet 75 milliGRAM(s) Oral daily  hydrocortisone 2.5% Cream 1 Application(s) Topical two times a day  Nephrocaps 1 Capsule(s) Oral daily  gabapentin 100 milliGRAM(s) Oral every other day  heparin  Infusion 1600 Unit(s)/Hr (16 mL/Hr) IV Continuous <Continuous>  metoprolol succinate ER 12.5 milliGRAM(s) Oral daily  ALBUTerol/ipratropium for Nebulization 3 milliLiter(s) Nebulizer every 4 hours  furosemide   Injectable 60 milliGRAM(s) IV Push every 12 hours  atorvastatin 40 milliGRAM(s) Oral at bedtime    MEDICATIONS  (PRN):  acetylcysteine 20% Inhalation 5 milliLiter(s) Inhalation every 6 hours PRN shortness of breath  artificial  tears Solution 1 Drop(s) Both EYES three times a day PRN Dry Eyes  acetaminophen   Tablet. 650 milliGRAM(s) Oral every 6 hours PRN Mild Pain (1 - 3)        DIAGNOSTIC TESTS:

## 2017-06-27 NOTE — PROVIDER CONTACT NOTE (CRITICAL VALUE NOTIFICATION) - SITUATION
Dr. Koo made aware pt's PTT results. pt's arrival from Cath lab at 2130 and not on any coagulation. No s/s of bleeding. Dr. Koo ordered to continue to monitor pt and repeat PTT at 12 midnight Dr. Koo made aware pt's PTT results. pt's arrival from Cath lab at 2130 and not on any anticoagulation. No s/s of bleeding. Dr. Koo ordered to continue to monitor pt and repeat PTT at 12 midnight

## 2017-06-27 NOTE — PROGRESS NOTE ADULT - SUBJECTIVE AND OBJECTIVE BOX
86 yo female with pmh of chronic diastolic CHF (EF 50-55% 11/16'), mod to severe AS (ALEKS 0.5cm)-not a surgical candidate, pafib (on Coumadin), CAD s/p PCI x 3 stent (04', 06' and 12')-last cath 3/15' PCI dLM w/ Impella support, Lupus, COPD (prior 30yr ppk hx), PNA, PPM 2011 for bradycardia, CKD (baseline Cr 2-3), hx of Renal failure requiring HD x 4 session 8/16' @ [facility], HTN, PVD, and Type 2 DM (no longer on meds), MONICA, and TKR 03' presents to [facility] with cc of inability to get OOB x 3 days. Pt states that her "legs were too weak". Pt is a poor historian and HPI obtained from her daughter. Pt's daughter states that her mother was unable to get OOB for 3 days and pt used her Life Alert x3 times to call for help-was told to go to the ED. Reports that she had been "tired" for several day, SOB w/ minimal exertion for "several months" and her mobility has declined since her discharge from rehab in December. Additionally, pt has gained 17 lbs over the past month (from 258 to 275) and non-compliant with her diet. As per daughter, pt has been hospitalized prior 2-3 yrs ago (? [facility]) for CHF exacerbation w/ aggressive diuresis. Of note, pt was evaluation by Dr. Barger for aortic stenosis in December 16'. However, due to her deconditioned status during that time, pt was recommended medical management.    Pt seen and examined at bedside. Denies chest pain, dizziness, palpitations or SOB.          Afib, rate controlled.     ICU Vital Signs Last 24 Hrs  T(C): 36.6 (27 Jun 2017 14:26), Max: 36.6 (27 Jun 2017 14:26)  T(F): 97.9 (27 Jun 2017 14:26), Max: 97.9 (27 Jun 2017 14:26)  HR: 92 (27 Jun 2017 14:40) (72 - 92)  BP: 117/65 (27 Jun 2017 14:40) (87/49 - 117/65)  BP(mean): 76 (27 Jun 2017 14:40) (58 - 76)  ABP: --  ABP(mean): --  RR: 16 (27 Jun 2017 12:40) (15 - 16)  SpO2: 90% (27 Jun 2017 14:40) (90% - 97%)    I&O's Summary    26 Jun 2017 07:01  -  27 Jun 2017 07:00  --------------------------------------------------------  IN: 304 mL / OUT: 1100 mL / NET: -796 mL          PHYSICAL EXAM:    General: A/ox 3, No acute Distress, agitated,   Neck: Supple, +JVD  Cardiac: +4/6 systolic ejection murmur  Pulmonary: Very diminished breath sounds bilaterally.  Abdomen: Soft, Non -tender, +BS x 4 quads  Extremities: 1+ pitting LUCIANA, chronic venous stasis changes.   Neuro: A/o x 3, No focal deficits          LABS:                          10.5   9.1   )-----------( 221      ( 27 Jun 2017 05:35 )             34.8                              06-27    139  |  91<L>  |  66<H>  ----------------------------<  110<H>  4.1   |  38<H>  |  2.40<H>    Ca    9.4      27 Jun 2017 05:35  Mg     2.0     06-27                              PTT - ( 27 Jun 2017 05:35 )  PTT:74.6 sec  CAPILLARY BLOOD GLUCOSE                  No Known Allergies    MEDICATIONS  (STANDING):  ferrous    sulfate 325 milliGRAM(s) Oral every 8 hours  acetazolamide    Tablet 500 milliGRAM(s) Oral daily  clopidogrel Tablet 75 milliGRAM(s) Oral daily  hydrocortisone 2.5% Cream 1 Application(s) Topical two times a day  Nephrocaps 1 Capsule(s) Oral daily  gabapentin 100 milliGRAM(s) Oral every other day  heparin  Infusion 1600 Unit(s)/Hr (16 mL/Hr) IV Continuous <Continuous>  metoprolol succinate ER 12.5 milliGRAM(s) Oral daily  ALBUTerol/ipratropium for Nebulization 3 milliLiter(s) Nebulizer every 4 hours  furosemide   Injectable 60 milliGRAM(s) IV Push every 12 hours  atorvastatin 40 milliGRAM(s) Oral at bedtime    MEDICATIONS  (PRN):  acetylcysteine 20% Inhalation 5 milliLiter(s) Inhalation every 6 hours PRN shortness of breath  artificial  tears Solution 1 Drop(s) Both EYES three times a day PRN Dry Eyes  acetaminophen   Tablet. 650 milliGRAM(s) Oral every 6 hours PRN Mild Pain (1 - 3)        DIAGNOSTIC TESTS:

## 2017-06-27 NOTE — BRIEF OPERATIVE NOTE - PRE-OP DX
Aortic stenosis, severe  06/27/2017    Active  Raymond Barger  Chronic diastolic congestive heart failure, NYHA class 4  06/27/2017    Active  Raymond Barger

## 2017-06-27 NOTE — PROGRESS NOTE ADULT - ASSESSMENT
1) acute/chronic diastolic chf  -strict i/o and daily weights  2) nonrheumatic aortic stenosis  -s/p bav - supportives measures per primary team  3) atrial fibrillation; presence of pacemaker  -rec ep follow-up post-procedure  4) CAD s/p PCI   -check ecg  5) Lupus - monitor  6) COPD - pulm following  7) CKD - f/u renal recs  8) HTN - off pressors  9) PVD - monitor  10) Type 2 DM - monitor blood sugar control  11) normocytic anemia - serial h/h  12) pressure injury of skin (right buttock) - recommend wound care consult follow-up  13) ppx: therapeutic ac; rec ppi if no contrainidcations  14)  maintain K>4< Mg>2

## 2017-06-27 NOTE — PROGRESS NOTE ADULT - PROBLEM SELECTOR PLAN 9
Right pressure ulcer to right buttock  -Wound care consult  - turning and positioning q2 hours.  -Apply barrier cream to buttock daily and prn

## 2017-06-27 NOTE — PROGRESS NOTE ADULT - PROBLEM SELECTOR PLAN 1
stable  continue diuresis  Lasix decreased to 60mg IV q12  -- continue negative fluid balance  BP low so Lasix dose decreased from 80mg to 60mg q12

## 2017-06-27 NOTE — PROGRESS NOTE ADULT - SUBJECTIVE AND OBJECTIVE BOX
Planned Date of Procedure: 6/27/17                                                                                                             Attending: Dr. Barger    Procedure: BAV    HPI:  87y Female y/o PMH of HTN, HLD, DM, pAF (on coumadin), PPM, PVD, known CAD s/p PCI (2004/2006/2012), cardiac cath on 3/15 w/ MANDI to distal LM required impella support, CKD and CHF with known aortic stenosis who presented to [facility] w/ the complaint of not being able to get out of bed. Pt was evaluated in the [facility] ED and admitted to  for management of acute on chronic diastolic CHF exacerbation. Pt was seen by Dr. Barger as an outpatient in 12/16 for evaluation of valvular heart disease. Echo was reviewed by Dr. Barger which revealed moderate-severe aortic stenosis VS low gradient severe aortic stenosis. Due to her deconditioned state at the time pt was recommended for medical management and optimization. Currently Dr. Barger was consulted for further evaluation for her known valvular heart disease.  She was medically optimized, and now deemed a candidate for a BAV today with Dr. Barger.  Currently denies acute CP, SOB, dizziness, fever or chills.      PAST MEDICAL & SURGICAL HISTORY:  PVD (peripheral vascular disease)  Iron deficiency anemia  Lupus (systemic lupus erythematosus)  Paroxysmal atrial fibrillation  Aortic stenosis, severe  CKD (chronic kidney disease) stage 4, GFR 15-29 ml/min  Diabetes  HTN (hypertension)  CHF (congestive heart failure)  CAD (coronary artery disease)  COPD (chronic obstructive pulmonary disease)  History of total knee replacement: 2003  Presence of cardiac pacemaker: at [facility] by Dr Escoto      No Known Allergies      MEDICATIONS  (STANDING):  ferrous    sulfate 325 milliGRAM(s) Oral every 8 hours  acetazolamide    Tablet 500 milliGRAM(s) Oral daily  clopidogrel Tablet 75 milliGRAM(s) Oral daily  hydrocortisone 2.5% Cream 1 Application(s) Topical two times a day  Nephrocaps 1 Capsule(s) Oral daily  gabapentin 100 milliGRAM(s) Oral every other day  heparin  Infusion 1600 Unit(s)/Hr (16 mL/Hr) IV Continuous <Continuous>  metoprolol succinate ER 12.5 milliGRAM(s) Oral daily  ALBUTerol/ipratropium for Nebulization 3 milliLiter(s) Nebulizer every 4 hours  furosemide   Injectable 60 milliGRAM(s) IV Push every 12 hours  atorvastatin 40 milliGRAM(s) Oral at bedtime    MEDICATIONS  (PRN):  acetylcysteine 20% Inhalation 5 milliLiter(s) Inhalation every 6 hours PRN shortness of breath  artificial  tears Solution 1 Drop(s) Both EYES three times a day PRN Dry Eyes  acetaminophen   Tablet. 650 milliGRAM(s) Oral every 6 hours PRN Mild Pain (1 - 3)      On Beta Blocker? YES     Labs:                        10.5   9.1   )-----------( 221      ( 27 Jun 2017 05:35 )             34.8     06-27    139  |  91<L>  |  66<H>  ----------------------------<  110<H>  4.1   |  38<H>  |  2.40<H>    Ca    9.4      27 Jun 2017 05:35  Mg     2.0     06-27      PTT - ( 27 Jun 2017 05:35 )  PTT:74.6 sec    ABO Interpretation: O (06-26-17 @ 21:40)      Hgb A1C: 5.7%    EKG:< from: 12 Lead ECG (06.24.17 @ 09:07) >  Ventricular Rate 82 BPM    < end of copied text >  < from: 12 Lead ECG (06.24.17 @ 09:07) >  Diagnosis Line Atrial fibrillation with premature ventricular or aberrantlyconducted complexes  Left axis deviation  Right bundle branch block  Moderate voltage criteria for LVH, may be normal variant  Cannot rule out Septal infarct , age undetermined  T wave abnormality, consider lateral ischemia or digitalis effect    < end of copied text >      CXR:< from: Xray Chest 1 View AP- PORTABLE-Urgent (06.25.17 @ 09:29) >    EXAM:  XR CHEST 1 VIEW PORT URGENT                          PROCEDURE DATE:  06/25/2017      < end of copied text >  < from: Xray Chest 1 View AP- PORTABLE-Urgent (06.25.17 @ 09:29) >    Impression: No interval change lung pathology    < end of copied text >      Echo:< from: TTE Echo w/Cont Complete (06.12.17 @ 15:52) >  EXAM:  ECHOCARDIOGRAM W CONTRAST                          PROCEDURE DATE:  06/12/2017             < end of copied text >  < from: TTE Echo w/Cont Complete (06.12.17 @ 15:52) >  There is moderate concentric left ventricular hypertrophy. Hypokinesis of   the   basal septal region. Normal motion in the remaining regions.  The left   ventricular ejection fraction is 50%.The left atrium is severely   dilated.The   right atrium is markedly dilated.The right ventricle is not well   visualized.Calcified aortic valve. There is Severe aortic stenosis.The   calculated aortic valve area using the continuity equation is 0.55   cm2.The   peak pressure gradient is 71 mmHg.The mean pressure gradient is 43   mmHg.The   calculated stroke volume index is 27 cc/m2 (normal >35cc/m2).The   dimensionless   index (ratio of LVOTvelocity to aortic velocity) was calculated to be   0.17.   There ismoderate mitral annular calcification. There is mild mitral   regurgitation.  There is mild to moderate tricuspid regurgitation.There   is   moderate pulmonary hypertension. The pulmonary artery systolic pressure   is   estimated to be 52 mmHg.  No aortic root dilatation.There is a pacemaker   wire   in the right heart.The IVC is dilated (>2.1 cm) with an abnormal   inspiratory   collapse (<50%) consistent with elevated right atrial pressure.  There   is no   pericardial effusion.Left pleural effusion noted.    < end of copied text >      PFT's: Pending    Carotid Duplex: Pending    Consult in Chart?  YES  Consent in Chart? YES  Pre-op Orders Placed? YES  Blood Prodeucts Ordered? YES  NPO ordered? YES

## 2017-06-27 NOTE — PROVIDER CONTACT NOTE (CRITICAL VALUE NOTIFICATION) - ACTION/TREATMENT ORDERED:
Heparin Held. Will restart at 15cc/h at 00:15
Dr. Koo ordered to continue to monitor pt and repeat PTT at 12 midnight

## 2017-06-27 NOTE — PROGRESS NOTE ADULT - PROBLEM SELECTOR PLAN 1
Admitted with acute CHF exacerbation 2/2 dietary non-compliance and critical AS. EF 50-55%, severely dilated LA.  - Cont diamox 500mg qd and Furosemide 60mg IV Q12hr per renal recs. Careful diuresis with close monitoring of BP in setting of severe AS. Net goal neg 1.5-2.5L as  - Daily weights, strict I/Os (us in place)  - Continue Toprol XL 12.5mg po daily  - Plan for BAV later today with Dr. Barger.

## 2017-06-27 NOTE — PROGRESS NOTE ADULT - ASSESSMENT
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, HTN, PVD, and Type 2 DM (no longer on meds), MONICA, and TKR 03' presents to [facility] c/o of inability to get OOB x 3 days,s ob and increased LE edema. Admitted for CHF exacerbation, transferred from [facility] to CCU for hypercapnic resp failure s/p BIPAP and IV diuresis step back down to 5-[facility], continued diuresis and now scheduled for BAV with Dr Barger today.

## 2017-06-27 NOTE — PROGRESS NOTE ADULT - PROBLEM SELECTOR PLAN 3
Continue Diamox  HCO3 levels slightly increased secondary to increased diuresis  Lasix dose scaled back to 60mg IV q12

## 2017-06-27 NOTE — PROGRESS NOTE ADULT - SUBJECTIVE AND OBJECTIVE BOX
CC: ACUTE CHRONIC CONGESTIVEHEART FAILURE UNSPECIFIE      INTERVAL HISTORY: no change clinically  very cranky and not answering questions      ROS: No chest pain, no sob, no abd pain. No n/v/d    PAST MEDICAL & SURGICAL HISTORY:  PVD (peripheral vascular disease)  Iron deficiency anemia  Lupus (systemic lupus erythematosus)  Paroxysmal atrial fibrillation  Aortic stenosis, severe  CKD (chronic kidney disease) stage 4, GFR 15-29 ml/min  Diabetes  HTN (hypertension)  CHF (congestive heart failure)  CAD (coronary artery disease)  COPD (chronic obstructive pulmonary disease)  History of total knee replacement: 2003  Presence of cardiac pacemaker: at [facility] by Dr Escoto      PHYSICAL EXAM:  T(C): 36.2 (06-27-17 @ 05:22), Max: 36.9 (06-26-17 @ 13:00)  HR: 80 (06-27-17 @ 06:58)  BP: 87/49 (06-27-17 @ 06:07) (87/49 - 109/56)  RR: 16 (06-27-17 @ 06:58)  SpO2: 92% (06-27-17 @ 06:58)  Wt(kg): --  I&O's Summary    26 Jun 2017 07:01  -  27 Jun 2017 07:00  --------------------------------------------------------  IN: 304 mL / OUT: 1100 mL / NET: -796 mL      Weight 84 (06-26 @ 05:05)  General: AAO x 3,  NAD.  HEENT: moist mucous membranes, no pallor/cyanosis.  Neck: no JVD visible.  Cardiac: S1, S2. RRR. No murmurs   Respratory:rales  Abdomen: soft. nontender. nondistended  Skin: no rashes.  Extremities: no LE edema b/l  Access:       DATA:                        10.5<L>  9.1   )-----------( 221      ( 27 Jun 2017 05:35 )             34.8     Ferritin, Serum: 92.0 ng/mL (06-12 @ 05:42)      139    |  91<L>  |  66<H>  ----------------------------<  110<H>  Ca:9.4   (27 Jun 2017 05:35)  4.1     |  38<H>  |  2.40<H>      eGFR if Non : 18 <L>  eGFR if : 20 <L>                        MEDICATIONS  (STANDING):  ferrous    sulfate 325 milliGRAM(s) Oral every 8 hours  acetazolamide    Tablet 500 milliGRAM(s) Oral daily  clopidogrel Tablet 75 milliGRAM(s) Oral daily  hydrocortisone 2.5% Cream 1 Application(s) Topical two times a day  Nephrocaps 1 Capsule(s) Oral daily  gabapentin 100 milliGRAM(s) Oral every other day  heparin  Infusion 1600 Unit(s)/Hr (16 mL/Hr) IV Continuous <Continuous>  metoprolol succinate ER 12.5 milliGRAM(s) Oral daily  ALBUTerol/ipratropium for Nebulization 3 milliLiter(s) Nebulizer every 4 hours  furosemide   Injectable 60 milliGRAM(s) IV Push every 12 hours  atorvastatin 40 milliGRAM(s) Oral at bedtime    MEDICATIONS  (PRN):  acetylcysteine 20% Inhalation 5 milliLiter(s) Inhalation every 6 hours PRN shortness of breath  artificial  tears Solution 1 Drop(s) Both EYES three times a day PRN Dry Eyes  acetaminophen   Tablet. 650 milliGRAM(s) Oral every 6 hours PRN Mild Pain (1 - 3)

## 2017-06-27 NOTE — PROGRESS NOTE ADULT - PROBLEM SELECTOR PLAN 7
Baseline Cr per daughter 2-3. Currently at baseline  - Renal following to assist with diuresis.   -Cont Furosemide 60 mg IV every 12 hours and Diamox 500mg qd for alkalosis per renal recs.   -Leahy catheter in place, monitor daily weights, strictr I/o's.  -Renally dose all meds  - Completed Ceftriaxone 7 days course for complicated UTI w/ Klesiella on UCx. No leukocytosis or fevers.

## 2017-06-27 NOTE — PROGRESS NOTE ADULT - SUBJECTIVE AND OBJECTIVE BOX
cc: follow-up evaluation and management of acute/chronic diastolic chf and aortic stenosis  subjective:  events noted; now intubated 9 [place] s/p bav    PAST MEDICAL & SURGICAL HISTORY:  PVD (peripheral vascular disease)  Iron deficiency anemia  Lupus (systemic lupus erythematosus)  Paroxysmal atrial fibrillation  Aortic stenosis, severe  CKD (chronic kidney disease) stage 4, GFR 15-29 ml/min  Diabetes  HTN (hypertension)  CHF (congestive heart failure)  CAD (coronary artery disease)  COPD (chronic obstructive pulmonary disease)  History of total knee replacement: 2003  Presence of cardiac pacemaker: at [facility] by Dr Escoto    MEDICATIONS  (STANDING):  ferrous    sulfate 325 milliGRAM(s) Oral every 8 hours  acetazolamide    Tablet 500 milliGRAM(s) Oral daily  clopidogrel Tablet 75 milliGRAM(s) Oral daily  hydrocortisone 2.5% Cream 1 Application(s) Topical two times a day  Nephrocaps 1 Capsule(s) Oral daily  gabapentin 100 milliGRAM(s) Oral every other day  metoprolol succinate ER 12.5 milliGRAM(s) Oral daily  ALBUTerol/ipratropium for Nebulization 3 milliLiter(s) Nebulizer every 4 hours  furosemide   Injectable 60 milliGRAM(s) IV Push every 12 hours  atorvastatin 40 milliGRAM(s) Oral at bedtime  buDESOnide   0.25 milliGRAM(s) Respule 0.25 milliGRAM(s) Inhalation every 12 hours  sodium chloride 0.45%. 1000 milliLiter(s) (10 mL/Hr) IV Continuous <Continuous>  propofol Infusion 5 MICROgram(s)/kG/Min (2.52 mL/Hr) IV Continuous <Continuous>    MEDICATIONS  (PRN):  acetylcysteine 20% Inhalation 5 milliLiter(s) Inhalation every 6 hours PRN shortness of breath  artificial  tears Solution 1 Drop(s) Both EYES three times a day PRN Dry Eyes  acetaminophen   Tablet. 650 milliGRAM(s) Oral every 6 hours PRN Mild Pain (1 - 3)    ICU Vital Signs Last 24 Hrs  T(C): 36.6 (27 Jun 2017 21:20), Max: 36.6 (27 Jun 2017 14:26)  T(F): 97.9 (27 Jun 2017 21:20), Max: 97.9 (27 Jun 2017 14:26)  HR: 80 (27 Jun 2017 22:45) (72 - 92)  BP: 140/65 (27 Jun 2017 21:50) (87/49 - 140/65)  BP(mean): 96 (27 Jun 2017 21:50) (65 - 96)  ABP: 130/48 (27 Jun 2017 22:45) (130/48 - 154/62)  ABP(mean): 80 (27 Jun 2017 22:45) (78 - 98)  RR: 11 (27 Jun 2017 22:45) (11 - 17)  SpO2: 98% (27 Jun 2017 22:45) (90% - 100%)    PHYSICAL EXAM:  General: nad; intubated  heent - mmm  Cardiac: rr; systolic murmur  Pulmonary:  breath sounds present  Abdomen: soft; nd  extremities: edema present bilat  vasc - pulses present    LABS:                                     10.0   12.6  )-----------( 233      ( 27 Jun 2017 21:48 )             33.0   06-27    138  |  90<L>  |  61<H>  ----------------------------<  117<H>  3.9   |  32<H>  |  2.30<H>    Ca    9.0      27 Jun 2017 21:48  Phos  3.8     06-27  Mg     1.8     06-27    TPro  7.0  /  Alb  2.9<L>  /  TBili  0.7  /  DBili  x   /  AST  16  /  ALT  <5<L>  /  AlkPhos  78  06-27    PT/INR - ( 27 Jun 2017 21:48 )   PT: 15.8 sec;   INR: 1.41          PTT - ( 27 Jun 2017 21:48 )  PTT:178.7 sec      DIAGNOSTIC TESTS:     EXAM:  XR CHEST 1 VIEW PORT URGENT                          PROCEDURE DATE:  06/25/2017                     INTERPRETATION:  Clinical History: Congestion    Portable examination the chest demonstrates no interval change congestion   and/or infiltrates in comparison to prior examination of the chest   6/23/2017. Bilateral effusions. No interval change position remaining   support devices. Cardiomegaly.    Impression: No interval change lung pathology            "Thank you for the opportunity to participate in the care of this   patient."        KATHARINA MOROCHO M.D., ATTENDING RADIOLOGIST  This document has been electronically signed. Jun 25 2017 10:38AM      6- ecg reviewed on 6-    EXAM:  XR CHEST 1 VIEW PORT ROUTINE                          PROCEDURE DATE:  06/23/2017                     INTERPRETATION:  Indication: chf    A single portable view of the chest is submitted. Comparison is made to   the most recent prior study dated 6/20/2017. Bilateral pulmonary   infiltrates and effusions are similar to the previous examination.   Impression:    No significant interval change            "Thank you for the opportunity to participate in the care of this   patient."        MASOUD WORKMAN M.D., ATTENDING RADIOLOGIST  This document has been electronically signed. Jun 23 2017  4:33PM

## 2017-06-27 NOTE — BRIEF OPERATIVE NOTE - OPERATION/FINDINGS
12F lt CFA, 6F rt CFV sheaths  dilator to 4F micropuncture sheath inserted into rt CFA showed severe calcific distal CFA stenosis with distal flow (pulled, no sheath inserted)  abdominal aortography revealed mild diffuse bilateral iliofemoral system in remainder of vessels  rt dominant system; RCA 30% proximal, 50% mid calcific disease, 50% focal defect at distal edge of distal RCA stent (?small dissection, ISR with contrast streaming, low suspicion for thrombus), 80% ostial LPL1 small territory vessel, total occlusion of rt AV groove branch with lt-rt collaterals, patent LMCA-LAD stent, 80% proximal LCx calcific stenosis (unchanged from prior), DIAG1 70% mid diffuse stenosis.  LV/Ao gradient peak/mean 34/40mmHg  BAV with [id number] TrueFlow balloon followed by [id number] TrueFlow balloon  LV/Ao gradient peak 16mmHg  hemostasis: lt CFA proglide x2, rt CFV sutured in place    imp: severe AS s/p successful BAV; CAD with patent LMCA/LAD stent, distal RCA stent with distal edge defect (STACEY 3 flow in vessel); chronic diastolic CHF; COPD  plan:  -routine post-catheterization care  -continue plavix; resume heparin gtt in 8hrs no bolus  -wean to extubate to CPAP; pulm following  -d/c rt CFV sheath when coags normalize

## 2017-06-27 NOTE — PROGRESS NOTE ADULT - PROBLEM SELECTOR PLAN 4
s/p PCI x 3 stent (04', 06' and 12')-last cath 3/15' PCI dLM   -Trop 0.04 x 4. Likely 2/2 demand ischemia.   - Continue Toprol 12.5 mg and Plavix 75 mg daily, LIpitor 40 mg daily.

## 2017-06-28 DIAGNOSIS — I50.9 HEART FAILURE, UNSPECIFIED: ICD-10-CM

## 2017-06-28 LAB
ALBUMIN SERPL ELPH-MCNC: 2.7 G/DL — LOW (ref 3.3–5)
ALBUMIN SERPL ELPH-MCNC: 3 G/DL — LOW (ref 3.3–5)
ALBUMIN SERPL ELPH-MCNC: 3 G/DL — LOW (ref 3.3–5)
ALP SERPL-CCNC: 71 U/L — SIGNIFICANT CHANGE UP (ref 40–120)
ALP SERPL-CCNC: 79 U/L — SIGNIFICANT CHANGE UP (ref 40–120)
ALP SERPL-CCNC: 82 U/L — SIGNIFICANT CHANGE UP (ref 40–120)
ALT FLD-CCNC: <5 U/L — LOW (ref 10–45)
ANION GAP SERPL CALC-SCNC: 14 MMOL/L — SIGNIFICANT CHANGE UP (ref 5–17)
ANION GAP SERPL CALC-SCNC: 15 MMOL/L — SIGNIFICANT CHANGE UP (ref 5–17)
ANION GAP SERPL CALC-SCNC: 17 MMOL/L — SIGNIFICANT CHANGE UP (ref 5–17)
APTT BLD: 30.6 SEC — SIGNIFICANT CHANGE UP (ref 27.5–37.4)
APTT BLD: 34 SEC — SIGNIFICANT CHANGE UP (ref 27.5–37.4)
APTT BLD: 40.7 SEC — HIGH (ref 27.5–37.4)
APTT BLD: 43.9 SEC — HIGH (ref 27.5–37.4)
AST SERPL-CCNC: 15 U/L — SIGNIFICANT CHANGE UP (ref 10–40)
AST SERPL-CCNC: 16 U/L — SIGNIFICANT CHANGE UP (ref 10–40)
AST SERPL-CCNC: 17 U/L — SIGNIFICANT CHANGE UP (ref 10–40)
BILIRUB SERPL-MCNC: 0.6 MG/DL — SIGNIFICANT CHANGE UP (ref 0.2–1.2)
BILIRUB SERPL-MCNC: 0.6 MG/DL — SIGNIFICANT CHANGE UP (ref 0.2–1.2)
BILIRUB SERPL-MCNC: 0.7 MG/DL — SIGNIFICANT CHANGE UP (ref 0.2–1.2)
BUN SERPL-MCNC: 68 MG/DL — HIGH (ref 7–23)
BUN SERPL-MCNC: 70 MG/DL — HIGH (ref 7–23)
BUN SERPL-MCNC: 71 MG/DL — HIGH (ref 7–23)
CALCIUM SERPL-MCNC: 8.6 MG/DL — SIGNIFICANT CHANGE UP (ref 8.4–10.5)
CALCIUM SERPL-MCNC: 9.3 MG/DL — SIGNIFICANT CHANGE UP (ref 8.4–10.5)
CALCIUM SERPL-MCNC: 9.4 MG/DL — SIGNIFICANT CHANGE UP (ref 8.4–10.5)
CHLORIDE SERPL-SCNC: 89 MMOL/L — LOW (ref 96–108)
CHLORIDE SERPL-SCNC: 90 MMOL/L — LOW (ref 96–108)
CHLORIDE SERPL-SCNC: 93 MMOL/L — LOW (ref 96–108)
CO2 SERPL-SCNC: 30 MMOL/L — SIGNIFICANT CHANGE UP (ref 22–31)
CO2 SERPL-SCNC: 31 MMOL/L — SIGNIFICANT CHANGE UP (ref 22–31)
CO2 SERPL-SCNC: 31 MMOL/L — SIGNIFICANT CHANGE UP (ref 22–31)
CREAT SERPL-MCNC: 2.3 MG/DL — HIGH (ref 0.5–1.3)
CREAT SERPL-MCNC: 2.3 MG/DL — HIGH (ref 0.5–1.3)
CREAT SERPL-MCNC: 2.4 MG/DL — HIGH (ref 0.5–1.3)
GAS PNL BLDA: SIGNIFICANT CHANGE UP
GLUCOSE SERPL-MCNC: 126 MG/DL — HIGH (ref 70–99)
GLUCOSE SERPL-MCNC: 146 MG/DL — HIGH (ref 70–99)
GLUCOSE SERPL-MCNC: 169 MG/DL — HIGH (ref 70–99)
HCT VFR BLD CALC: 31.3 % — LOW (ref 34.5–45)
HCT VFR BLD CALC: 33 % — LOW (ref 34.5–45)
HCT VFR BLD CALC: 33.6 % — LOW (ref 34.5–45)
HGB BLD-MCNC: 10.2 G/DL — LOW (ref 11.5–15.5)
HGB BLD-MCNC: 10.4 G/DL — LOW (ref 11.5–15.5)
HGB BLD-MCNC: 9.6 G/DL — LOW (ref 11.5–15.5)
INR BLD: 1.19 — HIGH (ref 0.88–1.16)
INR BLD: 1.24 — HIGH (ref 0.88–1.16)
INR BLD: 1.26 — HIGH (ref 0.88–1.16)
INR BLD: 1.3 — HIGH (ref 0.88–1.16)
LACTATE SERPL-SCNC: 1 MMOL/L — SIGNIFICANT CHANGE UP (ref 0.5–2)
MAGNESIUM SERPL-MCNC: 2.1 MG/DL — SIGNIFICANT CHANGE UP (ref 1.6–2.6)
MAGNESIUM SERPL-MCNC: 2.1 MG/DL — SIGNIFICANT CHANGE UP (ref 1.6–2.6)
MAGNESIUM SERPL-MCNC: 2.2 MG/DL — SIGNIFICANT CHANGE UP (ref 1.6–2.6)
MCHC RBC-ENTMCNC: 26.6 PG — LOW (ref 27–34)
MCHC RBC-ENTMCNC: 26.6 PG — LOW (ref 27–34)
MCHC RBC-ENTMCNC: 26.9 PG — LOW (ref 27–34)
MCHC RBC-ENTMCNC: 30.4 G/DL — LOW (ref 32–36)
MCHC RBC-ENTMCNC: 30.7 G/DL — LOW (ref 32–36)
MCHC RBC-ENTMCNC: 31.5 G/DL — LOW (ref 32–36)
MCV RBC AUTO: 85.5 FL — SIGNIFICANT CHANGE UP (ref 80–100)
MCV RBC AUTO: 86.7 FL — SIGNIFICANT CHANGE UP (ref 80–100)
MCV RBC AUTO: 87.5 FL — SIGNIFICANT CHANGE UP (ref 80–100)
PHOSPHATE SERPL-MCNC: 4.3 MG/DL — SIGNIFICANT CHANGE UP (ref 2.5–4.5)
PHOSPHATE SERPL-MCNC: 4.7 MG/DL — HIGH (ref 2.5–4.5)
PLATELET # BLD AUTO: 220 K/UL — SIGNIFICANT CHANGE UP (ref 150–400)
PLATELET # BLD AUTO: 257 K/UL — SIGNIFICANT CHANGE UP (ref 150–400)
PLATELET # BLD AUTO: 273 K/UL — SIGNIFICANT CHANGE UP (ref 150–400)
POTASSIUM SERPL-MCNC: 4.1 MMOL/L — SIGNIFICANT CHANGE UP (ref 3.5–5.3)
POTASSIUM SERPL-MCNC: 4.3 MMOL/L — SIGNIFICANT CHANGE UP (ref 3.5–5.3)
POTASSIUM SERPL-MCNC: 4.5 MMOL/L — SIGNIFICANT CHANGE UP (ref 3.5–5.3)
POTASSIUM SERPL-SCNC: 4.1 MMOL/L — SIGNIFICANT CHANGE UP (ref 3.5–5.3)
POTASSIUM SERPL-SCNC: 4.3 MMOL/L — SIGNIFICANT CHANGE UP (ref 3.5–5.3)
POTASSIUM SERPL-SCNC: 4.5 MMOL/L — SIGNIFICANT CHANGE UP (ref 3.5–5.3)
PREALB SERPL-MCNC: 8 MG/DL — LOW (ref 20–40)
PROT SERPL-MCNC: 7 G/DL — SIGNIFICANT CHANGE UP (ref 6–8.3)
PROT SERPL-MCNC: 7 G/DL — SIGNIFICANT CHANGE UP (ref 6–8.3)
PROT SERPL-MCNC: 7.5 G/DL — SIGNIFICANT CHANGE UP (ref 6–8.3)
PROTHROM AB SERPL-ACNC: 13.3 SEC — HIGH (ref 9.8–12.7)
PROTHROM AB SERPL-ACNC: 13.8 SEC — HIGH (ref 9.8–12.7)
PROTHROM AB SERPL-ACNC: 14 SEC — HIGH (ref 9.8–12.7)
PROTHROM AB SERPL-ACNC: 14.5 SEC — HIGH (ref 9.8–12.7)
RBC # BLD: 3.61 M/UL — LOW (ref 3.8–5.2)
RBC # BLD: 3.84 M/UL — SIGNIFICANT CHANGE UP (ref 3.8–5.2)
RBC # BLD: 3.86 M/UL — SIGNIFICANT CHANGE UP (ref 3.8–5.2)
RBC # FLD: 15.9 % — SIGNIFICANT CHANGE UP (ref 10.3–16.9)
RBC # FLD: 16 % — SIGNIFICANT CHANGE UP (ref 10.3–16.9)
RBC # FLD: 16.2 % — SIGNIFICANT CHANGE UP (ref 10.3–16.9)
SODIUM SERPL-SCNC: 135 MMOL/L — SIGNIFICANT CHANGE UP (ref 135–145)
SODIUM SERPL-SCNC: 137 MMOL/L — SIGNIFICANT CHANGE UP (ref 135–145)
SODIUM SERPL-SCNC: 138 MMOL/L — SIGNIFICANT CHANGE UP (ref 135–145)
WBC # BLD: 10 K/UL — SIGNIFICANT CHANGE UP (ref 3.8–10.5)
WBC # BLD: 8.6 K/UL — SIGNIFICANT CHANGE UP (ref 3.8–10.5)
WBC # BLD: 9.7 K/UL — SIGNIFICANT CHANGE UP (ref 3.8–10.5)
WBC # FLD AUTO: 10 K/UL — SIGNIFICANT CHANGE UP (ref 3.8–10.5)
WBC # FLD AUTO: 8.6 K/UL — SIGNIFICANT CHANGE UP (ref 3.8–10.5)
WBC # FLD AUTO: 9.7 K/UL — SIGNIFICANT CHANGE UP (ref 3.8–10.5)

## 2017-06-28 PROCEDURE — 71010: CPT | Mod: 26,77

## 2017-06-28 PROCEDURE — 93306 TTE W/DOPPLER COMPLETE: CPT | Mod: 26

## 2017-06-28 PROCEDURE — 99291 CRITICAL CARE FIRST HOUR: CPT

## 2017-06-28 PROCEDURE — 71010: CPT | Mod: 26

## 2017-06-28 PROCEDURE — 93010 ELECTROCARDIOGRAM REPORT: CPT

## 2017-06-28 RX ORDER — FUROSEMIDE 40 MG
40 TABLET ORAL ONCE
Qty: 0 | Refills: 0 | Status: COMPLETED | OUTPATIENT
Start: 2017-06-28 | End: 2017-06-28

## 2017-06-28 RX ORDER — DEXTROSE 50 % IN WATER 50 %
25 SYRINGE (ML) INTRAVENOUS ONCE
Qty: 0 | Refills: 0 | Status: DISCONTINUED | OUTPATIENT
Start: 2017-06-28 | End: 2017-07-06

## 2017-06-28 RX ORDER — CALCIUM GLUCONATE 100 MG/ML
2 VIAL (ML) INTRAVENOUS ONCE
Qty: 2 | Refills: 0 | Status: COMPLETED | OUTPATIENT
Start: 2017-06-28 | End: 2017-06-29

## 2017-06-28 RX ORDER — FUROSEMIDE 40 MG
40 TABLET ORAL ONCE
Qty: 0 | Refills: 0 | Status: COMPLETED | OUTPATIENT
Start: 2017-06-28 | End: 2017-06-29

## 2017-06-28 RX ORDER — PANTOPRAZOLE SODIUM 20 MG/1
40 TABLET, DELAYED RELEASE ORAL
Qty: 0 | Refills: 0 | Status: DISCONTINUED | OUTPATIENT
Start: 2017-06-28 | End: 2017-07-06

## 2017-06-28 RX ORDER — GLUCAGON INJECTION, SOLUTION 0.5 MG/.1ML
1 INJECTION, SOLUTION SUBCUTANEOUS ONCE
Qty: 0 | Refills: 0 | Status: DISCONTINUED | OUTPATIENT
Start: 2017-06-28 | End: 2017-07-06

## 2017-06-28 RX ORDER — DEXTROSE 50 % IN WATER 50 %
1 SYRINGE (ML) INTRAVENOUS ONCE
Qty: 0 | Refills: 0 | Status: DISCONTINUED | OUTPATIENT
Start: 2017-06-28 | End: 2017-07-06

## 2017-06-28 RX ORDER — DEXTROSE 50 % IN WATER 50 %
12.5 SYRINGE (ML) INTRAVENOUS ONCE
Qty: 0 | Refills: 0 | Status: DISCONTINUED | OUTPATIENT
Start: 2017-06-28 | End: 2017-07-06

## 2017-06-28 RX ORDER — PANTOPRAZOLE SODIUM 20 MG/1
40 TABLET, DELAYED RELEASE ORAL DAILY
Qty: 0 | Refills: 0 | Status: DISCONTINUED | OUTPATIENT
Start: 2017-06-28 | End: 2017-06-28

## 2017-06-28 RX ORDER — INSULIN LISPRO 100/ML
VIAL (ML) SUBCUTANEOUS EVERY 6 HOURS
Qty: 0 | Refills: 0 | Status: DISCONTINUED | OUTPATIENT
Start: 2017-06-28 | End: 2017-07-06

## 2017-06-28 RX ORDER — SODIUM CHLORIDE 9 MG/ML
1000 INJECTION, SOLUTION INTRAVENOUS
Qty: 0 | Refills: 0 | Status: DISCONTINUED | OUTPATIENT
Start: 2017-06-28 | End: 2017-07-06

## 2017-06-28 RX ORDER — HEPARIN SODIUM 5000 [USP'U]/ML
1200 INJECTION INTRAVENOUS; SUBCUTANEOUS
Qty: 25000 | Refills: 0 | Status: DISCONTINUED | OUTPATIENT
Start: 2017-06-28 | End: 2017-06-29

## 2017-06-28 RX ORDER — HEPARIN SODIUM 5000 [USP'U]/ML
12 INJECTION INTRAVENOUS; SUBCUTANEOUS
Qty: 25000 | Refills: 0 | Status: DISCONTINUED | OUTPATIENT
Start: 2017-06-28 | End: 2017-06-28

## 2017-06-28 RX ADMIN — Medication 0.25 MILLIGRAM(S): at 17:53

## 2017-06-28 RX ADMIN — Medication 3 MILLILITER(S): at 21:56

## 2017-06-28 RX ADMIN — Medication 100 GRAM(S): at 01:00

## 2017-06-28 RX ADMIN — ACETAZOLAMIDE 500 MILLIGRAM(S): 250 TABLET ORAL at 18:34

## 2017-06-28 RX ADMIN — Medication 40 MILLIGRAM(S): at 13:56

## 2017-06-28 RX ADMIN — Medication 3 MILLILITER(S): at 13:11

## 2017-06-28 RX ADMIN — Medication 3 MILLILITER(S): at 05:27

## 2017-06-28 RX ADMIN — PANTOPRAZOLE SODIUM 40 MILLIGRAM(S): 20 TABLET, DELAYED RELEASE ORAL at 12:32

## 2017-06-28 RX ADMIN — Medication 1 APPLICATION(S): at 18:35

## 2017-06-28 RX ADMIN — Medication 0.25 MILLIGRAM(S): at 05:27

## 2017-06-28 RX ADMIN — CLOPIDOGREL BISULFATE 75 MILLIGRAM(S): 75 TABLET, FILM COATED ORAL at 18:35

## 2017-06-28 RX ADMIN — Medication 3 MILLILITER(S): at 17:54

## 2017-06-28 RX ADMIN — Medication 3 MILLILITER(S): at 01:37

## 2017-06-28 RX ADMIN — ATORVASTATIN CALCIUM 40 MILLIGRAM(S): 80 TABLET, FILM COATED ORAL at 21:02

## 2017-06-28 RX ADMIN — SODIUM CHLORIDE 10 MILLILITER(S): 9 INJECTION, SOLUTION INTRAVENOUS at 19:55

## 2017-06-28 RX ADMIN — Medication 325 MILLIGRAM(S): at 21:02

## 2017-06-28 RX ADMIN — Medication 1 APPLICATION(S): at 06:00

## 2017-06-28 RX ADMIN — HEPARIN SODIUM 12 UNIT(S)/HR: 5000 INJECTION INTRAVENOUS; SUBCUTANEOUS at 14:25

## 2017-06-28 NOTE — PROGRESS NOTE ADULT - ASSESSMENT
1) acute/chronic diastolic chf  -i/o's  -daily weights  2) nonrheumatic aortic stenosis  -s/p bav - tte findings noted  3) atrial fibrillation; presence of pacemaker  -ep consulted earlier  4) CAD s/p PCI   -continue rx as tolerated  6) COPD - per pulm; co2 retention noted  7) CKD - monitor cr/uop  9) PVD - on plavix, bb, statin  10) DM - glycemic control per primary team  11) normocytic anemia - monitor  12) pressure injury of skin (right buttock) - recommend wound care consult follow-up  13) ppx: therapeutic ac; recommend start gi ppx  14) K>4< Mg>2  d/w [place] earlier today

## 2017-06-28 NOTE — PROGRESS NOTE ADULT - SUBJECTIVE AND OBJECTIVE BOX
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, CKD (baseline Cr 2-3), with transient HD requirement x 4 sessions Aug/2016, HTN, PVD, DM, MONICA, and TKR 03' presenting with inability to get OOB/further decline in mobility/functional status since discharged from Rehab in December    admitted June 11 with acute on chronic CHF exacerbation  EP interrogated [facility]  cardiology/renal/pulm/rehab/palliative team services following  Tx for Klebsiella UTI - Ceftriaxone 6/12-6/18  aggressive diuresis with significant weight loss documented  transient BIPAP requirement for hypercarbia (80's) - baseline 50's prompting transfer to CCU 6/13  Heparin infusion for known atrial fib   advanced directives: DNR/DNI with plan to rescind for any aortic procedure  of note - patient intermittently refusing to work with PT/BIPAP and other treatment recommendations    taken for BAV 6/27 - uneventful procedure reported  remains intubated this am - but awake and following during sedation vacation - Fi02 40% with ABG demonstrating good oxygenation and ventilation    minimal secretions reported per staff  no acute events overnight    ICU Vital Signs Last 24 Hrs  T(C): 36.6 (28 Jun 2017 05:00), Max: 36.6 (27 Jun 2017 14:26)  T(F): 97.9 (28 Jun 2017 05:00), Max: 97.9 (27 Jun 2017 14:26)  HR: 70 (28 Jun 2017 08:00) (60 - 92) - atrial fib  BP: 128/60 (28 Jun 2017 06:00) (110/51 - 140/65)  BP(mean): 91 (28 Jun 2017 06:00) (69 - 96)  ABP: 134/52 (28 Jun 2017 08:00) (106/42 - 154/62)  ABP(mean): 84 (28 Jun 2017 08:00) (66 - 98)  RR: 14 (28 Jun 2017 08:00) (11 - 17)  SpO2: 96% (28 Jun 2017 08:00) (90% - 100%)    Qtts: Heparin (1000U/hr)    I&O's Summary    27 Jun 2017 07:01  -  28 Jun 2017 07:00  --------------------------------------------------------  IN: 305 mL / OUT: 1365 mL / NET: -1060 mL    28 Jun 2017 07:01  -  28 Jun 2017 08:06  --------------------------------------------------------  IN: 10 mL / OUT: 100 mL / NET: -90 mL    UO /h    Vent settings: AC 12/550/50/5    Physical Exam    Heart- irregular irregular (+)murmur 3/6 MICKY  Lungs - dec BS bases (-)wheeze; rhonchi appreciated on ausculatation  Abd - (+)BS obese/soft (-)r/r/g  Ext - poor skin turgor with tenting on exam ; chronic stasis changes bilaterally - inguinal sites clean (-)hematoma; sacral edema - pitting  Neuro - pupils reactive bilaterally; follows simple commands  Skin - no rash; stasis changes bilaterally LE    LABS:                        10.2   10.0  )-----------( 220      ( 28 Jun 2017 02:59 )             33.6     06-28    135  |  89<L>  |  68<H>  ----------------------------<  146<H>  4.5   |  31  |  2.30<H>    Ca    9.4      28 Jun 2017 02:59  Phos  4.3     06-28  Mg     2.2     06-28    TPro  7.0  /  Alb  2.7<L>  /  TBili  0.6  /  DBili  x   /  AST  16  /  ALT  <5<L>  /  AlkPhos  79  06-28    PT/INR - ( 28 Jun 2017 02:59 )   PT: 14.0 sec;   INR: 1.26     PTT - ( 28 Jun 2017 02:59 )  PTT:30.6 sec    ABG  ( 28 Jun 2017 06:35 ) 7.43/50/98/98 (Fi02 40%.PEEP 5)    RADIOLOGY & ADDITIONAL STUDIES:reviewed    Patient with severe chronic debility/poor functional status and multiple medical problems with recurrent acute on chronic CHF and severe aortic stenosis now s/p BAV    1. CV -   s/p BAV - uneventful by report  ECHO per protocol today  hemodynamically stable - not requiring pressor support  atrial fib - rate controlled at this time  remains on heparin infusion - monitor pTT and titrate accordingly - restarted post-procedure at 6a  Metoprolol 12.5 daily  Plavix    2. Pulm   known COPD with some component of chronic C02 retention - (50's)  currently on vent with minimal Fi02 requirement and ABG demonstrating good gas exchange  CXR noted this am  per surgery - plan bronch this am to assess -   low clinical suspicion for PNA at this time - afebrile - minimal fi02 requirement and minimal secretions per staff  post-bronch will assess for candidacy for extubation - likely directly to BIPAP  cont Nebs    3. Renal  baseline CKD - Cr reported 2.4 and noted prior transient HD requirement in 2016  good UO -   standing diuretic held this am per cardiology  daily weight and strict I/O  monitor electrolytes/Cr and UO closely  renal following    4. Endocrine  maintain glycemic control  HgA1c 5.7  POC testing 161/141/121  ISS    DVT and start GI prophylaxis  anticipate start TF if not extubated - check prealbumin    d/w patient/staff and cardiology    I have spent/provided stated minutes of critical care time to this patient:  60 min 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, CKD (baseline Cr 2-3), with transient HD requirement x 4 sessions Aug/2016, HTN, PVD, DM, MONICA, and TKR 03' presenting with inability to get OOB/further decline in mobility/functional status since discharged from Rehab in December    admitted June 11 with acute on chronic CHF exacerbation  EP interrogated [facility]  cardiology/renal/pulm/rehab/palliative team services following  Tx for Klebsiella UTI - Ceftriaxone 6/12-6/18  aggressive diuresis with significant weight loss documented  transient BIPAP requirement for hypercarbia (80's) - baseline 50's prompting transfer to CCU 6/13  Heparin infusion for known atrial fib   advanced directives: DNR/DNI with plan to rescind for any aortic procedure  of note - patient intermittently refusing to work with PT/BIPAP and other treatment recommendations    taken for BAV 6/27 - uneventful procedure reported  remains intubated this am - but awake and following during sedation vacation - Fi02 40% with ABG demonstrating good oxygenation and ventilation    minimal secretions reported per staff  no acute events overnight    ICU Vital Signs Last 24 Hrs  T(C): 36.6 (28 Jun 2017 05:00), Max: 36.6 (27 Jun 2017 14:26)  T(F): 97.9 (28 Jun 2017 05:00), Max: 97.9 (27 Jun 2017 14:26)  HR: 70 (28 Jun 2017 08:00) (60 - 92) - atrial fib  BP: 128/60 (28 Jun 2017 06:00) (110/51 - 140/65)  BP(mean): 91 (28 Jun 2017 06:00) (69 - 96)  ABP: 134/52 (28 Jun 2017 08:00) (106/42 - 154/62)  ABP(mean): 84 (28 Jun 2017 08:00) (66 - 98)  RR: 14 (28 Jun 2017 08:00) (11 - 17)  SpO2: 96% (28 Jun 2017 08:00) (90% - 100%)    Qtts: Heparin (1000U/hr)    I&O's Summary    27 Jun 2017 07:01  -  28 Jun 2017 07:00  --------------------------------------------------------  IN: 305 mL / OUT: 1365 mL / NET: -1060 mL    28 Jun 2017 07:01  -  28 Jun 2017 08:06  --------------------------------------------------------  IN: 10 mL / OUT: 100 mL / NET: -90 mL    UO /h    Vent settings: AC 12/550/50/5    Physical Exam    Heart- irregular irregular (+)murmur 3/6 MICKY  Lungs - dec BS bases (-)wheeze; rhonchi appreciated on ausculatation  Abd - (+)BS obese/soft (-)r/r/g  Ext - poor skin turgor with tenting on exam ; chronic stasis changes bilaterally - inguinal sites clean (-)hematoma; sacral edema - pitting  Neuro - pupils reactive bilaterally; follows simple commands  Skin - no rash; stasis changes bilaterally LE    LABS:                        10.2   10.0  )-----------( 220      ( 28 Jun 2017 02:59 )             33.6     06-28    135  |  89<L>  |  68<H>  ----------------------------<  146<H>  4.5   |  31  |  2.30<H>    Ca    9.4      28 Jun 2017 02:59  Phos  4.3     06-28  Mg     2.2     06-28    TPro  7.0  /  Alb  2.7<L>  /  TBili  0.6  /  DBili  x   /  AST  16  /  ALT  <5<L>  /  AlkPhos  79  06-28    PT/INR - ( 28 Jun 2017 02:59 )   PT: 14.0 sec;   INR: 1.26     PTT - ( 28 Jun 2017 02:59 )  PTT:30.6 sec    ABG  ( 28 Jun 2017 06:35 ) 7.43/50/98/98 (Fi02 40%.PEEP 5)    RADIOLOGY & ADDITIONAL STUDIES:reviewed    Patient with severe chronic debility/poor functional status and multiple medical problems with recurrent acute on chronic CHF and severe aortic stenosis now s/p BAV    1. CV -   s/p BAV - uneventful by report  ECHO per protocol today  hemodynamically stable - not requiring pressor support  atrial fib - rate controlled at this time  remains on heparin infusion - monitor pTT and titrate accordingly - restarted post-procedure at 6a  Metoprolol 12.5 daily  Plavix    2. Pulm   known COPD with some component of chronic C02 retention - (50's)  currently on vent with minimal Fi02 requirement and ABG demonstrating good gas exchange  CXR noted this am  per surgery - plan bronch this am to assess -   low clinical suspicion for PNA at this time - afebrile - minimal fi02 requirement and minimal secretions per staff  post-bronch will assess for candidacy for extubation - likely directly to BIPAP  cont Nebs    3. Renal  baseline CKD - Cr reported 2.4 and noted prior transient HD requirement in 2016  good UO -   standing diuretic held this am per cardiology  daily weight and strict I/O  monitor electrolytes/Cr and UO closely  renal following    4. Endocrine  maintain glycemic control  HgA1c 5.7  POC testing 161/141/121  ISS    DVT and start GI prophylaxis  anticipate start TF if not extubated - check prealbumin  Advanced Directives to be readdressed    per d/w primary team - possible transfer back to CCU    d/w patient/staff and cardiology    I have spent/provided stated minutes of critical care time to this patient:  60 min

## 2017-06-28 NOTE — PROGRESS NOTE ADULT - SUBJECTIVE AND OBJECTIVE BOX
CC: ACUTE CHRONIC CONGESTIVEHEART FAILURE UNSPECIFIE      INTERVAL HISTORY:S/P BAV  intubated      ROS: No chest pain, no sob, no abd pain. No n/v/d    PAST MEDICAL & SURGICAL HISTORY:  PVD (peripheral vascular disease)  Iron deficiency anemia  Lupus (systemic lupus erythematosus)  Paroxysmal atrial fibrillation  Aortic stenosis, severe  CKD (chronic kidney disease) stage 4, GFR 15-29 ml/min  Diabetes  HTN (hypertension)  CHF (congestive heart failure)  CAD (coronary artery disease)  COPD (chronic obstructive pulmonary disease)  History of total knee replacement: 2003  Presence of cardiac pacemaker: at [facility] by Dr Escoto      PHYSICAL EXAM:  T(C): 36.6 (06-28-17 @ 05:00), Max: 36.6 (06-27-17 @ 14:26)  HR: 70 (06-28-17 @ 09:00)  BP: 128/60 (06-28-17 @ 06:00) (110/51 - 140/65)  RR: 14 (06-28-17 @ 09:00)  SpO2: 97% (06-28-17 @ 09:00)  Wt(kg): --  I&O's Summary    27 Jun 2017 07:01  -  28 Jun 2017 07:00  --------------------------------------------------------  IN: 305 mL / OUT: 1365 mL / NET: -1060 mL    28 Jun 2017 07:01  -  28 Jun 2017 09:43  --------------------------------------------------------  IN: 20 mL / OUT: 200 mL / NET: -180 mL      Weight 84 (06-27 @ 13:21)  Generalintubated  HEENT: moist mucous membranes, no pallor/cyanosis.  Neck: no JVD visible.  Cardiac: S1, S2. RRR. No murmurs   Respratory:coarse rhonchi  Abdomen: soft. nontender. nondistended  Skin: no rashes.  Extremities: no LE edema b/l  Access:       DATA:                        10.2<L>  10.0  )-----------( 220      ( 28 Jun 2017 02:59 )             33.6<L>    Ferritin, Serum: 92.0 ng/mL (06-12 @ 05:42)      135    |  89<L>  |  68<H>  ----------------------------<  146<H>  Ca:9.4   (28 Jun 2017 02:59)  4.5     |  31     |  2.30<H>      eGFR if Non : 18 <L>  eGFR if : 21 <L>    TPro  7.0 g/dL  /  Alb  2.7 g/dL<L>  /  TBili  0.6 mg/dL  /  DBili  x      /  AST  16 U/L  /  ALT  <5 U/L<L>  /  AlkPhos  79 U/L  28 Jun 2017 02:59                    MEDICATIONS  (STANDING):  ferrous    sulfate 325 milliGRAM(s) Oral every 8 hours  acetazolamide    Tablet 500 milliGRAM(s) Oral daily  clopidogrel Tablet 75 milliGRAM(s) Oral daily  hydrocortisone 2.5% Cream 1 Application(s) Topical two times a day  Nephrocaps 1 Capsule(s) Oral daily  gabapentin 100 milliGRAM(s) Oral every other day  metoprolol succinate ER 12.5 milliGRAM(s) Oral daily  ALBUTerol/ipratropium for Nebulization 3 milliLiter(s) Nebulizer every 4 hours  atorvastatin 40 milliGRAM(s) Oral at bedtime  buDESOnide   0.25 milliGRAM(s) Respule 0.25 milliGRAM(s) Inhalation every 12 hours  heparin  Infusion 1000 Unit(s)/Hr (10 mL/Hr) IV Continuous <Continuous>  sodium chloride 0.45%. 1000 milliLiter(s) (10 mL/Hr) IV Continuous <Continuous>  propofol Infusion 5 MICROgram(s)/kG/Min (2.52 mL/Hr) IV Continuous <Continuous>  pantoprazole  Injectable 40 milliGRAM(s) IV Push daily  insulin lispro (HumaLOG) corrective regimen sliding scale   SubCutaneous every 6 hours  dextrose 5%. 1000 milliLiter(s) (50 mL/Hr) IV Continuous <Continuous>  dextrose 50% Injectable 12.5 Gram(s) IV Push once  dextrose 50% Injectable 25 Gram(s) IV Push once  dextrose 50% Injectable 25 Gram(s) IV Push once    MEDICATIONS  (PRN):  acetylcysteine 20% Inhalation 5 milliLiter(s) Inhalation every 6 hours PRN shortness of breath  artificial  tears Solution 1 Drop(s) Both EYES three times a day PRN Dry Eyes  acetaminophen   Tablet. 650 milliGRAM(s) Oral every 6 hours PRN Mild Pain (1 - 3)  dextrose Gel 1 Dose(s) Oral once PRN Blood Glucose LESS THAN 70 milliGRAM(s)/deciliter  glucagon  Injectable 1 milliGRAM(s) IntraMuscular once PRN Glucose LESS THAN 70 milligrams/deciliter

## 2017-06-28 NOTE — PROGRESS NOTE ADULT - ASSESSMENT
86 yo female with pmh of chronic diastolic CHF (EF 50-55% 11/16'), mod to severe AS (ALEKS 0.5cm), pafib (on Coumadin), CAD s/p PCI x 3 stent (04', 06' and 12')-last cath 3/15' PCI dLM w/ Impella support, Lupus (not on meds), COPD (prior 30yr ppk hx), PNA, PPM 2011 for bradycardia, CKD (baseline Cr 2-3), hx of Renal failure requiring HD x 4 session 8/16' @ [facility], HTN, PVD, and Type 2 DM (no longer on meds), MONICA, and TKR 03' presents to [facility] c/o of inability to get OOB x 3 days,s ob and increased LE edema. Admitted for CHF exacerbation, transferred from [facility] to CCU for hypercapnic resp failure s/p BIPAP and IV diuresis step back down to [facility], continued diuresis   s/p BAV (POD # 1)

## 2017-06-28 NOTE — PROGRESS NOTE ADULT - SUBJECTIVE AND OBJECTIVE BOX
CC/ HPI:  88 yo female with chronic diastolic CHF (EF 50-55% 11/16'), mod to severe aortic stenosis, pAFIB, CAD s/p PCI x 3 stent, Lupus (not on meds), COPD, CKD (baseline Cr 2-3), admitted for CHF exacerbation, admitted with hypercapnic respiratory failure, status post BAV, resting on ventilator without distress.    ,     PAST MEDICAL & SURGICAL HISTORY:  PVD (peripheral vascular disease)  Iron deficiency anemia  Lupus (systemic lupus erythematosus)  Paroxysmal atrial fibrillation  Aortic stenosis, severe  CKD (chronic kidney disease) stage 4, GFR 15-29 ml/min  Diabetes  HTN (hypertension)  CHF (congestive heart failure)  CAD (coronary artery disease)  COPD (chronic obstructive pulmonary disease)  History of total knee replacement: 2003  Cardiac pacemaker: at [facility] by Dr Escoto, 2011    SOCHX:  + tobacco,  -  alcohol    FMHX: FA/MO  - contributory     ROS reviewed below with positive findings marked (+) :  GEN:  fever, chills ENT: tracheostomy,   epistaxis,  sinusitis COR: +CAD, +CHF,  +HTN, +dysrhythmia PUL: +COPD, ILD, asthma, pneumonia GI: PEG, dysphagia, hemorrhage, other PETEY: +kidney disease, electrolyte disorder HEM:  +anemia, thrombus, coagulopathy, cancer ENDO:  thyroid disease, +diabetes mellitus CNS:  dementia, stroke, seizure, PSY:  depression, anxiety, other      MEDICATIONS  (STANDING):  ferrous    sulfate 325 milliGRAM(s) Oral every 8 hours  acetazolamide    Tablet 500 milliGRAM(s) Oral daily  clopidogrel Tablet 75 milliGRAM(s) Oral daily  hydrocortisone 2.5% Cream 1 Application(s) Topical two times a day  Nephrocaps 1 Capsule(s) Oral daily  gabapentin 100 milliGRAM(s) Oral every other day  metoprolol succinate ER 12.5 milliGRAM(s) Oral daily  ALBUTerol/ipratropium for Nebulization 3 milliLiter(s) Nebulizer every 4 hours  atorvastatin 40 milliGRAM(s) Oral at bedtime  buDESOnide   0.25 milliGRAM(s) Respule 0.25 milliGRAM(s) Inhalation every 12 hours  heparin  Infusion 1000 Unit(s)/Hr (10 mL/Hr) IV Continuous <Continuous>  sodium chloride 0.45%. 1000 milliLiter(s) (10 mL/Hr) IV Continuous <Continuous>  propofol Infusion 5 MICROgram(s)/kG/Min (2.52 mL/Hr) IV Continuous <Continuous>  pantoprazole  Injectable 40 milliGRAM(s) IV Push daily  insulin lispro (HumaLOG) corrective regimen sliding scale   SubCutaneous every 6 hours      MEDICATIONS  (PRN):  artificial  tears Solution 1 Drop(s) Both EYES three times a day PRN Dry Eyes  acetaminophen   Tablet. 650 milliGRAM(s) Oral every 6 hours PRN Mild Pain (1 - 3)  dextrose Gel 1 Dose(s) Oral once PRN Blood Glucose LESS THAN 70 milliGRAM(s)/deciliter  glucagon  Injectable 1 milliGRAM(s) IntraMuscular once PRN Glucose LESS THAN 70 milligrams/deciliter      Vital Signs Last 24 Hrs  T(C): 36.6 (28 Jun 2017 05:00), Max: 36.6 (27 Jun 2017 14:26)  T(F): 97.9 (28 Jun 2017 05:00), Max: 97.9 (27 Jun 2017 14:26)  HR: 70 (28 Jun 2017 10:00) (60 - 92)  BP: 128/60 (28 Jun 2017 06:00) (110/51 - 140/65)  BP(mean): 91 (28 Jun 2017 06:00) (69 - 96)  RR: 11 (28 Jun 2017 10:00) (11 - 17)  SpO2: 97% (28 Jun 2017 10:00) (90% - 100%)  Mode: AC/ CMV (Assist Control/ Continuous Mandatory Ventilation)  RR (machine): 12  TV (machine): 550  FiO2: 40  PEEP: 5  ITime: 1  MAP: 9  PIP: 22    GENERAL:         comfortable,  - distress.  HEENT:            - trauma,  - icterus,  - injection,  - nasal discharge.  NECK:              - jugular venous distention, - thyromegaly.  LYMPH:           - lymphadenopathy, - masses.  RESP:              + crackles,   - rhonchi,   - wheezes.   COR:                S1S2 - gallops,  - rubs.  ABD:                bowel sounds,   soft, - tender, - distended, - organomegaly.  EXT/MSC:         - cyanosis,  - clubbing,  + edema.    NEURO:           - alert,  + responds to stimuli.    ABG - ( 28 Jun 2017 06:35 )  pH: 7.43  /  pCO2: 50    /  pO2: 98    / HCO3: 33    / Base Excess: 7.3   /  SaO2: 98                          10.2   10.0  )-----------( 220      ( 28 Jun 2017 02:59 )             33.6     06-28    135  |  89<L>  |  68<H>  ----------------------------<  146<H>  4.5   |  31  |  2.30<H>      CXR (6/28)  ETT, bilateral infiltrate/congestion/pleural effusion      ASSESSMENT/PLAN    1)  Status post BAV  2)  Respiratory failure  3)  Cardiomyopathy  3)  Chronic obstructive pulmonary disease  4)  Pulmonary hypertension    Spontaneous breathing trial to evaluate for extubation  Reduce FiO2 as tolerated.  Bronchodilators:  Atrovent/ albuterol q 4-6 hours.  Corticosteroids:  budesonide, consider systemic corticosteroids  Cardiac/HTN: diuresis as needed  GI: Rx/ prophylaxis c PPI/H2B  Heme: Rx/VT on AC  Discussed with critical care physician.

## 2017-06-28 NOTE — PROGRESS NOTE ADULT - SUBJECTIVE AND OBJECTIVE BOX
cc: follow-up evaluation and management of acute/chronic diastolic chf and aortic stenosis  subjective:  now extubated; events noted; patient resting     PAST MEDICAL & SURGICAL HISTORY:  PVD (peripheral vascular disease)  Iron deficiency anemia  Lupus (systemic lupus erythematosus)  Paroxysmal atrial fibrillation  Aortic stenosis, severe  CKD (chronic kidney disease) stage 4, GFR 15-29 ml/min  Diabetes  HTN (hypertension)  CHF (congestive heart failure)  CAD (coronary artery disease)  COPD (chronic obstructive pulmonary disease)  History of total knee replacement:   Presence of cardiac pacemaker: at [facility] by Dr Escoto    MEDICATIONS  (STANDING):  ferrous    sulfate 325 milliGRAM(s) Oral every 8 hours  acetazolamide    Tablet 500 milliGRAM(s) Oral daily  clopidogrel Tablet 75 milliGRAM(s) Oral daily  hydrocortisone 2.5% Cream 1 Application(s) Topical two times a day  Nephrocaps 1 Capsule(s) Oral daily  gabapentin 100 milliGRAM(s) Oral every other day  metoprolol succinate ER 12.5 milliGRAM(s) Oral daily  ALBUTerol/ipratropium for Nebulization 3 milliLiter(s) Nebulizer every 4 hours  atorvastatin 40 milliGRAM(s) Oral at bedtime  buDESOnide   0.25 milliGRAM(s) Respule 0.25 milliGRAM(s) Inhalation every 12 hours  sodium chloride 0.45%. 1000 milliLiter(s) (10 mL/Hr) IV Continuous <Continuous>  insulin lispro (HumaLOG) corrective regimen sliding scale   SubCutaneous every 6 hours  dextrose 5%. 1000 milliLiter(s) (50 mL/Hr) IV Continuous <Continuous>  dextrose 50% Injectable 12.5 Gram(s) IV Push once  dextrose 50% Injectable 25 Gram(s) IV Push once  dextrose 50% Injectable 25 Gram(s) IV Push once  heparin  Infusion 1200 Unit(s)/Hr (12 mL/Hr) IV Continuous <Continuous>  pantoprazole    Tablet 40 milliGRAM(s) Oral before breakfast    MEDICATIONS  (PRN):  acetylcysteine 20% Inhalation 5 milliLiter(s) Inhalation every 6 hours PRN shortness of breath  artificial  tears Solution 1 Drop(s) Both EYES three times a day PRN Dry Eyes  acetaminophen   Tablet. 650 milliGRAM(s) Oral every 6 hours PRN Mild Pain (1 - 3)  dextrose Gel 1 Dose(s) Oral once PRN Blood Glucose LESS THAN 70 milliGRAM(s)/deciliter  glucagon  Injectable 1 milliGRAM(s) IntraMuscular once PRN Glucose LESS THAN 70 milligrams/deciliter    ICU Vital Signs Last 24 Hrs  T(C): 37.6 (2017 22:05), Max: 37.9 (2017 19:00)  T(F): 99.6 (2017 22:05), Max: 100.3 (2017 19:00)  HR: 74 (2017 22:00) (60 - 79)  BP: 111/48 (2017 17:00) (111/48 - 128/60)  BP(mean): 74 (2017 17:00) (74 - 91)  ABP: 100/42 (2017 22:00) (100/42 - 138/56)  ABP(mean): 60 (2017 22:00) (60 - 88)  RR: 17 (2017 22:00) (11 - 26)  SpO2: 98% (2017 22:00) (94% - 100%)      PHYSICAL EXAM:  General: nad; nc in position  heent - no overt nasal congestion  neck - ~7-8 cm jvd  Cardiac: rr; systolic murmur present  Pulmonary:  decreased breath sounds  Abdomen: soft abd  extremities: edema present bilat le  vasc - warm le    LABS:                          9.6    9.7   )-----------( 257      ( 2017 21:03 )             31.3       138  |  93<L>  |  70<H>  ----------------------------<  169<H>  4.3   |  31  |  2.40<H>    Ca    8.6      2017 21:03  Phos  4.7       Mg     2.1         TPro  7.0  /  Alb  3.0<L>  /  TBili  0.6  /  DBili  x   /  AST  17  /  ALT  <5<L>  /  AlkPhos  71      PT/INR - ( 2017 21:03 )   PT: 13.3 sec;   INR: 1.19          PTT - ( 2017 21:03 )  PTT:43.9 sec    DIAGNOSTIC TESTS:   2017 ecg reviewed on 2017    < from: Xray Chest 1 View AP-PORTABLE IMMEDIATE (17 @ 12:07) >    EXAM:  XR CHEST 1 VIEW PORT IMMEDIATE                          PROCEDURE DATE:  2017                     INTERPRETATION:  Portable chest    History: Post bronc bronchoscopy exam x-ray. Follow-up abnormal exam.    Scattered lung infiltrates again noted. Overall similar appearance to   prior exam earlier same day. Endotracheal tube in satisfactory position.   No pneumothorax identified.            "Thank you for the opportunity to participate in the care of this   patient."        JUDE LOPEZ M.D., ATTENDING RADIOLOGIST  This document has been electronically signed. 2017  3:03PM    < end of copied text >      < from: Echocardiogram (17 @ 12:03) >  EXAM:  ECHOCARDIOGRAM (CARDIOL)                          PROCEDURE DATE:  2017                        INTERPRETATION:  Patient Height: 165.0 cm  Patient Weight: 84.0 kg  Systolic Pressure: 128 mmHg  Diastolic Pressure: 52 mmHg  BSA: 1.9 m^2  Interpretation Summary  A complete two-dimensional transthoracic echocardiogram was performed (2D,   M-mode, spectral and color flow doppler).  Study Quality: Fair.Left   ventricular hypertrophy presentThe ejection fraction could not be   accurately  calculated (poor endocardiac resolution)  The left atrium is severely   dilated.Right atrial size is normal.The right ventricle is normal in   size and   function.Abnormal (paradoxical) septal motion consistent with   LBBBCalcified   aortic valve.There is trace to mild aortic regurgitation.There is   Moderate to   severe aortic stenosis.The peak pressure gradient is 45 mmHg.The mean   pressure   gradient is 30 mmHg.The calculated aortic valve area using the continuity   equation is 1 cm2.The calculated stroke volume index is 39 cc/m2 (normal   >35cc/m2).There is moderate mitral annular calcification.There is mild   mitral   regurgitation.Structurally normal tricuspid valve.Structurally normal   pulmonic   valve.  Procedure Details  A completetwo-dimensional transthoracic echocardiogram was performed (2D,  M-mode, spectral and color flow doppler).  Study Quality: Fair.  Left Ventricle  Left ventricular hypertrophy present  The ejection fraction could not be accurately calculated (poor endocardiac  resolution)  Abnormal (paradoxical) septal motion consistent with LBBB  Left Atrium  The left atrium is severely dilated.  Right Atrium  Right atrial size is normal.  Right Ventricle  The right ventricle is normal in size and function.  Aortic Valve  Calcified aortic valve.  There is trace to mild aortic regurgitation.  There is Moderate to severe aortic stenosis.  The peak pressure gradient is 45 mmHg.  The mean pressure gradient is 30 mmHg.  The calculated aortic valve area using the continuity equation is 1 cm2.  The calculated stroke volume index is 39 cc/m2 (normal >35cc/m2).  Mitral Valve  There is moderate mitral annular calcification.  There is mild mitral regurgitation.  Tricuspid Valve  Structurally normal tricuspid valve.  Pulmonic Valve  Structurally normal pulmonic valve.  Arteries and Venous System  No aortic root dilatation.  The IVC is dilated (>2.1 cm) with an abnormal inspiratory collapse (<50%)  consistent with elevated right atrial pressure.  Pericardium / Pleura  There is no pericardial effusion.  Doppler Measurements & Calculations  MV E point: 140.2 cm/sec  MV A point: 53.4 cm/sec  MV E/A: 2.6  MV dec slope: 636.0 cm/sec^2  Ao V2 max: 319.4 cm/sec  Ao max P.8 mmHg  Ao max PG (full): 36.2 mmHg  Ao V2 mean: 251.5 cm/sec  Ao mean P.0 mmHg  Ao mean PG (full): 24.4 mmHg  Ao V2 VTI: 80.8 cm  ALEKS(I,A): 0.9 cm^2  ALEKS(I,D): 0.9 cm^2  ALEKS(V,A): 1.1 cm^2  ALEKS(V,D): 1.1 cm^2  LV max P.7 mmHg  LV mean P.6 mmHg  LV V1 max: 108 cm/sec  LV V1 mean: 76.1 cm/sec  LV V1 VTI: 24.0 cm  MR max graciela: 538.0 cm/sec  MR max P.8 mmHg  MR mean graciela: 400.3 cm/sec  MR mean P.0 mmHg  MR VTI: 181.9 cm  SV(Ao): 690.6 ml  SI(Ao): 360.9 ml/m^2  SV(LVOT): 75.0 ml  SI(LVOT): 39.2 ml/m^2  TR Max graciela: 252.8cm/sec  MMode 2D Measurements & Calculations  IVSd: 1.2 cm  LVIDd: 6.4 cm  LVIDs: 5.3 cm  LVPWd: 1.1 cm  IVS/LVPW: 1.0  FS: 18.0 %  EDV(Teich): 209.4 ml  ESV(Teich): 132.7 ml  LV mass(C)d: 331.0 grams  LV mass(C)dI: 173.0 grams/m^2  SI(cubed): 61.9 ml/m^2  Ao root diam: 3.3 cm  Ao root area: 8.5 cm^2  LA dimension: 4.5 cm  LA/Ao: 1.4  LVOT diam: 2.0 cm  LVOT area: 3.1 cm^2  LVOT area (M): 3.1 cm^2  EDV(MOD-sp4): 112 ml  EDV(MOD-sp2): 105 ml  Interpreting Physician:Perry Lacy MD electronicallysigned on   2017 12:24:22                "Thank you for the opportunity to participate in the care of this   patient."        PERRY LACY M.D., ATTENDING CARDIOLOGIST  This document has been electronically signed. 2017 12:24PM        < end of copied text >        EXAM:  XR CHEST 1 VIEW PORT URGENT                          PROCEDURE DATE:  2017                     INTERPRETATION:  Clinical History: Congestion    Portable examination the chest demonstrates no interval change congestion   and/or infiltrates in comparison to prior examination of the chest   2017. Bilateral effusions. No interval change position remaining   support devices. Cardiomegaly.    Impression: No interval change lung pathology            "Thank you for the opportunity to participate in the care of this   patient."        KATHARINA MOROCHO M.D., ATTENDING RADIOLOGIST  This document has been electronically signed. 2017 10:38AM      2017 ecg reviewed on 2017    EXAM:  XR CHEST 1 VIEW PORT ROUTINE                          PROCEDURE DATE:  2017                     INTERPRETATION:  Indication: chf    A single portable view of the chest is submitted. Comparison is made to   the most recent prior study dated 2017. Bilateral pulmonary   infiltrates and effusions are similar to the previous examination.   Impression:    No significant interval change            "Thank you for the opportunity to participate in the care of this   patient."        MASOUD WORKMAN M.D., ATTENDING RADIOLOGIST  This document has been electronically signed. 2017  4:33PM

## 2017-06-29 LAB
ALBUMIN SERPL ELPH-MCNC: 3.1 G/DL — LOW (ref 3.3–5)
ALP SERPL-CCNC: 71 U/L — SIGNIFICANT CHANGE UP (ref 40–120)
ALT FLD-CCNC: <5 U/L — LOW (ref 10–45)
ANION GAP SERPL CALC-SCNC: 14 MMOL/L — SIGNIFICANT CHANGE UP (ref 5–17)
APTT BLD: 43.4 SEC — HIGH (ref 27.5–37.4)
APTT BLD: 45.7 SEC — HIGH (ref 27.5–37.4)
APTT BLD: 72.7 SEC — HIGH (ref 27.5–37.4)
AST SERPL-CCNC: 13 U/L — SIGNIFICANT CHANGE UP (ref 10–40)
BILIRUB SERPL-MCNC: 0.6 MG/DL — SIGNIFICANT CHANGE UP (ref 0.2–1.2)
BUN SERPL-MCNC: 80 MG/DL — HIGH (ref 7–23)
CALCIUM SERPL-MCNC: 9.7 MG/DL — SIGNIFICANT CHANGE UP (ref 8.4–10.5)
CHLORIDE SERPL-SCNC: 91 MMOL/L — LOW (ref 96–108)
CO2 SERPL-SCNC: 32 MMOL/L — HIGH (ref 22–31)
CREAT SERPL-MCNC: 2.5 MG/DL — HIGH (ref 0.5–1.3)
GAS PNL BLDA: SIGNIFICANT CHANGE UP
GLUCOSE SERPL-MCNC: 156 MG/DL — HIGH (ref 70–99)
GRAM STN FLD: SIGNIFICANT CHANGE UP
HCT VFR BLD CALC: 29.9 % — LOW (ref 34.5–45)
HGB BLD-MCNC: 9.3 G/DL — LOW (ref 11.5–15.5)
INR BLD: 1.19 — HIGH (ref 0.88–1.16)
INR BLD: 1.22 — HIGH (ref 0.88–1.16)
MAGNESIUM SERPL-MCNC: 2.2 MG/DL — SIGNIFICANT CHANGE UP (ref 1.6–2.6)
MCHC RBC-ENTMCNC: 27 PG — SIGNIFICANT CHANGE UP (ref 27–34)
MCHC RBC-ENTMCNC: 31.1 G/DL — LOW (ref 32–36)
MCV RBC AUTO: 86.9 FL — SIGNIFICANT CHANGE UP (ref 80–100)
PHOSPHATE SERPL-MCNC: 4.7 MG/DL — HIGH (ref 2.5–4.5)
PLATELET # BLD AUTO: 261 K/UL — SIGNIFICANT CHANGE UP (ref 150–400)
POTASSIUM SERPL-MCNC: 3.9 MMOL/L — SIGNIFICANT CHANGE UP (ref 3.5–5.3)
POTASSIUM SERPL-SCNC: 3.9 MMOL/L — SIGNIFICANT CHANGE UP (ref 3.5–5.3)
PROT SERPL-MCNC: 7.1 G/DL — SIGNIFICANT CHANGE UP (ref 6–8.3)
PROTHROM AB SERPL-ACNC: 13.3 SEC — HIGH (ref 9.8–12.7)
PROTHROM AB SERPL-ACNC: 13.6 SEC — HIGH (ref 9.8–12.7)
RBC # BLD: 3.44 M/UL — LOW (ref 3.8–5.2)
RBC # FLD: 16.2 % — SIGNIFICANT CHANGE UP (ref 10.3–16.9)
SODIUM SERPL-SCNC: 137 MMOL/L — SIGNIFICANT CHANGE UP (ref 135–145)
SPECIMEN SOURCE: SIGNIFICANT CHANGE UP
WBC # BLD: 11 K/UL — HIGH (ref 3.8–10.5)
WBC # FLD AUTO: 11 K/UL — HIGH (ref 3.8–10.5)

## 2017-06-29 PROCEDURE — 71010: CPT | Mod: 26

## 2017-06-29 PROCEDURE — 93010 ELECTROCARDIOGRAM REPORT: CPT

## 2017-06-29 RX ORDER — FUROSEMIDE 40 MG
20 TABLET ORAL ONCE
Qty: 0 | Refills: 0 | Status: COMPLETED | OUTPATIENT
Start: 2017-06-29 | End: 2017-06-29

## 2017-06-29 RX ORDER — ALBUMIN HUMAN 25 %
250 VIAL (ML) INTRAVENOUS ONCE
Qty: 0 | Refills: 0 | Status: COMPLETED | OUTPATIENT
Start: 2017-06-29 | End: 2017-06-29

## 2017-06-29 RX ORDER — HEPARIN SODIUM 5000 [USP'U]/ML
1400 INJECTION INTRAVENOUS; SUBCUTANEOUS
Qty: 25000 | Refills: 0 | Status: DISCONTINUED | OUTPATIENT
Start: 2017-06-29 | End: 2017-06-29

## 2017-06-29 RX ORDER — FUROSEMIDE 40 MG
40 TABLET ORAL DAILY
Qty: 0 | Refills: 0 | Status: DISCONTINUED | OUTPATIENT
Start: 2017-06-29 | End: 2017-06-30

## 2017-06-29 RX ORDER — CALCIUM GLUCONATE 100 MG/ML
2 VIAL (ML) INTRAVENOUS ONCE
Qty: 2 | Refills: 0 | Status: COMPLETED | OUTPATIENT
Start: 2017-06-29 | End: 2017-06-29

## 2017-06-29 RX ORDER — SODIUM CHLORIDE 9 MG/ML
3 INJECTION INTRAMUSCULAR; INTRAVENOUS; SUBCUTANEOUS EVERY 8 HOURS
Qty: 0 | Refills: 0 | Status: DISCONTINUED | OUTPATIENT
Start: 2017-06-29 | End: 2017-07-06

## 2017-06-29 RX ORDER — FUROSEMIDE 40 MG
40 TABLET ORAL ONCE
Qty: 0 | Refills: 0 | Status: COMPLETED | OUTPATIENT
Start: 2017-06-29 | End: 2017-06-29

## 2017-06-29 RX ORDER — POTASSIUM CHLORIDE 20 MEQ
10 PACKET (EA) ORAL ONCE
Qty: 0 | Refills: 0 | Status: COMPLETED | OUTPATIENT
Start: 2017-06-29 | End: 2017-06-29

## 2017-06-29 RX ORDER — WARFARIN SODIUM 2.5 MG/1
1.5 TABLET ORAL ONCE
Qty: 0 | Refills: 0 | Status: COMPLETED | OUTPATIENT
Start: 2017-06-29 | End: 2017-06-29

## 2017-06-29 RX ORDER — HEPARIN SODIUM 5000 [USP'U]/ML
1500 INJECTION INTRAVENOUS; SUBCUTANEOUS
Qty: 25000 | Refills: 0 | Status: DISCONTINUED | OUTPATIENT
Start: 2017-06-29 | End: 2017-07-03

## 2017-06-29 RX ADMIN — Medication 12.5 MILLIGRAM(S): at 11:25

## 2017-06-29 RX ADMIN — Medication 20 MILLIGRAM(S): at 15:05

## 2017-06-29 RX ADMIN — Medication 2: at 11:38

## 2017-06-29 RX ADMIN — Medication 3 MILLILITER(S): at 02:14

## 2017-06-29 RX ADMIN — Medication 125 MILLILITER(S): at 06:37

## 2017-06-29 RX ADMIN — GABAPENTIN 100 MILLIGRAM(S): 400 CAPSULE ORAL at 11:26

## 2017-06-29 RX ADMIN — Medication 125 MILLILITER(S): at 02:32

## 2017-06-29 RX ADMIN — ATORVASTATIN CALCIUM 40 MILLIGRAM(S): 80 TABLET, FILM COATED ORAL at 21:56

## 2017-06-29 RX ADMIN — Medication 200 GRAM(S): at 06:37

## 2017-06-29 RX ADMIN — WARFARIN SODIUM 1.5 MILLIGRAM(S): 2.5 TABLET ORAL at 21:56

## 2017-06-29 RX ADMIN — Medication 3 MILLILITER(S): at 05:57

## 2017-06-29 RX ADMIN — Medication 0.25 MILLIGRAM(S): at 05:57

## 2017-06-29 RX ADMIN — Medication 1 APPLICATION(S): at 07:01

## 2017-06-29 RX ADMIN — SODIUM CHLORIDE 3 MILLILITER(S): 9 INJECTION INTRAMUSCULAR; INTRAVENOUS; SUBCUTANEOUS at 21:46

## 2017-06-29 RX ADMIN — Medication: at 23:54

## 2017-06-29 RX ADMIN — Medication 200 GRAM(S): at 00:22

## 2017-06-29 RX ADMIN — Medication 40 MILLIGRAM(S): at 01:50

## 2017-06-29 RX ADMIN — Medication 3 MILLILITER(S): at 11:05

## 2017-06-29 RX ADMIN — Medication 325 MILLIGRAM(S): at 21:56

## 2017-06-29 RX ADMIN — Medication 325 MILLIGRAM(S): at 14:11

## 2017-06-29 RX ADMIN — Medication 650 MILLIGRAM(S): at 09:47

## 2017-06-29 RX ADMIN — Medication 650 MILLIGRAM(S): at 12:32

## 2017-06-29 RX ADMIN — Medication 125 MILLILITER(S): at 14:09

## 2017-06-29 RX ADMIN — Medication 40 MILLIGRAM(S): at 18:45

## 2017-06-29 RX ADMIN — Medication 20 MILLIGRAM(S): at 06:37

## 2017-06-29 RX ADMIN — ACETAZOLAMIDE 500 MILLIGRAM(S): 250 TABLET ORAL at 11:26

## 2017-06-29 RX ADMIN — Medication 3 MILLILITER(S): at 19:00

## 2017-06-29 RX ADMIN — Medication 325 MILLIGRAM(S): at 06:37

## 2017-06-29 RX ADMIN — Medication 3 MILLILITER(S): at 21:56

## 2017-06-29 RX ADMIN — PANTOPRAZOLE SODIUM 40 MILLIGRAM(S): 20 TABLET, DELAYED RELEASE ORAL at 06:37

## 2017-06-29 RX ADMIN — CLOPIDOGREL BISULFATE 75 MILLIGRAM(S): 75 TABLET, FILM COATED ORAL at 11:26

## 2017-06-29 RX ADMIN — Medication 1 CAPSULE(S): at 11:26

## 2017-06-29 NOTE — PROGRESS NOTE ADULT - ASSESSMENT
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 c/o of inability to get OOB x 3 days,s ob and increased LE edema. Admitted for CHF exacerbation, transferred from [facility] to CCU for hypercapnic resp failure s/p BIPAP and IV diuresis step back down to [facility], continued diuresis   s/p BAV (POD # 2)

## 2017-06-29 NOTE — PROGRESS NOTE ADULT - PROBLEM SELECTOR PLAN 10
DVT prophylaxis: on Heparin drip    Full Code; advanced directions to be re-addressed.     PT consult: f/u with PT for recommendations  Dispo: Pending PT eval. DVT prophylaxis: on Heparin drip    Full Code; advanced directions to be re-addressed.     PT consult: f/u with PT for recommendations  Dispo: Pending PT eval.  d/w Dr. Soto.

## 2017-06-29 NOTE — PROGRESS NOTE ADULT - SUBJECTIVE AND OBJECTIVE BOX
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 Patient aggressively diuresed with IV lasix net negative ~25 liters with good urine output. Completed course of ceftriaxone for complicated UTI. Patient seen by Structural heart Dr. Barger, and plan for  BAV early next week. Patient also on heparin gtt for paroxysmal afib, PTTs therapeutic. Patient titrated down from Bipap --> HFNC --->Nasal cannula. Patient mental status at apparent baseline. Patient underwent BAV with structural heart and transferred back to [facility] for discharge planning and coumadin bridge under the care of Dr. Robert Soto.     INTERVAL HPI/OVERNIGHT EVENTS:  Patient was seen and examined at bedside.     VITAL SIGNS:  T(F): 97.8 (06-29-17 @ 17:31)  HR: 80 (06-29-17 @ 19:00)  BP: 108/53 (06-29-17 @ 19:00)  RR: 15 (06-29-17 @ 19:00)  SpO2: 97% (06-29-17 @ 19:00)  Wt(kg): --    PHYSICAL EXAM:    Constitutional: WDWN, NAD  Eyes: PERRL, EOMI, sclera non-icteric  Neck: supple, trachea midline, no masses, no JVD  Respiratory: CTA b/l, good air entry b/l, no wheezing, rhonchi, rales, without accessory muscle use and no intercostal retractions  Cardiovascular: RRR, normal S1S2, no M/R/G  Gastrointestinal: soft, NTND, no masses palpable, BS normal  Extremities: Warm, well perfused, pulses equal bilateral upper and lower extremities, no edema, no clubbing  Neurological: AAOx3, CN Grossly intact  Skin: Normal temperature, warm, dry    MEDICATIONS  (STANDING):  ferrous    sulfate 325 milliGRAM(s) Oral every 8 hours  acetazolamide    Tablet 500 milliGRAM(s) Oral daily  clopidogrel Tablet 75 milliGRAM(s) Oral daily  hydrocortisone 2.5% Cream 1 Application(s) Topical two times a day  Nephrocaps 1 Capsule(s) Oral daily  gabapentin 100 milliGRAM(s) Oral every other day  metoprolol succinate ER 12.5 milliGRAM(s) Oral daily  ALBUTerol/ipratropium for Nebulization 3 milliLiter(s) Nebulizer every 4 hours  atorvastatin 40 milliGRAM(s) Oral at bedtime  buDESOnide   0.25 milliGRAM(s) Respule 0.25 milliGRAM(s) Inhalation every 12 hours  sodium chloride 0.45%. 1000 milliLiter(s) (10 mL/Hr) IV Continuous <Continuous>  insulin lispro (HumaLOG) corrective regimen sliding scale   SubCutaneous every 6 hours  dextrose 5%. 1000 milliLiter(s) (50 mL/Hr) IV Continuous <Continuous>  dextrose 50% Injectable 12.5 Gram(s) IV Push once  dextrose 50% Injectable 25 Gram(s) IV Push once  dextrose 50% Injectable 25 Gram(s) IV Push once  pantoprazole    Tablet 40 milliGRAM(s) Oral before breakfast  heparin  Infusion 1500 Unit(s)/Hr (15 mL/Hr) IV Continuous <Continuous>  sodium chloride 0.9% lock flush 3 milliLiter(s) IV Push every 8 hours  furosemide    Tablet 40 milliGRAM(s) Oral daily    MEDICATIONS  (PRN):  acetylcysteine 20% Inhalation 5 milliLiter(s) Inhalation every 6 hours PRN shortness of breath  artificial  tears Solution 1 Drop(s) Both EYES three times a day PRN Dry Eyes  acetaminophen   Tablet. 650 milliGRAM(s) Oral every 6 hours PRN Mild Pain (1 - 3)  dextrose Gel 1 Dose(s) Oral once PRN Blood Glucose LESS THAN 70 milliGRAM(s)/deciliter  glucagon  Injectable 1 milliGRAM(s) IntraMuscular once PRN Glucose LESS THAN 70 milligrams/deciliter      Allergies    No Known Allergies    Intolerances        LABS:                        9.3    11.0  )-----------( 261      ( 29 Jun 2017 03:06 )             29.9     06-29    137  |  91<L>  |  80<H>  ----------------------------<  156<H>  3.9   |  32<H>  |  2.50<H>    Ca    9.7      29 Jun 2017 03:05  Phos  4.7     06-29  Mg     2.2     06-29    TPro  7.1  /  Alb  3.1<L>  /  TBili  0.6  /  DBili  x   /  AST  13  /  ALT  <5<L>  /  AlkPhos  71  06-29    PT/INR - ( 29 Jun 2017 03:05 )   PT: 13.3 sec;   INR: 1.19          PTT - ( 29 Jun 2017 12:42 )  PTT:45.7 sec      RADIOLOGY & ADDITIONAL TESTS: Hospital Course: 88 yo female with PHMx of chronic diastolic CHF (EF 50-55% 11/16'), severe  AS (ALEKS 0.5cm)-not a surgical candidate, PAF (on Coumadin), CAD s/p PCI x 3 stent (04', 06' and 12')-last cath 3/15' PCI dLM w/ Impella support, Lupus (not on meds), COPD (prior 30yr ppk hx), PNA, 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 Patient aggressively diuresed with IV lasix net negative ~25 liters with good urine output. Completed course of ceftriaxone for complicated UTI. Patient seen by Structural heart Dr. Barger, and plan for  BAV early next week. Patient also on heparin gtt for paroxysmal afib, PTTs therapeutic. Patient titrated down from Bipap --> HFNC --->Nasal cannula. Patient mental status at apparent baseline. Patient underwent BAV with structural heart and transferred back to [facility] for discharge planning and coumadin bridge under the care of Dr. Robert Soto.     INTERVAL HPI/OVERNIGHT EVENTS:  Patient was seen and examined at bedside. Comfortable in bed. No complaints at this time.     VITAL SIGNS:  T(F): 97.8 (06-29-17 @ 17:31)  HR: 80 (06-29-17 @ 19:00)  BP: 108/53 (06-29-17 @ 19:00)  RR: 15 (06-29-17 @ 19:00)  SpO2: 97% (06-29-17 @ 19:00)  Wt(kg): --    PHYSICAL EXAM:    Constitutional: WDWN, NAD  Eyes: PERRL, EOMI, sclera non-icteric  Neck: supple, trachea midline, no masses, no JVD  Respiratory: No crackles appreciated anteriorly, crackles at bases  Cardiovascular: Irregular, rate wnl, normal s1/s2, 3/6 systolic murmur with radiation to carotids.   Gastrointestinal: soft, NTND, no masses palpable, BS normal  Extremities: +pitting edema bilateral LE  Neurological: AAOx3, CN Grossly intact  Skin: Normal temperature, warm, dry    MEDICATIONS  (STANDING):  ferrous    sulfate 325 milliGRAM(s) Oral every 8 hours  acetazolamide    Tablet 500 milliGRAM(s) Oral daily  clopidogrel Tablet 75 milliGRAM(s) Oral daily  hydrocortisone 2.5% Cream 1 Application(s) Topical two times a day  Nephrocaps 1 Capsule(s) Oral daily  gabapentin 100 milliGRAM(s) Oral every other day  metoprolol succinate ER 12.5 milliGRAM(s) Oral daily  ALBUTerol/ipratropium for Nebulization 3 milliLiter(s) Nebulizer every 4 hours  atorvastatin 40 milliGRAM(s) Oral at bedtime  buDESOnide   0.25 milliGRAM(s) Respule 0.25 milliGRAM(s) Inhalation every 12 hours  sodium chloride 0.45%. 1000 milliLiter(s) (10 mL/Hr) IV Continuous <Continuous>  insulin lispro (HumaLOG) corrective regimen sliding scale   SubCutaneous every 6 hours  dextrose 5%. 1000 milliLiter(s) (50 mL/Hr) IV Continuous <Continuous>  dextrose 50% Injectable 12.5 Gram(s) IV Push once  dextrose 50% Injectable 25 Gram(s) IV Push once  dextrose 50% Injectable 25 Gram(s) IV Push once  pantoprazole    Tablet 40 milliGRAM(s) Oral before breakfast  heparin  Infusion 1500 Unit(s)/Hr (15 mL/Hr) IV Continuous <Continuous>  sodium chloride 0.9% lock flush 3 milliLiter(s) IV Push every 8 hours  furosemide    Tablet 40 milliGRAM(s) Oral daily    MEDICATIONS  (PRN):  acetylcysteine 20% Inhalation 5 milliLiter(s) Inhalation every 6 hours PRN shortness of breath  artificial  tears Solution 1 Drop(s) Both EYES three times a day PRN Dry Eyes  acetaminophen   Tablet. 650 milliGRAM(s) Oral every 6 hours PRN Mild Pain (1 - 3)  dextrose Gel 1 Dose(s) Oral once PRN Blood Glucose LESS THAN 70 milliGRAM(s)/deciliter  glucagon  Injectable 1 milliGRAM(s) IntraMuscular once PRN Glucose LESS THAN 70 milligrams/deciliter      Allergies    No Known Allergies    Intolerances        LABS:                        9.3    11.0  )-----------( 261      ( 29 Jun 2017 03:06 )             29.9     06-29    137  |  91<L>  |  80<H>  ----------------------------<  156<H>  3.9   |  32<H>  |  2.50<H>    Ca    9.7      29 Jun 2017 03:05  Phos  4.7     06-29  Mg     2.2     06-29    TPro  7.1  /  Alb  3.1<L>  /  TBili  0.6  /  DBili  x   /  AST  13  /  ALT  <5<L>  /  AlkPhos  71  06-29    PT/INR - ( 29 Jun 2017 03:05 )   PT: 13.3 sec;   INR: 1.19          PTT - ( 29 Jun 2017 12:42 )  PTT:45.7 sec      RADIOLOGY & ADDITIONAL TESTS:

## 2017-06-29 NOTE — PROGRESS NOTE ADULT - SUBJECTIVE AND OBJECTIVE BOX
CC: ACUTE CHRONIC CONGESTIVEHEART FAILURE UNSPECIFIE      INTERVAL HISTORY: angry disposition  in no apparent distress      ROS: No chest pain, no sob, no abd pain. No n/v/d    PAST MEDICAL & SURGICAL HISTORY:  PVD (peripheral vascular disease)  Iron deficiency anemia  Lupus (systemic lupus erythematosus)  Paroxysmal atrial fibrillation  Aortic stenosis, severe  CKD (chronic kidney disease) stage 4, GFR 15-29 ml/min  Diabetes  HTN (hypertension)  CHF (congestive heart failure)  CAD (coronary artery disease)  COPD (chronic obstructive pulmonary disease)  History of total knee replacement: 2003  Presence of cardiac pacemaker: at [facility] by Dr Escoto      PHYSICAL EXAM:  T(C): 37.4 (06-29-17 @ 10:38), Max: 37.9 (06-28-17 @ 19:00)  HR: 68 (06-29-17 @ 12:00)  BP: 96/55 (06-29-17 @ 07:00) (96/55 - 111/48)  RR: 18 (06-29-17 @ 12:00)  SpO2: 98% (06-29-17 @ 12:00)  Wt(kg): --  I&O's Summary    28 Jun 2017 07:01  -  29 Jun 2017 07:00  --------------------------------------------------------  IN: 1377 mL / OUT: 930 mL / NET: 447 mL    29 Jun 2017 07:01  -  29 Jun 2017 12:39  --------------------------------------------------------  IN: 110 mL / OUT: 135 mL / NET: -25 mL      Weight 84 (06-27 @ 13:21)  General: AAO x 3,  NAD.  HEENT: moist mucous membranes, no pallor/cyanosis.  Neck: no JVD visible.  Cardiac: S1, S2. RRR. No murmurs   Respratory:rales  Abdomen: soft. nontender. nondistended  Skin: no rashes.  Extremities: + LE edema b/l  Access:       DATA:                        9.3<L>  11.0<H> )-----------( 261      ( 29 Jun 2017 03:06 )             29.9<L>    Ferritin, Serum: 92.0 ng/mL (06-12 @ 05:42)      137    |  91<L>  |  80<H>  ----------------------------<  156<H>  Ca:9.7   (29 Jun 2017 03:05)  3.9     |  32<H>  |  2.50<H>      eGFR if Non : 17 <L>  eGFR if : 19 <L>    TPro  7.1 g/dL  /  Alb  3.1 g/dL<L>  /  TBili  0.6 mg/dL  /  DBili  x      /  AST  13 U/L  /  ALT  <5 U/L<L>  /  AlkPhos  71 U/L  29 Jun 2017 03:05                    MEDICATIONS  (STANDING):  ferrous    sulfate 325 milliGRAM(s) Oral every 8 hours  acetazolamide    Tablet 500 milliGRAM(s) Oral daily  clopidogrel Tablet 75 milliGRAM(s) Oral daily  hydrocortisone 2.5% Cream 1 Application(s) Topical two times a day  Nephrocaps 1 Capsule(s) Oral daily  gabapentin 100 milliGRAM(s) Oral every other day  metoprolol succinate ER 12.5 milliGRAM(s) Oral daily  ALBUTerol/ipratropium for Nebulization 3 milliLiter(s) Nebulizer every 4 hours  atorvastatin 40 milliGRAM(s) Oral at bedtime  buDESOnide   0.25 milliGRAM(s) Respule 0.25 milliGRAM(s) Inhalation every 12 hours  sodium chloride 0.45%. 1000 milliLiter(s) (10 mL/Hr) IV Continuous <Continuous>  insulin lispro (HumaLOG) corrective regimen sliding scale   SubCutaneous every 6 hours  dextrose 5%. 1000 milliLiter(s) (50 mL/Hr) IV Continuous <Continuous>  dextrose 50% Injectable 12.5 Gram(s) IV Push once  dextrose 50% Injectable 25 Gram(s) IV Push once  dextrose 50% Injectable 25 Gram(s) IV Push once  pantoprazole    Tablet 40 milliGRAM(s) Oral before breakfast  heparin  Infusion 1400 Unit(s)/Hr (14 mL/Hr) IV Continuous <Continuous>    MEDICATIONS  (PRN):  acetylcysteine 20% Inhalation 5 milliLiter(s) Inhalation every 6 hours PRN shortness of breath  artificial  tears Solution 1 Drop(s) Both EYES three times a day PRN Dry Eyes  acetaminophen   Tablet. 650 milliGRAM(s) Oral every 6 hours PRN Mild Pain (1 - 3)  dextrose Gel 1 Dose(s) Oral once PRN Blood Glucose LESS THAN 70 milliGRAM(s)/deciliter  glucagon  Injectable 1 milliGRAM(s) IntraMuscular once PRN Glucose LESS THAN 70 milligrams/deciliter

## 2017-06-29 NOTE — PROGRESS NOTE ADULT - PROBLEM SELECTOR PLAN 2
Critical AS, ALEKS 0.5, mean pressure gradient 43, mod pulm HTN, PASP 52.   -CTS Structural Heart is following and patient s/p BAV without complications. Patient transferred back to [facility] for dc planning and coumadin.

## 2017-06-29 NOTE — PROGRESS NOTE ADULT - SUBJECTIVE AND OBJECTIVE BOX
Patient discussed on morning rounds with Dr. Barger     Operation / Date: [facility] 6/27/17    SUBJECTIVE ASSESSMENT:  87y Female seen and examined at the bedside. Pt transferred to 9 from 9E will be transferred back to Dr. Carey's service.      Vital Signs Last 24 Hrs  T(C): 37 (29 Jun 2017 14:11), Max: 37.9 (28 Jun 2017 19:00)  T(F): 98.6 (29 Jun 2017 14:11), Max: 100.3 (28 Jun 2017 19:00)  HR: 70 (29 Jun 2017 15:00) (68 - 90)  BP: 96/55 (29 Jun 2017 07:00) (96/55 - 111/48)  BP(mean): 73 (29 Jun 2017 07:00) (73 - 74)  RR: 12 (29 Jun 2017 15:00) (12 - 35)  SpO2: 100% (29 Jun 2017 15:00) (90% - 100%)  I&O's Detail    28 Jun 2017 07:01  -  29 Jun 2017 07:00  --------------------------------------------------------  IN:    heparin Infusion: 28 mL    heparin Infusion: 70 mL    heparin Infusion: 189 mL    IV PiggyBack: 600 mL    Oral Fluid: 300 mL    sodium chloride 0.45%.: 190 mL  Total IN: 1377 mL    OUT:    Indwelling Catheter - Urethral: 930 mL  Total OUT: 930 mL    Total NET: 447 mL      29 Jun 2017 07:01  -  29 Jun 2017 16:05  --------------------------------------------------------  IN:    Albumin 5%  - 250 mL: 250 mL    heparin Infusion: 113 mL    sodium chloride 0.45%.: 80 mL  Total IN: 443 mL    OUT:    Indwelling Catheter - Urethral: 250 mL  Total OUT: 250 mL    Total NET: 193 mL    CHEST TUBE:  No.  ABY DRAIN:  No.  EPICARDIAL WIRES: No.  TIE DOWNS: No.  KISER: No.    PHYSICAL EXAM:    General:     Neurological:    Cardiovascular:    Respiratory:    Gastrointestinal:    Extremities:    Vascular:    Incision Sites:    LABS:                        9.3    11.0  )-----------( 261      ( 29 Jun 2017 03:06 )             29.9       COUMADIN:  No. REASON: .on hep gtt will be transitioned to coumadin when ready     PT/INR - ( 29 Jun 2017 03:05 )   PT: 13.3 sec;   INR: 1.19          PTT - ( 29 Jun 2017 12:42 )  PTT:45.7 sec    06-29    137  |  91<L>  |  80<H>  ----------------------------<  156<H>  3.9   |  32<H>  |  2.50<H>    Ca    9.7      29 Jun 2017 03:05  Phos  4.7     06-29  Mg     2.2     06-29    TPro  7.1  /  Alb  3.1<L>  /  TBili  0.6  /  DBili  x   /  AST  13  /  ALT  <5<L>  /  AlkPhos  71  06-29      MEDICATIONS  (STANDING):  ferrous    sulfate 325 milliGRAM(s) Oral every 8 hours  acetazolamide    Tablet 500 milliGRAM(s) Oral daily  clopidogrel Tablet 75 milliGRAM(s) Oral daily  hydrocortisone 2.5% Cream 1 Application(s) Topical two times a day  Nephrocaps 1 Capsule(s) Oral daily  gabapentin 100 milliGRAM(s) Oral every other day  metoprolol succinate ER 12.5 milliGRAM(s) Oral daily  ALBUTerol/ipratropium for Nebulization 3 milliLiter(s) Nebulizer every 4 hours  atorvastatin 40 milliGRAM(s) Oral at bedtime  buDESOnide   0.25 milliGRAM(s) Respule 0.25 milliGRAM(s) Inhalation every 12 hours  sodium chloride 0.45%. 1000 milliLiter(s) (10 mL/Hr) IV Continuous <Continuous>  insulin lispro (HumaLOG) corrective regimen sliding scale   SubCutaneous every 6 hours  dextrose 5%. 1000 milliLiter(s) (50 mL/Hr) IV Continuous <Continuous>  dextrose 50% Injectable 12.5 Gram(s) IV Push once  dextrose 50% Injectable 25 Gram(s) IV Push once  dextrose 50% Injectable 25 Gram(s) IV Push once  pantoprazole    Tablet 40 milliGRAM(s) Oral before breakfast  heparin  Infusion 1500 Unit(s)/Hr (15 mL/Hr) IV Continuous <Continuous>  sodium chloride 0.9% lock flush 3 milliLiter(s) IV Push every 8 hours    MEDICATIONS  (PRN):  acetylcysteine 20% Inhalation 5 milliLiter(s) Inhalation every 6 hours PRN shortness of breath  artificial  tears Solution 1 Drop(s) Both EYES three times a day PRN Dry Eyes  acetaminophen   Tablet. 650 milliGRAM(s) Oral every 6 hours PRN Mild Pain (1 - 3)  dextrose Gel 1 Dose(s) Oral once PRN Blood Glucose LESS THAN 70 milliGRAM(s)/deciliter  glucagon  Injectable 1 milliGRAM(s) IntraMuscular once PRN Glucose LESS THAN 70 milligrams/deciliter    RADIOLOGY & ADDITIONAL TESTS:  CXR: Status post extubation. Slight improvement in infiltrate in   the right upper lung, with otherwise little change in bilateral pulmonary   infiltrates and effusions (right more extensive than left).    EKG: Atrial fibrillation  Right bundle branch block  Left anteriorfascicular block  *** Bifascicular block ***  Voltage criteria for left ventricular hypertrophy  Cannot rule out Septal infarct , age undetermined  T wave abnormality, consider lateral ischemia or digitalis effect  Abnormal ECG    ECHO: Left   ventricular hypertrophy presentThe ejection fraction could not be accuratelycalculated (poor endocardiac resolution)  The left atrium is severely dilated.Right atrial size is normal.The right ventricle is normal in size and   function.Abnormal (paradoxical) septal motion consistent with LBBBCalcified aortic valve.There is trace to mild aortic regurgitation.There is Moderate to severe aortic stenosis.The peak pressure gradient is 45 mmHg.The mean   pressure gradient is 30 mmHg.The calculated aortic valve area using the continuity equation is 1 cm2.The calculated stroke volume index is 39 cc/m2 (normal >35cc/m2).There is moderate mitral annular calcification.There is mild mitral   regurgitation.Structurally normal tricuspid valve.Structurally normal pulmonic   valve.    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 for CHF exacerbation. Pt was admitted to [facility] but was transferred to CCU for hypercapnic respiratory failure s/p BiPAP. Pt now s/p BAV with Dr. Barger on 6/27/17. POD#1 pt was extubated. POD#2 pt was transferred to  and approved for transfer to cardiology service (Dr. Carey).    Plan:  - Continue Hep gtt for h/o AF and trend ptt's currently 45.7. increased hep gtt, recheck ptt at 9PM  -Continue plavix   -h/o DM, continue RISS and trend FS  -h/o recent PNA with completed ABX regimen, continue to monitor CXR, monitor for fevers and sputum. Continue Nebulizer's. Pulmonary team following appreciate recs.  -Continue Tylenol PRN for access site groin discomfort. continue gabapentin  -h/o Renal disease: continue nephrocaps, Renal team following: will restart Lasix 40mg PO QD to maintain negative fluid balance, continue diamox, continue to appreciate recs   -GI ppx: continue protonix   -wound care ordered for right buttock pressure injury (recommended by Cardiology service)  -Disposition: transfer to Cardiology service under the care of Dr. Carey Patient discussed on morning rounds with Dr. Barger     Operation / Date: [facility] 6/27/17    SUBJECTIVE ASSESSMENT:  87y Female seen and examined at the bedside. Pt transferred to  from 9 will be transferred back to Dr. Carey's service. Pt agitated denied sitting up for exam.      Vital Signs Last 24 Hrs  T(C): 37 (29 Jun 2017 14:11), Max: 37.9 (28 Jun 2017 19:00)  T(F): 98.6 (29 Jun 2017 14:11), Max: 100.3 (28 Jun 2017 19:00)  HR: 70 (29 Jun 2017 15:00) (68 - 90)  BP: 96/55 (29 Jun 2017 07:00) (96/55 - 111/48)  BP(mean): 73 (29 Jun 2017 07:00) (73 - 74)  RR: 12 (29 Jun 2017 15:00) (12 - 35)  SpO2: 100% (29 Jun 2017 15:00) (90% - 100%)  I&O's Detail    28 Jun 2017 07:01  -  29 Jun 2017 07:00  --------------------------------------------------------  IN:    heparin Infusion: 28 mL    heparin Infusion: 70 mL    heparin Infusion: 189 mL    IV PiggyBack: 600 mL    Oral Fluid: 300 mL    sodium chloride 0.45%.: 190 mL  Total IN: 1377 mL    OUT:    Indwelling Catheter - Urethral: 930 mL  Total OUT: 930 mL    Total NET: 447 mL      29 Jun 2017 07:01  -  29 Jun 2017 16:05  --------------------------------------------------------  IN:    Albumin 5%  - 250 mL: 250 mL    heparin Infusion: 113 mL    sodium chloride 0.45%.: 80 mL  Total IN: 443 mL    OUT:    Indwelling Catheter - Urethral: 250 mL  Total OUT: 250 mL    Total NET: 193 mL    CHEST TUBE:  No.  ABY DRAIN:  No.  EPICARDIAL WIRES: No.  TIE DOWNS: No.  KISER: No.    PHYSICAL EXAM:    General: NAD, laying in bed  Neurological: moving all extremities  Cardiovascular: irregular rate +systolic murmur   Respiratory: CTA b/l to anterior fields. unable to assess posterior fields (pt refused exam)  Gastrointestinal: NT-ND, soft  Extremities: warm and well perfused, +1-2 pitting edema, no calf tenderness  Incision Sites: b/l groin site no hematoma , drainage or ecchymosis     LABS:                        9.3    11.0  )-----------( 261      ( 29 Jun 2017 03:06 )             29.9       COUMADIN:  No. REASON: .on hep gtt will be transitioned to coumadin when ready     PT/INR - ( 29 Jun 2017 03:05 )   PT: 13.3 sec;   INR: 1.19          PTT - ( 29 Jun 2017 12:42 )  PTT:45.7 sec    06-29    137  |  91<L>  |  80<H>  ----------------------------<  156<H>  3.9   |  32<H>  |  2.50<H>    Ca    9.7      29 Jun 2017 03:05  Phos  4.7     06-29  Mg     2.2     06-29    TPro  7.1  /  Alb  3.1<L>  /  TBili  0.6  /  DBili  x   /  AST  13  /  ALT  <5<L>  /  AlkPhos  71  06-29      MEDICATIONS  (STANDING):  ferrous    sulfate 325 milliGRAM(s) Oral every 8 hours  acetazolamide    Tablet 500 milliGRAM(s) Oral daily  clopidogrel Tablet 75 milliGRAM(s) Oral daily  hydrocortisone 2.5% Cream 1 Application(s) Topical two times a day  Nephrocaps 1 Capsule(s) Oral daily  gabapentin 100 milliGRAM(s) Oral every other day  metoprolol succinate ER 12.5 milliGRAM(s) Oral daily  ALBUTerol/ipratropium for Nebulization 3 milliLiter(s) Nebulizer every 4 hours  atorvastatin 40 milliGRAM(s) Oral at bedtime  buDESOnide   0.25 milliGRAM(s) Respule 0.25 milliGRAM(s) Inhalation every 12 hours  sodium chloride 0.45%. 1000 milliLiter(s) (10 mL/Hr) IV Continuous <Continuous>  insulin lispro (HumaLOG) corrective regimen sliding scale   SubCutaneous every 6 hours  dextrose 5%. 1000 milliLiter(s) (50 mL/Hr) IV Continuous <Continuous>  dextrose 50% Injectable 12.5 Gram(s) IV Push once  dextrose 50% Injectable 25 Gram(s) IV Push once  dextrose 50% Injectable 25 Gram(s) IV Push once  pantoprazole    Tablet 40 milliGRAM(s) Oral before breakfast  heparin  Infusion 1500 Unit(s)/Hr (15 mL/Hr) IV Continuous <Continuous>  sodium chloride 0.9% lock flush 3 milliLiter(s) IV Push every 8 hours    MEDICATIONS  (PRN):  acetylcysteine 20% Inhalation 5 milliLiter(s) Inhalation every 6 hours PRN shortness of breath  artificial  tears Solution 1 Drop(s) Both EYES three times a day PRN Dry Eyes  acetaminophen   Tablet. 650 milliGRAM(s) Oral every 6 hours PRN Mild Pain (1 - 3)  dextrose Gel 1 Dose(s) Oral once PRN Blood Glucose LESS THAN 70 milliGRAM(s)/deciliter  glucagon  Injectable 1 milliGRAM(s) IntraMuscular once PRN Glucose LESS THAN 70 milligrams/deciliter    RADIOLOGY & ADDITIONAL TESTS:  CXR: Status post extubation. Slight improvement in infiltrate in   the right upper lung, with otherwise little change in bilateral pulmonary   infiltrates and effusions (right more extensive than left).    EKG: Atrial fibrillation  Right bundle branch block  Left anteriorfascicular block  *** Bifascicular block ***  Voltage criteria for left ventricular hypertrophy  Cannot rule out Septal infarct , age undetermined  T wave abnormality, consider lateral ischemia or digitalis effect  Abnormal ECG    ECHO: Left   ventricular hypertrophy presentThe ejection fraction could not be accurately calculated (poor endocardiac resolution)  The left atrium is severely dilated. Right atrial size is normal. The right ventricle is normal in size and function. Abnormal (paradoxical) septal motion consistent with LBBB Calcified aortic valve. There is trace to mild aortic regurgitation. There is Moderate to severe aortic stenosis. The peak pressure gradient is 45 mmHg. The mean pressure gradient is 30 mmHg. The calculated aortic valve area using the continuity equation is 1 cm2.The calculated stroke volume index is 39 cc/m2 (normal >35cc/m2).There is moderate mitral annular calcification. There is mild mitral regurgitation. Structurally normal tricuspid valve. Structurally normal pulmonic valve.    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 Pt was admitted to [facility] but was transferred to CCU for hypercapnic respiratory failure s/p BiPAP. Pt now s/p BAV with Dr. Barger on 6/27/17. POD#1 pt was extubated. POD#2 pt was transferred to  and approved for transfer to cardiology service (Dr. Carey).    Plan:  - Continue Hep gtt for h/o AF and trend ptt's currently 45.7. increased hep gtt, recheck ptt at 9PM  -Continue plavix   -h/o DM, continue RISS and trend FS  -h/o recent PNA with completed ABX regimen, continue to monitor CXR, monitor for fevers and sputum. Continue Nebulizer's. Pulmonary team following appreciate recs.  -Continue Tylenol PRN for access site groin discomfort. continue gabapentin  -h/o Renal disease: continue nephrocaps, Renal team following: will restart Lasix 40mg PO QD to maintain negative fluid balance, continue diamox, continue to appreciate recs   -GI ppx: continue protonix   -wound care ordered for right buttock pressure injury (recommended by Cardiology service)  -Disposition: transfer to Cardiology service under the care of Dr. Carey Patient discussed on morning rounds with Dr. Barger     Operation / Date: [facility] 6/27/17    SUBJECTIVE ASSESSMENT:  87y Female seen and examined at the bedside. Pt transferred to 9 from 9E will be transferred back to Dr. Carey's service. Pt agitated denied sitting up for exam.  Transitioned back to NC, improved respiratory status.      Vital Signs Last 24 Hrs  T(C): 37 (29 Jun 2017 14:11), Max: 37.9 (28 Jun 2017 19:00)  T(F): 98.6 (29 Jun 2017 14:11), Max: 100.3 (28 Jun 2017 19:00)  HR: 70 (29 Jun 2017 15:00) (68 - 90)  BP: 96/55 (29 Jun 2017 07:00) (96/55 - 111/48)  BP(mean): 73 (29 Jun 2017 07:00) (73 - 74)  RR: 12 (29 Jun 2017 15:00) (12 - 35)  SpO2: 100% (29 Jun 2017 15:00) (90% - 100%)  I&O's Detail    28 Jun 2017 07:01  -  29 Jun 2017 07:00  --------------------------------------------------------  IN:    heparin Infusion: 28 mL    heparin Infusion: 70 mL    heparin Infusion: 189 mL    IV PiggyBack: 600 mL    Oral Fluid: 300 mL    sodium chloride 0.45%.: 190 mL  Total IN: 1377 mL    OUT:    Indwelling Catheter - Urethral: 930 mL  Total OUT: 930 mL    Total NET: 447 mL      29 Jun 2017 07:01  -  29 Jun 2017 16:05  --------------------------------------------------------  IN:    Albumin 5%  - 250 mL: 250 mL    heparin Infusion: 113 mL    sodium chloride 0.45%.: 80 mL  Total IN: 443 mL    OUT:    Indwelling Catheter - Urethral: 250 mL  Total OUT: 250 mL    Total NET: 193 mL    CHEST TUBE:  No.  ABY DRAIN:  No.  EPICARDIAL WIRES: No.  TIE DOWNS: No.  KISER: No.    PHYSICAL EXAM:    General: NAD, laying in bed  Neurological: moving all extremities  Cardiovascular: irregular rate +systolic murmur   Respiratory: CTA b/l to anterior fields. unable to assess posterior fields (pt refused exam)  Gastrointestinal: NT-ND, soft  Extremities: warm and well perfused, +1-2 pitting edema, no calf tenderness  Incision Sites: b/l groin site no hematoma , drainage or ecchymosis     LABS:                        9.3    11.0  )-----------( 261      ( 29 Jun 2017 03:06 )             29.9       COUMADIN:  No. REASON: .on hep gtt will be transitioned to coumadin when ready     PT/INR - ( 29 Jun 2017 03:05 )   PT: 13.3 sec;   INR: 1.19          PTT - ( 29 Jun 2017 12:42 )  PTT:45.7 sec    06-29    137  |  91<L>  |  80<H>  ----------------------------<  156<H>  3.9   |  32<H>  |  2.50<H>    Ca    9.7      29 Jun 2017 03:05  Phos  4.7     06-29  Mg     2.2     06-29    TPro  7.1  /  Alb  3.1<L>  /  TBili  0.6  /  DBili  x   /  AST  13  /  ALT  <5<L>  /  AlkPhos  71  06-29      MEDICATIONS  (STANDING):  ferrous    sulfate 325 milliGRAM(s) Oral every 8 hours  acetazolamide    Tablet 500 milliGRAM(s) Oral daily  clopidogrel Tablet 75 milliGRAM(s) Oral daily  hydrocortisone 2.5% Cream 1 Application(s) Topical two times a day  Nephrocaps 1 Capsule(s) Oral daily  gabapentin 100 milliGRAM(s) Oral every other day  metoprolol succinate ER 12.5 milliGRAM(s) Oral daily  ALBUTerol/ipratropium for Nebulization 3 milliLiter(s) Nebulizer every 4 hours  atorvastatin 40 milliGRAM(s) Oral at bedtime  buDESOnide   0.25 milliGRAM(s) Respule 0.25 milliGRAM(s) Inhalation every 12 hours  sodium chloride 0.45%. 1000 milliLiter(s) (10 mL/Hr) IV Continuous <Continuous>  insulin lispro (HumaLOG) corrective regimen sliding scale   SubCutaneous every 6 hours  dextrose 5%. 1000 milliLiter(s) (50 mL/Hr) IV Continuous <Continuous>  dextrose 50% Injectable 12.5 Gram(s) IV Push once  dextrose 50% Injectable 25 Gram(s) IV Push once  dextrose 50% Injectable 25 Gram(s) IV Push once  pantoprazole    Tablet 40 milliGRAM(s) Oral before breakfast  heparin  Infusion 1500 Unit(s)/Hr (15 mL/Hr) IV Continuous <Continuous>  sodium chloride 0.9% lock flush 3 milliLiter(s) IV Push every 8 hours    MEDICATIONS  (PRN):  acetylcysteine 20% Inhalation 5 milliLiter(s) Inhalation every 6 hours PRN shortness of breath  artificial  tears Solution 1 Drop(s) Both EYES three times a day PRN Dry Eyes  acetaminophen   Tablet. 650 milliGRAM(s) Oral every 6 hours PRN Mild Pain (1 - 3)  dextrose Gel 1 Dose(s) Oral once PRN Blood Glucose LESS THAN 70 milliGRAM(s)/deciliter  glucagon  Injectable 1 milliGRAM(s) IntraMuscular once PRN Glucose LESS THAN 70 milligrams/deciliter    RADIOLOGY & ADDITIONAL TESTS:  CXR: Status post extubation. Slight improvement in infiltrate in   the right upper lung, with otherwise little change in bilateral pulmonary   infiltrates and effusions (right more extensive than left).    EKG: Atrial fibrillation  Right bundle branch block  Left anteriorfascicular block  *** Bifascicular block ***  Voltage criteria for left ventricular hypertrophy  Cannot rule out Septal infarct , age undetermined  T wave abnormality, consider lateral ischemia or digitalis effect  Abnormal ECG    ECHO: Left   ventricular hypertrophy presentThe ejection fraction could not be accurately calculated (poor endocardiac resolution)  The left atrium is severely dilated. Right atrial size is normal. The right ventricle is normal in size and function. Abnormal (paradoxical) septal motion consistent with LBBB Calcified aortic valve. There is trace to mild aortic regurgitation. There is Moderate to severe aortic stenosis. The peak pressure gradient is 45 mmHg. The mean pressure gradient is 30 mmHg. The calculated aortic valve area using the continuity equation is 1 cm2.The calculated stroke volume index is 39 cc/m2 (normal >35cc/m2).There is moderate mitral annular calcification. There is mild mitral regurgitation. Structurally normal tricuspid valve. Structurally normal pulmonic valve.    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 for CHF exacerbation. Pt was admitted to [facility] but was transferred to CCU for hypercapnic respiratory failure s/p BiPAP. Pt now s/p BAV with Dr. Barger on 6/27/17. POD#1 pt was extubated. POD#2 pt was transferred to  and approved for transfer to cardiology service (Dr. Carey).    Plan:  - Continue Hep gtt for h/o AF and trend ptt's currently 45.7. increased hep gtt, recheck ptt at 9PM; transitioning to coumadin - goal INR 2-2.5  - Continue plavix   -h/o DM, continue RISS and trend FS  -h/o recent PNA with completed ABX regimen, continue to monitor CXR, monitor for fevers and sputum. Continue Nebulizer's. Pulmonary team following appreciate recs.  - rt CFA stenosis by angiogram; consider vascular consult; agree with medical therapy for now  -Continue Tylenol PRN for access site groin discomfort. continue gabapentin  -h/o Renal disease: continue nephrocaps, Renal team following: maintain even/slightly positive - monitor Bun/Cr  -GI ppx: continue protonix   -wound care ordered for right buttock pressure injury  -Disposition: transfer to Cardiology service under the care of Dr. Carey; nutrition/PT/OT discussed with pt  -F/u as outpatient; needs mobilization, improved nutrition, and improved compliance prior to consideration for TAVR

## 2017-06-29 NOTE — PROGRESS NOTE ADULT - SUBJECTIVE AND OBJECTIVE BOX
cc: follow-up evaluation and management of acute/chronic diastolic chf and aortic stenosis  subjective:  now 5 [place]; patient denies pain; no new complaints    PAST MEDICAL & SURGICAL HISTORY:  PVD (peripheral vascular disease)  Iron deficiency anemia  Lupus (systemic lupus erythematosus)  Paroxysmal atrial fibrillation  Aortic stenosis, severe  CKD (chronic kidney disease) stage 4, GFR 15-29 ml/min  Diabetes  HTN (hypertension)  CHF (congestive heart failure)  CAD (coronary artery disease)  COPD (chronic obstructive pulmonary disease)  History of total knee replacement:   Presence of cardiac pacemaker: at [facility] by Dr Escoto    MEDICATIONS  (STANDING):  ferrous    sulfate 325 milliGRAM(s) Oral every 8 hours  acetazolamide    Tablet 500 milliGRAM(s) Oral daily  clopidogrel Tablet 75 milliGRAM(s) Oral daily  hydrocortisone 2.5% Cream 1 Application(s) Topical two times a day  Nephrocaps 1 Capsule(s) Oral daily  gabapentin 100 milliGRAM(s) Oral every other day  metoprolol succinate ER 12.5 milliGRAM(s) Oral daily  ALBUTerol/ipratropium for Nebulization 3 milliLiter(s) Nebulizer every 4 hours  atorvastatin 40 milliGRAM(s) Oral at bedtime  buDESOnide   0.25 milliGRAM(s) Respule 0.25 milliGRAM(s) Inhalation every 12 hours  sodium chloride 0.45%. 1000 milliLiter(s) (10 mL/Hr) IV Continuous <Continuous>  insulin lispro (HumaLOG) corrective regimen sliding scale   SubCutaneous every 6 hours  dextrose 5%. 1000 milliLiter(s) (50 mL/Hr) IV Continuous <Continuous>  dextrose 50% Injectable 12.5 Gram(s) IV Push once  dextrose 50% Injectable 25 Gram(s) IV Push once  dextrose 50% Injectable 25 Gram(s) IV Push once  pantoprazole    Tablet 40 milliGRAM(s) Oral before breakfast  heparin  Infusion 1500 Unit(s)/Hr (15 mL/Hr) IV Continuous <Continuous>  sodium chloride 0.9% lock flush 3 milliLiter(s) IV Push every 8 hours  furosemide    Tablet 40 milliGRAM(s) Oral daily    MEDICATIONS  (PRN):  acetylcysteine 20% Inhalation 5 milliLiter(s) Inhalation every 6 hours PRN shortness of breath  artificial  tears Solution 1 Drop(s) Both EYES three times a day PRN Dry Eyes  acetaminophen   Tablet. 650 milliGRAM(s) Oral every 6 hours PRN Mild Pain (1 - 3)  dextrose Gel 1 Dose(s) Oral once PRN Blood Glucose LESS THAN 70 milliGRAM(s)/deciliter  glucagon  Injectable 1 milliGRAM(s) IntraMuscular once PRN Glucose LESS THAN 70 milligrams/deciliter      ICU Vital Signs Last 24 Hrs  T(C): 36.2 (2017 21:27), Max: 37.8 (2017 01:00)  T(F): 97.1 (2017 21:27), Max: 100 (2017 01:00)  HR: 70 (2017 21:24) (68 - 90)  BP: 108/44 (2017 21:24) (96/55 - 108/53)  BP(mean): 64 (2017 21:24) (64 - 77)  ABP: 106/42 (2017 15:00) (96/38 - 118/50)  ABP(mean): 62 (2017 15:00) (58 - 72)  RR: 14 (2017 21:24) (12 - 35)  SpO2: 93% (2017 21:24) (90% - 100%)        PHYSICAL EXAM:  General: nad; supine  heent - mmm  neck - no obvious hjr  Cardiac: irregular; systolic murmur present  Pulmonary:  no accessory muscle use; bibasilar rales  Abdomen: soft abd; nt  extremities: edema present  vasc - pulses present    LABS:                          9.3    11.0  )-----------( 261      ( 2017 03:06 )             29.9       137  |  91<L>  |  80<H>  ----------------------------<  156<H>  3.9   |  32<H>  |  2.50<H>    Ca    9.7      2017 03:05  Phos  4.7       Mg     2.2         TPro  7.1  /  Alb  3.1<L>  /  TBili  0.6  /  DBili  x   /  AST  13  /  ALT  <5<L>  /  AlkPhos  71  -2017 ecg reviewed 2017    DIAGNOSTIC TESTS:     < from: Xray Chest 1 View AP -PORTABLE-Routine (17 @ 03:40) >  EXAM:  XR CHEST 1 VIEW PORT ROUTINE                          PROCEDURE DATE:  2017                     INTERPRETATION:  HISTORY: Extubated, follow-up    Portable AP view is compared to prior radiograph of 2017.    Endotracheal tube has been removed. No pneumothorax is evident, although   the superior-most lung apices are excluded from view. There has been   slight improvement in infiltrate in the right upper lung, with otherwise   little change in bilateral pulmonary infiltrates and effusions (right   more extensive than left), and there has otherwise been no interval   change.    IMPRESSION: Status post extubation. Slight improvement in infiltrate in   the right upper lung, with otherwise little change in bilateral pulmonary   infiltrates and effusions (right more extensive than left).            "Thank you for the opportunity to participate in the care of this   patient."        CHINO ROMAN M.D. ATTENDING RADIOLOGIST  This document has been electronically signed. 2017  1:16PM        < end of copied text >            < from: Xray Chest 1 View AP-PORTABLE IMMEDIATE (17 @ 12:07) >    EXAM:  XR CHEST 1 VIEW PORT IMMEDIATE                          PROCEDURE DATE:  2017                     INTERPRETATION:  Portable chest    History: Post [facility] bronchoscopy exam x-ray. Follow-up abnormal exam.    Scattered lung infiltrates again noted. Overall similar appearance to   prior exam earlier same day. Endotracheal tube in satisfactory position.   No pneumothorax identified.            "Thank you for the opportunity to participate in the care of this   patient."        JUDE LOPEZ M.D., ATTENDING RADIOLOGIST  This document has been electronically signed. 2017  3:03PM    < end of copied text >      < from: Echocardiogram (17 @ 12:03) >  EXAM:  ECHOCARDIOGRAM (CARDIOL)                          PROCEDURE DATE:  2017                        INTERPRETATION:  Patient Height: 165.0 cm  Patient Weight: 84.0 kg  Systolic Pressure: 128 mmHg  Diastolic Pressure: 52 mmHg  BSA: 1.9 m^2  Interpretation Summary  A complete two-dimensional transthoracic echocardiogram was performed (2D,   M-mode, spectral and color flow doppler).  Study Quality: Fair.Left   ventricular hypertrophy presentThe ejection fraction could not be   accurately  calculated (poor endocardiac resolution)  The left atrium is severely   dilated.Right atrial size is normal.The right ventricle is normal in   size and   function.Abnormal (paradoxical) septal motion consistent with   LBBBCalcified   aortic valve.There is trace to mild aortic regurgitation.There is   Moderate to   severe aortic stenosis.The peak pressure gradient is 45 mmHg.The mean   pressure   gradient is 30 mmHg.The calculated aortic valve area using the continuity   equation is 1 cm2.The calculated stroke volume index is 39 cc/m2 (normal   >35cc/m2).There is moderate mitral annular calcification.There is mild   mitral   regurgitation.Structurally normal tricuspid valve.Structurally normal   pulmonic   valve.  Procedure Details  A completetwo-dimensional transthoracic echocardiogram was performed (2D,  M-mode, spectral and color flow doppler).  Study Quality: Fair.  Left Ventricle  Left ventricular hypertrophy present  The ejection fraction could not be accurately calculated (poor endocardiac  resolution)  Abnormal (paradoxical) septal motion consistent with LBBB  Left Atrium  The left atrium is severely dilated.  Right Atrium  Right atrial size is normal.  Right Ventricle  The right ventricle is normal in size and function.  Aortic Valve  Calcified aortic valve.  There is trace to mild aortic regurgitation.  There is Moderate to severe aortic stenosis.  The peak pressure gradient is 45 mmHg.  The mean pressure gradient is 30 mmHg.  The calculated aortic valve area using the continuity equation is 1 cm2.  The calculated stroke volume index is 39 cc/m2 (normal >35cc/m2).  Mitral Valve  There is moderate mitral annular calcification.  There is mild mitral regurgitation.  Tricuspid Valve  Structurally normal tricuspid valve.  Pulmonic Valve  Structurally normal pulmonic valve.  Arteries and Venous System  No aortic root dilatation.  The IVC is dilated (>2.1 cm) with an abnormal inspiratory collapse (<50%)  consistent with elevated right atrial pressure.  Pericardium / Pleura  There is no pericardial effusion.  Doppler Measurements & Calculations  MV E point: 140.2 cm/sec  MV A point: 53.4 cm/sec  MV E/A: 2.6  MV dec slope: 636.0 cm/sec^2  Ao V2 max: 319.4 cm/sec  Ao max P.8 mmHg  Ao max PG (full): 36.2 mmHg  Ao V2 mean: 251.5 cm/sec  Ao mean P.0 mmHg  Ao mean PG (full): 24.4 mmHg  Ao V2 VTI: 80.8 cm  ALEKS(I,A): 0.9 cm^2  ALEKS(I,D): 0.9 cm^2  ALEKS(V,A): 1.1 cm^2  ALEKS(V,D): 1.1 cm^2  LV max P.7 mmHg  LV mean P.6 mmHg  LV V1 max: 108 cm/sec  LV V1 mean: 76.1 cm/sec  LV V1 VTI: 24.0 cm  MR max graciela: 538.0 cm/sec  MR max P.8 mmHg  MR mean graciela: 400.3 cm/sec  MR mean P.0 mmHg  MR VTI: 181.9 cm  SV(Ao): 690.6 ml  SI(Ao): 360.9 ml/m^2  SV(LVOT): 75.0 ml  SI(LVOT): 39.2 ml/m^2  TR Max graciela: 252.8cm/sec  MMode 2D Measurements & Calculations  IVSd: 1.2 cm  LVIDd: 6.4 cm  LVIDs: 5.3 cm  LVPWd: 1.1 cm  IVS/LVPW: 1.0  FS: 18.0 %  EDV(Teich): 209.4 ml  ESV(Teich): 132.7 ml  LV mass(C)d: 331.0 grams  LV mass(C)dI: 173.0 grams/m^2  SI(cubed): 61.9 ml/m^2  Ao root diam: 3.3 cm  Ao root area: 8.5 cm^2  LA dimension: 4.5 cm  LA/Ao: 1.4  LVOT diam: 2.0 cm  LVOT area: 3.1 cm^2  LVOT area (M): 3.1 cm^2  EDV(MOD-sp4): 112 ml  EDV(MOD-sp2): 105 ml  Interpreting Physician:Perry Lacy MD electronicallysigned on   2017 12:24:22                "Thank you for the opportunity to participate in the care of this   patient."        PERRY LACY M.D., ATTENDING CARDIOLOGIST  This document has been electronically signed. 2017 12:24PM        < end of copied text >        EXAM:  XR CHEST 1 VIEW PORT URGENT                          PROCEDURE DATE:  2017                     INTERPRETATION:  Clinical History: Congestion    Portable examination the chest demonstrates no interval change congestion   and/or infiltrates in comparison to prior examination of the chest   2017. Bilateral effusions. No interval change position remaining   support devices. Cardiomegaly.    Impression: No interval change lung pathology            "Thank you for the opportunity to participate in the care of this   patient."        KATHARINA MOROCHO M.D., ATTENDING RADIOLOGIST  This document has been electronically signed. 2017 10:38AM      2017 ecg reviewed on 2017    EXAM:  XR CHEST 1 VIEW PORT ROUTINE                          PROCEDURE DATE:  2017                     INTERPRETATION:  Indication: chf    A single portable view of the chest is submitted. Comparison is made to   the most recent prior study dated 2017. Bilateral pulmonary   infiltrates and effusions are similar to the previous examination.   Impression:    No significant interval change            "Thank you for the opportunity to participate in the care of this   patient."        MASOUD WORKMAN M.D., ATTENDING RADIOLOGIST  This document has been electronically signed. 2017  4:33PM

## 2017-06-29 NOTE — PROGRESS NOTE ADULT - PROBLEM SELECTOR PLAN 1
Admitted with acute CHF exacerbation 2/2 dietary non-compliance and critical AS. EF 50-55%, severely dilated LA.  -Daily weights, strict I/Os (us in place)  -40mg furosemide daily.

## 2017-06-29 NOTE — PROGRESS NOTE ADULT - ASSESSMENT
1) acute/chronic diastolic chf  -note elevated bun - diuretic management per renal  2) nonrheumatic aortic stenosis  -s/p bav - monitor; management per shd  3) atrial fibrillation; presence of pacemaker  -re-call ep  -plan for coumadin  4) CAD s/p PCI   -no cp now  6) COPD - pulm following  7) CKD - elevated bun noted  9) PVD -rx as tolerated  10) DM - ace/arb held with ckd  11) normocytic anemia   12) pressure injury of skin (right buttock) - please call wound care  13) ppx: therapeutic ac; start ppi  14) maintain K>4< Mg>2  d/w Charbel egan today

## 2017-06-29 NOTE — PROGRESS NOTE ADULT - PROBLEM SELECTOR PLAN 3
Hypercapnic resp failure 2/2 pulmonary congestion 2/2 CHF 2/2 severe AS.   - Saturating well on 4LNC  - BIPAP at night    #COPD - duonebs

## 2017-06-29 NOTE — PROGRESS NOTE ADULT - SUBJECTIVE AND OBJECTIVE BOX
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 with cc of inability to get OOB x 3 days. Pt states that her "legs were too weak". Pt is a poor historian and HPI obtained from her daughter. Pt's daughter states that her mother was unable to get OOB for 3 days and pt used her Life Alert x3 times to call for help-was told to go to the ED. Reports that she had been "tired" for several day, SOB w/ minimal exertion for "several months" and her mobility has declined since her discharge from rehab in December. Additionally, pt has gained 17 lbs over the past month (from 258 to 275) and non-compliant with her diet. As per daughter, pt has been hospitalized prior 2-3 yrs ago (? [facility]) for CHF exacerbation w/ aggressive diuresis. Of note, pt was evaluation by Dr. Barger for aortic stenosis in December 16'. However, due to her deconditioned status during that time, pt was recommended medical management.     S/P BAV    Pt S&E@BS in AM on SICU    	  MEDICATIONS:  acetazolamide    Tablet 500 milliGRAM(s) Oral daily  metoprolol succinate ER 12.5 milliGRAM(s) Oral daily  furosemide    Tablet 40 milliGRAM(s) Oral daily      acetylcysteine 20% Inhalation 5 milliLiter(s) Inhalation every 6 hours PRN  ALBUTerol/ipratropium for Nebulization 3 milliLiter(s) Nebulizer every 4 hours  buDESOnide   0.25 milliGRAM(s) Respule 0.25 milliGRAM(s) Inhalation every 12 hours    gabapentin 100 milliGRAM(s) Oral every other day  acetaminophen   Tablet. 650 milliGRAM(s) Oral every 6 hours PRN    pantoprazole    Tablet 40 milliGRAM(s) Oral before breakfast    atorvastatin 40 milliGRAM(s) Oral at bedtime  insulin lispro (HumaLOG) corrective regimen sliding scale   SubCutaneous every 6 hours  dextrose Gel 1 Dose(s) Oral once PRN  dextrose 50% Injectable 12.5 Gram(s) IV Push once  dextrose 50% Injectable 25 Gram(s) IV Push once  dextrose 50% Injectable 25 Gram(s) IV Push once  glucagon  Injectable 1 milliGRAM(s) IntraMuscular once PRN    ferrous    sulfate 325 milliGRAM(s) Oral every 8 hours  clopidogrel Tablet 75 milliGRAM(s) Oral daily  hydrocortisone 2.5% Cream 1 Application(s) Topical two times a day  Nephrocaps 1 Capsule(s) Oral daily  artificial  tears Solution 1 Drop(s) Both EYES three times a day PRN  sodium chloride 0.45%. 1000 milliLiter(s) IV Continuous <Continuous>  dextrose 5%. 1000 milliLiter(s) IV Continuous <Continuous>  heparin  Infusion 1500 Unit(s)/Hr IV Continuous <Continuous>      Complaint: Depressed    Otherwise 12 point review of systems is normal.    PHYSICAL EXAM:    Constitutional: NAD  Eyes: PERRL, EOMI, sclera non-icteric  Neck: supple, no masses, no JVD  Respiratory: CTA b/l, good air entry b/l, no wheezing, rhonchi,+ rales,   Cardiovascular: RRR, normal S1S2, +m  Gastrointestinal: soft, NTND, no masses palpable,      ECG:      CHEST X RAY    CT    MRI    MRA    CT ANGIO    CAROTID DUPLEX    DUPLEX     Echocardiogram    Catheterization:    Stress Test:     LABS:	 	  CARDIAC MARKERS:                              9.3    11.0  )-----------( 261      ( 29 Jun 2017 03:06 )             29.9     06-29    137  |  91<L>  |  80<H>  ----------------------------<  156<H>  3.9   |  32<H>  |  2.50<H>    Ca    9.7      29 Jun 2017 03:05  Phos  4.7     06-29  Mg     2.2     06-29    TPro  7.1  /  Alb  3.1<L>  /  TBili  0.6  /  DBili  x   /  AST  13  /  ALT  <5<L>  /  AlkPhos  71  06-29        ASSESSMENT/PLAN    1)  Status post BAV  2)  Cardiomyopathy  3)  Chronic obstructive pulmonary disease  4)  Pulmonary hypertension    Oxygen via nasal cannula as needed  Bronchodilators:  Atrovent/ albuterol q 4-6 hours.  Corticosteroids:  budesonide            HgA1c:   TSH:           ASSESSMENT/PLAN:

## 2017-06-29 NOTE — PROGRESS NOTE ADULT - PROBLEM SELECTOR PLAN 8
- h/h stable. Likely anemia of chronic disease.   - Fe studies and b12/folate WNL; c/w iron supplementation.

## 2017-06-29 NOTE — PROVIDER CONTACT NOTE (OTHER) - SITUATION
RN noted that oxygen saturation has been ranging from 88-92%. RT had been called to adjust HFNC, but with little change. Oxygen saturation now 91-92%. Assessed pt. At this time, pt is using abdominal
Patient refusing bipap and hi-flow nasal cannula
pt with low urine output 25cc for 9am. pt got lasix 20mg IV at 9am with no response. No new tx ordered

## 2017-06-29 NOTE — PROGRESS NOTE ADULT - SUBJECTIVE AND OBJECTIVE BOX
CC/ HPI:  88 yo female with chronic diastolic CHF (EF 50-55% 11/16'), mod to severe aortic stenosis, pAFIB, CAD s/p PCI x 3 stent, Lupus (not on meds), COPD, CKD (baseline Cr 2-3), admitted for CHF exacerbation complicated by hypercapnic respiratory failure, status post BAV, resting in chair without respiratory complaint.    PAST MEDICAL & SURGICAL HISTORY:  PVD (peripheral vascular disease)  Iron deficiency anemia  Lupus (systemic lupus erythematosus)  Paroxysmal atrial fibrillation  Aortic stenosis, severe  CKD (chronic kidney disease) stage 4, GFR 15-29 ml/min  Diabetes  HTN (hypertension)  CHF (congestive heart failure)  CAD (coronary artery disease)  COPD (chronic obstructive pulmonary disease)  History of total knee replacement: 2003  Cardiac pacemaker: at [facility] by Dr Escoto, 2011    SOCHX:  + tobacco,  -  alcohol    FMHX: FA/MO  - contributory     ROS reviewed below with positive findings marked (+) :  GEN:  fever, chills ENT: tracheostomy,   epistaxis,  sinusitis COR: +CAD, +CHF,  +HTN, +dysrhythmia PUL: +COPD, ILD, asthma, pneumonia GI: PEG, dysphagia, hemorrhage, other PETEY: +kidney disease, electrolyte disorder HEM:  +anemia, thrombus, coagulopathy, cancer ENDO:  thyroid disease, +diabetes mellitus CNS:  dementia, stroke, seizure, PSY:  depression, anxiety, other     MEDICATIONS  (STANDING):  ferrous    sulfate 325 milliGRAM(s) Oral every 8 hours  acetazolamide    Tablet 500 milliGRAM(s) Oral daily  clopidogrel Tablet 75 milliGRAM(s) Oral daily  hydrocortisone 2.5% Cream 1 Application(s) Topical two times a day  Nephrocaps 1 Capsule(s) Oral daily  gabapentin 100 milliGRAM(s) Oral every other day  metoprolol succinate ER 12.5 milliGRAM(s) Oral daily  ALBUTerol/ipratropium for Nebulization 3 milliLiter(s) Nebulizer every 4 hours  atorvastatin 40 milliGRAM(s) Oral at bedtime  buDESOnide   0.25 milliGRAM(s) Respule 0.25 milliGRAM(s) Inhalation every 12 hours  sodium chloride 0.45%. 1000 milliLiter(s) (10 mL/Hr) IV Continuous <Continuous>  insulin lispro (HumaLOG) corrective regimen sliding scale   SubCutaneous every 6 hours  dextrose 5%. 1000 milliLiter(s) (50 mL/Hr) IV Continuous <Continuous>  pantoprazole    Tablet 40 milliGRAM(s) Oral before breakfast  heparin  Infusion 1400 Unit(s)/Hr (14 mL/Hr) IV Continuous <Continuous>    MEDICATIONS  (PRN):  artificial  tears Solution 1 Drop(s) Both EYES three times a day PRN Dry Eyes  acetaminophen   Tablet. 650 milliGRAM(s) Oral every 6 hours PRN Mild Pain (1 - 3)  dextrose Gel 1 Dose(s) Oral once PRN Blood Glucose LESS THAN 70 milliGRAM(s)/deciliter  glucagon  Injectable 1 milliGRAM(s) IntraMuscular once PRN Glucose LESS THAN 70 milligrams/deciliter      Vital Signs Last 24 Hrs  T(C): 37.6 (29 Jun 2017 06:00), Max: 37.9 (28 Jun 2017 19:00)  T(F): 99.6 (29 Jun 2017 06:00), Max: 100.3 (28 Jun 2017 19:00)  HR: 76 (29 Jun 2017 07:00) (68 - 79)  BP: 96/55 (29 Jun 2017 07:00) (96/55 - 111/48)  BP(mean): 73 (29 Jun 2017 07:00) (73 - 74)  RR: 28 (29 Jun 2017 07:00) (11 - 28)  SpO2: 93% (29 Jun 2017 07:00) (93% - 99%)  Mode: CPAP with PS  FiO2: 40  PEEP: 5  PS: 8  MAP: 8  PIP: 17    GENERAL:         comfortable,  - distress.  HEENT:            - trauma,  - icterus,  - injection,  - nasal discharge.  NECK:              - jugular venous distention, - thyromegaly.  LYMPH:           - lymphadenopathy, - masses.  RESP:              + crackles,   - rhonchi,   - wheezes.   COR:                S1S2 RRR  + murmur,  - gallops,  - rubs.  ABD:                bowel sounds,   soft, - tender, - distended, - organomegaly.  EXT/MSC:         - cyanosis,  - clubbing,  - edema.    NEURO:             alert,   responds to stimuli.    ABG - ( 29 Jun 2017 02:48 )  pH: 7.40  /  pCO2: 55    /  pO2: 180   / HCO3: 33    / Base Excess: 6.9   /  SaO2: 99                              9.3    11.0  )-----------( 261      ( 29 Jun 2017 03:06 )             29.9     CXR (6/29)  Bilateral infiltrate/congestion/pleural effusion      ASSESSMENT/PLAN    1)  Status post BAV  2)  Cardiomyopathy  3)  Chronic obstructive pulmonary disease  4)  Pulmonary hypertension    Oxygen via nasal cannula as needed  Bronchodilators:  Atrovent/ albuterol q 4-6 hours.  Corticosteroids:  budesonide  Cardiac/HTN: diuresis as needed  GI: Rx/ prophylaxis c PPI/H2B  Heme: Rx/VT on AC  Discuss with medical team.

## 2017-06-29 NOTE — PROGRESS NOTE ADULT - ASSESSMENT
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 Pt was admitted to [facility] but was transferred to CCU for hypercapnic respiratory failure s/p BiPAP. Pt now s/p BAV with Dr. Barger on 6/27/17. POD#1 pt was extubated. POD#2 pt was transferred to  and approved for transfer to cardiology service (Dr. Garcia).

## 2017-06-29 NOTE — PROGRESS NOTE ADULT - PROBLEM SELECTOR PLAN 4
s/p PCI x 3 stent (04', 06' and 12')-last cath 3/15' PCI dLM   -Plavix 75 mg daily, atorvastatin 40mg Qdaily

## 2017-06-29 NOTE — PROGRESS NOTE ADULT - PROBLEM SELECTOR PLAN 5
Afib with slow ventricular response s/p PPM 2011 2/2 symptomatic bradycardia. Currently rate controlled.  -PPM interrogation WNL.  -c/w hep gtt with transition to coumadin, will give tonight. Afib with slow ventricular response s/p PPM 2011 2/2 symptomatic bradycardia. Currently rate controlled.  -PPM interrogation WNL.  -c/w hep gtt with transition to coumadin, will give tonight 1.5mg

## 2017-06-30 LAB
ANION GAP SERPL CALC-SCNC: 13 MMOL/L — SIGNIFICANT CHANGE UP (ref 5–17)
APTT BLD: 62.5 SEC — HIGH (ref 27.5–37.4)
APTT BLD: 66.5 SEC — HIGH (ref 27.5–37.4)
BUN SERPL-MCNC: 91 MG/DL — HIGH (ref 7–23)
CALCIUM SERPL-MCNC: 9.6 MG/DL — SIGNIFICANT CHANGE UP (ref 8.4–10.5)
CHLORIDE SERPL-SCNC: 92 MMOL/L — LOW (ref 96–108)
CO2 SERPL-SCNC: 33 MMOL/L — HIGH (ref 22–31)
CREAT SERPL-MCNC: 2.7 MG/DL — HIGH (ref 0.5–1.3)
CULTURE RESULTS: SIGNIFICANT CHANGE UP
GLUCOSE SERPL-MCNC: 110 MG/DL — HIGH (ref 70–99)
HCT VFR BLD CALC: 31.4 % — LOW (ref 34.5–45)
HGB BLD-MCNC: 9.1 G/DL — LOW (ref 11.5–15.5)
INR BLD: 1.21 — HIGH (ref 0.88–1.16)
MAGNESIUM SERPL-MCNC: 2.2 MG/DL — SIGNIFICANT CHANGE UP (ref 1.6–2.6)
MCHC RBC-ENTMCNC: 26.5 PG — LOW (ref 27–34)
MCHC RBC-ENTMCNC: 29 G/DL — LOW (ref 32–36)
MCV RBC AUTO: 91.3 FL — SIGNIFICANT CHANGE UP (ref 80–100)
PHOSPHATE SERPL-MCNC: 5.2 MG/DL — HIGH (ref 2.5–4.5)
PLATELET # BLD AUTO: 231 K/UL — SIGNIFICANT CHANGE UP (ref 150–400)
POTASSIUM SERPL-MCNC: 3.4 MMOL/L — LOW (ref 3.5–5.3)
POTASSIUM SERPL-SCNC: 3.4 MMOL/L — LOW (ref 3.5–5.3)
PROTHROM AB SERPL-ACNC: 13.5 SEC — HIGH (ref 9.8–12.7)
RBC # BLD: 3.44 M/UL — LOW (ref 3.8–5.2)
RBC # FLD: 16.1 % — SIGNIFICANT CHANGE UP (ref 10.3–16.9)
SODIUM SERPL-SCNC: 138 MMOL/L — SIGNIFICANT CHANGE UP (ref 135–145)
SPECIMEN SOURCE: SIGNIFICANT CHANGE UP
WBC # BLD: 9.8 K/UL — SIGNIFICANT CHANGE UP (ref 3.8–10.5)
WBC # FLD AUTO: 9.8 K/UL — SIGNIFICANT CHANGE UP (ref 3.8–10.5)

## 2017-06-30 PROCEDURE — 93010 ELECTROCARDIOGRAM REPORT: CPT

## 2017-06-30 PROCEDURE — 71010: CPT | Mod: 26

## 2017-06-30 RX ORDER — DOCUSATE SODIUM 100 MG
100 CAPSULE ORAL THREE TIMES A DAY
Qty: 0 | Refills: 0 | Status: DISCONTINUED | OUTPATIENT
Start: 2017-06-30 | End: 2017-07-06

## 2017-06-30 RX ORDER — SENNA PLUS 8.6 MG/1
2 TABLET ORAL AT BEDTIME
Qty: 0 | Refills: 0 | Status: DISCONTINUED | OUTPATIENT
Start: 2017-06-30 | End: 2017-07-06

## 2017-06-30 RX ORDER — POTASSIUM CHLORIDE 20 MEQ
40 PACKET (EA) ORAL EVERY 4 HOURS
Qty: 0 | Refills: 0 | Status: COMPLETED | OUTPATIENT
Start: 2017-06-30 | End: 2017-06-30

## 2017-06-30 RX ORDER — IPRATROPIUM/ALBUTEROL SULFATE 18-103MCG
3 AEROSOL WITH ADAPTER (GRAM) INHALATION ONCE
Qty: 0 | Refills: 0 | Status: COMPLETED | OUTPATIENT
Start: 2017-06-30 | End: 2017-06-30

## 2017-06-30 RX ORDER — FUROSEMIDE 40 MG
60 TABLET ORAL ONCE
Qty: 0 | Refills: 0 | Status: COMPLETED | OUTPATIENT
Start: 2017-06-30 | End: 2017-06-30

## 2017-06-30 RX ORDER — FUROSEMIDE 40 MG
40 TABLET ORAL EVERY 12 HOURS
Qty: 0 | Refills: 0 | Status: DISCONTINUED | OUTPATIENT
Start: 2017-06-30 | End: 2017-07-03

## 2017-06-30 RX ORDER — WARFARIN SODIUM 2.5 MG/1
1.5 TABLET ORAL
Qty: 0 | Refills: 0 | COMMUNITY

## 2017-06-30 RX ORDER — WARFARIN SODIUM 2.5 MG/1
2.5 TABLET ORAL ONCE
Qty: 0 | Refills: 0 | Status: COMPLETED | OUTPATIENT
Start: 2017-06-30 | End: 2017-06-30

## 2017-06-30 RX ADMIN — SODIUM CHLORIDE 3 MILLILITER(S): 9 INJECTION INTRAMUSCULAR; INTRAVENOUS; SUBCUTANEOUS at 05:49

## 2017-06-30 RX ADMIN — ACETAZOLAMIDE 500 MILLIGRAM(S): 250 TABLET ORAL at 11:30

## 2017-06-30 RX ADMIN — WARFARIN SODIUM 2.5 MILLIGRAM(S): 2.5 TABLET ORAL at 21:45

## 2017-06-30 RX ADMIN — Medication 325 MILLIGRAM(S): at 13:27

## 2017-06-30 RX ADMIN — Medication 0.25 MILLIGRAM(S): at 21:44

## 2017-06-30 RX ADMIN — Medication 100 MILLIGRAM(S): at 21:45

## 2017-06-30 RX ADMIN — CLOPIDOGREL BISULFATE 75 MILLIGRAM(S): 75 TABLET, FILM COATED ORAL at 11:30

## 2017-06-30 RX ADMIN — Medication 1 APPLICATION(S): at 17:25

## 2017-06-30 RX ADMIN — Medication 1 CAPSULE(S): at 11:30

## 2017-06-30 RX ADMIN — Medication 40 MILLIGRAM(S): at 06:14

## 2017-06-30 RX ADMIN — Medication 3 MILLILITER(S): at 13:27

## 2017-06-30 RX ADMIN — SODIUM CHLORIDE 3 MILLILITER(S): 9 INJECTION INTRAMUSCULAR; INTRAVENOUS; SUBCUTANEOUS at 21:34

## 2017-06-30 RX ADMIN — Medication 40 MILLIEQUIVALENT(S): at 13:28

## 2017-06-30 RX ADMIN — Medication 0.25 MILLIGRAM(S): at 11:29

## 2017-06-30 RX ADMIN — Medication 60 MILLIGRAM(S): at 09:01

## 2017-06-30 RX ADMIN — SODIUM CHLORIDE 3 MILLILITER(S): 9 INJECTION INTRAMUSCULAR; INTRAVENOUS; SUBCUTANEOUS at 13:33

## 2017-06-30 RX ADMIN — Medication 1 APPLICATION(S): at 05:52

## 2017-06-30 RX ADMIN — Medication 12.5 MILLIGRAM(S): at 11:30

## 2017-06-30 RX ADMIN — Medication 3 MILLILITER(S): at 21:45

## 2017-06-30 RX ADMIN — Medication 40 MILLIGRAM(S): at 17:23

## 2017-06-30 RX ADMIN — PANTOPRAZOLE SODIUM 40 MILLIGRAM(S): 20 TABLET, DELAYED RELEASE ORAL at 06:14

## 2017-06-30 RX ADMIN — Medication 325 MILLIGRAM(S): at 21:45

## 2017-06-30 RX ADMIN — ATORVASTATIN CALCIUM 40 MILLIGRAM(S): 80 TABLET, FILM COATED ORAL at 21:45

## 2017-06-30 RX ADMIN — SENNA PLUS 2 TABLET(S): 8.6 TABLET ORAL at 21:45

## 2017-06-30 RX ADMIN — Medication 325 MILLIGRAM(S): at 06:14

## 2017-06-30 RX ADMIN — Medication 40 MILLIEQUIVALENT(S): at 09:02

## 2017-06-30 RX ADMIN — Medication 1: at 06:14

## 2017-06-30 RX ADMIN — Medication 3 MILLILITER(S): at 06:14

## 2017-06-30 RX ADMIN — Medication 3 MILLILITER(S): at 17:23

## 2017-06-30 RX ADMIN — Medication 3 MILLILITER(S): at 09:05

## 2017-06-30 NOTE — PROGRESS NOTE ADULT - ASSESSMENT
84 y/o F w/ PMh of chronic diastolic CHF, moderate to severe AS (ALEKS 0.5cm2), pAF on coumadin, CAD s/p PCIx3, last cath required impella support , lupus, COPD, PNA, PPM 2011 for bradycardia, CKD w/ h/o renal failure requiring HD, HTN, PVD, DM, MONICA, TKA who was admitted to [facility] for CHF exacerbation. Pt was admitted to [facility] but was transferred to CCU for hypercapnic respiratory failure s/p BiPAP. Pt now s/p BAV with Dr. Barger on 6/27/17. POD#1  pt was extubated.  POD #3 for BAV

## 2017-06-30 NOTE — PROGRESS NOTE ADULT - PROBLEM SELECTOR PLAN 1
stable  Lasix 40mg PO qd resumed yesterday  maintain negative fluid balance stable  Lasix 40mg PO qd resumed yesterday  maintain negative fluid balance  Hypokalemia- probably secondary to diuresis with Lasix  supplement PRN

## 2017-06-30 NOTE — PROGRESS NOTE ADULT - SUBJECTIVE AND OBJECTIVE BOX
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 with cc of inability to get OOB x 3 days. Pt states that her "legs were too weak". Pt is a poor historian and HPI obtained from her daughter. Pt's daughter states that her mother was unable to get OOB for 3 days and pt used her Life Alert x3 times to call for help-was told to go to the ED. Reports that she had been "tired" for several day, SOB w/ minimal exertion for "several months" and her mobility has declined since her discharge from rehab in December. Additionally, pt has gained 17 lbs over the past month (from 258 to 275) and non-compliant with her diet. As per daughter, pt has been hospitalized prior 2-3 yrs ago (? [facility]) for CHF exacerbation w/ aggressive diuresis. Of note, pt was evaluation by Dr. Barger for aortic stenosis in December 16'. However, due to her deconditioned status during that time, pt was recommended medical management.     S/P BAV    Subjective: Pt seen and examined at bedside. Denies chest pain or sob. Angry about being in hospital, wants to "get out of here."    Overnight Events: Transferred from , resumed Coumadin    TELEMETRY: Afib 70s  ICU Vital Signs Last 24 Hrs  T(C): 36.6 (30 Jun 2017 16:24), Max: 36.6 (30 Jun 2017 09:08)  T(F): 97.9 (30 Jun 2017 16:24), Max: 97.9 (30 Jun 2017 09:08)  HR: 74 (30 Jun 2017 13:04) (68 - 80)  BP: 113/56 (30 Jun 2017 13:04) (103/48 - 113/56)  BP(mean): 85 (30 Jun 2017 13:04) (63 - 85)  ABP: --  ABP(mean): --  RR: 17 (30 Jun 2017 13:04) (14 - 18)  SpO2: 95% (30 Jun 2017 13:04) (93% - 97%)            I&O's Summary    29 Jun 2017 07:01  -  30 Jun 2017 07:00  --------------------------------------------------------  IN: 743 mL / OUT: 1250 mL / NET: -507 mL    30 Jun 2017 07:01  -  30 Jun 2017 10:25  --------------------------------------------------------  IN: 145 mL / OUT: 0 mL / NET: 145 mL          PHYSICAL EXAM:    General: A/ox 3, No acute Distress, obese  Neck: Supple, NO JVD  Cardiac: S1 S2, grade III/VI systolic murmur  Pulmonary: R sided mild expiratory wheezing, scattered crackles thru romeo lungs, Breathing unlabored at rest, No Rhonchi. On 3L NC.  Abdomen: Soft, Non -tender, +BS x 4 quads  Extremities: No Rashes, 1+ romeo LE edema  Neuro: A/o x 3, No focal deficits          LABS:                          9.1    9.8   )-----------( 231      ( 30 Jun 2017 06:39 )             31.4                              06-30    138  |  92<L>  |  91<H>  ----------------------------<  110<H>  3.4<L>   |  33<H>  |  2.70<H>    Ca    9.6      30 Jun 2017 06:39  Phos  5.2     06-30  Mg     2.2     06-30    TPro  7.1  /  Alb  3.1<L>  /  TBili  0.6  /  DBili  x   /  AST  13  /  ALT  <5<L>  /  AlkPhos  71  06-29    LIVER FUNCTIONS - ( 29 Jun 2017 03:05 )  Alb: 3.1 g/dL / Pro: 7.1 g/dL / ALK PHOS: 71 U/L / ALT: <5 U/L / AST: 13 U/L / GGT: x         PT/INR - ( 30 Jun 2017 06:39 )   PT: 13.5 sec;   INR: 1.21          PTT - ( 30 Jun 2017 06:39 )  PTT:62.5 sec  CAPILLARY BLOOD GLUCOSE  175 (30 Jun 2017 05:14)  161 (29 Jun 2017 21:36)  171 (29 Jun 2017 20:56)  113 (29 Jun 2017 16:03)  168 (29 Jun 2017 12:00)                  No Known Allergies    MEDICATIONS  (STANDING):  ferrous    sulfate 325 milliGRAM(s) Oral every 8 hours  acetazolamide    Tablet 500 milliGRAM(s) Oral daily  clopidogrel Tablet 75 milliGRAM(s) Oral daily  hydrocortisone 2.5% Cream 1 Application(s) Topical two times a day  Nephrocaps 1 Capsule(s) Oral daily  gabapentin 100 milliGRAM(s) Oral every other day  metoprolol succinate ER 12.5 milliGRAM(s) Oral daily  ALBUTerol/ipratropium for Nebulization 3 milliLiter(s) Nebulizer every 4 hours  atorvastatin 40 milliGRAM(s) Oral at bedtime  buDESOnide   0.25 milliGRAM(s) Respule 0.25 milliGRAM(s) Inhalation every 12 hours  sodium chloride 0.45%. 1000 milliLiter(s) (10 mL/Hr) IV Continuous <Continuous>  insulin lispro (HumaLOG) corrective regimen sliding scale   SubCutaneous every 6 hours  dextrose 5%. 1000 milliLiter(s) (50 mL/Hr) IV Continuous <Continuous>  dextrose 50% Injectable 12.5 Gram(s) IV Push once  dextrose 50% Injectable 25 Gram(s) IV Push once  dextrose 50% Injectable 25 Gram(s) IV Push once  pantoprazole    Tablet 40 milliGRAM(s) Oral before breakfast  heparin  Infusion 1500 Unit(s)/Hr (15 mL/Hr) IV Continuous <Continuous>  sodium chloride 0.9% lock flush 3 milliLiter(s) IV Push every 8 hours  furosemide    Tablet 40 milliGRAM(s) Oral daily  potassium chloride    Tablet ER 40 milliEquivalent(s) Oral every 4 hours    MEDICATIONS  (PRN):  acetylcysteine 20% Inhalation 5 milliLiter(s) Inhalation every 6 hours PRN shortness of breath  artificial  tears Solution 1 Drop(s) Both EYES three times a day PRN Dry Eyes  acetaminophen   Tablet. 650 milliGRAM(s) Oral every 6 hours PRN Mild Pain (1 - 3)  dextrose Gel 1 Dose(s) Oral once PRN Blood Glucose LESS THAN 70 milliGRAM(s)/deciliter  glucagon  Injectable 1 milliGRAM(s) IntraMuscular once PRN Glucose LESS THAN 70 milligrams/deciliter        DIAGNOSTIC TESTS:

## 2017-06-30 NOTE — PROGRESS NOTE ADULT - PROBLEM SELECTOR PLAN 2
Critical AS, ALEKS 0.5, mean pressure gradient 43, mod pulm HTN, PASP 52.   -CTS Structural Heart following, patient s/p successful BAV 6/27/17 without complications.   -Repeat Echo 6/28 post BAV w/ ALEKS 1, mean pressure gradient 30

## 2017-06-30 NOTE — PROGRESS NOTE ADULT - SUBJECTIVE AND OBJECTIVE BOX
cc: follow-up evaluation and management of acute/chronic diastolic chf and aortic stenosis    subjective:  events noted    PAST MEDICAL & SURGICAL HISTORY:  PVD (peripheral vascular disease)  Iron deficiency anemia  Lupus (systemic lupus erythematosus)  Paroxysmal atrial fibrillation  Aortic stenosis, severe  CKD (chronic kidney disease) stage 4, GFR 15-29 ml/min  Diabetes  HTN (hypertension)  CHF (congestive heart failure)  CAD (coronary artery disease)  COPD (chronic obstructive pulmonary disease)  History of total knee replacement:   Presence of cardiac pacemaker: at [facility] by Dr Escoto    MEDICATIONS  (STANDING):  ferrous    sulfate 325 milliGRAM(s) Oral every 8 hours  acetazolamide    Tablet 500 milliGRAM(s) Oral daily  clopidogrel Tablet 75 milliGRAM(s) Oral daily  hydrocortisone 2.5% Cream 1 Application(s) Topical two times a day  Nephrocaps 1 Capsule(s) Oral daily  gabapentin 100 milliGRAM(s) Oral every other day  metoprolol succinate ER 12.5 milliGRAM(s) Oral daily  ALBUTerol/ipratropium for Nebulization 3 milliLiter(s) Nebulizer every 4 hours  atorvastatin 40 milliGRAM(s) Oral at bedtime  buDESOnide   0.25 milliGRAM(s) Respule 0.25 milliGRAM(s) Inhalation every 12 hours  sodium chloride 0.45%. 1000 milliLiter(s) (10 mL/Hr) IV Continuous <Continuous>  insulin lispro (HumaLOG) corrective regimen sliding scale   SubCutaneous every 6 hours  dextrose 5%. 1000 milliLiter(s) (50 mL/Hr) IV Continuous <Continuous>  dextrose 50% Injectable 12.5 Gram(s) IV Push once  dextrose 50% Injectable 25 Gram(s) IV Push once  dextrose 50% Injectable 25 Gram(s) IV Push once  pantoprazole    Tablet 40 milliGRAM(s) Oral before breakfast  heparin  Infusion 1500 Unit(s)/Hr (15 mL/Hr) IV Continuous <Continuous>  sodium chloride 0.9% lock flush 3 milliLiter(s) IV Push every 8 hours  furosemide   Injectable 40 milliGRAM(s) IV Push every 12 hours  docusate sodium 100 milliGRAM(s) Oral three times a day  senna 2 Tablet(s) Oral at bedtime    MEDICATIONS  (PRN):  acetylcysteine 20% Inhalation 5 milliLiter(s) Inhalation every 6 hours PRN shortness of breath  artificial  tears Solution 1 Drop(s) Both EYES three times a day PRN Dry Eyes  acetaminophen   Tablet. 650 milliGRAM(s) Oral every 6 hours PRN Mild Pain (1 - 3)  dextrose Gel 1 Dose(s) Oral once PRN Blood Glucose LESS THAN 70 milliGRAM(s)/deciliter  glucagon  Injectable 1 milliGRAM(s) IntraMuscular once PRN Glucose LESS THAN 70 milligrams/deciliter    ICU Vital Signs Last 24 Hrs  T(C): 36.3 (2017 20:45), Max: 36.6 (2017 09:08)  T(F): 97.4 (2017 20:45), Max: 97.9 (2017 09:08)  HR: 80 (2017 17:12) (68 - 90)  BP: 108/53 (2017 17:12) (103/48 - 113/63)  BP(mean): 75 (2017 17:12) (63 - 99)  ABP: --  ABP(mean): --  RR: 18 (2017 17:12) (14 - 18)  SpO2: 94% (2017 17:12) (91% - 95%)      PHYSICAL EXAM:  General: nad; arousable  heent - jvd present; no obvious hjr  Cardiac: irregular; systolic murmur at base  Pulmonary:  breath sounds present  Abdomen: soft abd  extremities: edema present bilat le  vasc - warm ext    LABS:                                             9.1    9.8   )-----------( 231      ( 2017 06:39 )             31.4       138  |  92<L>  |  91<H>  ----------------------------<  110<H>  3.4<L>   |  33<H>  |  2.70<H>    Ca    9.6      2017 06:39  Phos  5.2       Mg     2.2         TPro  7.1  /  Alb  3.1<L>  /  TBili  0.6  /  DBili  x   /  AST  13  /  ALT  <5<L>  /  AlkPhos  71  2017 ecg reviewed on 2017 ecg reviewed 2017    DIAGNOSTIC TESTS:     < from: Xray Chest 1 View AP -PORTABLE-Routine (17 @ 03:40) >  EXAM:  XR CHEST 1 VIEW PORT ROUTINE                          PROCEDURE DATE:  2017                     INTERPRETATION:  HISTORY: Extubated, follow-up    Portable AP view is compared to prior radiograph of 2017.    Endotracheal tube has been removed. No pneumothorax is evident, although   the superior-most lung apices are excluded from view. There has been   slight improvement in infiltrate in the right upper lung, with otherwise   little change in bilateral pulmonary infiltrates and effusions (right   more extensive than left), and there has otherwise been no interval   change.    IMPRESSION: Status post extubation. Slight improvement in infiltrate in   the right upper lung, with otherwise little change in bilateral pulmonary   infiltrates and effusions (right more extensive than left).            "Thank you for the opportunity to participate in the care of this   patient."        CHINO ROMAN M.D. ATTENDING RADIOLOGIST  This document has been electronically signed. 2017  1:16PM        < end of copied text >            < from: Xray Chest 1 View AP-PORTABLE IMMEDIATE (17 @ 12:07) >    EXAM:  XR CHEST 1 VIEW PORT IMMEDIATE                          PROCEDURE DATE:  2017                     INTERPRETATION:  Portable chest    History: Post [facility] bronchoscopy exam x-ray. Follow-up abnormal exam.    Scattered lung infiltrates again noted. Overall similar appearance to   prior exam earlier same day. Endotracheal tube in satisfactory position.   No pneumothorax identified.            "Thank you for the opportunity to participate in the care of this   patient."        JUDE LOPEZ M.D., ATTENDING RADIOLOGIST  This document has been electronically signed. 2017  3:03PM    < end of copied text >      < from: Echocardiogram (17 @ 12:03) >  EXAM:  ECHOCARDIOGRAM (CARDIOL)                          PROCEDURE DATE:  2017                        INTERPRETATION:  Patient Height: 165.0 cm  Patient Weight: 84.0 kg  Systolic Pressure: 128 mmHg  Diastolic Pressure: 52 mmHg  BSA: 1.9 m^2  Interpretation Summary  A complete two-dimensional transthoracic echocardiogram was performed (2D,   M-mode, spectral and color flow doppler).  Study Quality: Fair.Left   ventricular hypertrophy presentThe ejection fraction could not be   accurately  calculated (poor endocardiac resolution)  The left atrium is severely   dilated.Right atrial size is normal.The right ventricle is normal in   size and   function.Abnormal (paradoxical) septal motion consistent with   LBBBCalcified   aortic valve.There is trace to mild aortic regurgitation.There is   Moderate to   severe aortic stenosis.The peak pressure gradient is 45 mmHg.The mean   pressure   gradient is 30 mmHg.The calculated aortic valve area using the continuity   equation is 1 cm2.The calculated stroke volume index is 39 cc/m2 (normal   >35cc/m2).There is moderate mitral annular calcification.There is mild   mitral   regurgitation.Structurally normal tricuspid valve.Structurally normal   pulmonic   valve.  Procedure Details  A completetwo-dimensional transthoracic echocardiogram was performed (2D,  M-mode, spectral and color flow doppler).  Study Quality: Fair.  Left Ventricle  Left ventricular hypertrophy present  The ejection fraction could not be accurately calculated (poor endocardiac  resolution)  Abnormal (paradoxical) septal motion consistent with LBBB  Left Atrium  The left atrium is severely dilated.  Right Atrium  Right atrial size is normal.  Right Ventricle  The right ventricle is normal in size and function.  Aortic Valve  Calcified aortic valve.  There is trace to mild aortic regurgitation.  There is Moderate to severe aortic stenosis.  The peak pressure gradient is 45 mmHg.  The mean pressure gradient is 30 mmHg.  The calculated aortic valve area using the continuity equation is 1 cm2.  The calculated stroke volume index is 39 cc/m2 (normal >35cc/m2).  Mitral Valve  There is moderate mitral annular calcification.  There is mild mitral regurgitation.  Tricuspid Valve  Structurally normal tricuspid valve.  Pulmonic Valve  Structurally normal pulmonic valve.  Arteries and Venous System  No aortic root dilatation.  The IVC is dilated (>2.1 cm) with an abnormal inspiratory collapse (<50%)  consistent with elevated right atrial pressure.  Pericardium / Pleura  There is no pericardial effusion.  Doppler Measurements & Calculations  MV E point: 140.2 cm/sec  MV A point: 53.4 cm/sec  MV E/A: 2.6  MV dec slope: 636.0 cm/sec^2  Ao V2 max: 319.4 cm/sec  Ao max P.8 mmHg  Ao max PG (full): 36.2 mmHg  Ao V2 mean: 251.5 cm/sec  Ao mean P.0 mmHg  Ao mean PG (full): 24.4 mmHg  Ao V2 VTI: 80.8 cm  ALEKS(I,A): 0.9 cm^2  ALEKS(I,D): 0.9 cm^2  ALEKS(V,A): 1.1 cm^2  ALEKS(V,D): 1.1 cm^2  LV max P.7 mmHg  LV mean P.6 mmHg  LV V1 max: 108 cm/sec  LV V1 mean: 76.1 cm/sec  LV V1 VTI: 24.0 cm  MR max graciela: 538.0 cm/sec  MR max P.8 mmHg  MR mean graciela: 400.3 cm/sec  MR mean P.0 mmHg  MR VTI: 181.9 cm  SV(Ao): 690.6 ml  SI(Ao): 360.9 ml/m^2  SV(LVOT): 75.0 ml  SI(LVOT): 39.2 ml/m^2  TR Max graciela: 252.8cm/sec  MMode 2D Measurements & Calculations  IVSd: 1.2 cm  LVIDd: 6.4 cm  LVIDs: 5.3 cm  LVPWd: 1.1 cm  IVS/LVPW: 1.0  FS: 18.0 %  EDV(Teich): 209.4 ml  ESV(Teich): 132.7 ml  LV mass(C)d: 331.0 grams  LV mass(C)dI: 173.0 grams/m^2  SI(cubed): 61.9 ml/m^2  Ao root diam: 3.3 cm  Ao root area: 8.5 cm^2  LA dimension: 4.5 cm  LA/Ao: 1.4  LVOT diam: 2.0 cm  LVOT area: 3.1 cm^2  LVOT area (M): 3.1 cm^2  EDV(MOD-sp4): 112 ml  EDV(MOD-sp2): 105 ml  Interpreting Physician:Perry Lcay MD electronicallysigned on   2017 12:24:22                "Thank you for the opportunity to participate in the care of this   patient."        PERRY LACY M.D., ATTENDING CARDIOLOGIST  This document has been electronically signed. 2017 12:24PM        < end of copied text >        EXAM:  XR CHEST 1 VIEW PORT URGENT                          PROCEDURE DATE:  2017                     INTERPRETATION:  Clinical History: Congestion    Portable examination the chest demonstrates no interval change congestion   and/or infiltrates in comparison to prior examination of the chest   2017. Bilateral effusions. No interval change position remaining   support devices. Cardiomegaly.    Impression: No interval change lung pathology            "Thank you for the opportunity to participate in the care of this   patient."        KATHARINA MOROCHO M.D., ATTENDING RADIOLOGIST  This document has been electronically signed. 2017 10:38AM      2017 ecg reviewed on 2017    EXAM:  XR CHEST 1 VIEW PORT ROUTINE                          PROCEDURE DATE:  2017                     INTERPRETATION:  Indication: chf    A single portable view of the chest is submitted. Comparison is made to   the most recent prior study dated 2017. Bilateral pulmonary   infiltrates and effusions are similar to the previous examination.   Impression:    No significant interval change            "Thank you for the opportunity to participate in the care of this   patient."        MASOUD WORKMAN M.D., ATTENDING RADIOLOGIST  This document has been electronically signed. 2017  4:33PM

## 2017-06-30 NOTE — PROGRESS NOTE ADULT - SUBJECTIVE AND OBJECTIVE BOX
CARDIOLOGY NP PROGRESS NOTE    Subjective: Pt seen and examined at bedside. Denies chest pain or sob. Angry about being in hospital, wants to "get out of here."    Overnight Events: Transferred from 9L, resumed Coumadin    TELEMETRY: Afib 70s          VITAL SIGNS:  T(C): 36.6 (06-30-17 @ 09:08), Max: 37.4 (06-29-17 @ 10:38)  HR: 68 (06-30-17 @ 09:10) (68 - 90)  BP: 106/45 (06-30-17 @ 09:10) (100/51 - 108/53)  RR: 18 (06-30-17 @ 09:10) (12 - 35)  SpO2: 94% (06-30-17 @ 09:10) (90% - 100%)  Wt(kg): --    I&O's Summary    29 Jun 2017 07:01  -  30 Jun 2017 07:00  --------------------------------------------------------  IN: 743 mL / OUT: 1250 mL / NET: -507 mL    30 Jun 2017 07:01  -  30 Jun 2017 10:25  --------------------------------------------------------  IN: 145 mL / OUT: 0 mL / NET: 145 mL          PHYSICAL EXAM:    General: A/ox 3, No acute Distress, obese  Neck: Supple, NO JVD  Cardiac: S1 S2, grade III/VI systolic murmur  Pulmonary: R sided mild expiratory wheezing, scattered crackles thru romeo lungs, Breathing unlabored at rest, No Rhonchi. On 3L NC.  Abdomen: Soft, Non -tender, +BS x 4 quads  Extremities: No Rashes, 1+ rmoeo LE edema  Neuro: A/o x 3, No focal deficits          LABS:                          9.1    9.8   )-----------( 231      ( 30 Jun 2017 06:39 )             31.4                              06-30    138  |  92<L>  |  91<H>  ----------------------------<  110<H>  3.4<L>   |  33<H>  |  2.70<H>    Ca    9.6      30 Jun 2017 06:39  Phos  5.2     06-30  Mg     2.2     06-30    TPro  7.1  /  Alb  3.1<L>  /  TBili  0.6  /  DBili  x   /  AST  13  /  ALT  <5<L>  /  AlkPhos  71  06-29    LIVER FUNCTIONS - ( 29 Jun 2017 03:05 )  Alb: 3.1 g/dL / Pro: 7.1 g/dL / ALK PHOS: 71 U/L / ALT: <5 U/L / AST: 13 U/L / GGT: x         PT/INR - ( 30 Jun 2017 06:39 )   PT: 13.5 sec;   INR: 1.21          PTT - ( 30 Jun 2017 06:39 )  PTT:62.5 sec  CAPILLARY BLOOD GLUCOSE  175 (30 Jun 2017 05:14)  161 (29 Jun 2017 21:36)  171 (29 Jun 2017 20:56)  113 (29 Jun 2017 16:03)  168 (29 Jun 2017 12:00)                  No Known Allergies    MEDICATIONS  (STANDING):  ferrous    sulfate 325 milliGRAM(s) Oral every 8 hours  acetazolamide    Tablet 500 milliGRAM(s) Oral daily  clopidogrel Tablet 75 milliGRAM(s) Oral daily  hydrocortisone 2.5% Cream 1 Application(s) Topical two times a day  Nephrocaps 1 Capsule(s) Oral daily  gabapentin 100 milliGRAM(s) Oral every other day  metoprolol succinate ER 12.5 milliGRAM(s) Oral daily  ALBUTerol/ipratropium for Nebulization 3 milliLiter(s) Nebulizer every 4 hours  atorvastatin 40 milliGRAM(s) Oral at bedtime  buDESOnide   0.25 milliGRAM(s) Respule 0.25 milliGRAM(s) Inhalation every 12 hours  sodium chloride 0.45%. 1000 milliLiter(s) (10 mL/Hr) IV Continuous <Continuous>  insulin lispro (HumaLOG) corrective regimen sliding scale   SubCutaneous every 6 hours  dextrose 5%. 1000 milliLiter(s) (50 mL/Hr) IV Continuous <Continuous>  dextrose 50% Injectable 12.5 Gram(s) IV Push once  dextrose 50% Injectable 25 Gram(s) IV Push once  dextrose 50% Injectable 25 Gram(s) IV Push once  pantoprazole    Tablet 40 milliGRAM(s) Oral before breakfast  heparin  Infusion 1500 Unit(s)/Hr (15 mL/Hr) IV Continuous <Continuous>  sodium chloride 0.9% lock flush 3 milliLiter(s) IV Push every 8 hours  furosemide    Tablet 40 milliGRAM(s) Oral daily  potassium chloride    Tablet ER 40 milliEquivalent(s) Oral every 4 hours    MEDICATIONS  (PRN):  acetylcysteine 20% Inhalation 5 milliLiter(s) Inhalation every 6 hours PRN shortness of breath  artificial  tears Solution 1 Drop(s) Both EYES three times a day PRN Dry Eyes  acetaminophen   Tablet. 650 milliGRAM(s) Oral every 6 hours PRN Mild Pain (1 - 3)  dextrose Gel 1 Dose(s) Oral once PRN Blood Glucose LESS THAN 70 milliGRAM(s)/deciliter  glucagon  Injectable 1 milliGRAM(s) IntraMuscular once PRN Glucose LESS THAN 70 milligrams/deciliter        DIAGNOSTIC TESTS:

## 2017-06-30 NOTE — PROGRESS NOTE ADULT - PROBLEM SELECTOR PLAN 5
Afib with slow ventricular response s/p PPM 2011 2/2 symptomatic bradycardia.  -Continue rate control w/ Toprol  -PPM interrogation WNL.  -c/w Heparin gtt bridge w/ Coumadin. Check daily INR. Will give tonight 2.5mg (home dose 2mg)

## 2017-06-30 NOTE — PROGRESS NOTE ADULT - ASSESSMENT
1) acute/chronic diastolic chf with known ckd  -additional bun elevation - diuretics per renal - monitor labs closely   2) nonrheumatic aortic stenosis  -s/p bav - monitor - call shd for follow-up plan  3) atrial fibrillation; presence of pacemaker  -re-call ep s/p bav - consider repeat device check  -coumadin/heparin  4) CAD s/p PCI   -rx as tolerated  6) COPD - f/u pulm recs  7) PVD - monitor  8) DM - monitor glycemic control  9) normocytic anemia - serial h/h on anti-plt and a/c  10) pressure injury of skin (right buttock) - recommend wound care consult  11) ppx: therapeutic ac; ppi ordered  12)  K>4 Mg>2  d/w NP earlier today

## 2017-06-30 NOTE — PROGRESS NOTE ADULT - SUBJECTIVE AND OBJECTIVE BOX
CC: ACUTE CHRONIC CONGESTIVEHEART FAILURE UNSPECIFIE      INTERVAL HISTORY: in NAD      ROS: No chest pain, no sob, no abd pain. No n/v/d    PAST MEDICAL & SURGICAL HISTORY:  PVD (peripheral vascular disease)  Iron deficiency anemia  Lupus (systemic lupus erythematosus)  Paroxysmal atrial fibrillation  Aortic stenosis, severe  CKD (chronic kidney disease) stage 4, GFR 15-29 ml/min  Diabetes  HTN (hypertension)  CHF (congestive heart failure)  CAD (coronary artery disease)  COPD (chronic obstructive pulmonary disease)  History of total knee replacement: 2003  Presence of cardiac pacemaker: at [facility] by Dr Escoto      PHYSICAL EXAM:  T(C): 36.1 (06-30-17 @ 05:14), Max: 37.4 (06-29-17 @ 10:38)  HR: 68 (06-30-17 @ 04:54)  BP: 103/48 (06-30-17 @ 04:54) (100/51 - 108/53)  RR: 14 (06-30-17 @ 04:54)  SpO2: 95% (06-30-17 @ 04:54)  Wt(kg): --  I&O's Summary    29 Jun 2017 07:01  -  30 Jun 2017 07:00  --------------------------------------------------------  IN: 743 mL / OUT: 1250 mL / NET: -507 mL      Weight 84 (06-27 @ 13:21)  General: AAO x 3,  NAD.  HEENT: moist mucous membranes, no pallor/cyanosis.  Neck: no JVD visible.  Cardiac: S1, S2. RRR. No murmurs   Respratory:basilar rales  Abdomen: soft. nontender. nondistended  Skin: no rashes.  Extremities: trace LE edema b/l  Access:       DATA:                        9.1<L>  9.8   )-----------( 231      ( 30 Jun 2017 06:39 )             31.4<L>    Ferritin, Serum: 92.0 ng/mL (06-12 @ 05:42)      138    |  92<L>  |  91<H>  ----------------------------<  110<H>  Ca:9.6   (30 Jun 2017 06:39)  3.4<L>   |  33<H>  |  2.70<H>      eGFR if Non : 15 <L>  eGFR if : 18 <L>    TPro  7.1 g/dL  /  Alb  3.1 g/dL<L>  /  TBili  0.6 mg/dL  /  DBili  x      /  AST  13 U/L  /  ALT  <5 U/L<L>  /  AlkPhos  71 U/L  29 Jun 2017 03:05                    MEDICATIONS  (STANDING):  ferrous    sulfate 325 milliGRAM(s) Oral every 8 hours  acetazolamide    Tablet 500 milliGRAM(s) Oral daily  clopidogrel Tablet 75 milliGRAM(s) Oral daily  hydrocortisone 2.5% Cream 1 Application(s) Topical two times a day  Nephrocaps 1 Capsule(s) Oral daily  gabapentin 100 milliGRAM(s) Oral every other day  metoprolol succinate ER 12.5 milliGRAM(s) Oral daily  ALBUTerol/ipratropium for Nebulization 3 milliLiter(s) Nebulizer every 4 hours  atorvastatin 40 milliGRAM(s) Oral at bedtime  buDESOnide   0.25 milliGRAM(s) Respule 0.25 milliGRAM(s) Inhalation every 12 hours  sodium chloride 0.45%. 1000 milliLiter(s) (10 mL/Hr) IV Continuous <Continuous>  insulin lispro (HumaLOG) corrective regimen sliding scale   SubCutaneous every 6 hours  dextrose 5%. 1000 milliLiter(s) (50 mL/Hr) IV Continuous <Continuous>  dextrose 50% Injectable 12.5 Gram(s) IV Push once  dextrose 50% Injectable 25 Gram(s) IV Push once  dextrose 50% Injectable 25 Gram(s) IV Push once  pantoprazole    Tablet 40 milliGRAM(s) Oral before breakfast  heparin  Infusion 1500 Unit(s)/Hr (15 mL/Hr) IV Continuous <Continuous>  sodium chloride 0.9% lock flush 3 milliLiter(s) IV Push every 8 hours  furosemide    Tablet 40 milliGRAM(s) Oral daily  potassium chloride    Tablet ER 40 milliEquivalent(s) Oral every 4 hours    MEDICATIONS  (PRN):  acetylcysteine 20% Inhalation 5 milliLiter(s) Inhalation every 6 hours PRN shortness of breath  artificial  tears Solution 1 Drop(s) Both EYES three times a day PRN Dry Eyes  acetaminophen   Tablet. 650 milliGRAM(s) Oral every 6 hours PRN Mild Pain (1 - 3)  dextrose Gel 1 Dose(s) Oral once PRN Blood Glucose LESS THAN 70 milliGRAM(s)/deciliter  glucagon  Injectable 1 milliGRAM(s) IntraMuscular once PRN Glucose LESS THAN 70 milligrams/deciliter

## 2017-06-30 NOTE — PROGRESS NOTE ADULT - PROBLEM SELECTOR PLAN 7
Baseline Crea per daughter 2-3. Currently at baseline.  - Renal following to assist with diuresis.   -Cont Furosemide IV and Diamox 500mg qd for alkalosis per renal recs.   -Renal consulted. F/u recs. (Dr. Ramirez-her outpt Nephrologist)  -Leahy catheter in place, monitor daily weights, strictr I/o's  -Renally dose all meds  - Completed Ceftriaxone 7 days course for complicated UTI w/ Klesiella on UCx. No leukocytosis or fevers.

## 2017-06-30 NOTE — PROGRESS NOTE ADULT - PROBLEM SELECTOR PLAN 5
Afib with slow ventricular response s/p PPM 2011 2/2 symptomatic bradycardia.  -Continue rate control w/ Toprol  -PPM interrogation WNL.  -c/w Heparin gtt bridge w/ Coumadin. Check daily INR. will give tonight 1.5mg Afib with slow ventricular response s/p PPM 2011 2/2 symptomatic bradycardia.  -Continue rate control w/ Toprol  -PPM interrogation WNL.  -c/w Heparin gtt bridge w/ Coumadin. Check daily INR. Will give tonight 2.5mg (home dose 2mg)

## 2017-06-30 NOTE — PROGRESS NOTE ADULT - PROBLEM SELECTOR PLAN 1
Admitted with acute CHF exacerbation 2/2 dietary non-compliance and critical AS. EF 50-55%, severely dilated LA.  -S/p successful BAV 6/27/17 for critical AS by Dr Bargre.  -Daily weights, strict I/Os (Leahy in place)  - Continue Toprol XL 12.5mg po daily  - Cont Diamox 500mg qd and Furosemide 60mg IV. Careful diuresis with close monitoring of BP in setting of severe AS. Net goal neg 1-2L. Admitted with acute CHF exacerbation 2/2 dietary non-compliance and critical AS. EF 50-55%, severely dilated LA.  -S/p successful BAV 6/27/17 for critical AS by Dr Barger.  -Daily weights, strict I/Os (Leahy in place)  - Continue Toprol XL 12.5mg po daily  - CXR w/ persistent pulm vasc congestion, lungs w/ romeo crackles on exam, still requiring O2. Resume IV diuresis Furosemide 40mg IV BID as discussed w/ Renal Dr Barbosa. Cont Diamox 500mg qd. Careful diuresis with close monitoring of BP in setting of severe AS. Net goal neg 1-2L.

## 2017-06-30 NOTE — PROGRESS NOTE ADULT - PROBLEM SELECTOR PLAN 10
DVT prophylaxis: on Heparin drip    Full Code; advanced directions to be re-addressed.     PT rec: TALIA    Dispo: per clinical progress.

## 2017-06-30 NOTE — PROGRESS NOTE ADULT - PROBLEM SELECTOR PLAN 3
Hypercapnic resp failure 2/2 pulmonary congestion 2/2 CHF 2/2 severe AS.   - Saturating well on 3LNC, try to wean off O2 over the weekend as pt tolerates.  - BIPAP at night    #COPD - c/w Duonebs q4h, Pulmicort INH

## 2017-06-30 NOTE — PROGRESS NOTE ADULT - PROBLEM SELECTOR PLAN 4
s/p PCI x 3 stent (04', 06' and 12')-last cath 3/15' PCI dLM   -Trop 0.04 x 4 on admission. Likely 2/2 demand ischemia.   - Continue Toprol 12.5 mg and Plavix 75 mg daily, Lipitor 40 mg daily.

## 2017-06-30 NOTE — PROGRESS NOTE ADULT - PROBLEM SELECTOR PLAN 1
Admitted with acute CHF exacerbation 2/2 dietary non-compliance and critical AS. EF 50-55%, severely dilated LA.  -S/p successful BAV 6/27/17 for critical AS by Dr Barger.  -Daily weights, strict I/Os (Leahy in place)  - Continue Toprol XL 12.5mg po daily  - CXR w/ persistent pulm vasc congestion, lungs w/ romeo crackles on exam, still requiring O2. Resume IV diuresis Furosemide 40mg IV BID as discussed w/ Renal Dr Barbosa. Cont Diamox 500mg qd. Careful diuresis with close monitoring of BP in setting of severe AS. Net goal neg 1-2L.

## 2017-06-30 NOTE — PROGRESS NOTE ADULT - ASSESSMENT
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 for CHF exacerbation. Pt was admitted to [facility] but was transferred to CCU for hypercapnic respiratory failure s/p BiPAP. Pt now s/p BAV with Dr. Barger on 6/27/17. Transferred from  to [facility] for Heparin/Coumadin bridging, further diuresis and dispo planning.

## 2017-06-30 NOTE — PROGRESS NOTE ADULT - SUBJECTIVE AND OBJECTIVE BOX
CC/ HPI:  86 yo female with chronic diastolic CHF (EF 50-55% 11/16'), mod to severe aortic stenosis, pAFIB, CAD s/p PCI x 3 stent, Lupus (not on meds), COPD, CKD (baseline Cr 2-3), admitted for CHF exacerbation complicated by hypercapnic respiratory failure, status post BAV, today without respiratory complaint.    PAST MEDICAL & SURGICAL HISTORY:  PVD (peripheral vascular disease)  Iron deficiency anemia  Lupus (systemic lupus erythematosus)  Paroxysmal atrial fibrillation  Aortic stenosis, severe  CKD (chronic kidney disease) stage 4, GFR 15-29 ml/min  Diabetes  HTN (hypertension)  CHF (congestive heart failure)  CAD (coronary artery disease)  COPD (chronic obstructive pulmonary disease)  History of total knee replacement: 2003  Cardiac pacemaker: at [facility] by Dr Escoto, 2011    SOCHX:  + tobacco,  -  alcohol    FMHX: FA/MO  - contributory     ROS reviewed below with positive findings marked (+) :  GEN:  fever, chills ENT: tracheostomy,   epistaxis,  sinusitis COR: +CAD, +CHF,  +HTN, +dysrhythmia PUL: +COPD, ILD, asthma, pneumonia GI: PEG, dysphagia, hemorrhage, other PETEY: +kidney disease, electrolyte disorder HEM:  +anemia, thrombus, coagulopathy, cancer ENDO:  thyroid disease, +diabetes mellitus CNS:  dementia, stroke, seizure, PSY:  depression, anxiety, other          MEDICATIONS  (STANDING):  ferrous    sulfate 325 milliGRAM(s) Oral every 8 hours  acetazolamide    Tablet 500 milliGRAM(s) Oral daily  clopidogrel Tablet 75 milliGRAM(s) Oral daily  hydrocortisone 2.5% Cream 1 Application(s) Topical two times a day  Nephrocaps 1 Capsule(s) Oral daily  gabapentin 100 milliGRAM(s) Oral every other day  metoprolol succinate ER 12.5 milliGRAM(s) Oral daily  ALBUTerol/ipratropium for Nebulization 3 milliLiter(s) Nebulizer every 4 hours  atorvastatin 40 milliGRAM(s) Oral at bedtime  buDESOnide   0.25 milliGRAM(s) Respule 0.25 milliGRAM(s) Inhalation every 12 hours  sodium chloride 0.45%. 1000 milliLiter(s) (10 mL/Hr) IV Continuous <Continuous>  insulin lispro (HumaLOG) corrective regimen sliding scale   SubCutaneous every 6 hours  pantoprazole    Tablet 40 milliGRAM(s) Oral before breakfast  heparin  Infusion 1500 Unit(s)/Hr (15 mL/Hr) IV Continuous <Continuous>  sodium chloride 0.9% lock flush 3 milliLiter(s) IV Push every 8 hours  furosemide   Injectable 40 milliGRAM(s) IV Push every 12 hours  warfarin 2.5 milliGRAM(s) Oral once    MEDICATIONS  (PRN):  acetylcysteine 20% Inhalation 5 milliLiter(s) Inhalation every 6 hours PRN shortness of breath  artificial  tears Solution 1 Drop(s) Both EYES three times a day PRN Dry Eyes  acetaminophen   Tablet. 650 milliGRAM(s) Oral every 6 hours PRN Mild Pain (1 - 3)  dextrose Gel 1 Dose(s) Oral once PRN Blood Glucose LESS THAN 70 milliGRAM(s)/deciliter  glucagon  Injectable 1 milliGRAM(s) IntraMuscular once PRN Glucose LESS THAN 70 milligrams/deciliter      Vital Signs Last 24 Hrs  T(C): 36.4 (30 Jun 2017 12:45), Max: 36.6 (29 Jun 2017 17:31)  T(F): 97.6 (30 Jun 2017 12:45), Max: 97.9 (30 Jun 2017 09:08)  HR: 74 (30 Jun 2017 13:04) (68 - 80)  BP: 113/56 (30 Jun 2017 13:04) (100/54 - 113/56)  BP(mean): 85 (30 Jun 2017 13:04) (63 - 85)  RR: 17 (30 Jun 2017 13:04) (14 - 18)  SpO2: 95% (30 Jun 2017 13:04) (93% - 97%)    GENERAL:         comfortable,  - distress.  HEENT:            - trauma,  - icterus,  - injection,  - nasal discharge.  NECK:              - jugular venous distention, - thyromegaly.  LYMPH:           - lymphadenopathy, - masses.  RESP:              + crackles,   - rhonchi,   - wheezes.   COR:                S1S2   - gallops,  - rubs.  ABD:                bowel sounds,   soft, - tender, - distended, - organomegaly.  EXT/MSC:         - cyanosis,  - clubbing,  - edema.    NEURO:             alert,   responds to stimuli.    ABG - ( 29 Jun 2017 02:48 )  pH: 7.40  /  pCO2: 55    /  pO2: 180   / HCO3: 33    / Base Excess: 6.9   /  SaO2: 99                                9.1    9.8   )-----------( 231      ( 30 Jun 2017 06:39 )             31.4     06-30    138  |  92<L>  |  91<H>  ----------------------------<  110<H>  3.4<L>   |  33<H>  |  2.70<H>      CXR (6/29)  Bilateral infiltrate/congestion/pleural effusion      ASSESSMENT/PLAN    1)  Status post BAV  2)  Cardiomyopathy  3)  Chronic obstructive pulmonary disease  4)  Pulmonary hypertension    Oxygen via nasal cannula as needed  Bronchodilators:  Atrovent/ albuterol q 4-6 hours.  Corticosteroids:  budesonide  Cardiac/HTN: post BAV  GI: Rx/ prophylaxis c PPI/H2B  Heme: Rx/VT on AC  Discuss with medical team.

## 2017-06-30 NOTE — PROGRESS NOTE ADULT - PROBLEM SELECTOR PLAN 9
Right pressure ulcer to right buttock  -Wound care consulted  -Turning and positioning q2 hours.  -Apply barrier cream to buttock daily and prn

## 2017-06-30 NOTE — PROGRESS NOTE ADULT - ASSESSMENT
82 y/o F w/ PMh of chronic diastolic CHF, moderate to severe AS (ALEKS 0.5cm2), pAF on coumadin, CAD s/p PCIx3, last cath required impella support , lupus, COPD, PNA, PPM 2011 for bradycardia, CKD w/ h/o renal failure requiring HD, HTN, PVD, DM, MONICA, TKA who was admitted to [facility] for CHF exacerbation. Pt was admitted to [facility] but was transferred to CCU for hypercapnic respiratory failure s/p BiPAP. Pt now s/p BAV with Dr. Barger on 6/27/17. Transferred from  to [facility] for Heparin/Coumadin bridging, further diuresis and dispo planning.

## 2017-07-01 LAB
ANION GAP SERPL CALC-SCNC: 14 MMOL/L — SIGNIFICANT CHANGE UP (ref 5–17)
APTT BLD: 75.6 SEC — HIGH (ref 27.5–37.4)
BUN SERPL-MCNC: 94 MG/DL — HIGH (ref 7–23)
CALCIUM SERPL-MCNC: 9.5 MG/DL — SIGNIFICANT CHANGE UP (ref 8.4–10.5)
CHLORIDE SERPL-SCNC: 93 MMOL/L — LOW (ref 96–108)
CO2 SERPL-SCNC: 31 MMOL/L — SIGNIFICANT CHANGE UP (ref 22–31)
CREAT SERPL-MCNC: 2.6 MG/DL — HIGH (ref 0.5–1.3)
GLUCOSE SERPL-MCNC: 105 MG/DL — HIGH (ref 70–99)
HCT VFR BLD CALC: 30.3 % — LOW (ref 34.5–45)
HGB BLD-MCNC: 9.2 G/DL — LOW (ref 11.5–15.5)
INR BLD: 1.28 — HIGH (ref 0.88–1.16)
MAGNESIUM SERPL-MCNC: 2.1 MG/DL — SIGNIFICANT CHANGE UP (ref 1.6–2.6)
MCHC RBC-ENTMCNC: 27.1 PG — SIGNIFICANT CHANGE UP (ref 27–34)
MCHC RBC-ENTMCNC: 30.4 G/DL — LOW (ref 32–36)
MCV RBC AUTO: 89.1 FL — SIGNIFICANT CHANGE UP (ref 80–100)
PLATELET # BLD AUTO: 223 K/UL — SIGNIFICANT CHANGE UP (ref 150–400)
POTASSIUM SERPL-MCNC: 4.1 MMOL/L — SIGNIFICANT CHANGE UP (ref 3.5–5.3)
POTASSIUM SERPL-SCNC: 4.1 MMOL/L — SIGNIFICANT CHANGE UP (ref 3.5–5.3)
PROTHROM AB SERPL-ACNC: 14.3 SEC — HIGH (ref 9.8–12.7)
RBC # BLD: 3.4 M/UL — LOW (ref 3.8–5.2)
RBC # FLD: 16.3 % — SIGNIFICANT CHANGE UP (ref 10.3–16.9)
SODIUM SERPL-SCNC: 138 MMOL/L — SIGNIFICANT CHANGE UP (ref 135–145)
WBC # BLD: 8.8 K/UL — SIGNIFICANT CHANGE UP (ref 3.8–10.5)
WBC # FLD AUTO: 8.8 K/UL — SIGNIFICANT CHANGE UP (ref 3.8–10.5)

## 2017-07-01 PROCEDURE — 99232 SBSQ HOSP IP/OBS MODERATE 35: CPT | Mod: GC

## 2017-07-01 RX ORDER — WARFARIN SODIUM 2.5 MG/1
2.5 TABLET ORAL ONCE
Qty: 0 | Refills: 0 | Status: COMPLETED | OUTPATIENT
Start: 2017-07-01 | End: 2017-07-01

## 2017-07-01 RX ADMIN — Medication 3 MILLILITER(S): at 13:32

## 2017-07-01 RX ADMIN — SODIUM CHLORIDE 3 MILLILITER(S): 9 INJECTION INTRAMUSCULAR; INTRAVENOUS; SUBCUTANEOUS at 21:41

## 2017-07-01 RX ADMIN — Medication 3 MILLILITER(S): at 10:15

## 2017-07-01 RX ADMIN — Medication 40 MILLIGRAM(S): at 06:20

## 2017-07-01 RX ADMIN — CLOPIDOGREL BISULFATE 75 MILLIGRAM(S): 75 TABLET, FILM COATED ORAL at 12:23

## 2017-07-01 RX ADMIN — WARFARIN SODIUM 2.5 MILLIGRAM(S): 2.5 TABLET ORAL at 21:41

## 2017-07-01 RX ADMIN — SENNA PLUS 2 TABLET(S): 8.6 TABLET ORAL at 21:41

## 2017-07-01 RX ADMIN — Medication 650 MILLIGRAM(S): at 23:43

## 2017-07-01 RX ADMIN — Medication 1 APPLICATION(S): at 18:06

## 2017-07-01 RX ADMIN — PANTOPRAZOLE SODIUM 40 MILLIGRAM(S): 20 TABLET, DELAYED RELEASE ORAL at 06:21

## 2017-07-01 RX ADMIN — HEPARIN SODIUM 15 UNIT(S)/HR: 5000 INJECTION INTRAVENOUS; SUBCUTANEOUS at 16:56

## 2017-07-01 RX ADMIN — Medication 3 MILLILITER(S): at 21:40

## 2017-07-01 RX ADMIN — ATORVASTATIN CALCIUM 40 MILLIGRAM(S): 80 TABLET, FILM COATED ORAL at 21:41

## 2017-07-01 RX ADMIN — Medication 325 MILLIGRAM(S): at 06:21

## 2017-07-01 RX ADMIN — Medication 1 CAPSULE(S): at 12:22

## 2017-07-01 RX ADMIN — SODIUM CHLORIDE 3 MILLILITER(S): 9 INJECTION INTRAMUSCULAR; INTRAVENOUS; SUBCUTANEOUS at 06:13

## 2017-07-01 RX ADMIN — Medication 3 MILLILITER(S): at 06:21

## 2017-07-01 RX ADMIN — Medication 0.25 MILLIGRAM(S): at 18:05

## 2017-07-01 RX ADMIN — Medication 12.5 MILLIGRAM(S): at 12:22

## 2017-07-01 RX ADMIN — GABAPENTIN 100 MILLIGRAM(S): 400 CAPSULE ORAL at 12:23

## 2017-07-01 RX ADMIN — Medication 0.25 MILLIGRAM(S): at 06:21

## 2017-07-01 RX ADMIN — Medication 325 MILLIGRAM(S): at 21:41

## 2017-07-01 RX ADMIN — Medication 3 MILLILITER(S): at 18:05

## 2017-07-01 RX ADMIN — ACETAZOLAMIDE 500 MILLIGRAM(S): 250 TABLET ORAL at 12:22

## 2017-07-01 RX ADMIN — Medication 100 MILLIGRAM(S): at 13:32

## 2017-07-01 RX ADMIN — Medication 40 MILLIGRAM(S): at 18:05

## 2017-07-01 RX ADMIN — Medication 100 MILLIGRAM(S): at 06:21

## 2017-07-01 RX ADMIN — Medication 100 MILLIGRAM(S): at 21:41

## 2017-07-01 RX ADMIN — Medication 325 MILLIGRAM(S): at 13:32

## 2017-07-01 RX ADMIN — SODIUM CHLORIDE 3 MILLILITER(S): 9 INJECTION INTRAMUSCULAR; INTRAVENOUS; SUBCUTANEOUS at 13:33

## 2017-07-01 NOTE — PROGRESS NOTE ADULT - PROBLEM SELECTOR PLAN 10
DVT prophylaxis: on Heparin drip    Full Code; advanced directions to be re-addressed.     PT rec: TALIA    Dispo: per clinical progress. DVT prophylaxis: on Heparin drip    Rediscussed code status with pt and daughter. Pt wishes to resume DNR/DNI status now s/p BAV.     PT rec: TALIA    Dispo: per clinical progress.

## 2017-07-01 NOTE — PROGRESS NOTE ADULT - SUBJECTIVE AND OBJECTIVE BOX
CARDIOLOGY NP PROGRESS NOTE    Subjective: Pt seen and examined at bedside. Irritated regarding physical exam daily. Denies chest pain or sob.    Overnight Events: None    TELEMETRY: Afib 80s, occasional V-paced        VITAL SIGNS:  T(C): 35.7 (07-01-17 @ 05:20), Max: 36.6 (06-30-17 @ 16:24)  HR: 76 (07-01-17 @ 08:46) (74 - 90)  BP: 110/53 (07-01-17 @ 08:46) (98/50 - 113/63)  RR: 18 (07-01-17 @ 08:46) (17 - 18)  SpO2: 92% (07-01-17 @ 08:46) (91% - 96%)  Wt(kg): --    I&O's Summary    30 Jun 2017 07:01  -  01 Jul 2017 07:00  --------------------------------------------------------  IN: 780 mL / OUT: 2025 mL / NET: -1245 mL    01 Jul 2017 07:01  -  01 Jul 2017 10:13  --------------------------------------------------------  IN: 0 mL / OUT: 375 mL / NET: -375 mL          PHYSICAL EXAM:    General: A/ox 3, No acute Distress, obese  Neck: Supple, NO JVD  Cardiac: S1 S2, No M/R/G  Pulmonary: R lung mild expiratory wheezing, Bibasilar crackles improved, Breathing unlabored, No Rhonchi  Abdomen: Soft, Non-tender, +BS x 4 quads  Extremities: No Rashes, No LE edema  Neuro: A/o x 3, No focal deficits          LABS:                          9.2    8.8   )-----------( 223      ( 01 Jul 2017 07:46 )             30.3                              07-01    138  |  93<L>  |  94<H>  ----------------------------<  105<H>  4.1   |  31  |  2.60<H>    Ca    9.5      01 Jul 2017 07:46  Phos  5.2     06-30  Mg     2.1     07-01      PT/INR - ( 01 Jul 2017 07:46 )   PT: 14.3 sec;   INR: 1.28          PTT - ( 01 Jul 2017 07:46 )  PTT:75.6 sec  CAPILLARY BLOOD GLUCOSE  117 (01 Jul 2017 06:06)  137 (30 Jun 2017 20:45)  125 (30 Jun 2017 16:24)  127 (30 Jun 2017 11:35)            No Known Allergies    MEDICATIONS  (STANDING):  ferrous    sulfate 325 milliGRAM(s) Oral every 8 hours  acetazolamide    Tablet 500 milliGRAM(s) Oral daily  clopidogrel Tablet 75 milliGRAM(s) Oral daily  hydrocortisone 2.5% Cream 1 Application(s) Topical two times a day  Nephrocaps 1 Capsule(s) Oral daily  gabapentin 100 milliGRAM(s) Oral every other day  metoprolol succinate ER 12.5 milliGRAM(s) Oral daily  ALBUTerol/ipratropium for Nebulization 3 milliLiter(s) Nebulizer every 4 hours  atorvastatin 40 milliGRAM(s) Oral at bedtime  buDESOnide   0.25 milliGRAM(s) Respule 0.25 milliGRAM(s) Inhalation every 12 hours  sodium chloride 0.45%. 1000 milliLiter(s) (10 mL/Hr) IV Continuous <Continuous>  insulin lispro (HumaLOG) corrective regimen sliding scale   SubCutaneous every 6 hours  dextrose 5%. 1000 milliLiter(s) (50 mL/Hr) IV Continuous <Continuous>  dextrose 50% Injectable 12.5 Gram(s) IV Push once  dextrose 50% Injectable 25 Gram(s) IV Push once  dextrose 50% Injectable 25 Gram(s) IV Push once  pantoprazole    Tablet 40 milliGRAM(s) Oral before breakfast  heparin  Infusion 1500 Unit(s)/Hr (15 mL/Hr) IV Continuous <Continuous>  sodium chloride 0.9% lock flush 3 milliLiter(s) IV Push every 8 hours  furosemide   Injectable 40 milliGRAM(s) IV Push every 12 hours  docusate sodium 100 milliGRAM(s) Oral three times a day  senna 2 Tablet(s) Oral at bedtime  warfarin 2.5 milliGRAM(s) Oral once    MEDICATIONS  (PRN):  acetylcysteine 20% Inhalation 5 milliLiter(s) Inhalation every 6 hours PRN shortness of breath  artificial  tears Solution 1 Drop(s) Both EYES three times a day PRN Dry Eyes  acetaminophen   Tablet. 650 milliGRAM(s) Oral every 6 hours PRN Mild Pain (1 - 3)  dextrose Gel 1 Dose(s) Oral once PRN Blood Glucose LESS THAN 70 milliGRAM(s)/deciliter  glucagon  Injectable 1 milliGRAM(s) IntraMuscular once PRN Glucose LESS THAN 70 milligrams/deciliter        DIAGNOSTIC TESTS:

## 2017-07-01 NOTE — PROGRESS NOTE ADULT - PROBLEM SELECTOR PLAN 5
Afib with slow ventricular response s/p PPM 2011 2/2 symptomatic bradycardia.  -Continue rate control w/ Toprol  -PPM interrogation WNL.  -c/w Heparin gtt bridge w/ Coumadin. Check daily INR. Will give tonight 2.5mg (home dose 2mg) Afib with slow ventricular response s/p PPM 2011 2/2 symptomatic bradycardia.  -Continue rate control w/ Toprol  -PPM interrogation WNL. Will ask EP to reinterrogate PPM s/p BAV per Dr Soto.  -c/w Heparin gtt bridge w/ Coumadin. Check daily INR. Will give tonight 2.5mg (home dose 2mg)

## 2017-07-01 NOTE — PROGRESS NOTE ADULT - SUBJECTIVE AND OBJECTIVE BOX
(on Coumadin), CAD s/p PCI x 3 stent (04', 06' and 12')-last cath 3/15' PCI dLM w/ Impella support, Lupus, COPD (prior 30yr ppk hx), PNA, PPM 2011 for bradycardia, CKD (baseline Cr 2-3), hx of Renal failure requiring HD x 4 session 8/16' @ [facility], HTN, PVD, and Type 2 DM (no longer on meds), MONICA, and TKR 03' presents to [facility] with cc of inability to get OOB x 3 days. Pt states that her "legs were too weak". Pt is a poor historian and HPI obtained from her daughter. Pt's daughter states that her mother was unable to get OOB for 3 days and pt used her Life Alert x3 times to call for help-was told to go to the ED. Reports that she had been "tired" for several day, SOB w/ minimal exertion for "several months" and her mobility has declined since her discharge from rehab in December. Additionally, pt has gained 17 lbs over the past month (from 258 to 275) and non-compliant with her diet. As per daughter, pt has been hospitalized prior 2-3 yrs ago (? [facility]) for CHF exacerbation w/ aggressive diuresis. Of note, pt was evaluation by Dr. Barger for aortic stenosis in December 16'. However, due to her deconditioned status during that time, pt was recommended medical management.     S/P BAV    Subjective: Pt seen and examined at bedside. Denies chest pain or sob. Angry about being in hospital, wants to "get out of here."        Overnight Events: None    TELEMETRY: Afib 80s, occasional V-paced  ICU Vital Signs Last 24 Hrs  T(C): 36.4 (01 Jul 2017 17:35), Max: 37.1 (01 Jul 2017 10:00)  T(F): 97.6 (01 Jul 2017 17:35), Max: 98.8 (01 Jul 2017 15:00)  HR: 82 (01 Jul 2017 18:00) (74 - 82)  BP: 112/50 (01 Jul 2017 18:00) (98/50 - 112/50)  BP(mean): 84 (01 Jul 2017 18:00) (69 - 84)  ABP: --  ABP(mean): --  RR: 18 (01 Jul 2017 18:00) (17 - 18)  SpO2: 94% (01 Jul 2017 18:00) (92% - 96%)          30 Jun 2017 07:01  -  01 Jul 2017 07:00  --------------------------------------------------------  IN: 780 mL / OUT: 2025 mL / NET: -1245 mL    01 Jul 2017 07:01  -  01 Jul 2017 10:13  --------------------------------------------------------  IN: 0 mL / OUT: 375 mL / NET: -375 mL          PHYSICAL EXAM:    General: A/ox 3, No acute Distress, obese  Neck: Supple, NO JVD  Cardiac: S1 S2, No M/R/G  Pulmonary: R lung mild expiratory wheezing, Bibasilar crackles improved, Breathing unlabored, No Rhonchi  Abdomen: Soft, Non-tender, +BS x 4 quads  Extremities: No Rashes, No LE edema  Neuro: A/o x 3, No focal deficits          LABS:                          9.2    8.8   )-----------( 223      ( 01 Jul 2017 07:46 )             30.3                              07-01    138  |  93<L>  |  94<H>  ----------------------------<  105<H>  4.1   |  31  |  2.60<H>    Ca    9.5      01 Jul 2017 07:46  Phos  5.2     06-30  Mg     2.1     07-01      PT/INR - ( 01 Jul 2017 07:46 )   PT: 14.3 sec;   INR: 1.28          PTT - ( 01 Jul 2017 07:46 )  PTT:75.6 sec  CAPILLARY BLOOD GLUCOSE  117 (01 Jul 2017 06:06)  137 (30 Jun 2017 20:45)  125 (30 Jun 2017 16:24)  127 (30 Jun 2017 11:35)            No Known Allergies    MEDICATIONS  (STANDING):  ferrous    sulfate 325 milliGRAM(s) Oral every 8 hours  acetazolamide    Tablet 500 milliGRAM(s) Oral daily  clopidogrel Tablet 75 milliGRAM(s) Oral daily  hydrocortisone 2.5% Cream 1 Application(s) Topical two times a day  Nephrocaps 1 Capsule(s) Oral daily  gabapentin 100 milliGRAM(s) Oral every other day  metoprolol succinate ER 12.5 milliGRAM(s) Oral daily  ALBUTerol/ipratropium for Nebulization 3 milliLiter(s) Nebulizer every 4 hours  atorvastatin 40 milliGRAM(s) Oral at bedtime  buDESOnide   0.25 milliGRAM(s) Respule 0.25 milliGRAM(s) Inhalation every 12 hours  sodium chloride 0.45%. 1000 milliLiter(s) (10 mL/Hr) IV Continuous <Continuous>  insulin lispro (HumaLOG) corrective regimen sliding scale   SubCutaneous every 6 hours  dextrose 5%. 1000 milliLiter(s) (50 mL/Hr) IV Continuous <Continuous>  dextrose 50% Injectable 12.5 Gram(s) IV Push once  dextrose 50% Injectable 25 Gram(s) IV Push once  dextrose 50% Injectable 25 Gram(s) IV Push once  pantoprazole    Tablet 40 milliGRAM(s) Oral before breakfast  heparin  Infusion 1500 Unit(s)/Hr (15 mL/Hr) IV Continuous <Continuous>  sodium chloride 0.9% lock flush 3 milliLiter(s) IV Push every 8 hours  furosemide   Injectable 40 milliGRAM(s) IV Push every 12 hours  docusate sodium 100 milliGRAM(s) Oral three times a day  senna 2 Tablet(s) Oral at bedtime  warfarin 2.5 milliGRAM(s) Oral once    MEDICATIONS  (PRN):  acetylcysteine 20% Inhalation 5 milliLiter(s) Inhalation every 6 hours PRN shortness of breath  artificial  tears Solution 1 Drop(s) Both EYES three times a day PRN Dry Eyes  acetaminophen   Tablet. 650 milliGRAM(s) Oral every 6 hours PRN Mild Pain (1 - 3)  dextrose Gel 1 Dose(s) Oral once PRN Blood Glucose LESS THAN 70 milliGRAM(s)/deciliter  glucagon  Injectable 1 milliGRAM(s) IntraMuscular once PRN Glucose LESS THAN 70 milligrams/deciliter        DIAGNOSTIC TESTS:

## 2017-07-01 NOTE — PROGRESS NOTE ADULT - SUBJECTIVE AND OBJECTIVE BOX
cc: follow-up evaluation and management of acute/chronic diastolic chf and aortic stenosis    subjective: pt reported continued leg pain - chronic without acute change    PAST MEDICAL & SURGICAL HISTORY:  PVD (peripheral vascular disease)  Iron deficiency anemia  Lupus (systemic lupus erythematosus)  Paroxysmal atrial fibrillation  Aortic stenosis, severe  CKD (chronic kidney disease) stage 4, GFR 15-29 ml/min  Diabetes  HTN (hypertension)  CHF (congestive heart failure)  CAD (coronary artery disease)  COPD (chronic obstructive pulmonary disease)  History of total knee replacement:   Presence of cardiac pacemaker: at [facility] by Dr Escoto    MEDICATIONS  (STANDING):  ferrous    sulfate 325 milliGRAM(s) Oral every 8 hours  acetazolamide    Tablet 500 milliGRAM(s) Oral daily  clopidogrel Tablet 75 milliGRAM(s) Oral daily  hydrocortisone 2.5% Cream 1 Application(s) Topical two times a day  Nephrocaps 1 Capsule(s) Oral daily  gabapentin 100 milliGRAM(s) Oral every other day  metoprolol succinate ER 12.5 milliGRAM(s) Oral daily  ALBUTerol/ipratropium for Nebulization 3 milliLiter(s) Nebulizer every 4 hours  atorvastatin 40 milliGRAM(s) Oral at bedtime  buDESOnide   0.25 milliGRAM(s) Respule 0.25 milliGRAM(s) Inhalation every 12 hours  sodium chloride 0.45%. 1000 milliLiter(s) (10 mL/Hr) IV Continuous <Continuous>  insulin lispro (HumaLOG) corrective regimen sliding scale   SubCutaneous every 6 hours  dextrose 5%. 1000 milliLiter(s) (50 mL/Hr) IV Continuous <Continuous>  dextrose 50% Injectable 12.5 Gram(s) IV Push once  dextrose 50% Injectable 25 Gram(s) IV Push once  dextrose 50% Injectable 25 Gram(s) IV Push once  pantoprazole    Tablet 40 milliGRAM(s) Oral before breakfast  heparin  Infusion 1500 Unit(s)/Hr (15 mL/Hr) IV Continuous <Continuous>  sodium chloride 0.9% lock flush 3 milliLiter(s) IV Push every 8 hours  furosemide   Injectable 40 milliGRAM(s) IV Push every 12 hours  docusate sodium 100 milliGRAM(s) Oral three times a day  senna 2 Tablet(s) Oral at bedtime    MEDICATIONS  (PRN):  acetylcysteine 20% Inhalation 5 milliLiter(s) Inhalation every 6 hours PRN shortness of breath  artificial  tears Solution 1 Drop(s) Both EYES three times a day PRN Dry Eyes  acetaminophen   Tablet. 650 milliGRAM(s) Oral every 6 hours PRN Mild Pain (1 - 3)  dextrose Gel 1 Dose(s) Oral once PRN Blood Glucose LESS THAN 70 milliGRAM(s)/deciliter  glucagon  Injectable 1 milliGRAM(s) IntraMuscular once PRN Glucose LESS THAN 70 milligrams/deciliter    ICU Vital Signs Last 24 Hrs  T(C): 37.2 (2017 20:50), Max: 37.2 (2017 20:50)  T(F): 98.9 (2017 20:50), Max: 98.9 (2017 20:50)  HR: 90 (2017 20:25) (74 - 90)  BP: 125/54 (2017 20:25) (103/51 - 125/54)  BP(mean): 65 (2017 20:25) (65 - 84)  ABP: --  ABP(mean): --  RR: 20 (2017 20:25) (17 - 20)  SpO2: 92% (2017 20:25) (92% - 96%)      PHYSICAL EXAM:  General: nad; alert; hard-of-hearing  heent - radiation of murmur to carotid area  Cardiac: irregular; s1 present; s2 diminished; systolic murmur  Pulmonary:  decreased breath sounds at bases; no accessory muscle use  Abdomen: soft abd; nt  extremities:tender le  vasc - pulses ue and le present    LABS:                          9.2    8.8   )-----------( 223      ( 2017 07:46 )             30.3   07-    138  |  93<L>  |  94<H>  ----------------------------<  105<H>  4.1   |  31  |  2.60<H>    Ca    9.5      2017 07:46  Phos  5.2     06-30  Mg     2.1     07-    PT/INR - ( 2017 07:46 )   PT: 14.3 sec;   INR: 1.28          PTT - ( 2017 07:46 )  PTT:75.6 sec    DIAGNOSTIC TESTS:     < from: Xray Chest 1 View AP -PORTABLE-Routine (17 @ 03:40) >  EXAM:  XR CHEST 1 VIEW PORT ROUTINE                          PROCEDURE DATE:  2017                     INTERPRETATION:  HISTORY: Extubated, follow-up    Portable AP view is compared to prior radiograph of 2017.    Endotracheal tube has been removed. No pneumothorax is evident, although   the superior-most lung apices are excluded from view. There has been   slight improvement in infiltrate in the right upper lung, with otherwise   little change in bilateral pulmonary infiltrates and effusions (right   more extensive than left), and there has otherwise been no interval   change.    IMPRESSION: Status post extubation. Slight improvement in infiltrate in   the right upper lung, with otherwise little change in bilateral pulmonary   infiltrates and effusions (right more extensive than left).            "Thank you for the opportunity to participate in the care of this   patient."        CHINO ROMAN M.D. ATTENDING RADIOLOGIST  This document has been electronically signed. 2017  1:16PM        < end of copied text >            < from: Xray Chest 1 View AP-PORTABLE IMMEDIATE (17 @ 12:07) >    EXAM:  XR CHEST 1 VIEW PORT IMMEDIATE                          PROCEDURE DATE:  2017                     INTERPRETATION:  Portable chest    History: Post [facility] bronchoscopy exam x-ray. Follow-up abnormal exam.    Scattered lung infiltrates again noted. Overall similar appearance to   prior exam earlier same day. Endotracheal tube in satisfactory position.   No pneumothorax identified.            "Thank you for the opportunity to participate in the care of this   patient."        JUDE LOPEZ M.D., ATTENDING RADIOLOGIST  This document has been electronically signed. 2017  3:03PM    < end of copied text >      < from: Echocardiogram (17 @ 12:03) >  EXAM:  ECHOCARDIOGRAM (CARDIOL)                          PROCEDURE DATE:  2017                        INTERPRETATION:  Patient Height: 165.0 cm  Patient Weight: 84.0 kg  Systolic Pressure: 128 mmHg  Diastolic Pressure: 52 mmHg  BSA: 1.9 m^2  Interpretation Summary  A complete two-dimensional transthoracic echocardiogram was performed (2D,   M-mode, spectral and color flow doppler).  Study Quality: Fair.Left   ventricular hypertrophy presentThe ejection fraction could not be   accurately  calculated (poor endocardiac resolution)  The left atrium is severely   dilated.Right atrial size is normal.The right ventricle is normal in   size and   function.Abnormal (paradoxical) septal motion consistent with   LBBBCalcified   aortic valve.There is trace to mild aortic regurgitation.There is   Moderate to   severe aortic stenosis.The peak pressure gradient is 45 mmHg.The mean   pressure   gradient is 30 mmHg.The calculated aortic valve area using the continuity   equation is 1 cm2.The calculated stroke volume index is 39 cc/m2 (normal   >35cc/m2).There is moderate mitral annular calcification.There is mild   mitral   regurgitation.Structurally normal tricuspid valve.Structurally normal   pulmonic   valve.  Procedure Details  A completetwo-dimensional transthoracic echocardiogram was performed (2D,  M-mode, spectral and color flow doppler).  Study Quality: Fair.  Left Ventricle  Left ventricular hypertrophy present  The ejection fraction could not be accurately calculated (poor endocardiac  resolution)  Abnormal (paradoxical) septal motion consistent with LBBB  Left Atrium  The left atrium is severely dilated.  Right Atrium  Right atrial size is normal.  Right Ventricle  The right ventricle is normal in size and function.  Aortic Valve  Calcified aortic valve.  There is trace to mild aortic regurgitation.  There is Moderate to severe aortic stenosis.  The peak pressure gradient is 45 mmHg.  The mean pressure gradient is 30 mmHg.  The calculated aortic valve area using the continuity equation is 1 cm2.  The calculated stroke volume index is 39 cc/m2 (normal >35cc/m2).  Mitral Valve  There is moderate mitral annular calcification.  There is mild mitral regurgitation.  Tricuspid Valve  Structurally normal tricuspid valve.  Pulmonic Valve  Structurally normal pulmonic valve.  Arteries and Venous System  No aortic root dilatation.  The IVC is dilated (>2.1 cm) with an abnormal inspiratory collapse (<50%)  consistent with elevated right atrial pressure.  Pericardium / Pleura  There is no pericardial effusion.  Doppler Measurements & Calculations  MV E point: 140.2 cm/sec  MV A point: 53.4 cm/sec  MV E/A: 2.6  MV dec slope: 636.0 cm/sec^2  Ao V2 max: 319.4 cm/sec  Ao max P.8 mmHg  Ao max PG (full): 36.2 mmHg  Ao V2 mean: 251.5 cm/sec  Ao mean P.0 mmHg  Ao mean PG (full): 24.4 mmHg  Ao V2 VTI: 80.8 cm  ALEKS(I,A): 0.9 cm^2  ALEKS(I,D): 0.9 cm^2  ALEKS(V,A): 1.1 cm^2  ALEKS(V,D): 1.1 cm^2  LV max P.7 mmHg  LV mean P.6 mmHg  LV V1 max: 108 cm/sec  LV V1 mean: 76.1 cm/sec  LV V1 VTI: 24.0 cm  MR max graciela: 538.0 cm/sec  MR max P.8 mmHg  MR mean graciela: 400.3 cm/sec  MR mean P.0 mmHg  MR VTI: 181.9 cm  SV(Ao): 690.6 ml  SI(Ao): 360.9 ml/m^2  SV(LVOT): 75.0 ml  SI(LVOT): 39.2 ml/m^2  TR Max graciela: 252.8cm/sec  MMode 2D Measurements & Calculations  IVSd: 1.2 cm  LVIDd: 6.4 cm  LVIDs: 5.3 cm  LVPWd: 1.1 cm  IVS/LVPW: 1.0  FS: 18.0 %  EDV(Teich): 209.4 ml  ESV(Teich): 132.7 ml  LV mass(C)d: 331.0 grams  LV mass(C)dI: 173.0 grams/m^2  SI(cubed): 61.9 ml/m^2  Ao root diam: 3.3 cm  Ao root area: 8.5 cm^2  LA dimension: 4.5 cm  LA/Ao: 1.4  LVOT diam: 2.0 cm  LVOT area: 3.1 cm^2  LVOT area (M): 3.1 cm^2  EDV(MOD-sp4): 112 ml  EDV(MOD-sp2): 105 ml  Interpreting Physician:Perry Lacy MD electronicallysigned on   2017 12:24:22                "Thank you for the opportunity to participate in the care of this   patient."        PERRY LACY M.D., ATTENDING CARDIOLOGIST  This document has been electronically signed. 2017 12:24PM        < end of copied text >        EXAM:  XR CHEST 1 VIEW PORT URGENT                          PROCEDURE DATE:  2017                     INTERPRETATION:  Clinical History: Congestion    Portable examination the chest demonstrates no interval change congestion   and/or infiltrates in comparison to prior examination of the chest   2017. Bilateral effusions. No interval change position remaining   support devices. Cardiomegaly.    Impression: No interval change lung pathology            "Thank you for the opportunity to participate in the care of this   patient."        KATHARINA MOROCHO M.D., ATTENDING RADIOLOGIST  This document has been electronically signed. 2017 10:38AM      2017 ecg reviewed on 2017    EXAM:  XR CHEST 1 VIEW PORT ROUTINE                          PROCEDURE DATE:  2017                     INTERPRETATION:  Indication: chf    A single portable view of the chest is submitted. Comparison is made to   the most recent prior study dated 2017. Bilateral pulmonary   infiltrates and effusions are similar to the previous examination.   Impression:    No significant interval change            "Thank you for the opportunity to participate in the care of this   patient."        MASOUD WORKMAN M.D., ATTENDING RADIOLOGIST  This document has been electronically signed. 2017  4:33PM

## 2017-07-01 NOTE — PROGRESS NOTE ADULT - PROBLEM SELECTOR PLAN 1
Admitted with acute CHF exacerbation 2/2 dietary non-compliance and critical AS. EF 50-55%, severely dilated LA.  -S/p successful BAV 6/27/17 for critical AS by Dr Barger.  -Daily weights, strict I/Os (Leahy in place)  - Continue Toprol XL 12.5mg po daily  - CXR w/ persistent pulm vasc congestion, lungs w/ romeo crackles on exam, still requiring O2. Resume IV diuresis Furosemide 40mg IV BID as discussed w/ Renal Dr Barbosa. Cont Diamox 500mg qd. Careful diuresis with close monitoring of BP in setting of severe AS. Net goal neg 1-2L.  -BUN continues to uptrend, Crea within baseline 2.5-3, F/u renal recs for diuresis plan. Monitor lytes closely.

## 2017-07-01 NOTE — PROGRESS NOTE ADULT - ASSESSMENT
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 Pt was admitted to [facility] but was transferred to CCU for hypercapnic respiratory failure s/p BiPAP. Pt now s/p BAV with Dr. Barger on 6/27/17. Transferred from  to [facility] for Heparin/Coumadin bridging, further diuresis and dispo planning.

## 2017-07-01 NOTE — PROGRESS NOTE ADULT - PROBLEM SELECTOR PLAN 2
Critical AS, ALEKS 0.5, mean pressure gradient 43, mod pulm HTN, PASP 52.   -CTS Structural Heart following, patient s/p successful BAV 6/27/17 without complications.   -Repeat Echo 6/28 post BAV w/ ALEKS 1, mean pressure gradient 30  -F/u CTS for follow-up plan

## 2017-07-01 NOTE — PROGRESS NOTE ADULT - PROBLEM SELECTOR PLAN 10
DVT prophylaxis: on Heparin drip    Rediscussed code status with pt and daughter. Pt wishes to resume DNR/DNI status now s/p BAV.     PT rec: TALIA    Dispo: per clinical progress.

## 2017-07-01 NOTE — PROGRESS NOTE ADULT - PROBLEM SELECTOR PLAN 1
Admitted with acute CHF exacerbation 2/2 dietary non-compliance and critical AS. EF 50-55%, severely dilated LA.  -S/p successful BAV 6/27/17 for critical AS by Dr Barger.  -Daily weights, strict I/Os (Leahy in place)  - Continue Toprol XL 12.5mg po daily  - CXR w/ persistent pulm vasc congestion, lungs w/ romeo crackles on exam, still requiring O2. Resume IV diuresis Furosemide 40mg IV BID as discussed w/ Renal Dr Barbosa. Cont Diamox 500mg qd. Careful diuresis with close monitoring of BP in setting of severe AS. Net goal neg 1-2L. Admitted with acute CHF exacerbation 2/2 dietary non-compliance and critical AS. EF 50-55%, severely dilated LA.  -S/p successful BAV 6/27/17 for critical AS by Dr Barger.  -Daily weights, strict I/Os (Leahy in place)  - Continue Toprol XL 12.5mg po daily  - CXR w/ persistent pulm vasc congestion, lungs w/ romeo crackles on exam, still requiring O2. Resume IV diuresis Furosemide 40mg IV BID as discussed w/ Renal Dr Barbosa. Cont Diamox 500mg qd. Careful diuresis with close monitoring of BP in setting of severe AS. Net goal neg 1-2L.  -BUN continues to uptrend, Crea within baseline 2.5-3, F/u renal recs for diuresis plan. Monitor lytes closely.

## 2017-07-01 NOTE — PROGRESS NOTE ADULT - PROBLEM SELECTOR PLAN 1
Continue with synergistic diuresis with Diamox and Lasix  furosemide   Injectable 40 milliGRAM(s) every 12 hours  acetazolamide    Tablet 500 milliGRAM(s) daily    Still with evidence of volume excess and still not yet at a dry weight    Daily weights  Net negative at least 1-1.5L per day

## 2017-07-01 NOTE — PROGRESS NOTE ADULT - SUBJECTIVE AND OBJECTIVE BOX
Patient seen and examined at bedside.     NO significant overnight events  Patient reports no change in symptoms  remains dyspneic at rest and with leg swelling.  NO recent change in diuretics.     ferrous    sulfate 325 milliGRAM(s) every 8 hours  acetylcysteine 20% Inhalation 5 milliLiter(s) every 6 hours PRN  acetazolamide    Tablet 500 milliGRAM(s) daily  clopidogrel Tablet 75 milliGRAM(s) daily  hydrocortisone 2.5% Cream 1 Application(s) two times a day  Nephrocaps 1 Capsule(s) daily  artificial  tears Solution 1 Drop(s) three times a day PRN  gabapentin 100 milliGRAM(s) every other day  metoprolol succinate ER 12.5 milliGRAM(s) daily  ALBUTerol/ipratropium for Nebulization 3 milliLiter(s) every 4 hours  acetaminophen   Tablet. 650 milliGRAM(s) every 6 hours PRN  atorvastatin 40 milliGRAM(s) at bedtime  buDESOnide   0.25 milliGRAM(s) Respule 0.25 milliGRAM(s) every 12 hours  sodium chloride 0.45%. 1000 milliLiter(s) <Continuous>  insulin lispro (HumaLOG) corrective regimen sliding scale   every 6 hours  dextrose 5%. 1000 milliLiter(s) <Continuous>  dextrose Gel 1 Dose(s) once PRN  dextrose 50% Injectable 12.5 Gram(s) once  dextrose 50% Injectable 25 Gram(s) once  dextrose 50% Injectable 25 Gram(s) once  glucagon  Injectable 1 milliGRAM(s) once PRN  pantoprazole    Tablet 40 milliGRAM(s) before breakfast  heparin  Infusion 1500 Unit(s)/Hr <Continuous>  sodium chloride 0.9% lock flush 3 milliLiter(s) every 8 hours  furosemide   Injectable 40 milliGRAM(s) every 12 hours  docusate sodium 100 milliGRAM(s) three times a day  senna 2 Tablet(s) at bedtime  warfarin 2.5 milliGRAM(s) once      Allergies    No Known Allergies    Intolerances        T(C): , Max: 37.1 (07-01-17 @ 10:00)  T(F): , Max: 98.7 (07-01-17 @ 10:00)  HR: 76 (07-01-17 @ 08:46)  BP: 110/53 (07-01-17 @ 08:46)  BP(mean): 82 (07-01-17 @ 08:46)  RR: 18 (07-01-17 @ 08:46)  SpO2: 92% (07-01-17 @ 08:46)  Wt(kg): --    06-30 @ 07:01  -  07-01 @ 07:00  --------------------------------------------------------  IN:    heparin Infusion: 330 mL    Oral Fluid: 450 mL  Total IN: 780 mL    OUT:    Voided: 2025 mL  Total OUT: 2025 mL    Total NET: -1245 mL      07-01 @ 07:01  -  07-01 @ 11:57  --------------------------------------------------------  IN:  Total IN: 0 mL    OUT:    Voided: 375 mL  Total OUT: 375 mL    Total NET: -375 mL              LABS:                        9.2    8.8   )-----------( 223      ( 01 Jul 2017 07:46 )             30.3     07-01    138  |  93<L>  |  94<H>  ----------------------------<  105<H>  4.1   |  31  |  2.60<H>    Ca    9.5      01 Jul 2017 07:46  Phos  5.2     06-30  Mg     2.1     07-01        PT/INR - ( 01 Jul 2017 07:46 )   PT: 14.3 sec;   INR: 1.28          PTT - ( 01 Jul 2017 07:46 )  PTT:75.6 sec          RADIOLOGY & ADDITIONAL STUDIES:

## 2017-07-01 NOTE — PROGRESS NOTE ADULT - PROBLEM SELECTOR PLAN 3
Hypercapnic resp failure 2/2 pulmonary congestion 2/2 CHF 2/2 severe AS.   - Saturating well on 3LNC, decreased to NC 2L today, try to wean off O2 over the weekend as pt tolerates.  - Pt refusing BIPAP at night    #COPD - c/w Duonebs q4h, Pulmicort INH

## 2017-07-01 NOTE — PROGRESS NOTE ADULT - PROBLEM SELECTOR PLAN 5
Afib with slow ventricular response s/p PPM 2011 2/2 symptomatic bradycardia.  -Continue rate control w/ Toprol  -PPM interrogation WNL. Will ask EP to reinterrogate PPM s/p BAV per Dr Soto.  -c/w Heparin gtt bridge w/ Coumadin. Check daily INR. Will give tonight 2.5mg (home dose 2mg)

## 2017-07-01 NOTE — PROGRESS NOTE ADULT - PROBLEM SELECTOR PLAN 2
Critical AS, ALEKS 0.5, mean pressure gradient 43, mod pulm HTN, PASP 52.   -CTS Structural Heart following, patient s/p successful BAV 6/27/17 without complications.   -Repeat Echo 6/28 post BAV w/ ALEKS 1, mean pressure gradient 30 Critical AS, ALEKS 0.5, mean pressure gradient 43, mod pulm HTN, PASP 52.   -CTS Structural Heart following, patient s/p successful BAV 6/27/17 without complications.   -Repeat Echo 6/28 post BAV w/ ALEKS 1, mean pressure gradient 30  -F/u CTS for follow-up plan

## 2017-07-01 NOTE — PROGRESS NOTE ADULT - ASSESSMENT
1) acute/chronic diastolic chf with known ckd  -f/u renal recs re diuretics   2) nonrheumatic aortic stenosis  -s/p bav - follow-up plan per shd  3) atrial fibrillation; presence of pacemaker  -please re-call ep to consider repeat device check post-bav  4) CAD s/p PCI   -no cp  6) COPD - monitor  7) PVD - rx as tolerated  8) DM - no ace-i/arb with ckd  9) normocytic anemia - monitor  10) pressure injury of skin (right buttock) - please call wound care consult  11) ppx: therapeutic ac; ppi   12)  K>4 Mg>2

## 2017-07-01 NOTE — PROGRESS NOTE ADULT - SUBJECTIVE AND OBJECTIVE BOX
PUD FOR DR STEINER    CC/ HPI:  86 yo female with chronic diastolic CHF (EF 50-55% 11/16'), mod to severe aortic stenosis, pAFIB, CAD s/p PCI x 3 stent, Lupus (not on meds), COPD, CKD (baseline Cr 2-3), admitted for CHF exacerbation complicated by hypercapnic respiratory failure, status post BAV, today without respiratory complaint.    PAST MEDICAL & SURGICAL HISTORY:  PVD (peripheral vascular disease)  Iron deficiency anemia  Lupus (systemic lupus erythematosus)  Paroxysmal atrial fibrillation  Aortic stenosis, severe  CKD (chronic kidney disease) stage 4, GFR 15-29 ml/min  Diabetes  HTN (hypertension)  CHF (congestive heart failure)  CAD (coronary artery disease)  COPD (chronic obstructive pulmonary disease)  History of total knee replacement: 2003  Cardiac pacemaker: at [facility] by Dr Escoto, 2011    SOCHX:  + tobacco,  -  alcohol    FMHX: FA/MO  - contributory     ROS reviewed below with positive findings marked (+) :  GEN:  fever, chills ENT: tracheostomy,   epistaxis,  sinusitis COR: +CAD, +CHF,  +HTN, +dysrhythmia PUL: +COPD, ILD, asthma, pneumonia GI: PEG, dysphagia, hemorrhage, other PETEY: +kidney disease, electrolyte disorder HEM:  +anemia, thrombus, coagulopathy, cancer ENDO:  thyroid disease, +diabetes mellitus CNS:  dementia, stroke, seizure, PSY:  depression, anxiety, other          MEDICATIONS  (STANDING):  ferrous    sulfate 325 milliGRAM(s) Oral every 8 hours  acetazolamide    Tablet 500 milliGRAM(s) Oral daily  clopidogrel Tablet 75 milliGRAM(s) Oral daily  hydrocortisone 2.5% Cream 1 Application(s) Topical two times a day  Nephrocaps 1 Capsule(s) Oral daily  gabapentin 100 milliGRAM(s) Oral every other day  metoprolol succinate ER 12.5 milliGRAM(s) Oral daily  ALBUTerol/ipratropium for Nebulization 3 milliLiter(s) Nebulizer every 4 hours  atorvastatin 40 milliGRAM(s) Oral at bedtime  buDESOnide   0.25 milliGRAM(s) Respule 0.25 milliGRAM(s) Inhalation every 12 hours  sodium chloride 0.45%. 1000 milliLiter(s) (10 mL/Hr) IV Continuous <Continuous>  insulin lispro (HumaLOG) corrective regimen sliding scale   SubCutaneous every 6 hours  pantoprazole    Tablet 40 milliGRAM(s) Oral before breakfast  heparin  Infusion 1500 Unit(s)/Hr (15 mL/Hr) IV Continuous <Continuous>  sodium chloride 0.9% lock flush 3 milliLiter(s) IV Push every 8 hours  furosemide   Injectable 40 milliGRAM(s) IV Push every 12 hours  warfarin 2.5 milliGRAM(s) Oral once    MEDICATIONS  (PRN):  acetylcysteine 20% Inhalation 5 milliLiter(s) Inhalation every 6 hours PRN shortness of breath  artificial  tears Solution 1 Drop(s) Both EYES three times a day PRN Dry Eyes  acetaminophen   Tablet. 650 milliGRAM(s) Oral every 6 hours PRN Mild Pain (1 - 3)  dextrose Gel 1 Dose(s) Oral once PRN Blood Glucose LESS THAN 70 milliGRAM(s)/deciliter  glucagon  Injectable 1 milliGRAM(s) IntraMuscular once PRN Glucose LESS THAN 70 milligrams/deciliter      Vital Signs Last 24 Hrs  T(C): 36.4 (30 Jun 2017 12:45), Max: 36.6 (29 Jun 2017 17:31)  T(F): 97.6 (30 Jun 2017 12:45), Max: 97.9 (30 Jun 2017 09:08)  HR: 74 (30 Jun 2017 13:04) (68 - 80)  BP: 113/56 (30 Jun 2017 13:04) (100/54 - 113/56)  BP(mean): 85 (30 Jun 2017 13:04) (63 - 85)  RR: 17 (30 Jun 2017 13:04) (14 - 18)  SpO2: 95% (30 Jun 2017 13:04) (93% - 97%)  ;   in bed on monitor, hard of hearing, -CP  -SOB  GENERAL:         uncomfortable,  + chronic distress. VERY AGITATED  HEENT:            - trauma,  - icterus,  - injection,  - nasal discharge.  NECK:              - jugular venous distention, - thyromegaly.  LYMPH:           - lymphadenopathy, - masses.  RESP:              DECREASED BREATH SOUNDS + crackles,   - rhonchi,   - wheezes.   COR:                S1S2   - gallops,  - rubs.  ABD:                bowel sounds,   soft, - tender, - distended, - organomegaly.  EXT/MSC:         - cyanosis,  - clubbing,  - edema.    NEURO:             alert,   responds to stimuli.    ABG - ( 29 Jun 2017 02:48 )  pH: 7.40  /  pCO2: 55    /  pO2: 180   / HCO3: 33    / Base Excess: 6.9   /  SaO2: 99       CXR improved, cardiomegaly, still pulmonary edema                         9.1    9.8   )-----------( 231      ( 30 Jun 2017 06:39 )             31.4     06-30    138  |  92<L>  |  91<H>  ----------------------------<  110<H>  3.4<L>   |  33<H>  |  2.70<H>    reviewed    CXR (6/29)  Bilateral infiltrate/congestion/pleural effusion      ASSESSMENT/PLAN    1)  Status post BAV  2)  Cardiomyopathy  3)  Chronic obstructive pulmonary disease  4)  Pulmonary hypertension  5)  Cough    Oxygen via nasal cannula as needed  Bronchodilators:  Atrovent/ albuterol q 4-6 hours.  Corticosteroids:  budesonide  Cardiac/HTN: post BAV  GI: Rx/ prophylaxis c PPI/H2B  Heme: Rx/VT on AC   Discuss with medical team, CCRN.

## 2017-07-01 NOTE — PROGRESS NOTE ADULT - ASSESSMENT
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 for CHF exacerbation. s/p BAV

## 2017-07-01 NOTE — PROGRESS NOTE ADULT - PROBLEM SELECTOR PLAN 3
Hypercapnic resp failure 2/2 pulmonary congestion 2/2 CHF 2/2 severe AS.   - Saturating well on 3LNC, try to wean off O2 over the weekend as pt tolerates.  - BIPAP at night    #COPD - c/w Duonebs q4h, Pulmicort INH Hypercapnic resp failure 2/2 pulmonary congestion 2/2 CHF 2/2 severe AS.   - Saturating well on 3LNC, decreased to NC 2L today, try to wean off O2 over the weekend as pt tolerates.  - Pt refusing BIPAP at night    #COPD - c/w Duonebs q4h, Pulmicort INH

## 2017-07-02 LAB
ANION GAP SERPL CALC-SCNC: 16 MMOL/L — SIGNIFICANT CHANGE UP (ref 5–17)
APTT BLD: 67.4 SEC — HIGH (ref 27.5–37.4)
BUN SERPL-MCNC: 88 MG/DL — HIGH (ref 7–23)
CALCIUM SERPL-MCNC: 9.1 MG/DL — SIGNIFICANT CHANGE UP (ref 8.4–10.5)
CHLORIDE SERPL-SCNC: 93 MMOL/L — LOW (ref 96–108)
CO2 SERPL-SCNC: 31 MMOL/L — SIGNIFICANT CHANGE UP (ref 22–31)
CREAT SERPL-MCNC: 2.6 MG/DL — HIGH (ref 0.5–1.3)
GLUCOSE SERPL-MCNC: 114 MG/DL — HIGH (ref 70–99)
HCT VFR BLD CALC: 29.8 % — LOW (ref 34.5–45)
HGB BLD-MCNC: 9 G/DL — LOW (ref 11.5–15.5)
INR BLD: 1.43 — HIGH (ref 0.88–1.16)
MAGNESIUM SERPL-MCNC: 2 MG/DL — SIGNIFICANT CHANGE UP (ref 1.6–2.6)
MCHC RBC-ENTMCNC: 26.9 PG — LOW (ref 27–34)
MCHC RBC-ENTMCNC: 30.2 G/DL — LOW (ref 32–36)
MCV RBC AUTO: 89 FL — SIGNIFICANT CHANGE UP (ref 80–100)
PLATELET # BLD AUTO: 216 K/UL — SIGNIFICANT CHANGE UP (ref 150–400)
POTASSIUM SERPL-MCNC: 3.7 MMOL/L — SIGNIFICANT CHANGE UP (ref 3.5–5.3)
POTASSIUM SERPL-SCNC: 3.7 MMOL/L — SIGNIFICANT CHANGE UP (ref 3.5–5.3)
PROTHROM AB SERPL-ACNC: 16 SEC — HIGH (ref 9.8–12.7)
RBC # BLD: 3.35 M/UL — LOW (ref 3.8–5.2)
RBC # FLD: 16.4 % — SIGNIFICANT CHANGE UP (ref 10.3–16.9)
SODIUM SERPL-SCNC: 140 MMOL/L — SIGNIFICANT CHANGE UP (ref 135–145)
WBC # BLD: 9.5 K/UL — SIGNIFICANT CHANGE UP (ref 3.8–10.5)
WBC # FLD AUTO: 9.5 K/UL — SIGNIFICANT CHANGE UP (ref 3.8–10.5)

## 2017-07-02 PROCEDURE — 99232 SBSQ HOSP IP/OBS MODERATE 35: CPT | Mod: GC

## 2017-07-02 PROCEDURE — 71010: CPT | Mod: 26

## 2017-07-02 RX ORDER — POTASSIUM CHLORIDE 20 MEQ
40 PACKET (EA) ORAL ONCE
Qty: 0 | Refills: 0 | Status: COMPLETED | OUTPATIENT
Start: 2017-07-02 | End: 2017-07-02

## 2017-07-02 RX ORDER — WARFARIN SODIUM 2.5 MG/1
2.5 TABLET ORAL ONCE
Qty: 0 | Refills: 0 | Status: COMPLETED | OUTPATIENT
Start: 2017-07-02 | End: 2017-07-02

## 2017-07-02 RX ADMIN — Medication 3 MILLILITER(S): at 06:35

## 2017-07-02 RX ADMIN — Medication 325 MILLIGRAM(S): at 13:13

## 2017-07-02 RX ADMIN — SODIUM CHLORIDE 3 MILLILITER(S): 9 INJECTION INTRAMUSCULAR; INTRAVENOUS; SUBCUTANEOUS at 06:36

## 2017-07-02 RX ADMIN — Medication 40 MILLIEQUIVALENT(S): at 07:48

## 2017-07-02 RX ADMIN — Medication 3 MILLILITER(S): at 10:00

## 2017-07-02 RX ADMIN — SODIUM CHLORIDE 3 MILLILITER(S): 9 INJECTION INTRAMUSCULAR; INTRAVENOUS; SUBCUTANEOUS at 14:14

## 2017-07-02 RX ADMIN — Medication 3 MILLILITER(S): at 14:00

## 2017-07-02 RX ADMIN — Medication 40 MILLIGRAM(S): at 06:35

## 2017-07-02 RX ADMIN — Medication 325 MILLIGRAM(S): at 21:18

## 2017-07-02 RX ADMIN — Medication 0.25 MILLIGRAM(S): at 18:01

## 2017-07-02 RX ADMIN — Medication 40 MILLIGRAM(S): at 18:01

## 2017-07-02 RX ADMIN — Medication 100 MILLIGRAM(S): at 21:18

## 2017-07-02 RX ADMIN — Medication 650 MILLIGRAM(S): at 00:15

## 2017-07-02 RX ADMIN — WARFARIN SODIUM 2.5 MILLIGRAM(S): 2.5 TABLET ORAL at 21:17

## 2017-07-02 RX ADMIN — Medication 1 APPLICATION(S): at 18:02

## 2017-07-02 RX ADMIN — SENNA PLUS 2 TABLET(S): 8.6 TABLET ORAL at 21:17

## 2017-07-02 RX ADMIN — Medication 3 MILLILITER(S): at 18:01

## 2017-07-02 RX ADMIN — Medication 100 MILLIGRAM(S): at 13:13

## 2017-07-02 RX ADMIN — Medication 650 MILLIGRAM(S): at 10:03

## 2017-07-02 RX ADMIN — Medication 1 APPLICATION(S): at 06:36

## 2017-07-02 RX ADMIN — Medication 1 CAPSULE(S): at 12:02

## 2017-07-02 RX ADMIN — Medication 325 MILLIGRAM(S): at 06:35

## 2017-07-02 RX ADMIN — Medication 100 MILLIGRAM(S): at 06:35

## 2017-07-02 RX ADMIN — ATORVASTATIN CALCIUM 40 MILLIGRAM(S): 80 TABLET, FILM COATED ORAL at 21:18

## 2017-07-02 RX ADMIN — Medication 0.25 MILLIGRAM(S): at 06:36

## 2017-07-02 RX ADMIN — PANTOPRAZOLE SODIUM 40 MILLIGRAM(S): 20 TABLET, DELAYED RELEASE ORAL at 06:36

## 2017-07-02 RX ADMIN — Medication 650 MILLIGRAM(S): at 10:50

## 2017-07-02 RX ADMIN — ACETAZOLAMIDE 500 MILLIGRAM(S): 250 TABLET ORAL at 12:02

## 2017-07-02 RX ADMIN — Medication 12.5 MILLIGRAM(S): at 12:02

## 2017-07-02 RX ADMIN — HEPARIN SODIUM 15 UNIT(S)/HR: 5000 INJECTION INTRAVENOUS; SUBCUTANEOUS at 10:00

## 2017-07-02 RX ADMIN — CLOPIDOGREL BISULFATE 75 MILLIGRAM(S): 75 TABLET, FILM COATED ORAL at 12:12

## 2017-07-02 RX ADMIN — Medication 3 MILLILITER(S): at 21:17

## 2017-07-02 RX ADMIN — SODIUM CHLORIDE 3 MILLILITER(S): 9 INJECTION INTRAMUSCULAR; INTRAVENOUS; SUBCUTANEOUS at 21:18

## 2017-07-02 RX ADMIN — Medication 3 MILLILITER(S): at 02:43

## 2017-07-02 NOTE — PROGRESS NOTE ADULT - ASSESSMENT
88 yo female with chronic diastolic CHF (EF 50-55% 11/16'), mod to severe aortic stenosis, pAFIB, CAD s/p PCI x 3 stent, Lupus (not on meds), COPD, CKD (baseline Cr 2-3), admitted for CHF exacerbation complicated by hypercapnic respiratory failure, status post BAV, today without respiratory complaint.    continue Rxs  improving

## 2017-07-02 NOTE — PROGRESS NOTE ADULT - PROBLEM SELECTOR PLAN 1
Admitted with acute CHF exacerbation 2/2 dietary non-compliance and critical AS. EF 50-55%, severely dilated LA.  -S/p successful BAV 6/27/17 for critical AS by Dr Barger.  -Daily weights, strict I/Os (Leahy in place)  - Continue Toprol XL 12.5mg po daily  - Pulmonary edema improved but still present on CXR. C/w Furosemide 40mg IV BID as discussed w/ Renal Dr Barbosa. Cont Diamox 500mg qd. Careful diuresis with close monitoring of BP in setting of severe AS. Net goal neg 1-2L.

## 2017-07-02 NOTE — PROGRESS NOTE ADULT - SUBJECTIVE AND OBJECTIVE BOX
Patient seen and examined at bedside.     Maintained on diuresis IV Lasix 40mg BID and diamox 500mg POQD   Patient reports feels no different since yesterday.  Also reports still has leg swelling and still short of breath on exertion.    Objective data shows standing weights this morning is 91kg from 93.3kg yesterday.  Patient is net negative Total NET: -1795 mL (assuming she did not have any unmeasured oral intake)     imaging reviewed and CXR shows worsening right infiltrates    ferrous    sulfate 325 milliGRAM(s) every 8 hours  acetylcysteine 20% Inhalation 5 milliLiter(s) every 6 hours PRN  acetazolamide    Tablet 500 milliGRAM(s) daily  clopidogrel Tablet 75 milliGRAM(s) daily  hydrocortisone 2.5% Cream 1 Application(s) two times a day  Nephrocaps 1 Capsule(s) daily  artificial  tears Solution 1 Drop(s) three times a day PRN  gabapentin 100 milliGRAM(s) every other day  metoprolol succinate ER 12.5 milliGRAM(s) daily  ALBUTerol/ipratropium for Nebulization 3 milliLiter(s) every 4 hours  acetaminophen   Tablet. 650 milliGRAM(s) every 6 hours PRN  atorvastatin 40 milliGRAM(s) at bedtime  buDESOnide   0.25 milliGRAM(s) Respule 0.25 milliGRAM(s) every 12 hours  sodium chloride 0.45%. 1000 milliLiter(s) <Continuous>  insulin lispro (HumaLOG) corrective regimen sliding scale   every 6 hours  dextrose 5%. 1000 milliLiter(s) <Continuous>  dextrose Gel 1 Dose(s) once PRN  dextrose 50% Injectable 12.5 Gram(s) once  dextrose 50% Injectable 25 Gram(s) once  dextrose 50% Injectable 25 Gram(s) once  glucagon  Injectable 1 milliGRAM(s) once PRN  pantoprazole    Tablet 40 milliGRAM(s) before breakfast  heparin  Infusion 1500 Unit(s)/Hr <Continuous>  sodium chloride 0.9% lock flush 3 milliLiter(s) every 8 hours  furosemide   Injectable 40 milliGRAM(s) every 12 hours  docusate sodium 100 milliGRAM(s) three times a day  senna 2 Tablet(s) at bedtime  warfarin 2.5 milliGRAM(s) once      Allergies    No Known Allergies    Intolerances        T(C): , Max: 37.2 (07-01-17 @ 20:50)  T(F): , Max: 98.9 (07-01-17 @ 20:50)  HR: 82 (07-02-17 @ 08:45)  BP: 122/53 (07-02-17 @ 08:45)  BP(mean): 70 (07-02-17 @ 08:45)  RR: 16 (07-02-17 @ 08:45)  SpO2: 92% (07-02-17 @ 08:45)  Wt(kg): --    07-01 @ 07:01  -  07-02 @ 07:00  --------------------------------------------------------  IN:    heparin Infusion: 180 mL  Total IN: 180 mL    OUT:    Indwelling Catheter - Urethral: 1975 mL  Total OUT: 1975 mL    Total NET: -1795 mL      07-02 @ 07:01  -  07-02 @ 09:28  --------------------------------------------------------  IN:    heparin Infusion: 15 mL  Total IN: 15 mL    OUT:  Total OUT: 0 mL    Total NET: 15 mL              LABS:                        9.0    9.5   )-----------( 216      ( 02 Jul 2017 06:33 )             29.8     07-02    140  |  93<L>  |  88<H>  ----------------------------<  114<H>  3.7   |  31  |  2.60<H>    Ca    9.1      02 Jul 2017 06:33  Mg     2.0     07-02        PT/INR - ( 02 Jul 2017 06:34 )   PT: 16.0 sec;   INR: 1.43          PTT - ( 02 Jul 2017 06:34 )  PTT:67.4 sec          RADIOLOGY & ADDITIONAL STUDIES:

## 2017-07-02 NOTE — PROGRESS NOTE ADULT - SUBJECTIVE AND OBJECTIVE BOX
cc: follow-up evaluation and management of acute/chronic diastolic chf and aortic stenosis    subjective: no new complaints    PAST MEDICAL & SURGICAL HISTORY:  PVD (peripheral vascular disease)  Iron deficiency anemia  Lupus (systemic lupus erythematosus)  Paroxysmal atrial fibrillation  Aortic stenosis, severe  CKD (chronic kidney disease) stage 4, GFR 15-29 ml/min  Diabetes  HTN (hypertension)  CHF (congestive heart failure)  CAD (coronary artery disease)  COPD (chronic obstructive pulmonary disease)  History of total knee replacement:   Presence of cardiac pacemaker: at [facility] by Dr Escoto    MEDICATIONS  (STANDING):  ferrous    sulfate 325 milliGRAM(s) Oral every 8 hours  acetazolamide    Tablet 500 milliGRAM(s) Oral daily  clopidogrel Tablet 75 milliGRAM(s) Oral daily  hydrocortisone 2.5% Cream 1 Application(s) Topical two times a day  Nephrocaps 1 Capsule(s) Oral daily  gabapentin 100 milliGRAM(s) Oral every other day  metoprolol succinate ER 12.5 milliGRAM(s) Oral daily  ALBUTerol/ipratropium for Nebulization 3 milliLiter(s) Nebulizer every 4 hours  atorvastatin 40 milliGRAM(s) Oral at bedtime  buDESOnide   0.25 milliGRAM(s) Respule 0.25 milliGRAM(s) Inhalation every 12 hours  sodium chloride 0.45%. 1000 milliLiter(s) (10 mL/Hr) IV Continuous <Continuous>  insulin lispro (HumaLOG) corrective regimen sliding scale   SubCutaneous every 6 hours  dextrose 5%. 1000 milliLiter(s) (50 mL/Hr) IV Continuous <Continuous>  dextrose 50% Injectable 12.5 Gram(s) IV Push once  dextrose 50% Injectable 25 Gram(s) IV Push once  dextrose 50% Injectable 25 Gram(s) IV Push once  pantoprazole    Tablet 40 milliGRAM(s) Oral before breakfast  heparin  Infusion 1500 Unit(s)/Hr (15 mL/Hr) IV Continuous <Continuous>  sodium chloride 0.9% lock flush 3 milliLiter(s) IV Push every 8 hours  furosemide   Injectable 40 milliGRAM(s) IV Push every 12 hours  docusate sodium 100 milliGRAM(s) Oral three times a day  senna 2 Tablet(s) Oral at bedtime    MEDICATIONS  (PRN):  acetylcysteine 20% Inhalation 5 milliLiter(s) Inhalation every 6 hours PRN shortness of breath  artificial  tears Solution 1 Drop(s) Both EYES three times a day PRN Dry Eyes  acetaminophen   Tablet. 650 milliGRAM(s) Oral every 6 hours PRN Mild Pain (1 - 3)  dextrose Gel 1 Dose(s) Oral once PRN Blood Glucose LESS THAN 70 milliGRAM(s)/deciliter  glucagon  Injectable 1 milliGRAM(s) IntraMuscular once PRN Glucose LESS THAN 70 milligrams/deciliter    ICU Vital Signs Last 24 Hrs  T(C): 36.6 (2017 21:52), Max: 37 (2017 10:00)  T(F): 97.8 (2017 21:52), Max: 98.6 (2017 10:00)  HR: 76 (2017 20:25) (72 - 86)  BP: 112/56 (2017 20:25) (98/49 - 122/53)  BP(mean): 76 (2017 20:25) (65 - 86)  ABP: --  ABP(mean): --  RR: 15 (2017 20:25) (15 - 18)  SpO2: 94% (2017 20:25) (92% - 99%)    PHYSICAL EXAM:  General: nad  heent - mmm  Cardiac: irregular; systolic murmur at base  Pulmonary:  decreased breath sounds  Abdomen: soft abd; bowel sounds present  extremities:tender le  vasc - warm le    LABS:                                              9.0    9.5   )-----------( 216      ( 2017 06:33 )             29.8   07-02    140  |  93<L>  |  88<H>  ----------------------------<  114<H>  3.7   |  31  |  2.60<H>    Ca    9.1      2017 06:33  Mg     2.0     07-02        DIAGNOSTIC TESTS:     < from: Xray Chest 1 View AP- PORTABLE-Urgent (17 @ 08:29) >  EXAM:  XR CHEST 1 VIEW PORT URGENT                          PROCEDURE DATE:  2017                     INTERPRETATION:  Clinical History: Shortness of breath    Portable examination the chest demonstrates no interval change lung   infiltrates in comparison to prior examination of the chest 2017.   Bilateral effusions. No interval change position remaining support   devices.    Impression: No interval change lung pathology            "Thank you for the opportunity to participate in thecare of this   patient."        KATHARINA MOROCHO M.D., ATTENDING RADIOLOGIST  This document has been electronically signed. 2017 10:13AM    < end of copied text >      < from: Xray Chest 1 View AP -PORTABLE-Routine (17 @ 03:40) >  EXAM:  XR CHEST 1 VIEW PORT ROUTINE                          PROCEDURE DATE:  2017                     INTERPRETATION:  HISTORY: Extubated, follow-up    Portable AP view is compared to prior radiograph of 2017.    Endotracheal tube has been removed. No pneumothorax is evident, although   the superior-most lung apices are excluded from view. There has been   slight improvement in infiltrate in the right upper lung, with otherwise   little change in bilateral pulmonary infiltrates and effusions (right   more extensive than left), and there has otherwise been no interval   change.    IMPRESSION: Status post extubation. Slight improvement in infiltrate in   the right upper lung, with otherwise little change in bilateral pulmonary   infiltrates and effusions (right more extensive than left).            "Thank you for the opportunity to participate in the care of this   patient."        CHINO ROMAN M.D. ATTENDING RADIOLOGIST  This document has been electronically signed. 2017  1:16PM        < end of copied text >            < from: Xray Chest 1 View AP-PORTABLE IMMEDIATE (17 @ 12:07) >    EXAM:  XR CHEST 1 VIEW PORT IMMEDIATE                          PROCEDURE DATE:  2017                     INTERPRETATION:  Portable chest    History: Post bronc bronchoscopy exam x-ray. Follow-up abnormal exam.    Scattered lung infiltrates again noted. Overall similar appearance to   prior exam earlier same day. Endotracheal tube in satisfactory position.   No pneumothorax identified.            "Thank you for the opportunity to participate in the care of this   patient."        JUDE LOPEZ M.D., ATTENDING RADIOLOGIST  This document has been electronically signed. 2017  3:03PM    < end of copied text >      < from: Echocardiogram (17 @ 12:03) >  EXAM:  ECHOCARDIOGRAM (CARDIOL)                          PROCEDURE DATE:  2017                        INTERPRETATION:  Patient Height: 165.0 cm  Patient Weight: 84.0 kg  Systolic Pressure: 128 mmHg  Diastolic Pressure: 52 mmHg  BSA: 1.9 m^2  Interpretation Summary  A complete two-dimensional transthoracic echocardiogram was performed (2D,   M-mode, spectral and color flow doppler).  Study Quality: Fair.Left   ventricular hypertrophy presentThe ejection fraction could not be   accurately  calculated (poor endocardiac resolution)  The left atrium is severely   dilated.Right atrial size is normal.The right ventricle is normal in   size and   function.Abnormal (paradoxical) septal motion consistent with   LBBBCalcified   aortic valve.There is trace to mild aortic regurgitation.There is   Moderate to   severe aortic stenosis.The peak pressure gradient is 45 mmHg.The mean   pressure   gradient is 30 mmHg.The calculated aortic valve area using the continuity   equation is 1 cm2.The calculated stroke volume index is 39 cc/m2 (normal   >35cc/m2).There is moderate mitral annular calcification.There is mild   mitral   regurgitation.Structurally normal tricuspid valve.Structurally normal   pulmonic   valve.  Procedure Details  A completetwo-dimensional transthoracic echocardiogram was performed (2D,  M-mode, spectral and color flow doppler).  Study Quality: Fair.  Left Ventricle  Left ventricular hypertrophy present  The ejection fraction could not be accurately calculated (poor endocardiac  resolution)  Abnormal (paradoxical) septal motion consistent with LBBB  Left Atrium  The left atrium is severely dilated.  Right Atrium  Right atrial size is normal.  Right Ventricle  The right ventricle is normal in size and function.  Aortic Valve  Calcified aortic valve.  There is trace to mild aortic regurgitation.  There is Moderate to severe aortic stenosis.  The peak pressure gradient is 45 mmHg.  The mean pressure gradient is 30 mmHg.  The calculated aortic valve area using the continuity equation is 1 cm2.  The calculated stroke volume index is 39 cc/m2 (normal >35cc/m2).  Mitral Valve  There is moderate mitral annular calcification.  There is mild mitral regurgitation.  Tricuspid Valve  Structurally normal tricuspid valve.  Pulmonic Valve  Structurally normal pulmonic valve.  Arteries and Venous System  No aortic root dilatation.  The IVC is dilated (>2.1 cm) with an abnormal inspiratory collapse (<50%)  consistent with elevated right atrial pressure.  Pericardium / Pleura  There is no pericardial effusion.  Doppler Measurements & Calculations  MV E point: 140.2 cm/sec  MV A point: 53.4 cm/sec  MV E/A: 2.6  MV dec slope: 636.0 cm/sec^2  Ao V2 max: 319.4 cm/sec  Ao max P.8 mmHg  Ao max PG (full): 36.2 mmHg  Ao V2 mean: 251.5 cm/sec  Ao mean P.0 mmHg  Ao mean PG (full): 24.4 mmHg  Ao V2 VTI: 80.8 cm  ALEKS(I,A): 0.9 cm^2  ALEKS(I,D): 0.9 cm^2  ALEKS(V,A): 1.1 cm^2  ALEKS(V,D): 1.1 cm^2  LV max P.7 mmHg  LV mean P.6 mmHg  LV V1 max: 108 cm/sec  LV V1 mean: 76.1 cm/sec  LV V1 VTI: 24.0 cm  MR max graciela: 538.0 cm/sec  MR max P.8 mmHg  MR mean graciela: 400.3 cm/sec  MR mean P.0 mmHg  MR VTI: 181.9 cm  SV(Ao): 690.6 ml  SI(Ao): 360.9 ml/m^2  SV(LVOT): 75.0 ml  SI(LVOT): 39.2 ml/m^2  TR Max graciela: 252.8cm/sec  MMode 2D Measurements & Calculations  IVSd: 1.2 cm  LVIDd: 6.4 cm  LVIDs: 5.3 cm  LVPWd: 1.1 cm  IVS/LVPW: 1.0  FS: 18.0 %  EDV(Teich): 209.4 ml  ESV(Teich): 132.7 ml  LV mass(C)d: 331.0 grams  LV mass(C)dI: 173.0 grams/m^2  SI(cubed): 61.9 ml/m^2  Ao root diam: 3.3 cm  Ao root area: 8.5 cm^2  LA dimension: 4.5 cm  LA/Ao: 1.4  LVOT diam: 2.0 cm  LVOT area: 3.1 cm^2  LVOT area (M): 3.1 cm^2  EDV(MOD-sp4): 112 ml  EDV(MOD-sp2): 105 ml  Interpreting Physician:Perry Huynh MD electronicallysigned on   2017 12:24:22                "Thank you for the opportunity to participate in the care of this   patient."        PERRY HUYNH M.D., ATTENDING CARDIOLOGIST  This document has been electronically signed. 2017 12:24PM        < end of copied text >        EXAM:  XR CHEST 1 VIEW PORT URGENT                          PROCEDURE DATE:  2017                     INTERPRETATION:  Clinical History: Congestion    Portable examination the chest demonstrates no interval change congestion   and/or infiltrates in comparison to prior examination of the chest   2017. Bilateral effusions. No interval change position remaining   support devices. Cardiomegaly.    Impression: No interval change lung pathology            "Thank you for the opportunity to participate in the care of this   patient."        KATHARINA MOROCHO M.D., ATTENDING RADIOLOGIST  This document has been electronically signed. 2017 10:38AM      2017 ecg reviewed on 2017    EXAM:  XR CHEST 1 VIEW PORT ROUTINE                          PROCEDURE DATE:  2017                     INTERPRETATION:  Indication: chf    A single portable view of the chest is submitted. Comparison is made to   the most recent prior study dated 2017. Bilateral pulmonary   infiltrates and effusions are similar to the previous examination.   Impression:    No significant interval change            "Thank you for the opportunity to participate in the care of this   patient."        MASOUD WORKMAN M.D., ATTENDING RADIOLOGIST  This document has been electronically signed. 2017  4:33PM

## 2017-07-02 NOTE — PROGRESS NOTE ADULT - ASSESSMENT
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 s/p BAV

## 2017-07-02 NOTE — PROGRESS NOTE ADULT - PROBLEM SELECTOR PLAN 7
Baseline Creatinine per daughter 2-3. Currently at baseline.  - Renal following to assist with diuresis.   -Cont Furosemide IV and Diamox 500mg qd for alkalosis per renal recs.   -Renal consulted. F/u recs. (Dr. Ramirez-her outpt Nephrologist)  -Leahy catheter in place, monitor daily weights, strictr I/o's  -Renally dose all meds  - Completed Ceftriaxone 7 days course for complicated UTI w/ Klesiella on UCx. No leukocytosis or fevers.

## 2017-07-02 NOTE — PROGRESS NOTE ADULT - PROBLEM SELECTOR PLAN 3
Hypercapnic resp failure 2/2 pulmonary congestion 2/2 CHF 2/2 severe AS.   - decreased to NC 2L yesterday, able to wean to RA this AM with sat 92%.  - Pt refusing BIPAP at night    #COPD - c/w Duonebs q4h, Pulmicort INH

## 2017-07-02 NOTE — PROGRESS NOTE ADULT - SUBJECTIVE AND OBJECTIVE BOX
INTERVAL HPI/OVERNIGHT EVENTS:  No acute events. Continues to refuse NIPPV overnight. Able to transition to room air this AM.    VITAL SIGNS:  T(F): 98.6 (07-02-17 @ 10:00)  HR: 82 (07-02-17 @ 08:45)  BP: 122/53 (07-02-17 @ 08:45)  RR: 16 (07-02-17 @ 08:45)  SpO2: 92% (07-02-17 @ 08:45)  Wt(kg): --    PHYSICAL EXAM:    General: A/ox 3, No acute Distress, obese  Neck: Supple, NO JVD  Cardiac: S1 S2, No M/R/G  Pulmonary: no resp distress, bibasilar crackles  Abdomen: NTND, no masses, +BS  Extremities: No Rashes, No LE edema  Neuro: A/o x 3, No focal deficits    MEDICATIONS  (STANDING):  ferrous    sulfate 325 milliGRAM(s) Oral every 8 hours  acetazolamide    Tablet 500 milliGRAM(s) Oral daily  clopidogrel Tablet 75 milliGRAM(s) Oral daily  hydrocortisone 2.5% Cream 1 Application(s) Topical two times a day  Nephrocaps 1 Capsule(s) Oral daily  gabapentin 100 milliGRAM(s) Oral every other day  metoprolol succinate ER 12.5 milliGRAM(s) Oral daily  ALBUTerol/ipratropium for Nebulization 3 milliLiter(s) Nebulizer every 4 hours  atorvastatin 40 milliGRAM(s) Oral at bedtime  buDESOnide   0.25 milliGRAM(s) Respule 0.25 milliGRAM(s) Inhalation every 12 hours  sodium chloride 0.45%. 1000 milliLiter(s) (10 mL/Hr) IV Continuous <Continuous>  insulin lispro (HumaLOG) corrective regimen sliding scale   SubCutaneous every 6 hours  dextrose 5%. 1000 milliLiter(s) (50 mL/Hr) IV Continuous <Continuous>  dextrose 50% Injectable 12.5 Gram(s) IV Push once  dextrose 50% Injectable 25 Gram(s) IV Push once  dextrose 50% Injectable 25 Gram(s) IV Push once  pantoprazole    Tablet 40 milliGRAM(s) Oral before breakfast  heparin  Infusion 1500 Unit(s)/Hr (15 mL/Hr) IV Continuous <Continuous>  sodium chloride 0.9% lock flush 3 milliLiter(s) IV Push every 8 hours  furosemide   Injectable 40 milliGRAM(s) IV Push every 12 hours  docusate sodium 100 milliGRAM(s) Oral three times a day  senna 2 Tablet(s) Oral at bedtime  warfarin 2.5 milliGRAM(s) Oral once    MEDICATIONS  (PRN):  acetylcysteine 20% Inhalation 5 milliLiter(s) Inhalation every 6 hours PRN shortness of breath  artificial  tears Solution 1 Drop(s) Both EYES three times a day PRN Dry Eyes  acetaminophen   Tablet. 650 milliGRAM(s) Oral every 6 hours PRN Mild Pain (1 - 3)  dextrose Gel 1 Dose(s) Oral once PRN Blood Glucose LESS THAN 70 milliGRAM(s)/deciliter  glucagon  Injectable 1 milliGRAM(s) IntraMuscular once PRN Glucose LESS THAN 70 milligrams/deciliter      Allergies    No Known Allergies    Intolerances        LABS:                        9.0    9.5   )-----------( 216      ( 02 Jul 2017 06:33 )             29.8     07-02    140  |  93<L>  |  88<H>  ----------------------------<  114<H>  3.7   |  31  |  2.60<H>    Ca    9.1      02 Jul 2017 06:33  Mg     2.0     07-02      PT/INR - ( 02 Jul 2017 06:34 )   PT: 16.0 sec;   INR: 1.43          PTT - ( 02 Jul 2017 06:34 )  PTT:67.4 sec      RADIOLOGY & ADDITIONAL TESTS:

## 2017-07-02 NOTE — PROGRESS NOTE ADULT - ASSESSMENT
1) acute/chronic diastolic chf with known ckd  -appreciate renal input  2) nonrheumatic aortic stenosis  -s/p bav - please call shd  3) atrial fibrillation; presence of pacemaker  -please re-call ep  -daily coags  4) CAD s/p PCI   -continue rx as tolerated  6) COPD - f/u pulm recs  7) PVD -monitor  8) DM - monitor blood sugar  9) normocytic anemia - serial h/h  10) pressure injury of skin (right buttock) - please call wound care  11) ppx: therapeutic ac; ppi   12)  maintain K>4 Mg>2 1) acute/chronic diastolic chf with known ckd  -appreciate renal input  2) nonrheumatic aortic stenosis  -s/p bav - please call shd  3) atrial fibrillation; presence of pacemaker  -please re-call ep  -daily coags  4) CAD s/p PCI   -continue rx as tolerated  6) COPD - f/u pulm recs  7) PVD -monitor  8) DM - monitor blood sugar  9) normocytic anemia - serial h/h  10) pressure injury of skin (right buttock) - please call wound care  11) ppx: therapeutic ac; ppi   12)  maintain K>4 Mg>2  d/w Navendra (Housestaff)

## 2017-07-02 NOTE — PROGRESS NOTE ADULT - PROBLEM SELECTOR PLAN 1
Continue with synergistic diuresis with Diamox and Lasix  furosemide   Injectable 40 milliGRAM(s) every 12 hours  acetazolamide    Tablet 500 milliGRAM(s) daily    Still with evidence of volume excess and still not yet at a dry weight  Further, her CXR has increased right sided infiltrates noted.    Please ascertain whether patient is actually ingesting any unmeasured oral fluid or food. However, the weights, if taken to be internally consistent, would suggest she is losing mass.     However, in light of CXR findings, would suggest to consider stepping up the diuresis.  Can consider IV Lasix 40mg q8 hours (q8 frequency to attain sustained natriuresis)  Further eventually on step down to oral agents, can discuss jointly and consider torsemide for its longer duration of action.     Daily weights  Net negative at least 1-1.5L per day

## 2017-07-02 NOTE — PROGRESS NOTE ADULT - ASSESSMENT
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 Pt was admitted to [facility] but was transferred to CCU for hypercapnic respiratory failure s/p BiPAP. Pt now s/p BAV with Dr. Barger on 6/27/17. Transferred from  to [facility] for Heparin/Coumadin bridging, further diuresis and dispo planning.

## 2017-07-02 NOTE — PROGRESS NOTE ADULT - SUBJECTIVE AND OBJECTIVE BOX
PUD FOR DR STEINER    CC/ HPI:  88 yo female with chronic diastolic CHF (EF 50-55% 11/16'), mod to severe aortic stenosis, pAFIB, CAD s/p PCI x 3 stent, Lupus (not on meds), COPD, CKD (baseline Cr 2-3), admitted for CHF exacerbation complicated by hypercapnic respiratory failure, status post BAV, today without respiratory complaint.    PAST MEDICAL & SURGICAL HISTORY:  PVD (peripheral vascular disease)  Iron deficiency anemia  Lupus (systemic lupus erythematosus)  Paroxysmal atrial fibrillation  Aortic stenosis, severe  CKD (chronic kidney disease) stage 4, GFR 15-29 ml/min  Diabetes  HTN (hypertension)  CHF (congestive heart failure)  CAD (coronary artery disease)  COPD (chronic obstructive pulmonary disease)  History of total knee replacement: 2003  Cardiac pacemaker: at [facility] by Dr Escoto, 2011    SOCHX:  + tobacco,  -  alcohol    FMHX: FA/MO  - contributory     ROS reviewed below with positive findings marked (+) :  GEN:  fever, chills ENT: tracheostomy,   epistaxis,  sinusitis COR: +CAD, +CHF,  +HTN, +dysrhythmia PUL: +COPD, ILD, asthma, pneumonia GI: PEG, dysphagia, hemorrhage, other PETEY: +kidney disease, electrolyte disorder HEM:  +anemia, thrombus, coagulopathy, cancer ENDO:  thyroid disease, +diabetes mellitus CNS:  dementia, stroke, seizure, PSY:  depression, anxiety, other          MEDICATIONS  (STANDING):  ferrous    sulfate 325 milliGRAM(s) Oral every 8 hours  acetazolamide    Tablet 500 milliGRAM(s) Oral daily  clopidogrel Tablet 75 milliGRAM(s) Oral daily  hydrocortisone 2.5% Cream 1 Application(s) Topical two times a day  Nephrocaps 1 Capsule(s) Oral daily  gabapentin 100 milliGRAM(s) Oral every other day  metoprolol succinate ER 12.5 milliGRAM(s) Oral daily  ALBUTerol/ipratropium for Nebulization 3 milliLiter(s) Nebulizer every 4 hours  atorvastatin 40 milliGRAM(s) Oral at bedtime  buDESOnide   0.25 milliGRAM(s) Respule 0.25 milliGRAM(s) Inhalation every 12 hours  sodium chloride 0.45%. 1000 milliLiter(s) (10 mL/Hr) IV Continuous <Continuous>  insulin lispro (HumaLOG) corrective regimen sliding scale   SubCutaneous every 6 hours  pantoprazole    Tablet 40 milliGRAM(s) Oral before breakfast  heparin  Infusion 1500 Unit(s)/Hr (15 mL/Hr) IV Continuous <Continuous>  sodium chloride 0.9% lock flush 3 milliLiter(s) IV Push every 8 hours  furosemide   Injectable 40 milliGRAM(s) IV Push every 12 hours  warfarin 2.5 milliGRAM(s) Oral once    MEDICATIONS  (PRN):  acetylcysteine 20% Inhalation 5 milliLiter(s) Inhalation every 6 hours PRN shortness of breath  artificial  tears Solution 1 Drop(s) Both EYES three times a day PRN Dry Eyes  acetaminophen   Tablet. 650 milliGRAM(s) Oral every 6 hours PRN Mild Pain (1 - 3)  dextrose Gel 1 Dose(s) Oral once PRN Blood Glucose LESS THAN 70 milliGRAM(s)/deciliter  glucagon  Injectable 1 milliGRAM(s) IntraMuscular once PRN Glucose LESS THAN 70 milligrams/deciliter      Vital Signs Last 24 Hrs  T(C): 36.4 (30 Jun 2017 12:45), Max: 36.6 (29 Jun 2017 17:31)  T(F): 97.6 (30 Jun 2017 12:45), Max: 97.9 (30 Jun 2017 09:08)  HR: 74 (30 Jun 2017 13:04) (68 - 80)  BP: 113/56 (30 Jun 2017 13:04) (100/54 - 113/56)  BP(mean): 85 (30 Jun 2017 13:04) (63 - 85)  RR: 17 (30 Jun 2017 13:04) (14 - 18)  SpO2: 95% (30 Jun 2017 13:04) (93% - 97%)  ;   in bed on monitor, hard of hearing, -CP  -SOB; agitated, wants to leave the hospital  GENERAL:         uncomfortable,  + chronic distress. VERY AGITATED  HEENT:            - trauma,  - icterus,  - injection,  - nasal discharge.  NECK:              - jugular venous distention, - thyromegaly.  LYMPH:           - lymphadenopathy, - masses.  RESP:              DECREASED BREATH SOUNDS + crackles,   - rhonchi,   - wheezes.   COR:                S1S2   - gallops,  - rubs.  ABD:                bowel sounds,   soft, - tender, - distended, - organomegaly.  EXT/MSC:         - cyanosis,  - clubbing,  - edema.    NEURO:             alert,   responds to stimuli.    ABG - ( 29 Jun 2017 02:48 )  pH: 7.40  /  pCO2: 55    /  pO2: 180   / HCO3: 33    / Base Excess: 6.9   /  SaO2: 99       CXR improved, cardiomegaly, still pulmonary edema                         9.1    9.8   )-----------( 231      ( 30 Jun 2017 06:39 ) Lab reviewed             31.4     06-30    138  |  92<L>  |  91<H>  ----------------------------<  110<H>  3.4<L>   |  33<H>  |  2.70<H>    reviewed    CXR (6/29)  Bilateral infiltrate/congestion/pleural effusion      ASSESSMENT/PLAN    1)  Status post BAV  2)  Cardiomyopathy  3)  Chronic obstructive pulmonary disease  4)  Pulmonary hypertension  5)  Cough    Oxygen via nasal cannula as needed  Bronchodilators:  Atrovent/ albuterol q 4-6 hours.  Corticosteroids:  budesonide  Cardiac/HTN: post BAV  GI: Rx/ prophylaxis c PPI/H2B  Heme: Rx/VT on AC   Discuss with medical team, CCRN

## 2017-07-02 NOTE — PROGRESS NOTE ADULT - SUBJECTIVE AND OBJECTIVE BOX
Pt seen and examined coverage for Dr. herbert  no complaints    REVIEW OF SYSTEMS:  Constitutional: No fever, weight loss or fatigue  Cardiovascular: No chest pain, palpitations, dizziness or leg swelling  Gastrointestinal: No abdominal or epigastric pain. No nausea, vomiting or hematemesis; No diarrhea or constipation. No melena or hematochezia.  Skin: No itching, burning, rashes or lesions       MEDICATIONS:  MEDICATIONS  (STANDING):  ferrous    sulfate 325 milliGRAM(s) Oral every 8 hours  acetazolamide    Tablet 500 milliGRAM(s) Oral daily  clopidogrel Tablet 75 milliGRAM(s) Oral daily  hydrocortisone 2.5% Cream 1 Application(s) Topical two times a day  Nephrocaps 1 Capsule(s) Oral daily  gabapentin 100 milliGRAM(s) Oral every other day  metoprolol succinate ER 12.5 milliGRAM(s) Oral daily  ALBUTerol/ipratropium for Nebulization 3 milliLiter(s) Nebulizer every 4 hours  atorvastatin 40 milliGRAM(s) Oral at bedtime  buDESOnide   0.25 milliGRAM(s) Respule 0.25 milliGRAM(s) Inhalation every 12 hours  sodium chloride 0.45%. 1000 milliLiter(s) (10 mL/Hr) IV Continuous <Continuous>  insulin lispro (HumaLOG) corrective regimen sliding scale   SubCutaneous every 6 hours  dextrose 5%. 1000 milliLiter(s) (50 mL/Hr) IV Continuous <Continuous>  dextrose 50% Injectable 12.5 Gram(s) IV Push once  dextrose 50% Injectable 25 Gram(s) IV Push once  dextrose 50% Injectable 25 Gram(s) IV Push once  pantoprazole    Tablet 40 milliGRAM(s) Oral before breakfast  heparin  Infusion 1500 Unit(s)/Hr (15 mL/Hr) IV Continuous <Continuous>  sodium chloride 0.9% lock flush 3 milliLiter(s) IV Push every 8 hours  furosemide   Injectable 40 milliGRAM(s) IV Push every 12 hours  docusate sodium 100 milliGRAM(s) Oral three times a day  senna 2 Tablet(s) Oral at bedtime  warfarin 2.5 milliGRAM(s) Oral once    MEDICATIONS  (PRN):  acetylcysteine 20% Inhalation 5 milliLiter(s) Inhalation every 6 hours PRN shortness of breath  artificial  tears Solution 1 Drop(s) Both EYES three times a day PRN Dry Eyes  acetaminophen   Tablet. 650 milliGRAM(s) Oral every 6 hours PRN Mild Pain (1 - 3)  dextrose Gel 1 Dose(s) Oral once PRN Blood Glucose LESS THAN 70 milliGRAM(s)/deciliter  glucagon  Injectable 1 milliGRAM(s) IntraMuscular once PRN Glucose LESS THAN 70 milligrams/deciliter      Allergies    No Known Allergies    Intolerances        Vital Signs Last 24 Hrs  T(C): 36.8 (02 Jul 2017 13:28), Max: 37.2 (01 Jul 2017 20:50)  T(F): 98.3 (02 Jul 2017 13:28), Max: 98.9 (01 Jul 2017 20:50)  HR: 74 (02 Jul 2017 12:10) (74 - 90)  BP: 108/57 (02 Jul 2017 12:10) (102/45 - 125/54)  BP(mean): 86 (02 Jul 2017 12:10) (65 - 86)  RR: 16 (02 Jul 2017 12:10) (16 - 20)  SpO2: 93% (02 Jul 2017 12:10) (92% - 95%)    07-01 @ 07:01 - 07-02 @ 07:00  --------------------------------------------------------  IN: 180 mL / OUT: 1975 mL / NET: -1795 mL    07-02 @ 07:01  - 07-02 @ 13:56  --------------------------------------------------------  IN: 55 mL / OUT: 870 mL / NET: -815 mL        PHYSICAL EXAM:    General:  in no acute distress  HEENT: MMM, conjunctiva and sclera clear  Lungs: decrease BS with crackles  Heart: regular  Gastrointestinal: Soft non-tender non-distended; Normal bowel sounds; No hepatosplenomegaly  Skin: Warm and dry. No obvious rash    LABS:      CBC Full  -  ( 02 Jul 2017 06:33 )  WBC Count : 9.5 K/uL  Hemoglobin : 9.0 g/dL  Hematocrit : 29.8 %  Platelet Count - Automated : 216 K/uL  Mean Cell Volume : 89.0 fL  Mean Cell Hemoglobin : 26.9 pg  Mean Cell Hemoglobin Concentration : 30.2 g/dL  Auto Neutrophil # : x  Auto Lymphocyte # : x  Auto Monocyte # : x  Auto Eosinophil # : x  Auto Basophil # : x  Auto Neutrophil % : x  Auto Lymphocyte % : x  Auto Monocyte % : x  Auto Eosinophil % : x  Auto Basophil % : x    07-02    140  |  93<L>  |  88<H>  ----------------------------<  114<H>  3.7   |  31  |  2.60<H>    Ca    9.1      02 Jul 2017 06:33  Mg     2.0     07-02      PT/INR - ( 02 Jul 2017 06:34 )   PT: 16.0 sec;   INR: 1.43          PTT - ( 02 Jul 2017 06:34 )  PTT:67.4 sec                  RADIOLOGY & ADDITIONAL STUDIES (The following images were personally reviewed):

## 2017-07-03 ENCOUNTER — APPOINTMENT (OUTPATIENT)
Dept: NEPHROLOGY | Facility: CLINIC | Age: 82
End: 2017-07-03

## 2017-07-03 LAB
ANION GAP SERPL CALC-SCNC: 15 MMOL/L — SIGNIFICANT CHANGE UP (ref 5–17)
APTT BLD: 81.3 SEC — HIGH (ref 27.5–37.4)
APTT BLD: 84.5 SEC — HIGH (ref 27.5–37.4)
BUN SERPL-MCNC: 86 MG/DL — HIGH (ref 7–23)
CALCIUM SERPL-MCNC: 9.4 MG/DL — SIGNIFICANT CHANGE UP (ref 8.4–10.5)
CHLORIDE SERPL-SCNC: 96 MMOL/L — SIGNIFICANT CHANGE UP (ref 96–108)
CO2 SERPL-SCNC: 31 MMOL/L — SIGNIFICANT CHANGE UP (ref 22–31)
CREAT SERPL-MCNC: 2.5 MG/DL — HIGH (ref 0.5–1.3)
GLUCOSE SERPL-MCNC: 122 MG/DL — HIGH (ref 70–99)
HCT VFR BLD CALC: 30.7 % — LOW (ref 34.5–45)
HGB BLD-MCNC: 9.2 G/DL — LOW (ref 11.5–15.5)
INR BLD: 1.76 — HIGH (ref 0.88–1.16)
INR BLD: 2.06 — HIGH (ref 0.88–1.16)
MAGNESIUM SERPL-MCNC: 2.1 MG/DL — SIGNIFICANT CHANGE UP (ref 1.6–2.6)
MCHC RBC-ENTMCNC: 26.7 PG — LOW (ref 27–34)
MCHC RBC-ENTMCNC: 30 G/DL — LOW (ref 32–36)
MCV RBC AUTO: 89.2 FL — SIGNIFICANT CHANGE UP (ref 80–100)
PHOSPHATE SERPL-MCNC: 4.4 MG/DL — SIGNIFICANT CHANGE UP (ref 2.5–4.5)
PLATELET # BLD AUTO: 229 K/UL — SIGNIFICANT CHANGE UP (ref 150–400)
POTASSIUM SERPL-MCNC: 3.8 MMOL/L — SIGNIFICANT CHANGE UP (ref 3.5–5.3)
POTASSIUM SERPL-SCNC: 3.8 MMOL/L — SIGNIFICANT CHANGE UP (ref 3.5–5.3)
PROTHROM AB SERPL-ACNC: 19.8 SEC — HIGH (ref 9.8–12.7)
PROTHROM AB SERPL-ACNC: 23.2 SEC — HIGH (ref 9.8–12.7)
RBC # BLD: 3.44 M/UL — LOW (ref 3.8–5.2)
RBC # FLD: 16.6 % — SIGNIFICANT CHANGE UP (ref 10.3–16.9)
SODIUM SERPL-SCNC: 142 MMOL/L — SIGNIFICANT CHANGE UP (ref 135–145)
WBC # BLD: 9.5 K/UL — SIGNIFICANT CHANGE UP (ref 3.8–10.5)
WBC # FLD AUTO: 9.5 K/UL — SIGNIFICANT CHANGE UP (ref 3.8–10.5)

## 2017-07-03 PROCEDURE — 71010: CPT | Mod: 26

## 2017-07-03 RX ORDER — CLOPIDOGREL BISULFATE 75 MG/1
1 TABLET, FILM COATED ORAL
Qty: 0 | Refills: 0 | COMMUNITY
Start: 2017-07-03

## 2017-07-03 RX ORDER — POTASSIUM CHLORIDE 20 MEQ
40 PACKET (EA) ORAL ONCE
Qty: 0 | Refills: 0 | Status: COMPLETED | OUTPATIENT
Start: 2017-07-03 | End: 2017-07-03

## 2017-07-03 RX ORDER — FUROSEMIDE 40 MG
80 TABLET ORAL EVERY 12 HOURS
Qty: 0 | Refills: 0 | Status: DISCONTINUED | OUTPATIENT
Start: 2017-07-03 | End: 2017-07-06

## 2017-07-03 RX ORDER — ACETAZOLAMIDE 250 MG/1
2 TABLET ORAL
Qty: 0 | Refills: 0 | COMMUNITY
Start: 2017-07-03

## 2017-07-03 RX ORDER — WARFARIN SODIUM 2.5 MG/1
2.5 TABLET ORAL ONCE
Qty: 0 | Refills: 0 | Status: DISCONTINUED | OUTPATIENT
Start: 2017-07-03 | End: 2017-07-03

## 2017-07-03 RX ORDER — FUROSEMIDE 40 MG
20 TABLET ORAL
Qty: 0 | Refills: 0 | COMMUNITY

## 2017-07-03 RX ORDER — SENNA PLUS 8.6 MG/1
2 TABLET ORAL
Qty: 0 | Refills: 0 | COMMUNITY
Start: 2017-07-03

## 2017-07-03 RX ORDER — PANTOPRAZOLE SODIUM 20 MG/1
1 TABLET, DELAYED RELEASE ORAL
Qty: 0 | Refills: 0 | COMMUNITY
Start: 2017-07-03

## 2017-07-03 RX ORDER — HYDROCORTISONE 1 %
1 OINTMENT (GRAM) TOPICAL
Qty: 0 | Refills: 0 | COMMUNITY
Start: 2017-07-03

## 2017-07-03 RX ORDER — ACETAZOLAMIDE 250 MG/1
1 TABLET ORAL
Qty: 0 | Refills: 0 | COMMUNITY
Start: 2017-07-03

## 2017-07-03 RX ORDER — WARFARIN SODIUM 2.5 MG/1
2 TABLET ORAL ONCE
Qty: 0 | Refills: 0 | Status: COMPLETED | OUTPATIENT
Start: 2017-07-03 | End: 2017-07-03

## 2017-07-03 RX ORDER — ATORVASTATIN CALCIUM 80 MG/1
1 TABLET, FILM COATED ORAL
Qty: 0 | Refills: 0 | COMMUNITY
Start: 2017-07-03

## 2017-07-03 RX ORDER — FUROSEMIDE 40 MG
1 TABLET ORAL
Qty: 0 | Refills: 0 | COMMUNITY
Start: 2017-07-03

## 2017-07-03 RX ORDER — DOCUSATE SODIUM 100 MG
1 CAPSULE ORAL
Qty: 0 | Refills: 0 | COMMUNITY
Start: 2017-07-03

## 2017-07-03 RX ORDER — POLYETHYLENE GLYCOL 3350 17 G/17G
17 POWDER, FOR SOLUTION ORAL
Qty: 0 | Refills: 0 | Status: DISCONTINUED | OUTPATIENT
Start: 2017-07-03 | End: 2017-07-05

## 2017-07-03 RX ADMIN — Medication 1 CAPSULE(S): at 12:10

## 2017-07-03 RX ADMIN — Medication 650 MILLIGRAM(S): at 02:27

## 2017-07-03 RX ADMIN — Medication 3 MILLILITER(S): at 06:26

## 2017-07-03 RX ADMIN — Medication 100 MILLIGRAM(S): at 06:26

## 2017-07-03 RX ADMIN — Medication 650 MILLIGRAM(S): at 02:55

## 2017-07-03 RX ADMIN — Medication 650 MILLIGRAM(S): at 10:52

## 2017-07-03 RX ADMIN — Medication 3 MILLILITER(S): at 21:43

## 2017-07-03 RX ADMIN — Medication 80 MILLIGRAM(S): at 18:39

## 2017-07-03 RX ADMIN — Medication 3 MILLILITER(S): at 18:39

## 2017-07-03 RX ADMIN — ATORVASTATIN CALCIUM 40 MILLIGRAM(S): 80 TABLET, FILM COATED ORAL at 21:43

## 2017-07-03 RX ADMIN — Medication 325 MILLIGRAM(S): at 06:26

## 2017-07-03 RX ADMIN — CLOPIDOGREL BISULFATE 75 MILLIGRAM(S): 75 TABLET, FILM COATED ORAL at 12:10

## 2017-07-03 RX ADMIN — Medication 325 MILLIGRAM(S): at 14:17

## 2017-07-03 RX ADMIN — Medication 1 APPLICATION(S): at 18:40

## 2017-07-03 RX ADMIN — Medication 40 MILLIGRAM(S): at 06:26

## 2017-07-03 RX ADMIN — Medication 0.25 MILLIGRAM(S): at 06:26

## 2017-07-03 RX ADMIN — Medication 40 MILLIEQUIVALENT(S): at 12:11

## 2017-07-03 RX ADMIN — ACETAZOLAMIDE 500 MILLIGRAM(S): 250 TABLET ORAL at 12:10

## 2017-07-03 RX ADMIN — SODIUM CHLORIDE 3 MILLILITER(S): 9 INJECTION INTRAMUSCULAR; INTRAVENOUS; SUBCUTANEOUS at 06:27

## 2017-07-03 RX ADMIN — Medication 100 MILLIGRAM(S): at 14:17

## 2017-07-03 RX ADMIN — Medication 0.25 MILLIGRAM(S): at 20:13

## 2017-07-03 RX ADMIN — SODIUM CHLORIDE 3 MILLILITER(S): 9 INJECTION INTRAMUSCULAR; INTRAVENOUS; SUBCUTANEOUS at 21:44

## 2017-07-03 RX ADMIN — Medication 3 MILLILITER(S): at 14:17

## 2017-07-03 RX ADMIN — Medication 1 APPLICATION(S): at 06:26

## 2017-07-03 RX ADMIN — SODIUM CHLORIDE 3 MILLILITER(S): 9 INJECTION INTRAMUSCULAR; INTRAVENOUS; SUBCUTANEOUS at 15:21

## 2017-07-03 RX ADMIN — Medication 650 MILLIGRAM(S): at 10:10

## 2017-07-03 RX ADMIN — Medication 12.5 MILLIGRAM(S): at 12:11

## 2017-07-03 RX ADMIN — Medication 325 MILLIGRAM(S): at 21:44

## 2017-07-03 RX ADMIN — WARFARIN SODIUM 2 MILLIGRAM(S): 2.5 TABLET ORAL at 21:44

## 2017-07-03 RX ADMIN — GABAPENTIN 100 MILLIGRAM(S): 400 CAPSULE ORAL at 12:10

## 2017-07-03 RX ADMIN — Medication 1: at 11:32

## 2017-07-03 RX ADMIN — PANTOPRAZOLE SODIUM 40 MILLIGRAM(S): 20 TABLET, DELAYED RELEASE ORAL at 06:26

## 2017-07-03 RX ADMIN — POLYETHYLENE GLYCOL 3350 17 GRAM(S): 17 POWDER, FOR SOLUTION ORAL at 20:13

## 2017-07-03 RX ADMIN — Medication 3 MILLILITER(S): at 01:55

## 2017-07-03 RX ADMIN — Medication 100 MILLIGRAM(S): at 21:44

## 2017-07-03 RX ADMIN — Medication 3 MILLILITER(S): at 10:00

## 2017-07-03 NOTE — PROGRESS NOTE ADULT - SUBJECTIVE AND OBJECTIVE BOX
CARDIOLOGY NP PROGRESS NOTE    Subjective: Pt seen and examined at bedside. Denies chest pain or sob. Eager to go to rehab.    Overnight Events: None    TELEMETRY: Afib with frequent V-pacing 70s        VITAL SIGNS:  T(C): 36.4 (07-03-17 @ 09:16), Max: 36.8 (07-02-17 @ 13:28)  HR: 78 (07-03-17 @ 09:00) (68 - 80)  BP: 106/51 (07-03-17 @ 09:00) (98/49 - 117/48)  RR: 16 (07-03-17 @ 09:00) (15 - 18)  SpO2: 94% (07-03-17 @ 09:00) (84% - 99%)  Wt(kg): --    I&O's Summary    02 Jul 2017 07:01  -  03 Jul 2017 07:00  --------------------------------------------------------  IN: 655 mL / OUT: 2120 mL / NET: -1465 mL    03 Jul 2017 07:01  -  03 Jul 2017 11:02  --------------------------------------------------------  IN: 60 mL / OUT: 0 mL / NET: 60 mL          PHYSICAL EXAM:    General: A/ox 3, No acute Distress  Neck: Supple, NO JVD  Cardiac: S1 S2, No M/R/G  Pulmonary: CTAB, Breathing unlabored, No Rhonchi/Rales/Wheezing  Abdomen: Soft, Non -tender, +BS x 4 quads  Extremities: No Rashes, No edema  Neuro: A/o x 3, No focal deficits          LABS:                          9.2    9.5   )-----------( 229      ( 03 Jul 2017 05:45 )             30.7                              07-03    142  |  96  |  86<H>  ----------------------------<  122<H>  3.8   |  31  |  2.50<H>    Ca    9.4      03 Jul 2017 05:44  Phos  4.4     07-03  Mg     2.1     07-03      PT/INR - ( 03 Jul 2017 05:46 )   PT: 19.8 sec;   INR: 1.76          PTT - ( 03 Jul 2017 05:46 )  PTT:84.5 sec  CAPILLARY BLOOD GLUCOSE  128 (03 Jul 2017 05:08)  133 (02 Jul 2017 21:03)  119 (02 Jul 2017 16:05)  131 (02 Jul 2017 11:40)          No Known Allergies    MEDICATIONS  (STANDING):  ferrous    sulfate 325 milliGRAM(s) Oral every 8 hours  acetazolamide    Tablet 500 milliGRAM(s) Oral daily  clopidogrel Tablet 75 milliGRAM(s) Oral daily  hydrocortisone 2.5% Cream 1 Application(s) Topical two times a day  Nephrocaps 1 Capsule(s) Oral daily  gabapentin 100 milliGRAM(s) Oral every other day  metoprolol succinate ER 12.5 milliGRAM(s) Oral daily  ALBUTerol/ipratropium for Nebulization 3 milliLiter(s) Nebulizer every 4 hours  atorvastatin 40 milliGRAM(s) Oral at bedtime  buDESOnide   0.25 milliGRAM(s) Respule 0.25 milliGRAM(s) Inhalation every 12 hours  sodium chloride 0.45%. 1000 milliLiter(s) (10 mL/Hr) IV Continuous <Continuous>  insulin lispro (HumaLOG) corrective regimen sliding scale   SubCutaneous every 6 hours  dextrose 5%. 1000 milliLiter(s) (50 mL/Hr) IV Continuous <Continuous>  dextrose 50% Injectable 12.5 Gram(s) IV Push once  dextrose 50% Injectable 25 Gram(s) IV Push once  dextrose 50% Injectable 25 Gram(s) IV Push once  pantoprazole    Tablet 40 milliGRAM(s) Oral before breakfast  heparin  Infusion 1500 Unit(s)/Hr (15 mL/Hr) IV Continuous <Continuous>  sodium chloride 0.9% lock flush 3 milliLiter(s) IV Push every 8 hours  furosemide   Injectable 40 milliGRAM(s) IV Push every 12 hours  docusate sodium 100 milliGRAM(s) Oral three times a day  senna 2 Tablet(s) Oral at bedtime  potassium chloride    Tablet ER 40 milliEquivalent(s) Oral once  warfarin 2.5 milliGRAM(s) Oral once    MEDICATIONS  (PRN):  acetylcysteine 20% Inhalation 5 milliLiter(s) Inhalation every 6 hours PRN shortness of breath  artificial  tears Solution 1 Drop(s) Both EYES three times a day PRN Dry Eyes  acetaminophen   Tablet. 650 milliGRAM(s) Oral every 6 hours PRN Mild Pain (1 - 3)  dextrose Gel 1 Dose(s) Oral once PRN Blood Glucose LESS THAN 70 milliGRAM(s)/deciliter  glucagon  Injectable 1 milliGRAM(s) IntraMuscular once PRN Glucose LESS THAN 70 milligrams/deciliter        DIAGNOSTIC TESTS: CARDIOLOGY NP PROGRESS NOTE    Subjective: Pt seen and examined at bedside. Denies chest pain or sob. Eager to go to rehab.    Overnight Events: None    TELEMETRY: Afib with frequent V-pacing 70s        VITAL SIGNS:  T(C): 36.4 (07-03-17 @ 09:16), Max: 36.8 (07-02-17 @ 13:28)  HR: 78 (07-03-17 @ 09:00) (68 - 80)  BP: 106/51 (07-03-17 @ 09:00) (98/49 - 117/48)  RR: 16 (07-03-17 @ 09:00) (15 - 18)  SpO2: 94% (07-03-17 @ 09:00) (84% - 99%)  Wt(kg): --    I&O's Summary    02 Jul 2017 07:01  -  03 Jul 2017 07:00  --------------------------------------------------------  IN: 655 mL / OUT: 2120 mL / NET: -1465 mL    03 Jul 2017 07:01  -  03 Jul 2017 11:02  --------------------------------------------------------  IN: 60 mL / OUT: 0 mL / NET: 60 mL          PHYSICAL EXAM:    General: A/ox 3, No acute Distress, elevated BMI  Neck: Supple, NO JVD  Cardiac: S1 S2, No M/R/G  Pulmonary: Mild R expiratory wheezing, L sided diffuse crackles, Breathing unlabored on 2L NC, No Rhonchi   Abdomen: Soft, Non -tender, +BS x 4 quads  Extremities: No Rashes, 1+ LE edema  Neuro: A/o x 3, No focal deficits          LABS:                          9.2    9.5   )-----------( 229      ( 03 Jul 2017 05:45 )             30.7                              07-03    142  |  96  |  86<H>  ----------------------------<  122<H>  3.8   |  31  |  2.50<H>    Ca    9.4      03 Jul 2017 05:44  Phos  4.4     07-03  Mg     2.1     07-03      PT/INR - ( 03 Jul 2017 05:46 )   PT: 19.8 sec;   INR: 1.76          PTT - ( 03 Jul 2017 05:46 )  PTT:84.5 sec  CAPILLARY BLOOD GLUCOSE  128 (03 Jul 2017 05:08)  133 (02 Jul 2017 21:03)  119 (02 Jul 2017 16:05)  131 (02 Jul 2017 11:40)          No Known Allergies    MEDICATIONS  (STANDING):  ferrous    sulfate 325 milliGRAM(s) Oral every 8 hours  acetazolamide    Tablet 500 milliGRAM(s) Oral daily  clopidogrel Tablet 75 milliGRAM(s) Oral daily  hydrocortisone 2.5% Cream 1 Application(s) Topical two times a day  Nephrocaps 1 Capsule(s) Oral daily  gabapentin 100 milliGRAM(s) Oral every other day  metoprolol succinate ER 12.5 milliGRAM(s) Oral daily  ALBUTerol/ipratropium for Nebulization 3 milliLiter(s) Nebulizer every 4 hours  atorvastatin 40 milliGRAM(s) Oral at bedtime  buDESOnide   0.25 milliGRAM(s) Respule 0.25 milliGRAM(s) Inhalation every 12 hours  sodium chloride 0.45%. 1000 milliLiter(s) (10 mL/Hr) IV Continuous <Continuous>  insulin lispro (HumaLOG) corrective regimen sliding scale   SubCutaneous every 6 hours  dextrose 5%. 1000 milliLiter(s) (50 mL/Hr) IV Continuous <Continuous>  dextrose 50% Injectable 12.5 Gram(s) IV Push once  dextrose 50% Injectable 25 Gram(s) IV Push once  dextrose 50% Injectable 25 Gram(s) IV Push once  pantoprazole    Tablet 40 milliGRAM(s) Oral before breakfast  heparin  Infusion 1500 Unit(s)/Hr (15 mL/Hr) IV Continuous <Continuous>  sodium chloride 0.9% lock flush 3 milliLiter(s) IV Push every 8 hours  furosemide   Injectable 40 milliGRAM(s) IV Push every 12 hours  docusate sodium 100 milliGRAM(s) Oral three times a day  senna 2 Tablet(s) Oral at bedtime  potassium chloride    Tablet ER 40 milliEquivalent(s) Oral once  warfarin 2.5 milliGRAM(s) Oral once    MEDICATIONS  (PRN):  acetylcysteine 20% Inhalation 5 milliLiter(s) Inhalation every 6 hours PRN shortness of breath  artificial  tears Solution 1 Drop(s) Both EYES three times a day PRN Dry Eyes  acetaminophen   Tablet. 650 milliGRAM(s) Oral every 6 hours PRN Mild Pain (1 - 3)  dextrose Gel 1 Dose(s) Oral once PRN Blood Glucose LESS THAN 70 milliGRAM(s)/deciliter  glucagon  Injectable 1 milliGRAM(s) IntraMuscular once PRN Glucose LESS THAN 70 milligrams/deciliter        DIAGNOSTIC TESTS: CARDIOLOGY NP PROGRESS NOTE    Subjective: Pt seen and examined at bedside. Denies chest pain or sob. Eager to go to rehab.    Overnight Events: None    TELEMETRY: Afib with frequent V-pacing 70s        VITAL SIGNS:  T(C): 36.4 (07-03-17 @ 09:16), Max: 36.8 (07-02-17 @ 13:28)  HR: 78 (07-03-17 @ 09:00) (68 - 80)  BP: 106/51 (07-03-17 @ 09:00) (98/49 - 117/48)  RR: 16 (07-03-17 @ 09:00) (15 - 18)  SpO2: 94% (07-03-17 @ 09:00) (84% - 99%)  Wt(kg): --    I&O's Summary    02 Jul 2017 07:01  -  03 Jul 2017 07:00  --------------------------------------------------------  IN: 655 mL / OUT: 2120 mL / NET: -1465 mL    03 Jul 2017 07:01  -  03 Jul 2017 11:02  --------------------------------------------------------  IN: 60 mL / OUT: 0 mL / NET: 60 mL          PHYSICAL EXAM:    General: A/ox 3, No acute Distress, elevated BMI  Neck: Supple, NO JVD  Cardiac: S1 S2, Grade III/VI systolic murmur  Pulmonary: Mild R expiratory wheezing, L sided diffuse crackles, Breathing unlabored on 2L NC, No Rhonchi   Abdomen: Soft, Non -tender, +BS x 4 quads  Extremities: No Rashes, 1+ LE edema  Neuro: A/o x 3, No focal deficits          LABS:                          9.2    9.5   )-----------( 229      ( 03 Jul 2017 05:45 )             30.7                              07-03    142  |  96  |  86<H>  ----------------------------<  122<H>  3.8   |  31  |  2.50<H>    Ca    9.4      03 Jul 2017 05:44  Phos  4.4     07-03  Mg     2.1     07-03      PT/INR - ( 03 Jul 2017 05:46 )   PT: 19.8 sec;   INR: 1.76          PTT - ( 03 Jul 2017 05:46 )  PTT:84.5 sec  CAPILLARY BLOOD GLUCOSE  128 (03 Jul 2017 05:08)  133 (02 Jul 2017 21:03)  119 (02 Jul 2017 16:05)  131 (02 Jul 2017 11:40)          No Known Allergies    MEDICATIONS  (STANDING):  ferrous    sulfate 325 milliGRAM(s) Oral every 8 hours  acetazolamide    Tablet 500 milliGRAM(s) Oral daily  clopidogrel Tablet 75 milliGRAM(s) Oral daily  hydrocortisone 2.5% Cream 1 Application(s) Topical two times a day  Nephrocaps 1 Capsule(s) Oral daily  gabapentin 100 milliGRAM(s) Oral every other day  metoprolol succinate ER 12.5 milliGRAM(s) Oral daily  ALBUTerol/ipratropium for Nebulization 3 milliLiter(s) Nebulizer every 4 hours  atorvastatin 40 milliGRAM(s) Oral at bedtime  buDESOnide   0.25 milliGRAM(s) Respule 0.25 milliGRAM(s) Inhalation every 12 hours  sodium chloride 0.45%. 1000 milliLiter(s) (10 mL/Hr) IV Continuous <Continuous>  insulin lispro (HumaLOG) corrective regimen sliding scale   SubCutaneous every 6 hours  dextrose 5%. 1000 milliLiter(s) (50 mL/Hr) IV Continuous <Continuous>  dextrose 50% Injectable 12.5 Gram(s) IV Push once  dextrose 50% Injectable 25 Gram(s) IV Push once  dextrose 50% Injectable 25 Gram(s) IV Push once  pantoprazole    Tablet 40 milliGRAM(s) Oral before breakfast  heparin  Infusion 1500 Unit(s)/Hr (15 mL/Hr) IV Continuous <Continuous>  sodium chloride 0.9% lock flush 3 milliLiter(s) IV Push every 8 hours  furosemide   Injectable 40 milliGRAM(s) IV Push every 12 hours  docusate sodium 100 milliGRAM(s) Oral three times a day  senna 2 Tablet(s) Oral at bedtime  potassium chloride    Tablet ER 40 milliEquivalent(s) Oral once  warfarin 2.5 milliGRAM(s) Oral once    MEDICATIONS  (PRN):  acetylcysteine 20% Inhalation 5 milliLiter(s) Inhalation every 6 hours PRN shortness of breath  artificial  tears Solution 1 Drop(s) Both EYES three times a day PRN Dry Eyes  acetaminophen   Tablet. 650 milliGRAM(s) Oral every 6 hours PRN Mild Pain (1 - 3)  dextrose Gel 1 Dose(s) Oral once PRN Blood Glucose LESS THAN 70 milliGRAM(s)/deciliter  glucagon  Injectable 1 milliGRAM(s) IntraMuscular once PRN Glucose LESS THAN 70 milligrams/deciliter        DIAGNOSTIC TESTS:

## 2017-07-03 NOTE — PROGRESS NOTE ADULT - PROBLEM SELECTOR PLAN 2
Critical AS, ALEKS 0.5, mean pressure gradient 43, mod pulm HTN, PASP 52.   -CTS Structural Heart following, patient s/p successful BAV 6/27/17 without complications.   -Repeat Echo 6/28 post BAV w/ ALEKS 1, mean pressure gradient 30  -F/u CTS for follow-up plan Critical AS, ALEKS 0.5, mean pressure gradient 43, mod pulm HTN, PASP 52.   -CTS Structural Heart following, patient s/p successful BAV 6/27/17 without complications.   - Repeat Echo 6/28 post BAV w/ ALEKS 1, mean pressure gradient 30  - Follow up w/ Dr Barger in 2weeks.

## 2017-07-03 NOTE — DISCHARGE NOTE ADULT - PLAN OF CARE
You were treated with antibiotics Ceftriaxone for a urinary tract infection. You have a history of atrial fibrillation where you take blood thinner Coumadin to prevent strokes. You were admitted to the hospital with elevated INR 4.89 and the Coumadin was held until after your Aortic Valvuloplasty procedure. You were placed on intravenous Heparin as a bridge to Coumadin Maintain low salt diet. Take medications as prescribed. You were admitted to the hospital with congestive heart failure where you had a large amount of fluid in your lungs.  You were given intravenous Lasix to get rid of the fluid in your lungs and to help you breathe better. Please take Lasix and Diamox as prescribed without missing doses. Please maintain a low salt diet as this will help keep your heart failure under better control and prevent frequent readmissions to the hospital. Weigh yourself daily and report any weight gain over 2 pounds per day to your Doctor.  Your oxygen levels are still low for which you are being discharge on oxygen. You were found to have Critical Aortic Stenosis where your aortic valve does not open fully. You received a Balloon Aortic Valvuloplasty on 6/27/2017 to help treat Critical Aortic Stenosis. You will need to follow You have a history of atrial fibrillation where you take blood thinner Coumadin to prevent strokes. You were admitted to the hospital with elevated INR 4.89 and the Coumadin was held until after your Aortic Valvuloplasty procedure. You were placed on intravenous Heparin until the Coumadin was at a therapeutic range. Please check INR in 2 days. You were found to have Critical Aortic Stenosis where your aortic valve does not open fully, this likely contributed to the congestive heart failure. You received a Balloon Aortic Valvuloplasty on 6/27/2017 to help treat the Critical Aortic Stenosis. You will need to follow to follow up with Dr Barger in 2 weeks on 7/17/17 at 12:30pm. Your renal function remained stable during your hospitalization. Please follow up with your primary Doctor for ongoing monitoring. You were treated with intravenous antibiotics for a urinary tract infection (ceftriaxone). Please notify your Doctor if you experience difficulty urinating or have fever, chills. You were admitted to the hospital with congestive heart failure where you had a large amount of fluid in your lungs.  You were given intravenous Lasix to get rid of the fluid in your lungs and to help you breathe better. Please take Lasix and Diamox as prescribed without missing doses. Please maintain a low salt diet as this will help keep your heart failure under better control and prevent frequent readmissions to the hospital. Weigh yourself daily and report any weight gain over 2 pounds per day to your Doctor.  Your oxygen levels are still low for which you are being discharge on Nasal Cannula 2L Oxygen as needed. You have a history of atrial fibrillation where you take blood thinner Coumadin to prevent strokes. You were admitted to the hospital with elevated INR 4.89 and the Coumadin was held until after your Aortic Valvuloplasty procedure. You were placed on intravenous Heparin until the Coumadin was at a therapeutic range. Please check INR in 2 days, your Coumadin dosing may need to be adjusted. 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 Please continue applying [employer] Antifungal Cream to the sites twice daily per Wound Care recommendations.

## 2017-07-03 NOTE — DISCHARGE NOTE ADULT - CARE PLAN
Principal Discharge DX:	Acute on chronic congestive heart failure, unspecified congestive heart failure type  Secondary Diagnosis:	Aortic stenosis, severe  Secondary Diagnosis:	CKD (chronic kidney disease) stage 4, GFR 15-29 ml/min  Secondary Diagnosis:	Paroxysmal atrial fibrillation  Secondary Diagnosis:	Acute respiratory failure with hypercapnia  Secondary Diagnosis:	CAD (coronary artery disease)  Secondary Diagnosis:	UTI (urinary tract infection) Principal Discharge DX:	Acute on chronic congestive heart failure, unspecified congestive heart failure type  Secondary Diagnosis:	Aortic stenosis, severe  Secondary Diagnosis:	CKD (chronic kidney disease) stage 4, GFR 15-29 ml/min  Secondary Diagnosis:	Paroxysmal atrial fibrillation  Instructions for follow-up, activity and diet:	You have a history of atrial fibrillation where you take blood thinner Coumadin to prevent strokes. You were admitted to the hospital with elevated INR 4.89 and the Coumadin was held until after your Aortic Valvuloplasty procedure. You were placed on intravenous Heparin as a bridge to Coumadin  Secondary Diagnosis:	Acute respiratory failure with hypercapnia  Secondary Diagnosis:	CAD (coronary artery disease)  Secondary Diagnosis:	UTI (urinary tract infection)  Instructions for follow-up, activity and diet:	You were treated with antibiotics Ceftriaxone for a urinary tract infection. Principal Discharge DX:	Acute on chronic congestive heart failure, unspecified congestive heart failure type  Goal:	Maintain low salt diet. Take medications as prescribed.  Instructions for follow-up, activity and diet:	You were admitted to the hospital with congestive heart failure where you had a large amount of fluid in your lungs.  You were given intravenous Lasix to get rid of the fluid in your lungs and to help you breathe better. Please take Lasix and Diamox as prescribed without missing doses. Please maintain a low salt diet as this will help keep your heart failure under better control and prevent frequent readmissions to the hospital. Weigh yourself daily and report any weight gain over 2 pounds per day to your Doctor.  Your oxygen levels are still low for which you are being discharge on oxygen.  Secondary Diagnosis:	Aortic stenosis, severe  Instructions for follow-up, activity and diet:	You were found to have Critical Aortic Stenosis where your aortic valve does not open fully. You received a Balloon Aortic Valvuloplasty on 6/27/2017 to help treat Critical Aortic Stenosis. You will need to follow  Secondary Diagnosis:	CKD (chronic kidney disease) stage 4, GFR 15-29 ml/min  Secondary Diagnosis:	Paroxysmal atrial fibrillation  Instructions for follow-up, activity and diet:	You have a history of atrial fibrillation where you take blood thinner Coumadin to prevent strokes. You were admitted to the hospital with elevated INR 4.89 and the Coumadin was held until after your Aortic Valvuloplasty procedure. You were placed on intravenous Heparin as a bridge to Coumadin  Secondary Diagnosis:	Acute respiratory failure with hypercapnia  Secondary Diagnosis:	CAD (coronary artery disease)  Secondary Diagnosis:	UTI (urinary tract infection)  Instructions for follow-up, activity and diet:	You were treated with antibiotics Ceftriaxone for a urinary tract infection. Principal Discharge DX:	Acute on chronic congestive heart failure, unspecified congestive heart failure type  Goal:	Maintain low salt diet. Take medications as prescribed.  Instructions for follow-up, activity and diet:	You were admitted to the hospital with congestive heart failure where you had a large amount of fluid in your lungs.  You were given intravenous Lasix to get rid of the fluid in your lungs and to help you breathe better. Please take Lasix and Diamox as prescribed without missing doses. Please maintain a low salt diet as this will help keep your heart failure under better control and prevent frequent readmissions to the hospital. Weigh yourself daily and report any weight gain over 2 pounds per day to your Doctor.  Your oxygen levels are still low for which you are being discharge on oxygen.  Secondary Diagnosis:	Aortic stenosis, severe  Instructions for follow-up, activity and diet:	You were found to have Critical Aortic Stenosis where your aortic valve does not open fully, this likely contributed to the congestive heart failure. You received a Balloon Aortic Valvuloplasty on 6/27/2017 to help treat the Critical Aortic Stenosis. You will need to follow to follow up with Dr Barger in 2 weeks on 7/17/17 at 12:30pm.  Secondary Diagnosis:	CKD (chronic kidney disease) stage 4, GFR 15-29 ml/min  Secondary Diagnosis:	Paroxysmal atrial fibrillation  Instructions for follow-up, activity and diet:	You have a history of atrial fibrillation where you take blood thinner Coumadin to prevent strokes. You were admitted to the hospital with elevated INR 4.89 and the Coumadin was held until after your Aortic Valvuloplasty procedure. You were placed on intravenous Heparin until the Coumadin was at a therapeutic range. Please check INR in 2 days.  Secondary Diagnosis:	Acute respiratory failure with hypercapnia  Secondary Diagnosis:	CAD (coronary artery disease)  Secondary Diagnosis:	UTI (urinary tract infection)  Instructions for follow-up, activity and diet:	You were treated with antibiotics Ceftriaxone for a urinary tract infection. Principal Discharge DX:	Acute on chronic congestive heart failure, unspecified congestive heart failure type  Goal:	Maintain low salt diet. Take medications as prescribed.  Instructions for follow-up, activity and diet:	You were admitted to the hospital with congestive heart failure where you had a large amount of fluid in your lungs.  You were given intravenous Lasix to get rid of the fluid in your lungs and to help you breathe better. Please take Lasix and Diamox as prescribed without missing doses. Please maintain a low salt diet as this will help keep your heart failure under better control and prevent frequent readmissions to the hospital. Weigh yourself daily and report any weight gain over 2 pounds per day to your Doctor.  Your oxygen levels are still low for which you are being discharge on oxygen.  Secondary Diagnosis:	Aortic stenosis, severe  Instructions for follow-up, activity and diet:	You were found to have Critical Aortic Stenosis where your aortic valve does not open fully, this likely contributed to the congestive heart failure. You received a Balloon Aortic Valvuloplasty on 6/27/2017 to help treat the Critical Aortic Stenosis. You will need to follow to follow up with Dr Barger in 2 weeks on 7/17/17 at 12:30pm.  Secondary Diagnosis:	CKD (chronic kidney disease) stage 4, GFR 15-29 ml/min  Instructions for follow-up, activity and diet:	Your renal function remained stable during your hospitalization. Please follow up with your primary Doctor for ongoing monitoring.  Secondary Diagnosis:	Paroxysmal atrial fibrillation  Instructions for follow-up, activity and diet:	You have a history of atrial fibrillation where you take blood thinner Coumadin to prevent strokes. You were admitted to the hospital with elevated INR 4.89 and the Coumadin was held until after your Aortic Valvuloplasty procedure. You were placed on intravenous Heparin until the Coumadin was at a therapeutic range. Please check INR in 2 days.  Secondary Diagnosis:	UTI (urinary tract infection)  Instructions for follow-up, activity and diet:	You were treated with intravenous antibiotics for a urinary tract infection (ceftriaxone). Please notify your Doctor if you experience difficulty urinating or have fever, chills. Principal Discharge DX:	Acute on chronic congestive heart failure, unspecified congestive heart failure type  Goal:	Maintain low salt diet. Take medications as prescribed.  Instructions for follow-up, activity and diet:	You were admitted to the hospital with congestive heart failure where you had a large amount of fluid in your lungs.  You were given intravenous Lasix to get rid of the fluid in your lungs and to help you breathe better. Please take Lasix and Diamox as prescribed without missing doses. Please maintain a low salt diet as this will help keep your heart failure under better control and prevent frequent readmissions to the hospital. Weigh yourself daily and report any weight gain over 2 pounds per day to your Doctor.  Your oxygen levels are still low for which you are being discharge on Nasal Cannula 2L Oxygen as needed.  Secondary Diagnosis:	Aortic stenosis, severe  Instructions for follow-up, activity and diet:	You were found to have Critical Aortic Stenosis where your aortic valve does not open fully, this likely contributed to the congestive heart failure. You received a Balloon Aortic Valvuloplasty on 6/27/2017 to help treat the Critical Aortic Stenosis. You will need to follow to follow up with Dr Barger in 2 weeks on 7/17/17 at 12:30pm.  Secondary Diagnosis:	CKD (chronic kidney disease) stage 4, GFR 15-29 ml/min  Instructions for follow-up, activity and diet:	Your renal function remained stable during your hospitalization. Please follow up with your primary Doctor for ongoing monitoring.  Secondary Diagnosis:	Paroxysmal atrial fibrillation  Instructions for follow-up, activity and diet:	You have a history of atrial fibrillation where you take blood thinner Coumadin to prevent strokes. You were admitted to the hospital with elevated INR 4.89 and the Coumadin was held until after your Aortic Valvuloplasty procedure. You were placed on intravenous Heparin until the Coumadin was at a therapeutic range. Please check INR in 2 days.  Secondary Diagnosis:	UTI (urinary tract infection)  Instructions for follow-up, activity and diet:	You were treated with intravenous antibiotics for a urinary tract infection (ceftriaxone). Please notify your Doctor if you experience difficulty urinating or have fever, chills. Principal Discharge DX:	Acute on chronic congestive heart failure, unspecified congestive heart failure type  Goal:	Maintain low salt diet. Take medications as prescribed.  Instructions for follow-up, activity and diet:	You were admitted to the hospital with congestive heart failure where you had a large amount of fluid in your lungs.  You were given intravenous Lasix to get rid of the fluid in your lungs and to help you breathe better. Please take Lasix and Diamox as prescribed without missing doses. Please maintain a low salt diet as this will help keep your heart failure under better control and prevent frequent readmissions to the hospital. Weigh yourself daily and report any weight gain over 2 pounds per day to your Doctor.  Your oxygen levels are still low for which you are being discharge on Nasal Cannula 2L Oxygen as needed.  Secondary Diagnosis:	Aortic stenosis, severe  Instructions for follow-up, activity and diet:	You were found to have Critical Aortic Stenosis where your aortic valve does not open fully, this likely contributed to the congestive heart failure. You received a Balloon Aortic Valvuloplasty on 6/27/2017 to help treat the Critical Aortic Stenosis. You will need to follow to follow up with Dr Barger in 2 weeks on 7/17/17 at 12:30pm.  Secondary Diagnosis:	CKD (chronic kidney disease) stage 4, GFR 15-29 ml/min  Instructions for follow-up, activity and diet:	Your renal function remained stable during your hospitalization. Please follow up with your primary Doctor for ongoing monitoring.  Secondary Diagnosis:	Paroxysmal atrial fibrillation  Instructions for follow-up, activity and diet:	You have a history of atrial fibrillation where you take blood thinner Coumadin to prevent strokes. You were admitted to the hospital with elevated INR 4.89 and the Coumadin was held until after your Aortic Valvuloplasty procedure. You were placed on intravenous Heparin until the Coumadin was at a therapeutic range. Please check INR in 2 days, your Coumadin dosing may need to be adjusted.  Secondary Diagnosis:	UTI (urinary tract infection)  Instructions for follow-up, activity and diet:	You were treated with intravenous antibiotics for a urinary tract infection (ceftriaxone). Please notify your Doctor if you experience difficulty urinating or have fever, chills. Principal Discharge DX:	Acute on chronic congestive heart failure, unspecified congestive heart failure type  Goal:	Maintain low salt diet. Take medications as prescribed.  Instructions for follow-up, activity and diet:	You were admitted to the hospital with congestive heart failure where you had a large amount of fluid in your lungs.  You were given intravenous Lasix to get rid of the fluid in your lungs and to help you breathe better. Please take Lasix and Diamox as prescribed without missing doses. Please maintain a low salt diet as this will help keep your heart failure under better control and prevent frequent readmissions to the hospital. Weigh yourself daily and report any weight gain over 2 pounds per day to your Doctor.  Your oxygen levels are still low for which you are being discharge on Nasal Cannula 2L Oxygen as needed.  Secondary Diagnosis:	Aortic stenosis, severe  Instructions for follow-up, activity and diet:	You were found to have Critical Aortic Stenosis where your aortic valve does not open fully, this likely contributed to the congestive heart failure. You received a Balloon Aortic Valvuloplasty on 6/27/2017 to help treat the Critical Aortic Stenosis. You will need to follow to follow up with Dr Barger in 2 weeks on 7/17/17 at 12:30pm.  Secondary Diagnosis:	CKD (chronic kidney disease) stage 4, GFR 15-29 ml/min  Instructions for follow-up, activity and diet:	Your renal function remained stable during your hospitalization. Please follow up with your primary Doctor for ongoing monitoring.  Secondary Diagnosis:	Paroxysmal atrial fibrillation  Instructions for follow-up, activity and diet:	You have a history of atrial fibrillation where you take blood thinner Coumadin to prevent strokes. You were admitted to the hospital with elevated INR 4.89 and the Coumadin was held until after your Aortic Valvuloplasty procedure. You were placed on intravenous Heparin until the Coumadin was at a therapeutic range. Please check INR in 2 days, your Coumadin dosing may need to be adjusted.  Secondary Diagnosis:	UTI (urinary tract infection)  Instructions for follow-up, activity and diet:	You were treated with intravenous antibiotics for a urinary tract infection (ceftriaxone). Please notify your Doctor if you experience difficulty urinating or have fever, chills.  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 Please continue applying [employer] Antifungal Cream to the sites twice daily per Wound Care recommendations.

## 2017-07-03 NOTE — DISCHARGE NOTE ADULT - MEDICATION SUMMARY - MEDICATIONS TO TAKE
I will START or STAY ON the medications listed below when I get home from the hospital:    Pulmicort Respules 0.25 mg/2 mL inhalation suspension  -- 0.25 milligram(s) inhaled every 12 hours  -- Indication: For COPD (chronic obstructive pulmonary disease)    acetaminophen 325 mg oral capsule  -- 1 tab(s) by mouth every 6 hours, As Needed for pain  -- Indication: For Pain    warfarin  -- 1.5 milligram(s) by mouth once a day (at bedtime)  -- Indication: For Atrial Fibrillation    gabapentin 100 mg oral capsule  -- 1 cap(s) by mouth every 8 hours  -- Indication: For Pain    atorvastatin 40 mg oral tablet  -- 1 tab(s) by mouth once a day (at bedtime)  -- Indication: For Hyperlipidemia    clopidogrel 75 mg oral tablet  -- 1 tab(s) by mouth once a day  -- Indication: For CAD (coronary artery disease)    Metoprolol Succinate ER 25 mg oral tablet, extended release  -- 0.5 tab(s) by mouth once a day  -- Indication: For CAD (coronary artery disease)/Atrial Fibrillation    albuterol-ipratropium 2.5 mg-0.5 mg/3 mL inhalation solution  -- 3 milliliter(s) inhaled every 4 hours  -- Indication: For COPD (chronic obstructive pulmonary disease)    hydrocortisone 2.5% topical cream  -- 1 application on skin 2 times a day  -- Indication: For Dermatitis    acetaZOLAMIDE 250 mg oral tablet  -- 1 tab(s) by mouth once a day  -- Indication: For CHF (congestive heart failure)    furosemide 80 mg oral tablet  -- 1 tab(s) by mouth every 12 hours  -- Indication: For CHF (congestive heart failure)    ferrous sulfate 324 mg (65 mg elemental iron) oral delayed release tablet  -- 1 tab(s) by mouth every 8 hours  -- Indication: For Iron deficiency anemia    docusate sodium 100 mg oral capsule  -- 1 cap(s) by mouth 3 times a day  -- Indication: For COnstipation    senna oral tablet  -- 2 tab(s) by mouth once a day (at bedtime)  -- Indication: For COnstipation    pantoprazole 40 mg oral delayed release tablet  -- 1 tab(s) by mouth once a day (before a meal)  -- Indication: For Gastric Protection    Nephrocaps oral capsule  -- 1 cap(s) by mouth once a day  -- Indication: For CKD (chronic kidney disease) stage 4, GFR 15-29 ml/min I will START or STAY ON the medications listed below when I get home from the hospital:    Pulmicort Respules 0.25 mg/2 mL inhalation suspension  -- 0.25 milligram(s) inhaled every 12 hours  -- Indication: For COPD (chronic obstructive pulmonary disease)    acetaminophen 325 mg oral capsule  -- 1 tab(s) by mouth every 6 hours, As Needed for pain  -- Indication: For Pain    warfarin 1 mg oral tablet  -- 1 tab(s) by mouth once a day (at bedtime)  -- Indication: For Atrial fibrillation    gabapentin 100 mg oral capsule  -- 1 cap(s) by mouth every 8 hours  -- Indication: For Pain    atorvastatin 40 mg oral tablet  -- 1 tab(s) by mouth once a day (at bedtime)  -- Indication: For Hyperlipidemia    clopidogrel 75 mg oral tablet  -- 1 tab(s) by mouth once a day  -- Indication: For CAD (coronary artery disease)    Metoprolol Succinate ER 25 mg oral tablet, extended release  -- 0.5 tab(s) by mouth once a day  -- Indication: For CAD (coronary artery disease)/Atrial Fibrillation    albuterol-ipratropium 2.5 mg-0.5 mg/3 mL inhalation solution  -- 3 milliliter(s) inhaled every 4 hours  -- Indication: For COPD (chronic obstructive pulmonary disease)    hydrocortisone 2.5% topical cream  -- 1 application on skin 2 times a day  -- Indication: For Dermatitis    acetaZOLAMIDE 250 mg oral tablet  -- 1 tab(s) by mouth once a day  -- Indication: For CHF (congestive heart failure)    furosemide 80 mg oral tablet  -- 1 tab(s) by mouth every 12 hours  -- Indication: For CHF (congestive heart failure)    ferrous sulfate 324 mg (65 mg elemental iron) oral delayed release tablet  -- 1 tab(s) by mouth every 8 hours  -- Indication: For Iron deficiency anemia    docusate sodium 100 mg oral capsule  -- 1 cap(s) by mouth 3 times a day  -- Indication: For COnstipation    senna oral tablet  -- 2 tab(s) by mouth once a day (at bedtime)  -- Indication: For COnstipation    pantoprazole 40 mg oral delayed release tablet  -- 1 tab(s) by mouth once a day (before a meal)  -- Indication: For Gastric Protection    Nephrocaps oral capsule  -- 1 cap(s) by mouth once a day  -- Indication: For CKD (chronic kidney disease) stage 4, GFR 15-29 ml/min

## 2017-07-03 NOTE — PROGRESS NOTE ADULT - ASSESSMENT
1) acute/chronic diastolic chf with known ckd  -diuretics per renal  2) nonrheumatic aortic stenosis  -s/p bav - shd to follow-up  3) atrial fibrillation; presence of pacemaker  -ep called by Housestaff - follow-up recs  4) CAD s/p PCI   -no cp now  6) COPD - management per pulm  7) PVD -continue rx as tolerated  8) DM - hold ace-i/arb with ckd  9) normocytic anemia - monitor h/h  10) pressure injury of skin (right buttock) -  wound care to follow-up  11) ppx: therapeutic ac; ppi   12)  maintain K>4 Mg>2  d/w Housestaff and NP

## 2017-07-03 NOTE — PROGRESS NOTE ADULT - SUBJECTIVE AND OBJECTIVE BOX
Pt seen and examined coverage for Dr. Martinez    Upset and wants to go home    REVIEW OF SYSTEMS:  Constitutional: No fever, weight loss or fatigue  Cardiovascular: No chest pain, palpitations, dizziness or leg swelling  Gastrointestinal: No abdominal or epigastric pain. No nausea, vomiting or hematemesis; No diarrhea or constipation. No melena or hematochezia.  Skin: No itching, burning, rashes or lesions       MEDICATIONS:  MEDICATIONS  (STANDING):  ferrous    sulfate 325 milliGRAM(s) Oral every 8 hours  acetazolamide    Tablet 500 milliGRAM(s) Oral daily  clopidogrel Tablet 75 milliGRAM(s) Oral daily  hydrocortisone 2.5% Cream 1 Application(s) Topical two times a day  Nephrocaps 1 Capsule(s) Oral daily  gabapentin 100 milliGRAM(s) Oral every other day  metoprolol succinate ER 12.5 milliGRAM(s) Oral daily  ALBUTerol/ipratropium for Nebulization 3 milliLiter(s) Nebulizer every 4 hours  atorvastatin 40 milliGRAM(s) Oral at bedtime  buDESOnide   0.25 milliGRAM(s) Respule 0.25 milliGRAM(s) Inhalation every 12 hours  sodium chloride 0.45%. 1000 milliLiter(s) (10 mL/Hr) IV Continuous <Continuous>  insulin lispro (HumaLOG) corrective regimen sliding scale   SubCutaneous every 6 hours  dextrose 5%. 1000 milliLiter(s) (50 mL/Hr) IV Continuous <Continuous>  dextrose 50% Injectable 12.5 Gram(s) IV Push once  dextrose 50% Injectable 25 Gram(s) IV Push once  dextrose 50% Injectable 25 Gram(s) IV Push once  pantoprazole    Tablet 40 milliGRAM(s) Oral before breakfast  heparin  Infusion 1500 Unit(s)/Hr (15 mL/Hr) IV Continuous <Continuous>  sodium chloride 0.9% lock flush 3 milliLiter(s) IV Push every 8 hours  furosemide   Injectable 40 milliGRAM(s) IV Push every 12 hours  docusate sodium 100 milliGRAM(s) Oral three times a day  senna 2 Tablet(s) Oral at bedtime  warfarin 2.5 milliGRAM(s) Oral once    MEDICATIONS  (PRN):  acetylcysteine 20% Inhalation 5 milliLiter(s) Inhalation every 6 hours PRN shortness of breath  artificial  tears Solution 1 Drop(s) Both EYES three times a day PRN Dry Eyes  acetaminophen   Tablet. 650 milliGRAM(s) Oral every 6 hours PRN Mild Pain (1 - 3)  dextrose Gel 1 Dose(s) Oral once PRN Blood Glucose LESS THAN 70 milliGRAM(s)/deciliter  glucagon  Injectable 1 milliGRAM(s) IntraMuscular once PRN Glucose LESS THAN 70 milligrams/deciliter      Allergies    No Known Allergies    Intolerances        Vital Signs Last 24 Hrs  T(C): 36.4 (03 Jul 2017 09:16), Max: 36.8 (02 Jul 2017 13:28)  T(F): 97.6 (03 Jul 2017 09:16), Max: 98.3 (02 Jul 2017 13:28)  HR: 70 (03 Jul 2017 12:15) (68 - 80)  BP: 101/50 (03 Jul 2017 12:15) (98/49 - 117/48)  BP(mean): 76 (03 Jul 2017 12:15) (64 - 89)  RR: 16 (03 Jul 2017 12:15) (15 - 18)  SpO2: 95% (03 Jul 2017 12:15) (84% - 99%)    07-02 @ 07:01 - 07-03 @ 07:00  --------------------------------------------------------  IN: 655 mL / OUT: 2120 mL / NET: -1465 mL    07-03 @ 07:01 - 07-03 @ 12:41  --------------------------------------------------------  IN: 60 mL / OUT: 0 mL / NET: 60 mL        PHYSICAL EXAM:    General: Well developed; well nourished; in no acute distress  HEENT: MMM, conjunctiva and sclera clear  Lungs:  crackles at bases  Heart: irregular  Gastrointestinal: Soft non-tender non-distended; Normal bowel sounds; No hepatosplenomegaly  Skin: Warm and dry. No obvious rash    LABS:      CBC Full  -  ( 03 Jul 2017 05:45 )  WBC Count : 9.5 K/uL  Hemoglobin : 9.2 g/dL  Hematocrit : 30.7 %  Platelet Count - Automated : 229 K/uL  Mean Cell Volume : 89.2 fL  Mean Cell Hemoglobin : 26.7 pg  Mean Cell Hemoglobin Concentration : 30.0 g/dL  Auto Neutrophil # : x  Auto Lymphocyte # : x  Auto Monocyte # : x  Auto Eosinophil # : x  Auto Basophil # : x  Auto Neutrophil % : x  Auto Lymphocyte % : x  Auto Monocyte % : x  Auto Eosinophil % : x  Auto Basophil % : x    07-03    142  |  96  |  86<H>  ----------------------------<  122<H>  3.8   |  31  |  2.50<H>    Ca    9.4      03 Jul 2017 05:44  Phos  4.4     07-03  Mg     2.1     07-03      PT/INR - ( 03 Jul 2017 05:46 )   PT: 19.8 sec;   INR: 1.76          PTT - ( 03 Jul 2017 05:46 )  PTT:84.5 sec                  RADIOLOGY & ADDITIONAL STUDIES (The following images were personally reviewed):

## 2017-07-03 NOTE — PROGRESS NOTE ADULT - SUBJECTIVE AND OBJECTIVE BOX
Patient seen and examined at bedside.       Subjective:  87 year female Admitted with Acute on chronic diastolic CHF with CKD stage III-IV, Non rheumatic AS, A.fib, CAD s/p PCI, GERD, Anemia of chronic disease. Patient denies any new complaints at present. Feels fatigue. No new complaints at present overnight. Patient denies any chest pain, shortness of breath, palpitations, dizziness at present lying in bed in no acute distress.     Objective:    Review of Systems:    CONSTITUTIONAL: Complaints of fatigue and tiredness. No fever, weight loss,   EYES: No eye pain, visual disturbances, or discharge  ENMT:  No difficulty hearing, tinnitus, vertigo; No sinus or throat pain  NECK: No pain or stiffness  BREASTS: No pain, masses, or nipple discharge  RESPIRATORY: No cough, wheezing, chills or hemoptysis; No shortness of breath  CARDIOVASCULAR: No chest pain, palpitations, dizziness, or leg swelling  GASTROINTESTINAL: No abdominal or epigastric pain. No nausea, vomiting, or hematemesis; No diarrhea or constipation. No melena or hematochezia.  GENITOURINARY: No dysuria, frequency, hematuria, or incontinence  NEUROLOGICAL: No headaches, memory loss, loss of strength, numbness, or tremors  SKIN: No itching, burning, rashes, or lesions   LYMPH NODES: No enlarged glands  ENDOCRINE: No heat or cold intolerance; No hair loss  MUSCULOSKELETAL: Mild leg edema and pain; No muscle, back  PSYCHIATRIC: No depression, anxiety, mood swings, or difficulty sleeping  HEME/LYMPH: No easy bruising, or bleeding gums  ALLERY AND IMMUNOLOGIC: No hives or eczema      Vital Signs Last 24 Hrs  T(C): 36.4 (07-03-17 @ 09:16), Max: 36.8 (07-02-17 @ 13:28)  HR: 78 (07-03-17 @ 09:00) (68 - 80)  BP: 106/51 (07-03-17 @ 09:00) (98/49 - 117/48)  RR: 16 (07-03-17 @ 09:00) (15 - 18)  SpO2: 94% (07-03-17 @ 09:00) (84% - 99%)      PHYSICAL EXAM:  GENERAL: NAD, well-developed, well nourished, alert, awake, no acute distress at present  HEAD:  Atraumatic, Normocephalic,   EYES: EOMI, ED, conjunctiva and sclera clear, no nystagmus  Oral cavity: Oral mucosa dry and pink, no oral lesions or ulcers, dental caries+nt  NECK: Neck supple, No JVD, no palpable thyromegaly, no lymphadenopahy  CHEST/LUNG: Bilateral decreased breath sounds, Bibasilar minimal crepitations with rales, No wheezing at prsent  HEART: Irregular rate and rhythm. MICKY III/VI at LPSB, No gallop, no rub   ABDOMEN: Soft, Nontender, Nondistended; Bowel sounds present, no guarding, no rigidity, no rebound tenderness, No flank or CVA angle tenderness, no palpable or pulsatile mass noted.   EXTREMITIES:  Bilateral lower extremity chronic venous stasis dermatitis with bilateral peripheral pedal edema+nt, Bilateral lower extremity peripheral Pulses, palpable,  No clubbing, cyanosis, no calf tenderness  Neurology: AAOx3, no focal neurological deficit. Motor 5/5 all 4 extremities. sensory intact. cranial nerves II to XII grossly intact. Able to comprehend and answers all questions appropriately. No neck stiffness.   SKIN: No rashes or lesions    LABS:                                                   07-03-17 @ 05:44    142  |  96  |  86<H>  ----------------------------<  122<H>  3.8   |  31  |  2.50<H>                                                 07-02-17 @ 06:33    140  |  93<L>  |  88<H>  ----------------------------<  114<H>  3.7   |  31  |  2.60<H>                                                 07-01-17 @ 07:46    138  |  93<L>  |  94<H>  ----------------------------<  105<H>  4.1   |  31  |  2.60<H>                                                 06-30-17 @ 06:39    138  |  92<L>  |  91<H>  ----------------------------<  110<H>  3.4<L>   |  33<H>  |  2.70<H>    Ca    9.4      03 Jul 2017 05:44  Ca    9.1      02 Jul 2017 06:33  Ca    9.5      01 Jul 2017 07:46  Ca    9.6      30 Jun 2017 06:39  Phos  4.4     07-03  Phos  5.2     06-30  Mg     2.1     07-03  Mg     2.0     07-02  Mg     2.1     07-01  Mg     2.2     06-30                            9.2    9.5   )-----------( 229      ( 03 Jul 2017 05:45 )             30.7     PT/INR - ( 03 Jul 2017 05:46 )   PT: 19.8 sec;   INR: 1.76          PTT - ( 03 Jul 2017 05:46 )  PTT:84.5 sec            Allergies    No Known Allergies    Intolerances          07-02 @ 07:01  -  07-03 @ 07:00  --------------------------------------------------------  IN:    heparin Infusion: 315 mL    Oral Fluid: 340 mL  Total IN: 655 mL    OUT:    Indwelling Catheter - Urethral: 2120 mL  Total OUT: 2120 mL    Total NET: -1465 mL      07-03 @ 07:01  -  07-03 @ 12:11  --------------------------------------------------------  IN:    Oral Fluid: 60 mL  Total IN: 60 mL    OUT:  Total OUT: 0 mL    Total NET: 60 mL                MEDICATIONS  (STANDING):  ferrous    sulfate 325 milliGRAM(s) Oral every 8 hours  acetazolamide    Tablet 500 milliGRAM(s) Oral daily  clopidogrel Tablet 75 milliGRAM(s) Oral daily  hydrocortisone 2.5% Cream 1 Application(s) Topical two times a day  Nephrocaps 1 Capsule(s) Oral daily  gabapentin 100 milliGRAM(s) Oral every other day  metoprolol succinate ER 12.5 milliGRAM(s) Oral daily  ALBUTerol/ipratropium for Nebulization 3 milliLiter(s) Nebulizer every 4 hours  atorvastatin 40 milliGRAM(s) Oral at bedtime  buDESOnide   0.25 milliGRAM(s) Respule 0.25 milliGRAM(s) Inhalation every 12 hours  sodium chloride 0.45%. 1000 milliLiter(s) (10 mL/Hr) IV Continuous <Continuous>  insulin lispro (HumaLOG) corrective regimen sliding scale   SubCutaneous every 6 hours  dextrose 5%. 1000 milliLiter(s) (50 mL/Hr) IV Continuous <Continuous>  dextrose 50% Injectable 12.5 Gram(s) IV Push once  dextrose 50% Injectable 25 Gram(s) IV Push once  dextrose 50% Injectable 25 Gram(s) IV Push once  pantoprazole    Tablet 40 milliGRAM(s) Oral before breakfast  heparin  Infusion 1500 Unit(s)/Hr (15 mL/Hr) IV Continuous <Continuous>  sodium chloride 0.9% lock flush 3 milliLiter(s) IV Push every 8 hours  furosemide   Injectable 40 milliGRAM(s) IV Push every 12 hours  docusate sodium 100 milliGRAM(s) Oral three times a day  senna 2 Tablet(s) Oral at bedtime  potassium chloride    Tablet ER 40 milliEquivalent(s) Oral once  warfarin 2.5 milliGRAM(s) Oral once    MEDICATIONS  (PRN):  acetylcysteine 20% Inhalation 5 milliLiter(s) Inhalation every 6 hours PRN shortness of breath  artificial  tears Solution 1 Drop(s) Both EYES three times a day PRN Dry Eyes  acetaminophen   Tablet. 650 milliGRAM(s) Oral every 6 hours PRN Mild Pain (1 - 3)  dextrose Gel 1 Dose(s) Oral once PRN Blood Glucose LESS THAN 70 milliGRAM(s)/deciliter  glucagon  Injectable 1 milliGRAM(s) IntraMuscular once PRN Glucose LESS THAN 70 milligrams/deciliter      CAPILLARY BLOOD GLUCOSE  176 (03 Jul 2017 11:31)  128 (03 Jul 2017 05:08)  133 (02 Jul 2017 21:03)  119 (02 Jul 2017 16:05)            RADIOLOGY & ADDITIONAL STUDIES:  < from: Xray Chest 1 View AP- PORTABLE-Urgent (07.02.17 @ 08:29) >    EXAM:  XR CHEST 1 VIEW PORT URGENT                          PROCEDURE DATE:  07/02/2017                     INTERPRETATION:  Clinical History: Shortness of breath    Portable examination the chest demonstrates no interval change lung   infiltrates in comparison to prior examination of the chest 6/30/2017.   Bilateral effusions. No interval change position remaining support   devices.    Impression: No interval change lung pathology            "Thank you for the opportunity to participate in thecare of this   patient."        KATHARINA MOROCHO M.D., ATTENDING RADIOLOGIST  This document has been electronically signed. Jul 2 2017 10:13AM                  < end of copied text >

## 2017-07-03 NOTE — PROGRESS NOTE ADULT - PROBLEM SELECTOR PLAN 10
DVT prophylaxis: on Heparin drip  DNR/DNI    PT rec: [facility]    Dispo: per clinical progress. DVT prophylaxis: on Heparin/Coumadin bridge  DNR/DNI    PT rec: [facility]IA    Dispo: per clinical progress.

## 2017-07-03 NOTE — DISCHARGE NOTE ADULT - HOSPITAL COURSE
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 Patient aggressively diuresed with IV lasix net negative ~25 liters with good urine output. Completed course of ceftriaxone for complicated UTI. Patient seen by Structural heart Dr. Barger, and plan for  BAV early next week. Patient also on heparin gtt for paroxysmal afib, PTTs therapeutic. Patient titrated down from Bipap --> HFNC --->Nasal cannula. Patient mental status at apparent baseline. Patient underwent BAV with structural heart and transferred back to [facility] for discharge planning and coumadin bridge under the care of Dr. Robert Soto, POD #3.. 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, HTN, PVD, and Type 2 DM (no longer on meds), MONICA, and TKR 03' presented to [facility] 6/11/17 with c/c of inability to get OOB x 3 days admitted to tele [facility] for CHF exacerbation 2/2 dietary non-compliance with severe AS received IV diureses; Course complicated with hypercarbic respiratory failure and altered mental status where pt was transferred to CCU and placed on BIPAP (no plan for intubation as patient DNR/DNI) which she tolerated. Patient further aggressively diuresed with IV lasix net negative ~25 liters with good urine output. Completed course of ceftriaxone for complicated UTI. Patient seen by Structural heart Dr. Barger, and plan for  BAV early next week. Patient also on heparin gtt for paroxysmal afib, PTTs therapeutic. Patient titrated down from Bipap --> HFNC --->Nasal cannula. Patient mental status at apparent baseline. Patient underwent BAV with structural heart and transferred back to [facility] for discharge planning and coumadin bridge under the care of Dr. Robert Soto, POD #3.. 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 Patient further aggressively diuresed with IV lasix with good urine output. Completed course of ceftriaxone for complicated UTI. Patient seen by Structural heart Dr. Barger and is s/p successful BAV on 6/27/17. Repeat echo w/ ALEKS 1cm2, mean pressure gradient 30mmHg. Patient was placed on heparin gtt for paroxysmal Afib, PTTs therapeutic. Patient titrated down from BIPAP --> HFNC --->Nasal cannula 2L. Patient mental status at apparent baseline. Patient  transferred back to [facility] on BAV POD#2 for Heparin/coumadin bridge, further diuresis. Overall net negative ~34 liters since admission. INR therapeutic at 2.06. Renal recommended continuing diuresis w/ Lasix 80mg BID PO and Diamox 500mg qd at discharge. She was discharged to [facility] per PT recommendations. 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 Patient further aggressively diuresed with IV lasix with good urine output. Completed course of ceftriaxone for complicated UTI. Patient seen by Structural heart Dr. Barger and is s/p successful BAV on 6/27/17. Repeat echo w/ ALEKS 1cm2, mean pressure gradient 30mmHg. Patient was placed on heparin gtt for paroxysmal Afib, PTTs therapeutic. Patient titrated down from BIPAP --> HFNC --->Nasal cannula 2L. Patient mental status at apparent baseline. Patient  transferred back to [facility] on BAV POD#2 for Heparin/coumadin bridge, further diuresis. Overall net negative ~34 liters since admission. INR therapeutic at 2.06. Renal recommended continuing diuresis w/ Lasix 80mg BID PO and Diamox 250mg qd at discharge. She was discharged to [facility] per PT recommendations. 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 Patient further aggressively diuresed with IV lasix with good urine output. Completed course of ceftriaxone for complicated UTI. Patient seen by Structural heart Dr. Barger and is s/p successful BAV on 6/27/17. Repeat echo w/ ALEKS 1cm2, mean pressure gradient 30mmHg. Patient was placed on heparin gtt for paroxysmal Afib, PTTs therapeutic. Patient titrated down from BIPAP --> HFNC --->Nasal cannula 2L. SpO2 decreases to 88% on RA, for which she will require NC 2L O2 at rehab as needed, to be weaned at rehab. Pt's mental status at apparent baseline. Patient transferred back to [facility] on BAV POD#2 for Heparin/coumadin bridge, further diuresis. Overall net negative ~35 liters since admission. INR therapeutic. Renal recommended continuing diuresis w/ Lasix 80mg BID PO and Diamox 250mg qd at discharge. She was discharged to [facility] per PT recommendations. 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 Patient further aggressively diuresed with IV lasix with good urine output. Pulm was consulted and rec Pulmicort INH, Duonebs q4h. Completed course of ceftriaxone for complicated UTI. Patient seen by Structural heart Dr. Barger and is s/p successful BAV on 6/27/17. Repeat echo w/ ALEKS 1cm2, mean pressure gradient 30mmHg. Patient was placed on heparin gtt for paroxysmal Afib, PTTs therapeutic. Patient titrated down from BIPAP --> HFNC --->Nasal cannula 2L. SpO2 decreases to 88% on RA, for which she will require NC 2L O2 at rehab as needed, to be weaned at rehab. Pt's mental status at apparent baseline. Patient transferred back to [facility] on BAV POD#2 for Heparin/coumadin bridge, further diuresis. Overall net negative ~35 liters since admission. INR 3.19, will decrease to Coumadin 1.5mg qd (home dose 2mg qd), to check INR in 2 days for further Coumadin dosing. Renal consulted and recommended continuing diuresis w/ Lasix 80mg BID PO and Diamox 250mg qd at discharge. She was discharged to [facility] per PT recommendations. 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 Patient further aggressively diuresed with IV lasix with good urine output. Pulm was consulted and rec Pulmicort INH, Duonebs q4h. Completed course of ceftriaxone for complicated UTI. Patient seen by Structural heart Dr. Barger and is s/p successful BAV on 6/27/17. Repeat echo w/ ALEKS 1cm2, mean pressure gradient 30mmHg. Patient was placed on heparin gtt for paroxysmal Afib, PTTs therapeutic. Patient titrated down from BIPAP --> HFNC --->Nasal cannula 2L. SpO2 decreases to 88% on RA, for which she will require NC 2L O2 at rehab as needed, to be weaned at rehab. Pt's mental status at apparent baseline. Patient transferred back to [facility] on BAV POD#2 for Heparin/coumadin bridge, further diuresis. Overall net negative ~35 liters since admission. INR 3.19, will decrease to Coumadin 1mg qd (home dose 2mg qd), to check INR in 2 days for further Coumadin dosing. Renal consulted and recommended continuing diuresis w/ Lasix 80mg BID PO and Diamox 250mg qd at discharge. She was discharged to [facility] per PT recommendations. 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 6/11/17 with c/c of inability to get OOB x 3 days admitted to tele [facility] for CHF exacerbation 2/2 dietary non-compliance with severe AS received IV diureses; Course complicated with hypercarbic respiratory failure and altered mental status where pt was transferred to CCU and placed on BIPAP (no plan for intubation as patient DNR/DNI) which she tolerated. Patient further aggressively diuresed with IV lasix with good urine output. Pulm was consulted and rec Pulmicort INH, Duonebs q4h. Completed course of ceftriaxone for complicated UTI. Structural heart Dr. Barger consulted for critical AS pt underwent successful BAV on 6/27/17. Repeat echo w/ ALEKS 1cm2, mean pressure gradient 30mmHg. Patient was placed on Heparin gtt for paroxysmal Afib, PTTs therapeutic. Patient titrated down from BIPAP --> HFNC --->Nasal cannula 2L. SpO2 decreases to 88% on RA, for which she will require NC 2L O2 at rehab as needed, to be weaned at rehab. Pt's mental status at apparent baseline. Wound care was consulted: noted fungal rash 2/2 incontinence associated dermatitis, along with L upper posterior thigh of unclear etiology measuring 1cm x1cm (previously documented as stage 2 pressure ulcer), rec [employer] antifungal cream BID. Patient was transferred back to [facility] on BAV POD#2 for Heparin/coumadin bridge, further diuresis, and dispo planning. Overall net negative ~35 liters since admission. INR 3.19, will decrease to Coumadin 1mg qd (home dose 2mg qd), to check INR in 2 days for further Coumadin dosing. Renal consulted and recommended continuing diuresis w/ Lasix 80mg BID PO and Diamox 250mg qd at discharge. She was discharged to [facility] per PT recommendations.

## 2017-07-03 NOTE — PROGRESS NOTE ADULT - SUBJECTIVE AND OBJECTIVE BOX
CC/ HPI:  86 yo female with chronic diastolic CHF (EF 50-55% 11/16'), mod to severe aortic stenosis, pAFIB, CAD s/p PCI x 3 stent, Lupus (not on meds), COPD, CKD (baseline Cr 2-3), admitted for CHF exacerbation complicated by hypercapnic respiratory failure, status post BAV, today without respiratory complaint.    PAST MEDICAL & SURGICAL HISTORY:  PVD (peripheral vascular disease)  Iron deficiency anemia  Lupus (systemic lupus erythematosus)  Paroxysmal atrial fibrillation  Aortic stenosis, severe  CKD (chronic kidney disease) stage 4, GFR 15-29 ml/min  Diabetes  HTN (hypertension)  CHF (congestive heart failure)  CAD (coronary artery disease)  COPD (chronic obstructive pulmonary disease)  History of total knee replacement: 2003  Cardiac pacemaker: at [facility] by Dr Escoto, 2011    SOCHX:  + tobacco,  -  alcohol    FMHX: FA/MO  - contributory     ROS reviewed below with positive findings marked (+) :  GEN:  fever, chills ENT: tracheostomy,   epistaxis,  sinusitis COR: +CAD, +CHF,  +HTN, +dysrhythmia PUL: +COPD, ILD, asthma, pneumonia GI: PEG, dysphagia, hemorrhage, other PETEY: +kidney disease, electrolyte disorder HEM:  +anemia, thrombus, coagulopathy, cancer ENDO:  thyroid disease, +diabetes mellitus CNS:  dementia, stroke, seizure, PSY:  depression, anxiety, other       MEDICATIONS  (STANDING):  ferrous    sulfate 325 milliGRAM(s) Oral every 8 hours  acetazolamide    Tablet 500 milliGRAM(s) Oral daily  clopidogrel Tablet 75 milliGRAM(s) Oral daily  hydrocortisone 2.5% Cream 1 Application(s) Topical two times a day  Nephrocaps 1 Capsule(s) Oral daily  gabapentin 100 milliGRAM(s) Oral every other day  metoprolol succinate ER 12.5 milliGRAM(s) Oral daily  ALBUTerol/ipratropium for Nebulization 3 milliLiter(s) Nebulizer every 4 hours  atorvastatin 40 milliGRAM(s) Oral at bedtime  buDESOnide   0.25 milliGRAM(s) Respule 0.25 milliGRAM(s) Inhalation every 12 hours  sodium chloride 0.45%. 1000 milliLiter(s) (10 mL/Hr) IV Continuous <Continuous>  insulin lispro (HumaLOG) corrective regimen sliding scale   SubCutaneous every 6 hours  dextrose 5%. 1000 milliLiter(s) (50 mL/Hr) IV Continuous <Continuous>  pantoprazole    Tablet 40 milliGRAM(s) Oral before breakfast  heparin  Infusion 1500 Unit(s)/Hr (15 mL/Hr) IV Continuous <Continuous>  sodium chloride 0.9% lock flush 3 milliLiter(s) IV Push every 8 hours  furosemide   Injectable 40 milliGRAM(s) IV Push every 12 hours  docusate sodium 100 milliGRAM(s) Oral three times a day  senna 2 Tablet(s) Oral at bedtime  potassium chloride    Tablet ER 40 milliEquivalent(s) Oral once  warfarin 2.5 milliGRAM(s) Oral once    MEDICATIONS  (PRN):  acetylcysteine 20% Inhalation 5 milliLiter(s) Inhalation every 6 hours PRN shortness of breath  artificial  tears Solution 1 Drop(s) Both EYES three times a day PRN Dry Eyes  acetaminophen   Tablet. 650 milliGRAM(s) Oral every 6 hours PRN Mild Pain (1 - 3)  dextrose Gel 1 Dose(s) Oral once PRN Blood Glucose LESS THAN 70 milliGRAM(s)/deciliter  glucagon  Injectable 1 milliGRAM(s) IntraMuscular once PRN Glucose LESS THAN 70 milligrams/deciliter      Vital Signs Last 24 Hrs  T(C): 36.4 (03 Jul 2017 09:16), Max: 36.8 (02 Jul 2017 13:28)  T(F): 97.6 (03 Jul 2017 09:16), Max: 98.3 (02 Jul 2017 13:28)  HR: 78 (03 Jul 2017 09:00) (68 - 80)  BP: 106/51 (03 Jul 2017 09:00) (98/49 - 117/48)  BP(mean): 80 (03 Jul 2017 09:00) (64 - 89)  RR: 16 (03 Jul 2017 09:00) (15 - 18)  SpO2: 94% (03 Jul 2017 09:00) (84% - 99%)    GENERAL:         comfortable,  - distress.  HEENT:            - trauma,  - icterus,  - injection,  - nasal discharge.  NECK:              - jugular venous distention, - thyromegaly.  LYMPH:           - lymphadenopathy, - masses.  RESP:              + crackles,   - rhonchi,   - wheezes.   COR:                S1S2  - gallops,  - rubs.  ABD:                bowel sounds,   soft, - tender, - distended, - organomegaly.  EXT/MSC:         - cyanosis,  - clubbing,  + edema.    NEURO:             alert,   responds to stimuli.                        9.2    9.5   )-----------( 229      ( 03 Jul 2017 05:45 )             30.7     07-03    142  |  96  |  86<H>  ----------------------------<  122<H>  3.8   |  31  |  2.50<H>      CXR (7/3)  Bilateral opacity/congestion/pleural effusion      ASSESSMENT/PLAN    1)  Status post BAV  2)  Cardiomyopathy  3)  Chronic obstructive pulmonary disease  4)  Pulmonary hypertension    Oxygen via nasal cannula as needed  Bronchodilators:  Atrovent/ albuterol q 4-6 hours.  Corticosteroids:  budesonide  Cardiac/HTN: post BAV, diuresis as needed  GI: Rx/ prophylaxis c PPI/H2B  Heme: Rx/VT on AC  Discussed with medical team.

## 2017-07-03 NOTE — DISCHARGE NOTE ADULT - CARE PROVIDERS DIRECT ADDRESSES
[email or web address]

## 2017-07-03 NOTE — DISCHARGE NOTE ADULT - CARE PROVIDER_API CALL
Raymond Barger), Cardiology; Internal Medicine; Interventional Cardiology  [address] [address]  Phone: [phone]  Fax: [phone]    Robert Soto; MA), Cardiovascular Disease; Internal Medicine  [address]  [place] [address]  Phone: [phone]  Fax: [phone]

## 2017-07-03 NOTE — DISCHARGE NOTE ADULT - ADDITIONAL INSTRUCTIONS
Please follow up with Dr Barger in 2 weeks.  Raymond Barger (MD), Cardiology; Internal Medicine; Interventional Cardiology  [address] [place] [address]  Phone: [phone]    Please follow up with Dr Soto in 1-2 weeks. Please follow up with Dr Barger in 2 weeks on 7/17/17 at 12:30pm.  Raymond Barger), Cardiology; Internal Medicine; Interventional Cardiology  [address], [address] [place] [address]  Phone: [phone]    Please follow up with Dr Soto in 1-2 weeks. Please follow up with Dr Barger in 2 weeks on 7/17/17 at 12:30pm.  Raymond Barger), Cardiology; Internal Medicine; Interventional Cardiology  [address], [address] [place] [address]  Phone: [phone]    Please follow up with Dr Soto in 1-2 weeks.  [address], [place], [place] [address] [phone] 1. Please follow up with Dr Barger in 2 weeks on 7/17/17 at 12:30pm.  Raymond Barger), Cardiology; Internal Medicine; Interventional Cardiology  [address], [address] [place] [address]  Phone: [phone]    2. Please follow up with Dr Soto in 1-2 weeks.  [address], [place] [address]  [phone]    3. Please check BMP and INR in 2 days, the coumadin dosing may need to be adjusted. (Home dose of Coumadin was 2mg PO daily)

## 2017-07-03 NOTE — PROGRESS NOTE ADULT - PROBLEM SELECTOR PLAN 9
Right pressure ulcer to right buttock  -Wound care consulted  -Turning and positioning q2 hours.  -Apply barrier cream to buttock daily and prn Left pressure ulcer to left buttock  -Wound care consulted  -Turning and positioning q2 hours.  -Apply barrier cream to buttock daily and prn

## 2017-07-03 NOTE — PROGRESS NOTE ADULT - PROBLEM SELECTOR PLAN 3
Hypercapnic resp failure 2/2 pulmonary congestion 2/2 CHF 2/2 severe AS.   - decreased to NC 2L yesterday, able to wean to RA this AM with sat 92%.  - Pt refusing BIPAP at night    #COPD - c/w Duonebs q4h, Pulmicort INH Hypercapnic resp failure 2/2 pulmonary congestion 2/2 CHF 2/2 severe AS.   - Weaned down O2 requirements over weekend, on 2L NC w/ SpO2 >95%. Pulse ox 91-92% on RA while in bed, decreased to 88% in chair, cont NC 2L PRN.  - Pt refusing BIPAP at night    #COPD - c/w Duonebs q4h, Pulmicort INH

## 2017-07-03 NOTE — DISCHARGE NOTE ADULT - PATIENT PORTAL LINK FT
“You can access the Follow[facility] Patient Portal, offered by [facility], by registering with the following website: [email or web address]”

## 2017-07-03 NOTE — DISCHARGE NOTE ADULT - NS AS DC VTE INSTRUCTION
Coumadin/Warfarin - Follow-up monitoring.../Coumadin/Warfarin - Dietary Advice.../Coumadin/Warfarin - Potential for adverse drug reactions and interactions/Coumadin/Warfarin - Compliance... Coumadin/Warfarin - Compliance.../Coumadin/Warfarin - Potential for adverse drug reactions and interactions/Coumadin/Warfarin - Follow-up monitoring.../Coumadin/Warfarin - Dietary Advice...

## 2017-07-03 NOTE — DISCHARGE NOTE ADULT - SECONDARY DIAGNOSIS.
Aortic stenosis, severe CKD (chronic kidney disease) stage 4, GFR 15-29 ml/min Paroxysmal atrial fibrillation Acute respiratory failure with hypercapnia CAD (coronary artery disease) UTI (urinary tract infection) Incontinence associated dermatitis

## 2017-07-03 NOTE — PROGRESS NOTE ADULT - PROBLEM SELECTOR PLAN 1
Admitted with acute CHF exacerbation 2/2 dietary non-compliance and critical AS. EF 50-55%, severely dilated LA.  -S/p successful BAV 6/27/17 for critical AS by Dr Barger.  -Daily weights, strict I/Os (Leahy in place)  - Continue Toprol XL 12.5mg po daily  - Pulmonary edema improved but still present on CXR. C/w Furosemide 40mg IV BID as discussed w/ Renal Dr Barbosa. Cont Diamox 500mg qd. Careful diuresis with close monitoring of BP in setting of severe AS. Net goal neg 1-2L. Admitted with acute CHF exacerbation 2/2 dietary non-compliance and critical AS. EF 50-55%, severely dilated LA.  -S/p successful BAV 6/27/17 for critical AS by Dr Barger.  -Daily weights, strict I/Os (Leahy in place)  - Continue Toprol XL 12.5mg po daily  - Pulmonary edema improved but still present on CXR. Diuresis plan per Renal rec. C/w Furosemide 40mg IV BID, may increase to q8h pending clinical progress per renal. Cont Diamox 500mg qd. Careful diuresis with close monitoring of BP in setting of severe AS. Net goal neg 1-2L.

## 2017-07-03 NOTE — DISCHARGE NOTE ADULT - MEDICATION SUMMARY - MEDICATIONS TO CHANGE
I will SWITCH the dose or number of times a day I take the medications listed below when I get home from the hospital:    Toprol-XL 25 mg oral tablet, extended release  -- 1 tab(s) by mouth once a day    Lasix  -- 20  by mouth once a day    warfarin  -- 1.5 milligram(s) by mouth once a day (at bedtime)    gabapentin 100 mg oral tablet  -- 1 tab(s) by mouth every other day

## 2017-07-03 NOTE — PROGRESS NOTE ADULT - PROBLEM SELECTOR PLAN 1
Creatinine stable for now. Patient currently stable at present.  Continue with synergistic diuresis with Diamox and   Switch lasix to 80mg po bid for now upon discharge.  Monitor Na/BUN/Creatinine level  acetazolamide    Tablet 500 milliGRAM(s) daily    Net Negative: -146

## 2017-07-03 NOTE — PROGRESS NOTE ADULT - PROBLEM SELECTOR PLAN 2
Switch lasix to 80 mg po bid for nw  Continue diamox for now  Monitor electrolytes and BUN/creatinine level

## 2017-07-03 NOTE — PROGRESS NOTE ADULT - PROBLEM SELECTOR PLAN 7
Baseline Creatinine per daughter 2-3. Currently at baseline.  - Renal following to assist with diuresis.   -Cont Furosemide IV and Diamox 500mg qd for alkalosis per renal recs.   -Renal consulted. F/u recs. (Dr. Ramirez-her outpt Nephrologist)  -Leahy catheter in place, monitor daily weights, strictr I/o's  -Renally dose all meds  - Completed Ceftriaxone 7 days course for complicated UTI w/ Klesiella on UCx. No leukocytosis or fevers. Baseline Creatinine per daughter 2-3. Currently at baseline.  - Renal following to assist with diuresis.   -Cont Furosemide IV and Diamox 500mg qd for alkalosis per renal recs.   -Renal consulted. F/u recs. (Dr. Ramirez-her outpt Nephrologist)  -Leahy catheter in place, monitor daily weights, strict I/o's  -Renally dose all meds  - Completed Ceftriaxone 7 days course for complicated UTI w/ Klesiella on UCx. No leukocytosis or fevers.

## 2017-07-03 NOTE — PROGRESS NOTE ADULT - ASSESSMENT
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 s/p BAV

## 2017-07-03 NOTE — PROGRESS NOTE ADULT - ASSESSMENT
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 Pt was admitted to [facility] but was transferred to CCU for hypercapnic respiratory failure s/p BiPAP. Pt now s/p BAV with Dr. Barger on 6/27/17. Transferred from  to [facility] for Heparin/Coumadin bridging, further diuresis and dispo planning.

## 2017-07-03 NOTE — PROGRESS NOTE ADULT - SUBJECTIVE AND OBJECTIVE BOX
cc: follow-up evaluation and management of acute/chronic diastolic chf and aortic stenosis    subjective: patient reports looking forward to discharge    PAST MEDICAL & SURGICAL HISTORY:  PVD (peripheral vascular disease)  Iron deficiency anemia  Lupus (systemic lupus erythematosus)  Paroxysmal atrial fibrillation  Aortic stenosis, severe  CKD (chronic kidney disease) stage 4, GFR 15-29 ml/min  Diabetes  HTN (hypertension)  CHF (congestive heart failure)  CAD (coronary artery disease)  COPD (chronic obstructive pulmonary disease)  History of total knee replacement:   Presence of cardiac pacemaker: at [facility] by Dr Escoto    MEDICATIONS  (STANDING):  ferrous    sulfate 325 milliGRAM(s) Oral every 8 hours  acetazolamide    Tablet 500 milliGRAM(s) Oral daily  clopidogrel Tablet 75 milliGRAM(s) Oral daily  hydrocortisone 2.5% Cream 1 Application(s) Topical two times a day  Nephrocaps 1 Capsule(s) Oral daily  gabapentin 100 milliGRAM(s) Oral every other day  metoprolol succinate ER 12.5 milliGRAM(s) Oral daily  ALBUTerol/ipratropium for Nebulization 3 milliLiter(s) Nebulizer every 4 hours  atorvastatin 40 milliGRAM(s) Oral at bedtime  buDESOnide   0.25 milliGRAM(s) Respule 0.25 milliGRAM(s) Inhalation every 12 hours  sodium chloride 0.45%. 1000 milliLiter(s) (10 mL/Hr) IV Continuous <Continuous>  insulin lispro (HumaLOG) corrective regimen sliding scale   SubCutaneous every 6 hours  dextrose 5%. 1000 milliLiter(s) (50 mL/Hr) IV Continuous <Continuous>  dextrose 50% Injectable 12.5 Gram(s) IV Push once  dextrose 50% Injectable 25 Gram(s) IV Push once  dextrose 50% Injectable 25 Gram(s) IV Push once  pantoprazole    Tablet 40 milliGRAM(s) Oral before breakfast  heparin  Infusion 1500 Unit(s)/Hr (15 mL/Hr) IV Continuous <Continuous>  sodium chloride 0.9% lock flush 3 milliLiter(s) IV Push every 8 hours  furosemide   Injectable 40 milliGRAM(s) IV Push every 12 hours  docusate sodium 100 milliGRAM(s) Oral three times a day  senna 2 Tablet(s) Oral at bedtime  potassium chloride    Tablet ER 40 milliEquivalent(s) Oral once  warfarin 2.5 milliGRAM(s) Oral once    MEDICATIONS  (PRN):  acetylcysteine 20% Inhalation 5 milliLiter(s) Inhalation every 6 hours PRN shortness of breath  artificial  tears Solution 1 Drop(s) Both EYES three times a day PRN Dry Eyes  acetaminophen   Tablet. 650 milliGRAM(s) Oral every 6 hours PRN Mild Pain (1 - 3)  dextrose Gel 1 Dose(s) Oral once PRN Blood Glucose LESS THAN 70 milliGRAM(s)/deciliter  glucagon  Injectable 1 milliGRAM(s) IntraMuscular once PRN Glucose LESS THAN 70 milligrams/deciliter    ICU Vital Signs Last 24 Hrs  T(C): 36.4 (2017 09:16), Max: 37 (2017 10:00)  T(F): 97.6 (2017 09:16), Max: 98.6 (2017 10:00)  HR: 78 (2017 09:00) (68 - 80)  BP: 106/51 (2017 09:00) (98/49 - 117/48)  BP(mean): 80 (2017 09:00) (64 - 89)  ABP: --  ABP(mean): --  RR: 16 (2017 09:00) (15 - 18)  SpO2: 94% (2017 09:00) (84% - 99%)      PHYSICAL EXAM:  General: nad; oob to chair  heent - mmm; anicteric  Cardiac: irregular; systolic murmur  Pulmonary:  bilat breath sounds present; no accessory muscle use  Abdomen: soft abd; nd  extremities:tender le; edema present bilat  vasc - ue pulses present    LABS:                                                                  9.2    9.5   )-----------( 229      ( 2017 05:45 )             30.7   07-03    142  |  96  |  86<H>  ----------------------------<  122<H>  3.8   |  31  |  2.50<H>    Ca    9.4      2017 05:44  Phos  4.4     07-03  Mg     2.1     07-03    PT/INR - ( 2017 05:46 )   PT: 19.8 sec;   INR: 1.76          PTT - ( 2017 05:46 )  PTT:84.5 sec      DIAGNOSTIC TESTS:     < from: Xray Chest 1 View AP- PORTABLE-Urgent (17 @ 08:29) >  EXAM:  XR CHEST 1 VIEW PORT URGENT                          PROCEDURE DATE:  2017                     INTERPRETATION:  Clinical History: Shortness of breath    Portable examination the chest demonstrates no interval change lung   infiltrates in comparison to prior examination of the chest 2017.   Bilateral effusions. No interval change position remaining support   devices.    Impression: No interval change lung pathology            "Thank you for the opportunity to participate in thecare of this   patient."        KATHARINA MOROCHO M.D., ATTENDING RADIOLOGIST  This document has been electronically signed. 2017 10:13AM    < end of copied text >      < from: Xray Chest 1 View AP -PORTABLE-Routine (17 @ 03:40) >  EXAM:  XR CHEST 1 VIEW PORT ROUTINE                          PROCEDURE DATE:  2017                     INTERPRETATION:  HISTORY: Extubated, follow-up    Portable AP view is compared to prior radiograph of 2017.    Endotracheal tube has been removed. No pneumothorax is evident, although   the superior-most lung apices are excluded from view. There has been   slight improvement in infiltrate in the right upper lung, with otherwise   little change in bilateral pulmonary infiltrates and effusions (right   more extensive than left), and there has otherwise been no interval   change.    IMPRESSION: Status post extubation. Slight improvement in infiltrate in   the right upper lung, with otherwise little change in bilateral pulmonary   infiltrates and effusions (right more extensive than left).            "Thank you for the opportunity to participate in the care of this   patient."        CHINO ROMAN M.D. ATTENDING RADIOLOGIST  This document has been electronically signed. 2017  1:16PM        < end of copied text >            < from: Xray Chest 1 View AP-PORTABLE IMMEDIATE (17 @ 12:07) >    EXAM:  XR CHEST 1 VIEW PORT IMMEDIATE                          PROCEDURE DATE:  2017                     INTERPRETATION:  Portable chest    History: Post [facility] bronchoscopy exam x-ray. Follow-up abnormal exam.    Scattered lung infiltrates again noted. Overall similar appearance to   prior exam earlier same day. Endotracheal tube in satisfactory position.   No pneumothorax identified.            "Thank you for the opportunity to participate in the care of this   patient."        JUDE LOPEZ M.D., ATTENDING RADIOLOGIST  This document has been electronically signed. 2017  3:03PM    < end of copied text >      < from: Echocardiogram (17 @ 12:03) >  EXAM:  ECHOCARDIOGRAM (CARDIOL)                          PROCEDURE DATE:  2017                        INTERPRETATION:  Patient Height: 165.0 cm  Patient Weight: 84.0 kg  Systolic Pressure: 128 mmHg  Diastolic Pressure: 52 mmHg  BSA: 1.9 m^2  Interpretation Summary  A complete two-dimensional transthoracic echocardiogram was performed (2D,   M-mode, spectral and color flow doppler).  Study Quality: Fair.Left   ventricular hypertrophy presentThe ejection fraction could not be   accurately  calculated (poor endocardiac resolution)  The left atrium is severely   dilated.Right atrial size is normal.The right ventricle is normal in   size and   function.Abnormal (paradoxical) septal motion consistent with   LBBBCalcified   aortic valve.There is trace to mild aortic regurgitation.There is   Moderate to   severe aortic stenosis.The peak pressure gradient is 45 mmHg.The mean   pressure   gradient is 30 mmHg.The calculated aortic valve area using the continuity   equation is 1 cm2.The calculated stroke volume index is 39 cc/m2 (normal   >35cc/m2).There is moderate mitral annular calcification.There is mild   mitral   regurgitation.Structurally normal tricuspid valve.Structurally normal   pulmonic   valve.  Procedure Details  A completetwo-dimensional transthoracic echocardiogram was performed (2D,  M-mode, spectral and color flow doppler).  Study Quality: Fair.  Left Ventricle  Left ventricular hypertrophy present  The ejection fraction could not be accurately calculated (poor endocardiac  resolution)  Abnormal (paradoxical) septal motion consistent with LBBB  Left Atrium  The left atrium is severely dilated.  Right Atrium  Right atrial size is normal.  Right Ventricle  The right ventricle is normal in size and function.  Aortic Valve  Calcified aortic valve.  There is trace to mild aortic regurgitation.  There is Moderate to severe aortic stenosis.  The peak pressure gradient is 45 mmHg.  The mean pressure gradient is 30 mmHg.  The calculated aortic valve area using the continuity equation is 1 cm2.  The calculated stroke volume index is 39 cc/m2 (normal >35cc/m2).  Mitral Valve  There is moderate mitral annular calcification.  There is mild mitral regurgitation.  Tricuspid Valve  Structurally normal tricuspid valve.  Pulmonic Valve  Structurally normal pulmonic valve.  Arteries and Venous System  No aortic root dilatation.  The IVC is dilated (>2.1 cm) with an abnormal inspiratory collapse (<50%)  consistent with elevated right atrial pressure.  Pericardium / Pleura  There is no pericardial effusion.  Doppler Measurements & Calculations  MV E point: 140.2 cm/sec  MV A point: 53.4 cm/sec  MV E/A: 2.6  MV dec slope: 636.0 cm/sec^2  Ao V2 max: 319.4 cm/sec  Ao max P.8 mmHg  Ao max PG (full): 36.2 mmHg  Ao V2 mean: 251.5 cm/sec  Ao mean P.0 mmHg  Ao mean PG (full): 24.4 mmHg  Ao V2 VTI: 80.8 cm  ALEKS(I,A): 0.9 cm^2  ALEKS(I,D): 0.9 cm^2  ALEKS(V,A): 1.1 cm^2  ALEKS(V,D): 1.1 cm^2  LV max P.7 mmHg  LV mean P.6 mmHg  LV V1 max: 108 cm/sec  LV V1 mean: 76.1 cm/sec  LV V1 VTI: 24.0 cm  MR max graciela: 538.0 cm/sec  MR max P.8 mmHg  MR mean graciela: 400.3 cm/sec  MR mean P.0 mmHg  MR VTI: 181.9 cm  SV(Ao): 690.6 ml  SI(Ao): 360.9 ml/m^2  SV(LVOT): 75.0 ml  SI(LVOT): 39.2 ml/m^2  TR Max graciela: 252.8cm/sec  MMode 2D Measurements & Calculations  IVSd: 1.2 cm  LVIDd: 6.4 cm  LVIDs: 5.3 cm  LVPWd: 1.1 cm  IVS/LVPW: 1.0  FS: 18.0 %  EDV(Teich): 209.4 ml  ESV(Teich): 132.7 ml  LV mass(C)d: 331.0 grams  LV mass(C)dI: 173.0 grams/m^2  SI(cubed): 61.9 ml/m^2  Ao root diam: 3.3 cm  Ao root area: 8.5 cm^2  LA dimension: 4.5 cm  LA/Ao: 1.4  LVOT diam: 2.0 cm  LVOT area: 3.1 cm^2  LVOT area (M): 3.1 cm^2  EDV(MOD-sp4): 112 ml  EDV(MOD-sp2): 105 ml  Interpreting Physician:Perry Huynh MD electronicallysigned on   2017 12:24:22                "Thank you for the opportunity to participate in the care of this   patient."        PERRY HUYNH M.D., ATTENDING CARDIOLOGIST  This document has been electronically signed. 2017 12:24PM        < end of copied text >        EXAM:  XR CHEST 1 VIEW PORT URGENT                          PROCEDURE DATE:  2017                     INTERPRETATION:  Clinical History: Congestion    Portable examination the chest demonstrates no interval change congestion   and/or infiltrates in comparison to prior examination of the chest   2017. Bilateral effusions. No interval change position remaining   support devices. Cardiomegaly.    Impression: No interval change lung pathology            "Thank you for the opportunity to participate in the care of this   patient."        KATHARINA MOROCHO M.D., ATTENDING RADIOLOGIST  This document has been electronically signed. 2017 10:38AM      2017 ecg reviewed on 2017    EXAM:  XR CHEST 1 VIEW PORT ROUTINE                          PROCEDURE DATE:  2017                     INTERPRETATION:  Indication: chf    A single portable view of the chest is submitted. Comparison is made to   the most recent prior study dated 2017. Bilateral pulmonary   infiltrates and effusions are similar to the previous examination.   Impression:    No significant interval change            "Thank you for the opportunity to participate in the care of this   patient."        MASOUD WORKMAN M.D., ATTENDING RADIOLOGIST  This document has been electronically signed. 2017  4:33PM

## 2017-07-04 LAB
ANION GAP SERPL CALC-SCNC: 12 MMOL/L — SIGNIFICANT CHANGE UP (ref 5–17)
APTT BLD: 34.5 SEC — SIGNIFICANT CHANGE UP (ref 27.5–37.4)
BUN SERPL-MCNC: 92 MG/DL — HIGH (ref 7–23)
CALCIUM SERPL-MCNC: 9.6 MG/DL — SIGNIFICANT CHANGE UP (ref 8.4–10.5)
CHLORIDE SERPL-SCNC: 95 MMOL/L — LOW (ref 96–108)
CO2 SERPL-SCNC: 33 MMOL/L — HIGH (ref 22–31)
CREAT SERPL-MCNC: 2.4 MG/DL — HIGH (ref 0.5–1.3)
GLUCOSE SERPL-MCNC: 91 MG/DL — SIGNIFICANT CHANGE UP (ref 70–99)
HCT VFR BLD CALC: 32.8 % — LOW (ref 34.5–45)
HGB BLD-MCNC: 9.6 G/DL — LOW (ref 11.5–15.5)
INR BLD: 2.61 — HIGH (ref 0.88–1.16)
MAGNESIUM SERPL-MCNC: 2.2 MG/DL — SIGNIFICANT CHANGE UP (ref 1.6–2.6)
MCHC RBC-ENTMCNC: 26.8 PG — LOW (ref 27–34)
MCHC RBC-ENTMCNC: 29.3 G/DL — LOW (ref 32–36)
MCV RBC AUTO: 91.6 FL — SIGNIFICANT CHANGE UP (ref 80–100)
PLATELET # BLD AUTO: 247 K/UL — SIGNIFICANT CHANGE UP (ref 150–400)
POTASSIUM SERPL-MCNC: 4.2 MMOL/L — SIGNIFICANT CHANGE UP (ref 3.5–5.3)
POTASSIUM SERPL-SCNC: 4.2 MMOL/L — SIGNIFICANT CHANGE UP (ref 3.5–5.3)
PROTHROM AB SERPL-ACNC: 29.6 SEC — HIGH (ref 9.8–12.7)
RBC # BLD: 3.58 M/UL — LOW (ref 3.8–5.2)
RBC # FLD: 16.6 % — SIGNIFICANT CHANGE UP (ref 10.3–16.9)
SODIUM SERPL-SCNC: 140 MMOL/L — SIGNIFICANT CHANGE UP (ref 135–145)
WBC # BLD: 9.4 K/UL — SIGNIFICANT CHANGE UP (ref 3.8–10.5)
WBC # FLD AUTO: 9.4 K/UL — SIGNIFICANT CHANGE UP (ref 3.8–10.5)

## 2017-07-04 RX ORDER — WARFARIN SODIUM 2.5 MG/1
2 TABLET ORAL ONCE
Qty: 0 | Refills: 0 | Status: COMPLETED | OUTPATIENT
Start: 2017-07-04 | End: 2017-07-04

## 2017-07-04 RX ORDER — ACETAZOLAMIDE 250 MG/1
250 TABLET ORAL DAILY
Qty: 0 | Refills: 0 | Status: DISCONTINUED | OUTPATIENT
Start: 2017-07-04 | End: 2017-07-06

## 2017-07-04 RX ORDER — GABAPENTIN 400 MG/1
100 CAPSULE ORAL EVERY 8 HOURS
Qty: 0 | Refills: 0 | Status: DISCONTINUED | OUTPATIENT
Start: 2017-07-04 | End: 2017-07-06

## 2017-07-04 RX ORDER — ACETAMINOPHEN 500 MG
650 TABLET ORAL EVERY 6 HOURS
Qty: 0 | Refills: 0 | Status: DISCONTINUED | OUTPATIENT
Start: 2017-07-04 | End: 2017-07-04

## 2017-07-04 RX ADMIN — WARFARIN SODIUM 2 MILLIGRAM(S): 2.5 TABLET ORAL at 21:36

## 2017-07-04 RX ADMIN — CLOPIDOGREL BISULFATE 75 MILLIGRAM(S): 75 TABLET, FILM COATED ORAL at 10:19

## 2017-07-04 RX ADMIN — Medication 12.5 MILLIGRAM(S): at 10:20

## 2017-07-04 RX ADMIN — Medication 3 MILLILITER(S): at 10:19

## 2017-07-04 RX ADMIN — ATORVASTATIN CALCIUM 40 MILLIGRAM(S): 80 TABLET, FILM COATED ORAL at 21:36

## 2017-07-04 RX ADMIN — Medication 3 MILLILITER(S): at 21:36

## 2017-07-04 RX ADMIN — Medication 3 MILLILITER(S): at 06:00

## 2017-07-04 RX ADMIN — Medication 3 MILLILITER(S): at 18:15

## 2017-07-04 RX ADMIN — Medication 325 MILLIGRAM(S): at 06:00

## 2017-07-04 RX ADMIN — PANTOPRAZOLE SODIUM 40 MILLIGRAM(S): 20 TABLET, DELAYED RELEASE ORAL at 06:00

## 2017-07-04 RX ADMIN — Medication 1 CAPSULE(S): at 10:19

## 2017-07-04 RX ADMIN — Medication 3 MILLILITER(S): at 14:25

## 2017-07-04 RX ADMIN — Medication 650 MILLIGRAM(S): at 21:00

## 2017-07-04 RX ADMIN — Medication 80 MILLIGRAM(S): at 18:15

## 2017-07-04 RX ADMIN — SODIUM CHLORIDE 3 MILLILITER(S): 9 INJECTION INTRAMUSCULAR; INTRAVENOUS; SUBCUTANEOUS at 13:08

## 2017-07-04 RX ADMIN — Medication 80 MILLIGRAM(S): at 06:00

## 2017-07-04 RX ADMIN — Medication 0.25 MILLIGRAM(S): at 06:02

## 2017-07-04 RX ADMIN — Medication 1 APPLICATION(S): at 06:09

## 2017-07-04 RX ADMIN — Medication 650 MILLIGRAM(S): at 12:10

## 2017-07-04 RX ADMIN — Medication 650 MILLIGRAM(S): at 20:07

## 2017-07-04 RX ADMIN — Medication 3 MILLILITER(S): at 01:27

## 2017-07-04 RX ADMIN — GABAPENTIN 100 MILLIGRAM(S): 400 CAPSULE ORAL at 14:24

## 2017-07-04 RX ADMIN — Medication 0.25 MILLIGRAM(S): at 18:17

## 2017-07-04 RX ADMIN — Medication 1 APPLICATION(S): at 18:15

## 2017-07-04 RX ADMIN — Medication 325 MILLIGRAM(S): at 14:25

## 2017-07-04 RX ADMIN — SODIUM CHLORIDE 3 MILLILITER(S): 9 INJECTION INTRAMUSCULAR; INTRAVENOUS; SUBCUTANEOUS at 05:58

## 2017-07-04 RX ADMIN — ACETAZOLAMIDE 250 MILLIGRAM(S): 250 TABLET ORAL at 11:33

## 2017-07-04 RX ADMIN — GABAPENTIN 100 MILLIGRAM(S): 400 CAPSULE ORAL at 21:36

## 2017-07-04 RX ADMIN — SODIUM CHLORIDE 3 MILLILITER(S): 9 INJECTION INTRAMUSCULAR; INTRAVENOUS; SUBCUTANEOUS at 21:39

## 2017-07-04 RX ADMIN — Medication 650 MILLIGRAM(S): at 11:33

## 2017-07-04 RX ADMIN — Medication 325 MILLIGRAM(S): at 21:36

## 2017-07-04 NOTE — PROGRESS NOTE ADULT - SUBJECTIVE AND OBJECTIVE BOX
Patient seen and examined at bedside.     stepped down to oral lasix 80mg BID  no new complaints  maintains net negative fluid balance  serum creatinine stable     ferrous    sulfate 325 milliGRAM(s) every 8 hours  acetylcysteine 20% Inhalation 5 milliLiter(s) every 6 hours PRN  acetazolamide    Tablet 500 milliGRAM(s) daily  clopidogrel Tablet 75 milliGRAM(s) daily  hydrocortisone 2.5% Cream 1 Application(s) two times a day  Nephrocaps 1 Capsule(s) daily  artificial  tears Solution 1 Drop(s) three times a day PRN  gabapentin 100 milliGRAM(s) every other day  metoprolol succinate ER 12.5 milliGRAM(s) daily  ALBUTerol/ipratropium for Nebulization 3 milliLiter(s) every 4 hours  acetaminophen   Tablet. 650 milliGRAM(s) every 6 hours PRN  atorvastatin 40 milliGRAM(s) at bedtime  buDESOnide   0.25 milliGRAM(s) Respule 0.25 milliGRAM(s) every 12 hours  sodium chloride 0.45%. 1000 milliLiter(s) <Continuous>  insulin lispro (HumaLOG) corrective regimen sliding scale   every 6 hours  dextrose 5%. 1000 milliLiter(s) <Continuous>  dextrose Gel 1 Dose(s) once PRN  dextrose 50% Injectable 12.5 Gram(s) once  dextrose 50% Injectable 25 Gram(s) once  dextrose 50% Injectable 25 Gram(s) once  glucagon  Injectable 1 milliGRAM(s) once PRN  pantoprazole    Tablet 40 milliGRAM(s) before breakfast  sodium chloride 0.9% lock flush 3 milliLiter(s) every 8 hours  docusate sodium 100 milliGRAM(s) three times a day  senna 2 Tablet(s) at bedtime  furosemide    Tablet 80 milliGRAM(s) every 12 hours  polyethylene glycol 3350 17 Gram(s) two times a day      Allergies    No Known Allergies    Intolerances        T(C): , Max: 36.8 (07-04-17 @ 05:15)  T(F): , Max: 98.3 (07-04-17 @ 05:15)  HR: 80 (07-04-17 @ 05:00)  BP: 111/58 (07-04-17 @ 05:00)  BP(mean): 85 (07-04-17 @ 05:00)  RR: 16 (07-04-17 @ 05:00)  SpO2: 95% (07-04-17 @ 05:00)  Wt(kg): --    07-03 @ 07:01  -  07-04 @ 07:00  --------------------------------------------------------  IN:    Oral Fluid: 350 mL  Total IN: 350 mL    OUT:    Indwelling Catheter - Urethral: 950 mL  Total OUT: 950 mL    Total NET: -600 mL              LABS:                        9.2    9.5   )-----------( 229      ( 03 Jul 2017 05:45 )             30.7     07-03    142  |  96  |  86<H>  ----------------------------<  122<H>  3.8   |  31  |  2.50<H>    Ca    9.4      03 Jul 2017 05:44  Phos  4.4     07-03  Mg     2.1     07-03        PT/INR - ( 03 Jul 2017 12:37 )   PT: 23.2 sec;   INR: 2.06          PTT - ( 03 Jul 2017 12:37 )  PTT:81.3 sec          RADIOLOGY & ADDITIONAL STUDIES:

## 2017-07-04 NOTE — PROGRESS NOTE ADULT - SUBJECTIVE AND OBJECTIVE BOX
s/p BAV no further role for intervention at this time. Patient may be discharged with f/u as outpatient; needs mobilization, improved nutrition, and improved compliance prior to consideration for TAVR.  Please call the office at [phone] to schedule for follow up appointment.

## 2017-07-04 NOTE — PROGRESS NOTE ADULT - SUBJECTIVE AND OBJECTIVE BOX
cc: follow-up evaluation and management of acute/chronic diastolic chf and aortic stenosis    subjective: events noted; no new complaints    PAST MEDICAL & SURGICAL HISTORY:  PVD (peripheral vascular disease)  Iron deficiency anemia  Lupus (systemic lupus erythematosus)  Paroxysmal atrial fibrillation  Aortic stenosis, severe  CKD (chronic kidney disease) stage 4, GFR 15-29 ml/min  Diabetes  HTN (hypertension)  CHF (congestive heart failure)  CAD (coronary artery disease)  COPD (chronic obstructive pulmonary disease)  History of total knee replacement:   Presence of cardiac pacemaker: at [facility] by Dr Escoto    MEDICATIONS  (STANDING):  ferrous    sulfate 325 milliGRAM(s) Oral every 8 hours  acetazolamide    Tablet 500 milliGRAM(s) Oral daily  clopidogrel Tablet 75 milliGRAM(s) Oral daily  hydrocortisone 2.5% Cream 1 Application(s) Topical two times a day  Nephrocaps 1 Capsule(s) Oral daily  gabapentin 100 milliGRAM(s) Oral every other day  metoprolol succinate ER 12.5 milliGRAM(s) Oral daily  ALBUTerol/ipratropium for Nebulization 3 milliLiter(s) Nebulizer every 4 hours  atorvastatin 40 milliGRAM(s) Oral at bedtime  buDESOnide   0.25 milliGRAM(s) Respule 0.25 milliGRAM(s) Inhalation every 12 hours  sodium chloride 0.45%. 1000 milliLiter(s) (10 mL/Hr) IV Continuous <Continuous>  insulin lispro (HumaLOG) corrective regimen sliding scale   SubCutaneous every 6 hours  dextrose 5%. 1000 milliLiter(s) (50 mL/Hr) IV Continuous <Continuous>  dextrose 50% Injectable 12.5 Gram(s) IV Push once  dextrose 50% Injectable 25 Gram(s) IV Push once  dextrose 50% Injectable 25 Gram(s) IV Push once  pantoprazole    Tablet 40 milliGRAM(s) Oral before breakfast  sodium chloride 0.9% lock flush 3 milliLiter(s) IV Push every 8 hours  docusate sodium 100 milliGRAM(s) Oral three times a day  senna 2 Tablet(s) Oral at bedtime  furosemide    Tablet 80 milliGRAM(s) Oral every 12 hours  polyethylene glycol 3350 17 Gram(s) Oral two times a day    MEDICATIONS  (PRN):  acetylcysteine 20% Inhalation 5 milliLiter(s) Inhalation every 6 hours PRN shortness of breath  artificial  tears Solution 1 Drop(s) Both EYES three times a day PRN Dry Eyes  acetaminophen   Tablet. 650 milliGRAM(s) Oral every 6 hours PRN Mild Pain (1 - 3)  dextrose Gel 1 Dose(s) Oral once PRN Blood Glucose LESS THAN 70 milliGRAM(s)/deciliter  glucagon  Injectable 1 milliGRAM(s) IntraMuscular once PRN Glucose LESS THAN 70 milligrams/deciliter      ICU Vital Signs Last 24 Hrs  T(C): 36.8 (2017 05:15), Max: 36.8 (2017 05:15)  T(F): 98.3 (2017 05:15), Max: 98.3 (2017 05:15)  HR: 80 (2017 05:00) (68 - 80)  BP: 111/58 (2017 05:00) (101/50 - 117/54)  BP(mean): 85 (2017 05:00) (67 - 89)  ABP: --  ABP(mean): --  RR: 16 (2017 05:00) (16 - 16)  SpO2: 95% (2017 05:00) (84% - 96%)        PHYSICAL EXAM:  General: nad  heent - supple  Cardiac: irregular; systolic murmur present  Pulmonary:  decreased breath sounds at bases  Abdomen: soft abd; bowel sounds present  extremities:tender le; bilat edema  vasc - warm ext    LABS:  no 2017 labs available in sunrise at this time                        9.2    9.5   )-----------( 229      ( 2017 05:45 )             30.7   07-03    142  |  96  |  86<H>  ----------------------------<  122<H>  3.8   |  31  |  2.50<H>    Ca    9.4      2017 05:44  Phos  4.4     07-03  Mg     2.1     07-03          DIAGNOSTIC TESTS:     < from: Xray Chest 1 View AP- PORTABLE-Urgent (17 @ 08:29) >  EXAM:  XR CHEST 1 VIEW PORT URGENT                          PROCEDURE DATE:  2017                     INTERPRETATION:  Clinical History: Shortness of breath    Portable examination the chest demonstrates no interval change lung   infiltrates in comparison to prior examination of the chest 2017.   Bilateral effusions. No interval change position remaining support   devices.    Impression: No interval change lung pathology            "Thank you for the opportunity to participate in thecare of this   patient."        KATHARINA MOROCHO M.D., ATTENDING RADIOLOGIST  This document has been electronically signed. 2017 10:13AM    < end of copied text >      < from: Xray Chest 1 View AP -PORTABLE-Routine (17 @ 03:40) >  EXAM:  XR CHEST 1 VIEW PORT ROUTINE                          PROCEDURE DATE:  2017                     INTERPRETATION:  HISTORY: Extubated, follow-up    Portable AP view is compared to prior radiograph of 2017.    Endotracheal tube has been removed. No pneumothorax is evident, although   the superior-most lung apices are excluded from view. There has been   slight improvement in infiltrate in the right upper lung, with otherwise   little change in bilateral pulmonary infiltrates and effusions (right   more extensive than left), and there has otherwise been no interval   change.    IMPRESSION: Status post extubation. Slight improvement in infiltrate in   the right upper lung, with otherwise little change in bilateral pulmonary   infiltrates and effusions (right more extensive than left).            "Thank you for the opportunity to participate in the care of this   patient."        CHINO ROMAN M.D. ATTENDING RADIOLOGIST  This document has been electronically signed. 2017  1:16PM        < end of copied text >            < from: Xray Chest 1 View AP-PORTABLE IMMEDIATE (17 @ 12:07) >    EXAM:  XR CHEST 1 VIEW PORT IMMEDIATE                          PROCEDURE DATE:  2017                     INTERPRETATION:  Portable chest    History: Post [facility] bronchoscopy exam x-ray. Follow-up abnormal exam.    Scattered lung infiltrates again noted. Overall similar appearance to   prior exam earlier same day. Endotracheal tube in satisfactory position.   No pneumothorax identified.            "Thank you for the opportunity to participate in the care of this   patient."        JUDE LOPEZ M.D., ATTENDING RADIOLOGIST  This document has been electronically signed. 2017  3:03PM    < end of copied text >      < from: Echocardiogram (17 @ 12:03) >  EXAM:  ECHOCARDIOGRAM (CARDIOL)                          PROCEDURE DATE:  2017                        INTERPRETATION:  Patient Height: 165.0 cm  Patient Weight: 84.0 kg  Systolic Pressure: 128 mmHg  Diastolic Pressure: 52 mmHg  BSA: 1.9 m^2  Interpretation Summary  A complete two-dimensional transthoracic echocardiogram was performed (2D,   M-mode, spectral and color flow doppler).  Study Quality: Fair.Left   ventricular hypertrophy presentThe ejection fraction could not be   accurately  calculated (poor endocardiac resolution)  The left atrium is severely   dilated.Right atrial size is normal.The right ventricle is normal in   size and   function.Abnormal (paradoxical) septal motion consistent with   LBBBCalcified   aortic valve.There is trace to mild aortic regurgitation.There is   Moderate to   severe aortic stenosis.The peak pressure gradient is 45 mmHg.The mean   pressure   gradient is 30 mmHg.The calculated aortic valve area using the continuity   equation is 1 cm2.The calculated stroke volume index is 39 cc/m2 (normal   >35cc/m2).There is moderate mitral annular calcification.There is mild   mitral   regurgitation.Structurally normal tricuspid valve.Structurally normal   pulmonic   valve.  Procedure Details  A completetwo-dimensional transthoracic echocardiogram was performed (2D,  M-mode, spectral and color flow doppler).  Study Quality: Fair.  Left Ventricle  Left ventricular hypertrophy present  The ejection fraction could not be accurately calculated (poor endocardiac  resolution)  Abnormal (paradoxical) septal motion consistent with LBBB  Left Atrium  The left atrium is severely dilated.  Right Atrium  Right atrial size is normal.  Right Ventricle  The right ventricle is normal in size and function.  Aortic Valve  Calcified aortic valve.  There is trace to mild aortic regurgitation.  There is Moderate to severe aortic stenosis.  The peak pressure gradient is 45 mmHg.  The mean pressure gradient is 30 mmHg.  The calculated aortic valve area using the continuity equation is 1 cm2.  The calculated stroke volume index is 39 cc/m2 (normal >35cc/m2).  Mitral Valve  There is moderate mitral annular calcification.  There is mild mitral regurgitation.  Tricuspid Valve  Structurally normal tricuspid valve.  Pulmonic Valve  Structurally normal pulmonic valve.  Arteries and Venous System  No aortic root dilatation.  The IVC is dilated (>2.1 cm) with an abnormal inspiratory collapse (<50%)  consistent with elevated right atrial pressure.  Pericardium / Pleura  There is no pericardial effusion.  Doppler Measurements & Calculations  MV E point: 140.2 cm/sec  MV A point: 53.4 cm/sec  MV E/A: 2.6  MV dec slope: 636.0 cm/sec^2  Ao V2 max: 319.4 cm/sec  Ao max P.8 mmHg  Ao max PG (full): 36.2 mmHg  Ao V2 mean: 251.5 cm/sec  Ao mean P.0 mmHg  Ao mean PG (full): 24.4 mmHg  Ao V2 VTI: 80.8 cm  ALEKS(I,A): 0.9 cm^2  ALEKS(I,D): 0.9 cm^2  ALEKS(V,A): 1.1 cm^2  ALEKS(V,D): 1.1 cm^2  LV max P.7 mmHg  LV mean P.6 mmHg  LV V1 max: 108 cm/sec  LV V1 mean: 76.1 cm/sec  LV V1 VTI: 24.0 cm  MR max graciela: 538.0 cm/sec  MR max P.8 mmHg  MR mean graciela: 400.3 cm/sec  MR mean P.0 mmHg  MR VTI: 181.9 cm  SV(Ao): 690.6 ml  SI(Ao): 360.9 ml/m^2  SV(LVOT): 75.0 ml  SI(LVOT): 39.2 ml/m^2  TR Max graciela: 252.8cm/sec  MMode 2D Measurements & Calculations  IVSd: 1.2 cm  LVIDd: 6.4 cm  LVIDs: 5.3 cm  LVPWd: 1.1 cm  IVS/LVPW: 1.0  FS: 18.0 %  EDV(Teich): 209.4 ml  ESV(Teich): 132.7 ml  LV mass(C)d: 331.0 grams  LV mass(C)dI: 173.0 grams/m^2  SI(cubed): 61.9 ml/m^2  Ao root diam: 3.3 cm  Ao root area: 8.5 cm^2  LA dimension: 4.5 cm  LA/Ao: 1.4  LVOT diam: 2.0 cm  LVOT area: 3.1 cm^2  LVOT area (M): 3.1 cm^2  EDV(MOD-sp4): 112 ml  EDV(MOD-sp2): 105 ml  Interpreting Physician:Perry Huynh MD electronicallysigned on   2017 12:24:22                "Thank you for the opportunity to participate in the care of this   patient."        PERRY HUYNH M.D., ATTENDING CARDIOLOGIST  This document has been electronically signed. 2017 12:24PM        < end of copied text >        EXAM:  XR CHEST 1 VIEW PORT URGENT                          PROCEDURE DATE:  2017                     INTERPRETATION:  Clinical History: Congestion    Portable examination the chest demonstrates no interval change congestion   and/or infiltrates in comparison to prior examination of the chest   2017. Bilateral effusions. No interval change position remaining   support devices. Cardiomegaly.    Impression: No interval change lung pathology            "Thank you for the opportunity to participate in the care of this   patient."        KATHARINA MOROCHO M.D., ATTENDING RADIOLOGIST  This document has been electronically signed. 2017 10:38AM      2017 ecg reviewed on 2017    EXAM:  XR CHEST 1 VIEW PORT ROUTINE                          PROCEDURE DATE:  2017                     INTERPRETATION:  Indication: chf    A single portable view of the chest is submitted. Comparison is made to   the most recent prior study dated 2017. Bilateral pulmonary   infiltrates and effusions are similar to the previous examination.   Impression:    No significant interval change            "Thank you for the opportunity to participate in the care of this   patient."        MASOUD WORKMAN M.D., ATTENDING RADIOLOGIST  This document has been electronically signed. 2017  4:33PM

## 2017-07-04 NOTE — PROGRESS NOTE ADULT - SUBJECTIVE AND OBJECTIVE BOX
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 Pt states that her "legs were too weak". Pt is a poor historian and HPI obtained from her daughter. Pt's daughter states that her mother was unable to get OOB for 3 days and pt used her Life Alert x3 times to call for help-was told to go to the ED. Reports that she had been "tired" for several day, SOB w/ minimal exertion for "several months" and her mobility has declined since her discharge from rehab in December. Additionally, pt has gained 17 lbs over the past month (from 258 to 275) and non-compliant with her diet. As per daughter, pt has been hospitalized prior 2-3 yrs ago (? [facility]) for CHF exacerbation w/ aggressive diuresis. Of note, pt was evaluation by Dr. Barger for aortic stenosis in December 16'. However, due to her deconditioned status during that time, pt was recommended medical management.     S/P BAV    Subjective: Pt seen and examined at bedside. Denies chest pain or sob. Angry about being in hospital, wants to "get out of here."           Rate controlled afib with HR in 70's.     ICU Vital Signs Last 24 Hrs  T(C): 36.3 (04 Jul 2017 11:29), Max: 37.2 (04 Jul 2017 09:36)  T(F): 97.4 (04 Jul 2017 11:29), Max: 98.9 (04 Jul 2017 09:36)  HR: 68 (04 Jul 2017 14:29) (68 - 80)  BP: 119/58 (04 Jul 2017 14:29) (102/52 - 119/58)  BP(mean): 119 (04 Jul 2017 14:29) (67 - 119)  ABP: --  ABP(mean): --  RR: 16 (04 Jul 2017 14:29) (16 - 16)  SpO2: 96% (04 Jul 2017 14:29) (92% - 97%)          I&O's Summary    03 Jul 2017 07:01  -  04 Jul 2017 07:00  --------------------------------------------------------  IN: 350 mL / OUT: 950 mL / NET: -600 mL    04 Jul 2017 07:01  -  04 Jul 2017 09:23  --------------------------------------------------------  IN: 0 mL / OUT: 400 mL / NET: -400 mL          PHYSICAL EXAM:    General: A/ox 3, No acute Distress  Neck: Supple, NO JVD  Cardiac: S1 S2, No M/R/G  Pulmonary: CTAB, Breathing unlabored, No Rhonchi/Rales/Wheezing  Abdomen: Soft, Non -tender, +BS x 4 quads  Extremities: No Rashes, No edema  Neuro: A/o x 3, No focal deficits          LABS:                          9.2    9.5   )-----------( 229      ( 03 Jul 2017 05:45 )             30.7                              07-03    142  |  96  |  86<H>  ----------------------------<  122<H>  3.8   |  31  |  2.50<H>    Ca    9.4      03 Jul 2017 05:44  Phos  4.4     07-03  Mg     2.1     07-03                              PT/INR - ( 03 Jul 2017 12:37 )   PT: 23.2 sec;   INR: 2.06          PTT - ( 03 Jul 2017 12:37 )  PTT:81.3 sec  CAPILLARY BLOOD GLUCOSE  104 (04 Jul 2017 05:44)  128 (03 Jul 2017 20:56)  115 (03 Jul 2017 16:45)  176 (03 Jul 2017 11:31)                  No Known Allergies    MEDICATIONS  (STANDING):  ferrous    sulfate 325 milliGRAM(s) Oral every 8 hours  acetazolamide    Tablet 500 milliGRAM(s) Oral daily  clopidogrel Tablet 75 milliGRAM(s) Oral daily  hydrocortisone 2.5% Cream 1 Application(s) Topical two times a day  Nephrocaps 1 Capsule(s) Oral daily  gabapentin 100 milliGRAM(s) Oral every other day  metoprolol succinate ER 12.5 milliGRAM(s) Oral daily  ALBUTerol/ipratropium for Nebulization 3 milliLiter(s) Nebulizer every 4 hours  atorvastatin 40 milliGRAM(s) Oral at bedtime  buDESOnide   0.25 milliGRAM(s) Respule 0.25 milliGRAM(s) Inhalation every 12 hours  sodium chloride 0.45%. 1000 milliLiter(s) (10 mL/Hr) IV Continuous <Continuous>  insulin lispro (HumaLOG) corrective regimen sliding scale   SubCutaneous every 6 hours  dextrose 5%. 1000 milliLiter(s) (50 mL/Hr) IV Continuous <Continuous>  dextrose 50% Injectable 12.5 Gram(s) IV Push once  dextrose 50% Injectable 25 Gram(s) IV Push once  dextrose 50% Injectable 25 Gram(s) IV Push once  pantoprazole    Tablet 40 milliGRAM(s) Oral before breakfast  sodium chloride 0.9% lock flush 3 milliLiter(s) IV Push every 8 hours  docusate sodium 100 milliGRAM(s) Oral three times a day  senna 2 Tablet(s) Oral at bedtime  furosemide    Tablet 80 milliGRAM(s) Oral every 12 hours  polyethylene glycol 3350 17 Gram(s) Oral two times a day    MEDICATIONS  (PRN):  acetylcysteine 20% Inhalation 5 milliLiter(s) Inhalation every 6 hours PRN shortness of breath  artificial  tears Solution 1 Drop(s) Both EYES three times a day PRN Dry Eyes  acetaminophen   Tablet. 650 milliGRAM(s) Oral every 6 hours PRN Mild Pain (1 - 3)  dextrose Gel 1 Dose(s) Oral once PRN Blood Glucose LESS THAN 70 milliGRAM(s)/deciliter  glucagon  Injectable 1 milliGRAM(s) IntraMuscular once PRN Glucose LESS THAN 70 milligrams/deciliter        DIAGNOSTIC TESTS:

## 2017-07-04 NOTE — PROGRESS NOTE ADULT - ASSESSMENT
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 s/p BAV

## 2017-07-04 NOTE — PROGRESS NOTE ADULT - ASSESSMENT
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 for CHF exacerbation. Pt was admitted to [facility] but was transferred to CCU for hypercapnic respiratory failure s/p BiPAP. Pt now s/p BAV with Dr. Barger on 6/27/17. Transferred from  to [facility] for Heparin/Coumadin bridging, further diuresis and dispo planning.

## 2017-07-04 NOTE — PROGRESS NOTE ADULT - SUBJECTIVE AND OBJECTIVE BOX
CARDIOLOGY NP PROGRESS NOTE    Subjective: Pt seen and examined at bedside. Denies chest pain, dizziness, palpitations or SOB.    Overnight Events: NO events.     TELEMETRY: Rate controlled afib with HR in 70's.   EKG:      VITAL SIGNS:  T(C): 36.8 (07-04-17 @ 05:15), Max: 36.8 (07-04-17 @ 05:15)  HR: 71 (07-04-17 @ 09:11) (68 - 80)  BP: 109/56 (07-04-17 @ 09:11) (101/50 - 117/54)  RR: 16 (07-04-17 @ 09:11) (16 - 16)  SpO2: 97% (07-04-17 @ 09:11) (92% - 97%)  Wt(kg): --    I&O's Summary    03 Jul 2017 07:01  -  04 Jul 2017 07:00  --------------------------------------------------------  IN: 350 mL / OUT: 950 mL / NET: -600 mL    04 Jul 2017 07:01  -  04 Jul 2017 09:23  --------------------------------------------------------  IN: 0 mL / OUT: 400 mL / NET: -400 mL          PHYSICAL EXAM:    General: A/ox 3, No acute Distress  Neck: Supple, NO JVD  Cardiac: S1 S2, No M/R/G  Pulmonary: CTAB, Breathing unlabored, No Rhonchi/Rales/Wheezing  Abdomen: Soft, Non -tender, +BS x 4 quads  Extremities: No Rashes, No edema  Neuro: A/o x 3, No focal deficits          LABS:                          9.2    9.5   )-----------( 229      ( 03 Jul 2017 05:45 )             30.7                              07-03    142  |  96  |  86<H>  ----------------------------<  122<H>  3.8   |  31  |  2.50<H>    Ca    9.4      03 Jul 2017 05:44  Phos  4.4     07-03  Mg     2.1     07-03                              PT/INR - ( 03 Jul 2017 12:37 )   PT: 23.2 sec;   INR: 2.06          PTT - ( 03 Jul 2017 12:37 )  PTT:81.3 sec  CAPILLARY BLOOD GLUCOSE  104 (04 Jul 2017 05:44)  128 (03 Jul 2017 20:56)  115 (03 Jul 2017 16:45)  176 (03 Jul 2017 11:31)                  No Known Allergies    MEDICATIONS  (STANDING):  ferrous    sulfate 325 milliGRAM(s) Oral every 8 hours  acetazolamide    Tablet 500 milliGRAM(s) Oral daily  clopidogrel Tablet 75 milliGRAM(s) Oral daily  hydrocortisone 2.5% Cream 1 Application(s) Topical two times a day  Nephrocaps 1 Capsule(s) Oral daily  gabapentin 100 milliGRAM(s) Oral every other day  metoprolol succinate ER 12.5 milliGRAM(s) Oral daily  ALBUTerol/ipratropium for Nebulization 3 milliLiter(s) Nebulizer every 4 hours  atorvastatin 40 milliGRAM(s) Oral at bedtime  buDESOnide   0.25 milliGRAM(s) Respule 0.25 milliGRAM(s) Inhalation every 12 hours  sodium chloride 0.45%. 1000 milliLiter(s) (10 mL/Hr) IV Continuous <Continuous>  insulin lispro (HumaLOG) corrective regimen sliding scale   SubCutaneous every 6 hours  dextrose 5%. 1000 milliLiter(s) (50 mL/Hr) IV Continuous <Continuous>  dextrose 50% Injectable 12.5 Gram(s) IV Push once  dextrose 50% Injectable 25 Gram(s) IV Push once  dextrose 50% Injectable 25 Gram(s) IV Push once  pantoprazole    Tablet 40 milliGRAM(s) Oral before breakfast  sodium chloride 0.9% lock flush 3 milliLiter(s) IV Push every 8 hours  docusate sodium 100 milliGRAM(s) Oral three times a day  senna 2 Tablet(s) Oral at bedtime  furosemide    Tablet 80 milliGRAM(s) Oral every 12 hours  polyethylene glycol 3350 17 Gram(s) Oral two times a day    MEDICATIONS  (PRN):  acetylcysteine 20% Inhalation 5 milliLiter(s) Inhalation every 6 hours PRN shortness of breath  artificial  tears Solution 1 Drop(s) Both EYES three times a day PRN Dry Eyes  acetaminophen   Tablet. 650 milliGRAM(s) Oral every 6 hours PRN Mild Pain (1 - 3)  dextrose Gel 1 Dose(s) Oral once PRN Blood Glucose LESS THAN 70 milliGRAM(s)/deciliter  glucagon  Injectable 1 milliGRAM(s) IntraMuscular once PRN Glucose LESS THAN 70 milligrams/deciliter        DIAGNOSTIC TESTS:

## 2017-07-04 NOTE — PROGRESS NOTE ADULT - SUBJECTIVE AND OBJECTIVE BOX
PUD ATTENDING FOR DR STEINER    CC/ HPI:  88 yo female with chronic diastolic CHF (EF 50-55% 11/16'), critical aortic stenosis, pAFIB, CAD s/p PCI x 3 stent, Lupus (not on meds), COPD, CKD (baseline Cr 2-3), admitted for CHF exacerbation complicated by hypercapnic respiratory failure, status post BAV, today without respiratory complaint., while in bed, at rest, on room air.    PAST MEDICAL & SURGICAL HISTORY:  PVD (peripheral vascular disease)  Iron deficiency anemia  Lupus (systemic lupus erythematosus)  Paroxysmal atrial fibrillation  Aortic stenosis, severe  CKD (chronic kidney disease) stage 4, GFR 15-29 ml/min  Diabetes  HTN (hypertension)  CHF (congestive heart failure)  CAD (coronary artery disease)  COPD (chronic obstructive pulmonary disease)  History of total knee replacement: 2003  Cardiac pacemaker: at [facility] by Dr Escoto, 2011    SOCHX:  + tobacco,  -  alcohol    FMHX: FA/MO  - contributory     ROS reviewed below with positive findings marked (+) :  no significant change  GEN:  fever, chills ENT: tracheostomy,   epistaxis,  sinusitis COR: +CAD, +CHF,  +HTN, +dysrhythmia PUL: +COPD, ILD, asthma, pneumonia GI: PEG, dysphagia, hemorrhage, other PETEY: +kidney disease, electrolyte disorder HEM:  +anemia, thrombus, coagulopathy, cancer ENDO:  thyroid disease, +diabetes mellitus CNS:  dementia, stroke, seizure, PSY:  depression, anxiety, other     MEDICATIONS  (STANDING):  ferrous    sulfate 325 milliGRAM(s) Oral every 8 hours  acetazolamide    Tablet 500 milliGRAM(s) Oral daily  FOR INCREASED BICARB  clopidogrel Tablet 75 milliGRAM(s) Oral daily  hydrocortisone 2.5% Cream 1 Application(s) Topical two times a day  Nephrocaps 1 Capsule(s) Oral daily  gabapentin 100 milliGRAM(s) Oral every other day  metoprolol succinate ER 12.5 milliGRAM(s) Oral daily  ALBUTerol/ipratropium for Nebulization 3 milliLiter(s) Nebulizer every 4 hours  atorvastatin 40 milliGRAM(s) Oral at bedtime  buDESOnide   0.25 milliGRAM(s) Respule 0.25 milliGRAM(s) Inhalation every 12 hours  sodium chloride 0.45%. 1000 milliLiter(s) (10 mL/Hr) IV Continuous <Continuous>  insulin lispro (HumaLOG) corrective regimen sliding scale   SubCutaneous every 6 hours  dextrose 5%. 1000 milliLiter(s) (50 mL/Hr) IV Continuous <Continuous>  pantoprazole    Tablet 40 milliGRAM(s) Oral before breakfast  heparin  Infusion 1500 Unit(s)/Hr (15 mL/Hr) IV Continuous <Continuous>  sodium chloride 0.9% lock flush 3 milliLiter(s) IV Push every 8 hours  furosemide   Injectable 40 milliGRAM(s) IV Push every 12 hours  docusate sodium 100 milliGRAM(s) Oral three times a day  senna 2 Tablet(s) Oral at bedtime  potassium chloride    Tablet ER 40 milliEquivalent(s) Oral once  warfarin 2.5 milliGRAM(s) Oral once    MEDICATIONS  (PRN):  acetylcysteine 20% Inhalation 5 milliLiter(s) Inhalation every 6 hours PRN shortness of breath  artificial  tears Solution 1 Drop(s) Both EYES three times a day PRN Dry Eyes  acetaminophen   Tablet. 650 milliGRAM(s) Oral every 6 hours PRN Mild Pain (1 - 3)  dextrose Gel 1 Dose(s) Oral once PRN Blood Glucose LESS THAN 70 milliGRAM(s)/deciliter  glucagon  Injectable 1 milliGRAM(s) IntraMuscular once PRN Glucose LESS THAN 70 milligrams/deciliter      Vital Signs Last 24 Hrs  T(C): 36.4 (03 Jul 2017 09:16), Max: 36.8 (02 Jul 2017 13:28)  T(F): 97.6 (03 Jul 2017 09:16), Max: 98.3 (02 Jul 2017 13:28)  HR: 78 (03 Jul 2017 09:00) (68 - 80)  BP: 106/51 (03 Jul 2017 09:00) (98/49 - 117/48)  BP(mean): 80 (03 Jul 2017 09:00) (64 - 89)  RR: 16 (03 Jul 2017 09:00) (15 - 18)  SpO2: 94% (03 Jul 2017 09:00) (84% - 99%)    does not want to talk much, intermittent agitation  denies SOB/CP  GENERAL:         uncomfortable ON/OFF,  - distress.  HEENT:            - trauma,  - icterus,  - injection,  - nasal discharge.  NECK:              - jugular venous distention, - thyromegaly.  LYMPH:           - lymphadenopathy, - masses.  RESP:              + crackles,   - rhonchi,   - wheezes.   COR:                S1S2  - gallops,  - rubs  4/6 MURMUR.  ABD:                bowel sounds,   soft, - tender, - distended, - organomegaly.  EXT/MSC:         - cyanosis,  - clubbing,  + edema.    NEURO:             alert,   responds to stimuli.                        9.2    9.5   )-----------( 229      ( 03 Jul 2017 05:45 )             30.7     07-03    142  |  96  |  86<H>  ----------------------------<  122<H>  3.8   |  31  |  2.50<H>      CXR (7/3)  Bilateral opacity/pulmonary edema/pleural effusion    ASSESSMENT/PLAN    1)  Status post BAV  2)  Cardiomyopathy  3)  Chronic obstructive pulmonary disease  4)  Pulmonary hypertension  5)  May need quetiapine if agitation worsens    Oxygen via nasal cannula as needed  Bronchodilators:  Atrovent/ albuterol q 4-6 hours.  Corticosteroids:  budesonide  Cardiac/HTN: post BAV, diuresis as needed  GI: Rx/ prophylaxis c PPI/H2B  Heme: Rx/VT on AC  Discussed with medical team.

## 2017-07-04 NOTE — PROGRESS NOTE ADULT - PROBLEM SELECTOR PLAN 3
Hypercapnic resp failure 2/2 pulmonary congestion 2/2 CHF 2/2 severe AS.   - Weaned down O2 requirements over weekend, on 2L NC w/ SpO2 >95%. Pulse ox 91-92% on RA while in bed, decreased to 88% in chair, cont NC 2L PRN.  - Pt refusing BIPAP at night    #COPD - c/w Duonebs q4h, Pulmicort INH

## 2017-07-04 NOTE — PROGRESS NOTE ADULT - PROBLEM SELECTOR PLAN 7
Baseline Creatinine per daughter 2-3. Currently at baseline.  - Renal following to assist with diuresis.   -Cont Furosemide IV and Diamox 500mg qd for alkalosis per renal recs.   -Renal consulted. F/u recs. (Dr. Ramirez-her outpt Nephrologist)  -Leahy catheter in place, monitor daily weights, strict I/o's  -Renally dose all meds  - Completed Ceftriaxone 7 days course for complicated UTI w/ Klesiella on UCx. No leukocytosis or fevers.

## 2017-07-04 NOTE — PROGRESS NOTE ADULT - CARDIOVASCULAR DETAILS
positive S1/positive S2
positive S1/positive S2
positive S2/positive S1
murmur/positive S1
murmur/positive S1/positive S2

## 2017-07-04 NOTE — PROGRESS NOTE ADULT - ATTENDING COMMENTS
87 F HTN hyperlipidemia critical AS (ALEKS 0.5cm2)  with UTI PNA and acute on chronic diastolic HF.  Continue diuresis.  Continue BiPAP.  Pulmonary consult regarding pleural effusion.   Critically ill patient, time spent 45 minutes.
87 F HTN AFib severe AS CHF diastolic lupus COPD PPM with   respiratory failure and pleural effusion.  1/ F/u Structural Heart recs.  2. Pulm f/u for possible thoracentesis.  3. BiPAP.  4. Continue diuresis.   Critically ill patient, time spent 45 minutes.
87 F HTN hyperlipidemia critical AS (ALEKS 0.5cm2)  with UTI PNA and acute on chronic diastolic HF.  Continue diuresis.  Continue BiPAP.  Pulmonary consult regarding pleural effusion.   Critically ill patient, time spent 45 minutes.
rate of diuresis is slow- but steady- if  net negative by < 500ml again increase lasix to 40 TID or 80 mg BID
in MICU for closer monitoring of respiratory status- able to generate marginal net negative fluid balance  Add diamox- may benfit from lasix drip   considering aquapheresis
rising serum CO2- raise diamox- may need to go to 500 mg BID- but lso needs ABG to reassess pH and confirm pCO2 level- do not want to overcorrect serum CO2.  creatinine acceptable
CCM > 35
restarting IV lasix  as above
CCM > 35
Patient examined, fellow's hx and PE reviewed and confirmed. I find BP stable. CKD stable. A/P reviewed and confirmed. Follow SCr. Continue anti-hypertensives. See fellow’s note
c/w diuresis.  for alkalosis- add diamox.  would add metolazone with PM lasix as not much negative balance today  still with significant LE edema
Patient examined, fellow's hx and PE reviewed and confirmed. I find CKD stable, fluid overload. A/P reviewed and confirmed. Follow SCr. Continue anti-hypertensives. See fellow’s note
Serum HCO3 stable for past three days  decrease Acetazolamide dose to 250mg qd
clinically improving; creatinine within acceptable range for her  continue with present regimen
CCM > 35
CCM > 75
slow progress- creatinine in acceptable range  if no significant progress by tomorrow, increase lasix dosage as above

## 2017-07-04 NOTE — PROGRESS NOTE ADULT - ASSESSMENT
1) acute/chronic diastolic chf with known ckd  -monitor on diuretics  2) nonrheumatic aortic stenosis  -s/p bav - recommend shd follow-up  3) atrial fibrillation; presence of pacemaker  -please re-call ep  4) CAD s/p PCI   -rx as tolerated  6) COPD - pulm following  7) PVD - monitor on rx  8) DM - monitor blood sugar  9) normocytic anemia - monitor h/h  10) pressure injury of skin (right buttock) - recommend wound care follow-up  11) ppx: therapeutic ac; ppi   12)  K>4 Mg>2  d/w Housestaff  d/w Housestaff and NP 1) acute/chronic diastolic chf with known ckd  -monitor on diuretics  2) nonrheumatic aortic stenosis  -s/p bav - recommend shd follow-up  3) atrial fibrillation; presence of pacemaker  -please re-call ep  -continue coumadin; heparin now d/c'd  4) CAD s/p PCI   -rx as tolerated  6) COPD - pulm following  7) PVD - monitor on rx  8) DM - monitor blood sugar  9) normocytic anemia - monitor h/h  10) pressure injury of skin (right buttock) - recommend wound care follow-up  11) ppx: therapeutic ac; ppi   12)  K>4 Mg>2  d/w Housestaff

## 2017-07-04 NOTE — PROGRESS NOTE ADULT - ASSESSMENT
84 y/o F w/ PMh of chronic diastolic CHF, moderate to severe AS (ALEKS 0.5cm2), pAF on coumadin, CAD s/p PCIx3, last cath required impella support , lupus, COPD, PNA, PPM 2011 for bradycardia, CKD w/ h/o renal failure requiring HD, HTN, PVD, DM, MONICA, TKA who was admitted to [facility] for CHF exacerbation. Pt was admitted to [facility] but was transferred to CCU for hypercapnic respiratory failure s/p BiPAP. Pt now s/p BAV with Dr. Barger on 6/27/17. Transferred from  to [facility] for Heparin/Coumadin bridging, further diuresis and dispo planning.

## 2017-07-04 NOTE — PROGRESS NOTE ADULT - PROBLEM SELECTOR PLAN 10
DVT prophylaxis: on Heparin/Coumadin bridge  DNR/DNI    PT rec: TALIA    Dispo: per clinical progress.

## 2017-07-04 NOTE — PROGRESS NOTE ADULT - PROBLEM SELECTOR PLAN 1
Admitted with acute CHF exacerbation 2/2 dietary non-compliance and critical AS. EF 50-55%, severely dilated LA.  -S/p successful BAV 6/27/17 for critical AS by Dr Barger.  -Daily weights, strict I/Os (Leahy in place)  - Continue Toprol XL 12.5mg po daily  - Pulmonary edema improved but still present on CXR. Diuresis plan per Renal rec. Continue Lasix 80 mg PO BID and Diamox 500mg qd. Consider discontinuing Diamox now that metabolic alkalosis has resolved.

## 2017-07-04 NOTE — PROGRESS NOTE ADULT - PROBLEM SELECTOR PLAN 5
Afib with slow ventricular response s/p PPM 2011 2/2 symptomatic bradycardia.  -Continue rate control w/ Toprol  -PPM interrogation WNL. Will ask EP to reinterrogate PPM s/p BAV per Dr Soto.  - Continue AC with Coumadin. Check daily INR (pending today)

## 2017-07-04 NOTE — PROGRESS NOTE ADULT - PROBLEM SELECTOR PLAN 2
Critical AS, ALEKS 0.5, mean pressure gradient 43, mod pulm HTN, PASP 52.   -CTS Structural Heart following, patient s/p successful BAV 6/27/17 without complications.   - Repeat Echo 6/28 post BAV w/ ALEKS 1, mean pressure gradient 30  - Follow up w/ Dr Barger in 2weeks.

## 2017-07-04 NOTE — PROGRESS NOTE ADULT - CARDIOVASCULAR
detailed exam
negative
detailed exam
detailed exam
negative

## 2017-07-04 NOTE — PROGRESS NOTE ADULT - RS GEN PE MLT RESP DETAILS PC
airway patent/diminished breath sounds, R/rales/no wheezes/no rhonchi/clear to auscultation bilaterally/normal/diminished breath sounds, L
breath sounds equal/good air movement/airway patent/decreased breath sounds b/l/respirations non-labored/normal
breath sounds equal/respirations non-labored/b/l rales/good air movement/normal/airway patent
diminished breath sounds, R/normal/no wheezes/airway patent/no rhonchi/no rales/diminished breath sounds, L
clear to auscultation bilaterally/normal/no rales/no rhonchi/no wheezes
diminished breath sounds, R/rales/normal/diminished breath sounds, L
no rales/normal/clear to auscultation bilaterally/airway patent/no rhonchi/no wheezes

## 2017-07-04 NOTE — PROGRESS NOTE ADULT - PROBLEM SELECTOR PLAN 1
Continue with synergistic diuresis with Diamox and Lasix  furosemide   Injectable 80mg POBID   acetazolamide    Tablet 500 milliGRAM(s) daily    Daily weights  net negative 500cc-1000cc per day goal

## 2017-07-05 LAB
ANION GAP SERPL CALC-SCNC: 13 MMOL/L — SIGNIFICANT CHANGE UP (ref 5–17)
BUN SERPL-MCNC: 87 MG/DL — HIGH (ref 7–23)
CALCIUM SERPL-MCNC: 9.6 MG/DL — SIGNIFICANT CHANGE UP (ref 8.4–10.5)
CHLORIDE SERPL-SCNC: 95 MMOL/L — LOW (ref 96–108)
CO2 SERPL-SCNC: 32 MMOL/L — HIGH (ref 22–31)
CREAT SERPL-MCNC: 2.5 MG/DL — HIGH (ref 0.5–1.3)
GLUCOSE SERPL-MCNC: 79 MG/DL — SIGNIFICANT CHANGE UP (ref 70–99)
HCT VFR BLD CALC: 33.5 % — LOW (ref 34.5–45)
HGB BLD-MCNC: 10 G/DL — LOW (ref 11.5–15.5)
INR BLD: 3.19 — HIGH (ref 0.88–1.16)
MAGNESIUM SERPL-MCNC: 2.1 MG/DL — SIGNIFICANT CHANGE UP (ref 1.6–2.6)
MCHC RBC-ENTMCNC: 26.8 PG — LOW (ref 27–34)
MCHC RBC-ENTMCNC: 29.9 G/DL — LOW (ref 32–36)
MCV RBC AUTO: 89.8 FL — SIGNIFICANT CHANGE UP (ref 80–100)
PLATELET # BLD AUTO: 257 K/UL — SIGNIFICANT CHANGE UP (ref 150–400)
POTASSIUM SERPL-MCNC: 4.3 MMOL/L — SIGNIFICANT CHANGE UP (ref 3.5–5.3)
POTASSIUM SERPL-SCNC: 4.3 MMOL/L — SIGNIFICANT CHANGE UP (ref 3.5–5.3)
PROTHROM AB SERPL-ACNC: 36.2 SEC — HIGH (ref 9.8–12.7)
RBC # BLD: 3.73 M/UL — LOW (ref 3.8–5.2)
RBC # FLD: 16.9 % — SIGNIFICANT CHANGE UP (ref 10.3–16.9)
SODIUM SERPL-SCNC: 140 MMOL/L — SIGNIFICANT CHANGE UP (ref 135–145)
WBC # BLD: 9.6 K/UL — SIGNIFICANT CHANGE UP (ref 3.8–10.5)
WBC # FLD AUTO: 9.6 K/UL — SIGNIFICANT CHANGE UP (ref 3.8–10.5)

## 2017-07-05 RX ORDER — IPRATROPIUM/ALBUTEROL SULFATE 18-103MCG
3 AEROSOL WITH ADAPTER (GRAM) INHALATION
Qty: 0 | Refills: 0 | COMMUNITY
Start: 2017-07-05

## 2017-07-05 RX ORDER — WARFARIN SODIUM 2.5 MG/1
1.5 TABLET ORAL ONCE
Qty: 0 | Refills: 0 | Status: DISCONTINUED | OUTPATIENT
Start: 2017-07-05 | End: 2017-07-05

## 2017-07-05 RX ORDER — BUDESONIDE, MICRONIZED 100 %
0.25 POWDER (GRAM) MISCELLANEOUS
Qty: 0 | Refills: 0 | COMMUNITY
Start: 2017-07-05

## 2017-07-05 RX ORDER — WARFARIN SODIUM 2.5 MG/1
1 TABLET ORAL
Qty: 0 | Refills: 0 | COMMUNITY
Start: 2017-07-05

## 2017-07-05 RX ORDER — GABAPENTIN 400 MG/1
1 CAPSULE ORAL
Qty: 0 | Refills: 0 | COMMUNITY
Start: 2017-07-05

## 2017-07-05 RX ORDER — WARFARIN SODIUM 2.5 MG/1
1.5 TABLET ORAL
Qty: 0 | Refills: 0 | COMMUNITY

## 2017-07-05 RX ORDER — METOPROLOL TARTRATE 50 MG
0.5 TABLET ORAL
Qty: 0 | Refills: 0 | COMMUNITY

## 2017-07-05 RX ORDER — GABAPENTIN 400 MG/1
1 CAPSULE ORAL
Qty: 0 | Refills: 0 | COMMUNITY

## 2017-07-05 RX ORDER — WARFARIN SODIUM 2.5 MG/1
1 TABLET ORAL ONCE
Qty: 0 | Refills: 0 | Status: COMPLETED | OUTPATIENT
Start: 2017-07-05 | End: 2017-07-05

## 2017-07-05 RX ORDER — WARFARIN SODIUM 2.5 MG/1
2 TABLET ORAL
Qty: 0 | Refills: 0 | COMMUNITY

## 2017-07-05 RX ORDER — METOPROLOL TARTRATE 50 MG
1 TABLET ORAL
Qty: 0 | Refills: 0 | COMMUNITY

## 2017-07-05 RX ADMIN — Medication 3 MILLILITER(S): at 21:25

## 2017-07-05 RX ADMIN — GABAPENTIN 100 MILLIGRAM(S): 400 CAPSULE ORAL at 06:54

## 2017-07-05 RX ADMIN — Medication 325 MILLIGRAM(S): at 21:24

## 2017-07-05 RX ADMIN — Medication 650 MILLIGRAM(S): at 07:27

## 2017-07-05 RX ADMIN — Medication 3 MILLILITER(S): at 10:01

## 2017-07-05 RX ADMIN — ATORVASTATIN CALCIUM 40 MILLIGRAM(S): 80 TABLET, FILM COATED ORAL at 21:24

## 2017-07-05 RX ADMIN — Medication 3 MILLILITER(S): at 14:07

## 2017-07-05 RX ADMIN — Medication 80 MILLIGRAM(S): at 06:54

## 2017-07-05 RX ADMIN — Medication 3 MILLILITER(S): at 06:54

## 2017-07-05 RX ADMIN — Medication 0.25 MILLIGRAM(S): at 06:54

## 2017-07-05 RX ADMIN — Medication 3 MILLILITER(S): at 18:17

## 2017-07-05 RX ADMIN — SODIUM CHLORIDE 3 MILLILITER(S): 9 INJECTION INTRAMUSCULAR; INTRAVENOUS; SUBCUTANEOUS at 14:08

## 2017-07-05 RX ADMIN — GABAPENTIN 100 MILLIGRAM(S): 400 CAPSULE ORAL at 14:07

## 2017-07-05 RX ADMIN — Medication 1 APPLICATION(S): at 18:12

## 2017-07-05 RX ADMIN — PANTOPRAZOLE SODIUM 40 MILLIGRAM(S): 20 TABLET, DELAYED RELEASE ORAL at 06:54

## 2017-07-05 RX ADMIN — Medication 325 MILLIGRAM(S): at 06:54

## 2017-07-05 RX ADMIN — GABAPENTIN 100 MILLIGRAM(S): 400 CAPSULE ORAL at 21:24

## 2017-07-05 RX ADMIN — Medication 100 MILLIGRAM(S): at 21:25

## 2017-07-05 RX ADMIN — Medication 325 MILLIGRAM(S): at 14:07

## 2017-07-05 RX ADMIN — ACETAZOLAMIDE 250 MILLIGRAM(S): 250 TABLET ORAL at 11:48

## 2017-07-05 RX ADMIN — Medication 1 APPLICATION(S): at 07:02

## 2017-07-05 RX ADMIN — Medication 0.25 MILLIGRAM(S): at 18:11

## 2017-07-05 RX ADMIN — WARFARIN SODIUM 1 MILLIGRAM(S): 2.5 TABLET ORAL at 21:25

## 2017-07-05 RX ADMIN — SODIUM CHLORIDE 3 MILLILITER(S): 9 INJECTION INTRAMUSCULAR; INTRAVENOUS; SUBCUTANEOUS at 21:25

## 2017-07-05 RX ADMIN — Medication 80 MILLIGRAM(S): at 18:11

## 2017-07-05 RX ADMIN — Medication 12.5 MILLIGRAM(S): at 11:48

## 2017-07-05 RX ADMIN — Medication 1 CAPSULE(S): at 11:48

## 2017-07-05 RX ADMIN — SENNA PLUS 2 TABLET(S): 8.6 TABLET ORAL at 21:24

## 2017-07-05 RX ADMIN — CLOPIDOGREL BISULFATE 75 MILLIGRAM(S): 75 TABLET, FILM COATED ORAL at 11:48

## 2017-07-05 RX ADMIN — SODIUM CHLORIDE 3 MILLILITER(S): 9 INJECTION INTRAMUSCULAR; INTRAVENOUS; SUBCUTANEOUS at 06:53

## 2017-07-05 NOTE — ADVANCED PRACTICE NURSE CONSULT - ASSESSMENT
Patient presented with incontinence associated dermatitis/fungal rash on bilateral buttocks and bilateral posterior thighs; and wound on left upper posterior thigh of unknown etiology measuring 1 cm x 1 cm x 0.1 cm with pale red granulating tissue and scant serosanguinous, fungal rash surrounding wound. Patient reported wound on left upper thigh "has been there for months".  Unable to apply dressing to left upper posterior thigh due to incontinence.  Patient on AirTAP positioning system.   RN, [facility], present during assessment.

## 2017-07-05 NOTE — PROGRESS NOTE ADULT - SUBJECTIVE AND OBJECTIVE BOX
CC/ HPI:  88 yo female with chronic diastolic CHF (EF 50-55% 11/16'), mod to severe aortic stenosis, pAFIB, CAD s/p PCI x 3 stent, Lupus (not on meds), COPD, CKD (baseline Cr 2-3), admitted for CHF exacerbation complicated by hypercapnic respiratory failure, status post BAV, today without respiratory complaint.    PAST MEDICAL & SURGICAL HISTORY:  PVD (peripheral vascular disease)  Iron deficiency anemia  Lupus (systemic lupus erythematosus)  Paroxysmal atrial fibrillation  Aortic stenosis, severe  CKD (chronic kidney disease) stage 4, GFR 15-29 ml/min  Diabetes  HTN (hypertension)  CHF (congestive heart failure)  CAD (coronary artery disease)  COPD (chronic obstructive pulmonary disease)  History of total knee replacement: 2003  Cardiac pacemaker: at [facility] by Dr Escoto, 2011    SOCHX:  + tobacco,  -  alcohol    FMHX: FA/MO  - contributory     ROS reviewed below with positive findings marked (+) :  GEN:  fever, chills ENT: tracheostomy,   epistaxis,  sinusitis COR: +CAD, +CHF,  +HTN, +dysrhythmia PUL: +COPD, ILD, asthma, pneumonia GI: PEG, dysphagia, hemorrhage, other PETEY: +kidney disease, electrolyte disorder HEM:  +anemia, thrombus, coagulopathy, cancer ENDO:  thyroid disease, +diabetes mellitus CNS:  dementia, stroke, seizure, PSY:  depression, anxiety, other    MEDICATIONS  (STANDING):  ferrous    sulfate 325 milliGRAM(s) Oral every 8 hours  clopidogrel Tablet 75 milliGRAM(s) Oral daily  hydrocortisone 2.5% Cream 1 Application(s) Topical two times a day  Nephrocaps 1 Capsule(s) Oral daily  metoprolol succinate ER 12.5 milliGRAM(s) Oral daily  ALBUTerol/ipratropium for Nebulization 3 milliLiter(s) Nebulizer every 4 hours  atorvastatin 40 milliGRAM(s) Oral at bedtime  buDESOnide   0.25 milliGRAM(s) Respule 0.25 milliGRAM(s) Inhalation every 12 hours  sodium chloride 0.45%. 1000 milliLiter(s) (10 mL/Hr) IV Continuous <Continuous>  insulin lispro (HumaLOG) corrective regimen sliding scale   SubCutaneous every 6 hours  dextrose 5%. 1000 milliLiter(s) (50 mL/Hr) IV Continuous <Continuous>  dextrose 50% Injectable 12.5 Gram(s) IV Push once  dextrose 50% Injectable 25 Gram(s) IV Push once  dextrose 50% Injectable 25 Gram(s) IV Push once  pantoprazole    Tablet 40 milliGRAM(s) Oral before breakfast  sodium chloride 0.9% lock flush 3 milliLiter(s) IV Push every 8 hours  docusate sodium 100 milliGRAM(s) Oral three times a day  senna 2 Tablet(s) Oral at bedtime  furosemide    Tablet 80 milliGRAM(s) Oral every 12 hours  polyethylene glycol 3350 17 Gram(s) Oral two times a day  acetazolamide    Tablet 250 milliGRAM(s) Oral daily  gabapentin 100 milliGRAM(s) Oral every 8 hours    MEDICATIONS  (PRN):  acetylcysteine 20% Inhalation 5 milliLiter(s) Inhalation every 6 hours PRN shortness of breath  artificial  tears Solution 1 Drop(s) Both EYES three times a day PRN Dry Eyes  acetaminophen   Tablet. 650 milliGRAM(s) Oral every 6 hours PRN Mild Pain (1 - 3)  dextrose Gel 1 Dose(s) Oral once PRN Blood Glucose LESS THAN 70 milliGRAM(s)/deciliter  glucagon  Injectable 1 milliGRAM(s) IntraMuscular once PRN Glucose LESS THAN 70 milligrams/deciliter      Vital Signs Last 24 Hrs  T(C): 36.3 (05 Jul 2017 09:44), Max: 36.7 (05 Jul 2017 06:02)  T(F): 97.4 (05 Jul 2017 09:44), Max: 98 (05 Jul 2017 06:02)  HR: 74 (05 Jul 2017 09:02) (68 - 81)  BP: 93/50 (05 Jul 2017 09:02) (93/50 - 119/58)  BP(mean): 75 (05 Jul 2017 09:02) (75 - 119)  RR: 18 (05 Jul 2017 09:02) (15 - 18)  SpO2: 95% (05 Jul 2017 09:02) (95% - 96%)    GENERAL:         comfortable,  - distress.  HEENT:            - trauma,  - icterus,  - injection,  - nasal discharge.  NECK:              - jugular venous distention, - thyromegaly.  LYMPH:           - lymphadenopathy, - masses.  RESP:              + crackles,   - rhonchi,   - wheezes.   COR:                S1S2  + systolic murmur,  - gallops,  - rubs.  ABD:                bowel sounds,   soft, - tender, - distended, - organomegaly.  EXT/MSC:         - cyanosis,  - clubbing,  - edema.    NEURO:             alert,   responds to stimuli.                          10.0 ,  9.6   )-----------( 257      ( 05 Jul 2017 06:38 )             33.5       X-ray Chest (07.03.17)  No interval change congestion and/or infiltrates. Improvement bilateral effusions        ASSESSMENT/PLAN    1)  Status post BAV  2)  Cardiomyopathy  3)  Chronic obstructive pulmonary disease  4)  Pulmonary hypertension    Bronchodilators:  Atrovent/ albuterol q 4-6 hours.  Corticosteroids:  budesonide  Cardiac/HTN: post BAV, diuresis as needed  GI: Rx/ prophylaxis c PPI/H2B  Heme: Rx/VT AC  Discuss with medical team.

## 2017-07-05 NOTE — PROGRESS NOTE ADULT - PROBLEM SELECTOR PLAN 1
Admitted with acute CHF exacerbation 2/2 dietary non-compliance and critical AS. EF 50-55%, severely dilated LA.  -S/p successful BAV 6/27/17 for critical AS by Dr Barger.  -Daily weights. Leahy d/c'ed yesterday, passed void trail, although incontinent unable to monitor strict I/Os, maintaining net neg balance.  - Continue Toprol XL 12.5mg po daily  - Pulmonary edema improved but still present on CXR. Diuresis plan per Renal rec. Continue Lasix 80 mg PO BID. Diamox 250mg qd decreased yesterday per Renal rec as metabolic alkalosis stable.

## 2017-07-05 NOTE — PROGRESS NOTE ADULT - PROBLEM SELECTOR PLAN 3
Hypercapnic resp failure 2/2 pulmonary congestion 2/2 CHF 2/2 severe AS.   - Weaned down O2 requirements, currently on 2L NC w/ SpO2 >95%. Pulse ox 91-92% on RA while in bed, decreased to 88% in chair, cont NC 2L PRN.  - Pt refusing BIPAP at night    #COPD - c/w Duonebs q4h, Pulmicort INH

## 2017-07-05 NOTE — PROGRESS NOTE ADULT - PROBLEM/PLAN-3
DISPLAY PLAN FREE TEXT

## 2017-07-05 NOTE — PROGRESS NOTE ADULT - ASSESSMENT
84 y/o F w/ PMh of chronic diastolic CHF, moderate to severe AS (ALEKS 0.5cm2), pAF on coumadin, CAD s/p PCIx3, last cath required impella support , lupus, COPD, PNA, PPM 2011 for bradycardia, CKD w/ h/o renal failure requiring HD, HTN, PVD, DM, MONICA, TKA who was admitted to [facility] for CHF exacerbation. s/p BAV

## 2017-07-05 NOTE — PROGRESS NOTE ADULT - ASSESSMENT
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 Pt was admitted to [facility] but was transferred to CCU for hypercapnic respiratory failure s/p BiPAP. Pt now s/p BAV with Dr. Barger on 6/27/17. Transferred from  to [facility] for Heparin/Coumadin bridging, further diuresis and dispo planning.

## 2017-07-05 NOTE — ADVANCED PRACTICE NURSE CONSULT - RECOMMEDATIONS
Apply antifungal cream twice daily to bilateral buttocks and bilateral posterior thighs.  Discussed assessment and recommendations with Jessi and Brenda BLOCK.

## 2017-07-05 NOTE — PROGRESS NOTE ADULT - ASSESSMENT
84 y/o F w/ PMh of chronic diastolic CHF, moderate to severe AS (ALEKS 0.5cm2), pAF on coumadin, CAD s/p PCIx3, last cath required impella support , lupus, COPD, PNA, PPM 2011 for bradycardia, CKD w/ h/o renal failure requiring HD, HTN, PVD, DM, MONICA, TKA who was admitted to [facility] for CHF exacerbation. Pt was admitted to [facility] but was transferred to CCU for hypercapnic respiratory failure s/p BiPAP. Pt now s/p BAV with Dr. Barger on 6/27/17. Transferred from  to [facility] for Heparin/Coumadin bridging, further diuresis and dispo planning.

## 2017-07-05 NOTE — PROGRESS NOTE ADULT - PROBLEM SELECTOR PLAN 2
Critical AS, ALEKS 0.5, mean pressure gradient 43, mod pulm HTN, PASP 52.   -CTS Structural Heart signed off, patient s/p successful BAV 6/27/17 without complications.   - Repeat Echo 6/28 post BAV w/ ALEKS 1, mean pressure gradient 30  - Follow up w/ Dr Barger in 2 weeks 7/17/17 12:30pm.

## 2017-07-05 NOTE — PROGRESS NOTE ADULT - SUBJECTIVE AND OBJECTIVE BOX
CC: ACUTE CHRONIC CONGESTIVEHEART FAILURE UNSPECIFIE      INTERVAL HISTORY: sitting in chair      ROS: No chest pain, no sob, no abd pain. No n/v/d    PAST MEDICAL & SURGICAL HISTORY:  PVD (peripheral vascular disease)  Iron deficiency anemia  Lupus (systemic lupus erythematosus)  Paroxysmal atrial fibrillation  Aortic stenosis, severe  CKD (chronic kidney disease) stage 4, GFR 15-29 ml/min  Diabetes  HTN (hypertension)  CHF (congestive heart failure)  CAD (coronary artery disease)  COPD (chronic obstructive pulmonary disease)  History of total knee replacement: 2003  Presence of cardiac pacemaker: at [facility] by Dr Escoto      PHYSICAL EXAM:  T(C): 36.7 (07-05-17 @ 06:02), Max: 37.2 (07-04-17 @ 09:36)  HR: 74 (07-05-17 @ 09:02)  BP: 93/50 (07-05-17 @ 09:02) (93/50 - 119/58)  RR: 18 (07-05-17 @ 09:02)  SpO2: 95% (07-05-17 @ 09:02)  Wt(kg): --  I&O's Summary    04 Jul 2017 07:01  -  05 Jul 2017 07:00  --------------------------------------------------------  IN: 290 mL / OUT: 403 mL / NET: -113 mL      Weight   General: AAO x 3,  NAD.  HEENT: moist mucous membranes, no pallor/cyanosis.  Neck: no JVD visible.  Cardiac: S1, S2. RRR. No murmurs   Respratory: CTA b/l, no access muscle use.   Abdomen: soft. nontender. nondistended  Skin: no rashes.  Extremities: + LE edema b/l  Access:       DATA:                        10.0<L>  9.6   )-----------( 257      ( 05 Jul 2017 06:38 )             33.5<L>    Ferritin, Serum: 92.0 ng/mL (06-12 @ 05:42)      140    |  95<L>  |  87<H>  ----------------------------<  79     Ca:9.6   (05 Jul 2017 06:38)  4.3     |  32<H>  |  2.50<H>      eGFR if Non : 17 <L>  eGFR if : 19 <L>                        MEDICATIONS  (STANDING):  ferrous    sulfate 325 milliGRAM(s) Oral every 8 hours  clopidogrel Tablet 75 milliGRAM(s) Oral daily  hydrocortisone 2.5% Cream 1 Application(s) Topical two times a day  Nephrocaps 1 Capsule(s) Oral daily  metoprolol succinate ER 12.5 milliGRAM(s) Oral daily  ALBUTerol/ipratropium for Nebulization 3 milliLiter(s) Nebulizer every 4 hours  atorvastatin 40 milliGRAM(s) Oral at bedtime  buDESOnide   0.25 milliGRAM(s) Respule 0.25 milliGRAM(s) Inhalation every 12 hours  sodium chloride 0.45%. 1000 milliLiter(s) (10 mL/Hr) IV Continuous <Continuous>  insulin lispro (HumaLOG) corrective regimen sliding scale   SubCutaneous every 6 hours  dextrose 5%. 1000 milliLiter(s) (50 mL/Hr) IV Continuous <Continuous>  dextrose 50% Injectable 12.5 Gram(s) IV Push once  dextrose 50% Injectable 25 Gram(s) IV Push once  dextrose 50% Injectable 25 Gram(s) IV Push once  pantoprazole    Tablet 40 milliGRAM(s) Oral before breakfast  sodium chloride 0.9% lock flush 3 milliLiter(s) IV Push every 8 hours  docusate sodium 100 milliGRAM(s) Oral three times a day  senna 2 Tablet(s) Oral at bedtime  furosemide    Tablet 80 milliGRAM(s) Oral every 12 hours  polyethylene glycol 3350 17 Gram(s) Oral two times a day  acetazolamide    Tablet 250 milliGRAM(s) Oral daily  gabapentin 100 milliGRAM(s) Oral every 8 hours    MEDICATIONS  (PRN):  acetylcysteine 20% Inhalation 5 milliLiter(s) Inhalation every 6 hours PRN shortness of breath  artificial  tears Solution 1 Drop(s) Both EYES three times a day PRN Dry Eyes  acetaminophen   Tablet. 650 milliGRAM(s) Oral every 6 hours PRN Mild Pain (1 - 3)  dextrose Gel 1 Dose(s) Oral once PRN Blood Glucose LESS THAN 70 milliGRAM(s)/deciliter  glucagon  Injectable 1 milliGRAM(s) IntraMuscular once PRN Glucose LESS THAN 70 milligrams/deciliter

## 2017-07-05 NOTE — PROGRESS NOTE ADULT - PROBLEM SELECTOR PLAN 7
Baseline Creatinine per daughter 2-3. Currently at baseline.  - Renal following to assist with diuresis.   -Cont Furosemide IV and Diamox 250mg qd for alkalosis per renal recs.   -Renal consulted. F/u recs. (Dr. Ramirez-her outpt Nephrologist)  -Leahy catheter d/c'ed yesterday, monitor daily weights   -Renally dose all meds  - Completed Ceftriaxone 7 days course for complicated UTI w/ Klesiella on UCx. No leukocytosis or fevers.

## 2017-07-05 NOTE — PROGRESS NOTE ADULT - PROBLEM SELECTOR PROBLEM 6
Diabetes
Aortic stenosis, severe
Diabetes
Acute pulmonary edema
Aortic stenosis, severe
COPD (chronic obstructive pulmonary disease)
Diabetes

## 2017-07-05 NOTE — PROGRESS NOTE ADULT - PROBLEM SELECTOR PLAN 9
Wound Care was consulted previously 6/23/17 w/ documentation of Left pressure ulcer to Left buttock/upper thigh.  -Wound care reconsulted today: does not appear to be pressure ulcer, is wound of unclear etiology 1cm x 1cm. Also noted w/ incontinence associated dermatitis/fungal rash to romeo buttock/posterior thighs  -Turning and positioning q2 hours.  -Apply Crit[employer] antifungal cream to bilateral buttocks & romeo posterior thighs BID per Wound Care recs.

## 2017-07-05 NOTE — PROGRESS NOTE ADULT - SUBJECTIVE AND OBJECTIVE BOX
cc: follow-up evaluation and management of acute/chronic diastolic chf and aortic stenosis    subjective: no new complaints    PAST MEDICAL & SURGICAL HISTORY:  PVD (peripheral vascular disease)  Iron deficiency anemia  Lupus (systemic lupus erythematosus)  Paroxysmal atrial fibrillation  Aortic stenosis, severe  CKD (chronic kidney disease) stage 4, GFR 15-29 ml/min  Diabetes  HTN (hypertension)  CHF (congestive heart failure)  CAD (coronary artery disease)  COPD (chronic obstructive pulmonary disease)  History of total knee replacement:   Presence of cardiac pacemaker: at [facility] by Dr Escoto    MEDICATIONS  (STANDING):  ferrous    sulfate 325 milliGRAM(s) Oral every 8 hours  clopidogrel Tablet 75 milliGRAM(s) Oral daily  hydrocortisone 2.5% Cream 1 Application(s) Topical two times a day  Nephrocaps 1 Capsule(s) Oral daily  metoprolol succinate ER 12.5 milliGRAM(s) Oral daily  ALBUTerol/ipratropium for Nebulization 3 milliLiter(s) Nebulizer every 4 hours  atorvastatin 40 milliGRAM(s) Oral at bedtime  buDESOnide   0.25 milliGRAM(s) Respule 0.25 milliGRAM(s) Inhalation every 12 hours  sodium chloride 0.45%. 1000 milliLiter(s) (10 mL/Hr) IV Continuous <Continuous>  insulin lispro (HumaLOG) corrective regimen sliding scale   SubCutaneous every 6 hours  dextrose 5%. 1000 milliLiter(s) (50 mL/Hr) IV Continuous <Continuous>  dextrose 50% Injectable 12.5 Gram(s) IV Push once  dextrose 50% Injectable 25 Gram(s) IV Push once  dextrose 50% Injectable 25 Gram(s) IV Push once  pantoprazole    Tablet 40 milliGRAM(s) Oral before breakfast  sodium chloride 0.9% lock flush 3 milliLiter(s) IV Push every 8 hours  docusate sodium 100 milliGRAM(s) Oral three times a day  senna 2 Tablet(s) Oral at bedtime  furosemide    Tablet 80 milliGRAM(s) Oral every 12 hours  acetazolamide    Tablet 250 milliGRAM(s) Oral daily  gabapentin 100 milliGRAM(s) Oral every 8 hours    MEDICATIONS  (PRN):  acetylcysteine 20% Inhalation 5 milliLiter(s) Inhalation every 6 hours PRN shortness of breath  artificial  tears Solution 1 Drop(s) Both EYES three times a day PRN Dry Eyes  acetaminophen   Tablet. 650 milliGRAM(s) Oral every 6 hours PRN Mild Pain (1 - 3)  dextrose Gel 1 Dose(s) Oral once PRN Blood Glucose LESS THAN 70 milliGRAM(s)/deciliter  glucagon  Injectable 1 milliGRAM(s) IntraMuscular once PRN Glucose LESS THAN 70 milligrams/deciliter    ICU Vital Signs Last 24 Hrs  T(C): 36.7 (2017 21:48), Max: 36.7 (2017 06:02)  T(F): 98 (2017 21:48), Max: 98 (2017 06:02)  HR: 74 (2017 18:11) (68 - 88)  BP: 99/51 (2017 18:11) (93/50 - 114/63)  BP(mean): 77 (2017 18:11) (72 - 87)  ABP: --  ABP(mean): --  RR: 16 (2017 18:11) (15 - 18)  SpO2: 96% (2017 18:11) (95% - 98%)    PHYSICAL EXAM:  General: nad; supine in bed  heent - carotid radiation of murmur  Cardiac: irregular; systolic murmur present at base  Pulmonary:  improving breath sounds  Abdomen: soft abd; nt  extremities:edema improving  vasc -pulses present    LABS:                        10.0   9.6   )-----------( 257      ( 2017 06:38 )             33.5   07-05    140  |  95<L>  |  87<H>  ----------------------------<  79  4.3   |  32<H>  |  2.50<H>    Ca    9.6      2017 06:38  Mg     2.1     07-05    PT/INR - ( 2017 06:38 )   PT: 36.2 sec;   INR: 3.19          PTT - ( 2017 10:30 )  PTT:34.5 sec          DIAGNOSTIC TESTS:     < from: Xray Chest 1 View AP- PORTABLE-Urgent (17 @ 08:29) >  EXAM:  XR CHEST 1 VIEW PORT URGENT                          PROCEDURE DATE:  2017                     INTERPRETATION:  Clinical History: Shortness of breath    Portable examination the chest demonstrates no interval change lung   infiltrates in comparison to prior examination of the chest 2017.   Bilateral effusions. No interval change position remaining support   devices.    Impression: No interval change lung pathology            "Thank you for the opportunity to participate in thecare of this   patient."        KATHARINA MOROCHO M.D., ATTENDING RADIOLOGIST  This document has been electronically signed. 2017 10:13AM    < end of copied text >      < from: Xray Chest 1 View AP -PORTABLE-Routine (17 @ 03:40) >  EXAM:  XR CHEST 1 VIEW PORT ROUTINE                          PROCEDURE DATE:  2017                     INTERPRETATION:  HISTORY: Extubated, follow-up    Portable AP view is compared to prior radiograph of 2017.    Endotracheal tube has been removed. No pneumothorax is evident, although   the superior-most lung apices are excluded from view. There has been   slight improvement in infiltrate in the right upper lung, with otherwise   little change in bilateral pulmonary infiltrates and effusions (right   more extensive than left), and there has otherwise been no interval   change.    IMPRESSION: Status post extubation. Slight improvement in infiltrate in   the right upper lung, with otherwise little change in bilateral pulmonary   infiltrates and effusions (right more extensive than left).            "Thank you for the opportunity to participate in the care of this   patient."        CHINO ROMAN M.D. ATTENDING RADIOLOGIST  This document has been electronically signed. 2017  1:16PM        < end of copied text >            < from: Xray Chest 1 View AP-PORTABLE IMMEDIATE (17 @ 12:07) >    EXAM:  XR CHEST 1 VIEW PORT IMMEDIATE                          PROCEDURE DATE:  2017                     INTERPRETATION:  Portable chest    History: Post [facility] bronchoscopy exam x-ray. Follow-up abnormal exam.    Scattered lung infiltrates again noted. Overall similar appearance to   prior exam earlier same day. Endotracheal tube in satisfactory position.   No pneumothorax identified.            "Thank you for the opportunity to participate in the care of this   patient."        JUDE LOPEZ M.D., ATTENDING RADIOLOGIST  This document has been electronically signed. 2017  3:03PM    < end of copied text >      < from: Echocardiogram (17 @ 12:03) >  EXAM:  ECHOCARDIOGRAM (CARDIOL)                          PROCEDURE DATE:  2017                        INTERPRETATION:  Patient Height: 165.0 cm  Patient Weight: 84.0 kg  Systolic Pressure: 128 mmHg  Diastolic Pressure: 52 mmHg  BSA: 1.9 m^2  Interpretation Summary  A complete two-dimensional transthoracic echocardiogram was performed (2D,   M-mode, spectral and color flow doppler).  Study Quality: Fair.Left   ventricular hypertrophy presentThe ejection fraction could not be   accurately  calculated (poor endocardiac resolution)  The left atrium is severely   dilated.Right atrial size is normal.The right ventricle is normal in   size and   function.Abnormal (paradoxical) septal motion consistent with   LBBBCalcified   aortic valve.There is trace to mild aortic regurgitation.There is   Moderate to   severe aortic stenosis.The peak pressure gradient is 45 mmHg.The mean   pressure   gradient is 30 mmHg.The calculated aortic valve area using the continuity   equation is 1 cm2.The calculated stroke volume index is 39 cc/m2 (normal   >35cc/m2).There is moderate mitral annular calcification.There is mild   mitral   regurgitation.Structurally normal tricuspid valve.Structurally normal   pulmonic   valve.  Procedure Details  A completetwo-dimensional transthoracic echocardiogram was performed (2D,  M-mode, spectral and color flow doppler).  Study Quality: Fair.  Left Ventricle  Left ventricular hypertrophy present  The ejection fraction could not be accurately calculated (poor endocardiac  resolution)  Abnormal (paradoxical) septal motion consistent with LBBB  Left Atrium  The left atrium is severely dilated.  Right Atrium  Right atrial size is normal.  Right Ventricle  The right ventricle is normal in size and function.  Aortic Valve  Calcified aortic valve.  There is trace to mild aortic regurgitation.  There is Moderate to severe aortic stenosis.  The peak pressure gradient is 45 mmHg.  The mean pressure gradient is 30 mmHg.  The calculated aortic valve area using the continuity equation is 1 cm2.  The calculated stroke volume index is 39 cc/m2 (normal >35cc/m2).  Mitral Valve  There is moderate mitral annular calcification.  There is mild mitral regurgitation.  Tricuspid Valve  Structurally normal tricuspid valve.  Pulmonic Valve  Structurally normal pulmonic valve.  Arteries and Venous System  No aortic root dilatation.  The IVC is dilated (>2.1 cm) with an abnormal inspiratory collapse (<50%)  consistent with elevated right atrial pressure.  Pericardium / Pleura  There is no pericardial effusion.  Doppler Measurements & Calculations  MV E point: 140.2 cm/sec  MV A point: 53.4 cm/sec  MV E/A: 2.6  MV dec slope: 636.0 cm/sec^2  Ao V2 max: 319.4 cm/sec  Ao max P.8 mmHg  Ao max PG (full): 36.2 mmHg  Ao V2 mean: 251.5 cm/sec  Ao mean P.0 mmHg  Ao mean PG (full): 24.4 mmHg  Ao V2 VTI: 80.8 cm  ALEKS(I,A): 0.9 cm^2  ALEKS(I,D): 0.9 cm^2  ALEKS(V,A): 1.1 cm^2  ALEKS(V,D): 1.1 cm^2  LV max P.7 mmHg  LV mean P.6 mmHg  LV V1 max: 108 cm/sec  LV V1 mean: 76.1 cm/sec  LV V1 VTI: 24.0 cm  MR max graciela: 538.0 cm/sec  MR max P.8 mmHg  MR mean graciela: 400.3 cm/sec  MR mean P.0 mmHg  MR VTI: 181.9 cm  SV(Ao): 690.6 ml  SI(Ao): 360.9 ml/m^2  SV(LVOT): 75.0 ml  SI(LVOT): 39.2 ml/m^2  TR Max graciela: 252.8cm/sec  MMode 2D Measurements & Calculations  IVSd: 1.2 cm  LVIDd: 6.4 cm  LVIDs: 5.3 cm  LVPWd: 1.1 cm  IVS/LVPW: 1.0  FS: 18.0 %  EDV(Teich): 209.4 ml  ESV(Teich): 132.7 ml  LV mass(C)d: 331.0 grams  LV mass(C)dI: 173.0 grams/m^2  SI(cubed): 61.9 ml/m^2  Ao root diam: 3.3 cm  Ao root area: 8.5 cm^2  LA dimension: 4.5 cm  LA/Ao: 1.4  LVOT diam: 2.0 cm  LVOT area: 3.1 cm^2  LVOT area (M): 3.1 cm^2  EDV(MOD-sp4): 112 ml  EDV(MOD-sp2): 105 ml  Interpreting Physician:Perry Huynh MD electronicallysigned on   2017 12:24:22                "Thank you for the opportunity to participate in the care of this   patient."        PERRY HUYNH M.D., ATTENDING CARDIOLOGIST  This document has been electronically signed. 2017 12:24PM        < end of copied text >        EXAM:  XR CHEST 1 VIEW PORT URGENT                          PROCEDURE DATE:  2017                     INTERPRETATION:  Clinical History: Congestion    Portable examination the chest demonstrates no interval change congestion   and/or infiltrates in comparison to prior examination of the chest   2017. Bilateral effusions. No interval change position remaining   support devices. Cardiomegaly.    Impression: No interval change lung pathology            "Thank you for the opportunity to participate in the care of this   patient."        KATHARINA MOROCHO M.D., ATTENDING RADIOLOGIST  This document has been electronically signed. 2017 10:38AM      2017 ecg reviewed on 2017    EXAM:  XR CHEST 1 VIEW PORT ROUTINE                          PROCEDURE DATE:  2017                     INTERPRETATION:  Indication: chf    A single portable view of the chest is submitted. Comparison is made to   the most recent prior study dated 2017. Bilateral pulmonary   infiltrates and effusions are similar to the previous examination.   Impression:    No significant interval change            "Thank you for the opportunity to participate in the care of this   patient."        MASOUD WORKMAN M.D., ATTENDING RADIOLOGIST  This document has been electronically signed. 2017  4:33PM

## 2017-07-05 NOTE — ADVANCED PRACTICE NURSE CONSULT - REASON FOR CONSULT
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 with cc of inability to get OOB x 3 days. Pt states that her "legs were too weak". Pt is a poor historian and HPI obtained from her daughter. Pt's daughter states that her mother was unable to get OOB for 3 days and pt used her Life Alert x3 times to call for help-was told to go to the ED.

## 2017-07-05 NOTE — PROGRESS NOTE ADULT - PROBLEM SELECTOR PROBLEM 9
Pressure injury of skin, stage 2

## 2017-07-05 NOTE — PROGRESS NOTE ADULT - PROBLEM SELECTOR PROBLEM 8
Normocytic anemia

## 2017-07-05 NOTE — PROGRESS NOTE ADULT - ASSESSMENT
1) acute/chronic diastolic chf with known ckd  -i/o's and daily weights  2) nonrheumatic aortic stenosis  -s/p bav -follow-up per cts recs  3) atrial fibrillation; presence of pacemaker  -case discussed with Dr. Carmichael on 7-4-2017 - per Dr. Carmichael, no indication for repeat ppm device check prior to discharge  -coumadin dose adjusted  4) CAD s/p PCI   -no cp now  5) COPD - f/u with Pulm after discharge  6) PVD - continue rx as tolerated  7) DM - holding ace-i/arb with ckd  8) normocytic anemia - serial h/h  9) skin lesion - appreciate wound care recs  10) ppx: therapeutic ac; ppi   11) maintain  K>4 Mg>2  12) d/c planning to [place]  d/w NP earlier today

## 2017-07-05 NOTE — PROGRESS NOTE ADULT - SUBJECTIVE AND OBJECTIVE BOX
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 with cc of inability to get OOB x 3 days. Pt states that her "legs were too weak". Pt is a poor historian and HPI obtained from her daughter. Pt's daughter states that her mother was unable to get OOB for 3 days and pt used her Life Alert x3 times to call for help-was told to go to the ED. Reports that she had been "tired" for several day, SOB w/ minimal exertion for "several months" and her mobility has declined since her discharge from rehab in December. Additionally, pt has gained 17 lbs over the past month (from 258 to 275) and non-compliant with her diet. As per daughter, pt has been hospitalized prior 2-3 yrs ago (? [facility]) for CHF exacerbation w/ aggressive diuresis. Of note, pt was evaluation by Dr. Barger for aortic stenosis in December 16'. However, due to her deconditioned status during that time, pt was recommended medical management.     S/P BAV         Pt seen and examined sitting in chair. Eager to go to rehab. Denies chest pain or sob.        TELEMETRY: Afib w/ V-pacing 70-80s      ICU Vital Signs Last 24 Hrs  T(C): 36.5 (05 Jul 2017 16:32), Max: 36.7 (05 Jul 2017 06:02)  T(F): 97.7 (05 Jul 2017 16:32), Max: 98 (05 Jul 2017 06:02)  HR: 74 (05 Jul 2017 18:11) (68 - 88)  BP: 99/51 (05 Jul 2017 18:11) (93/50 - 114/63)  BP(mean): 77 (05 Jul 2017 18:11) (72 - 87)  ABP: --  ABP(mean): --  RR: 16 (05 Jul 2017 18:11) (15 - 18)  SpO2: 96% (05 Jul 2017 18:11) (95% - 98%)          PHYSICAL EXAM:    General: A/ox 3, No acute Distress, obese  Neck: Supple, NO JVD  Cardiac: S1 S2, grade III/VI systolic murmur  Pulmonary: R lung CTA, L basilar crackles much improved,  Abdomen: Soft, Non-tender, +BS x 4 quads  Extremities: No Rashes, 1+ romeo LE edema  Neuro: A/o x 3, No focal deficits          LABS:                          10.0   9.6   )-----------( 257      ( 05 Jul 2017 06:38 )             33.5                              07-05    140  |  95<L>  |  87<H>  ----------------------------<  79  4.3   |  32<H>  |  2.50<H>    Ca    9.6      05 Jul 2017 06:38  Mg     2.1     07-05      PT/INR - ( 05 Jul 2017 06:38 )   PT: 36.2 sec;   INR: 3.19          PTT - ( 04 Jul 2017 10:30 )  PTT:34.5 sec  CAPILLARY BLOOD GLUCOSE  106 (05 Jul 2017 11:30)  84 (05 Jul 2017 06:02)  89 (04 Jul 2017 21:58)  86 (04 Jul 2017 16:14)                  No Known Allergies    MEDICATIONS  (STANDING):  ferrous    sulfate 325 milliGRAM(s) Oral every 8 hours  clopidogrel Tablet 75 milliGRAM(s) Oral daily  hydrocortisone 2.5% Cream 1 Application(s) Topical two times a day  Nephrocaps 1 Capsule(s) Oral daily  metoprolol succinate ER 12.5 milliGRAM(s) Oral daily  ALBUTerol/ipratropium for Nebulization 3 milliLiter(s) Nebulizer every 4 hours  atorvastatin 40 milliGRAM(s) Oral at bedtime  buDESOnide   0.25 milliGRAM(s) Respule 0.25 milliGRAM(s) Inhalation every 12 hours  sodium chloride 0.45%. 1000 milliLiter(s) (10 mL/Hr) IV Continuous <Continuous>  insulin lispro (HumaLOG) corrective regimen sliding scale   SubCutaneous every 6 hours  dextrose 5%. 1000 milliLiter(s) (50 mL/Hr) IV Continuous <Continuous>  dextrose 50% Injectable 12.5 Gram(s) IV Push once  dextrose 50% Injectable 25 Gram(s) IV Push once  dextrose 50% Injectable 25 Gram(s) IV Push once  pantoprazole    Tablet 40 milliGRAM(s) Oral before breakfast  sodium chloride 0.9% lock flush 3 milliLiter(s) IV Push every 8 hours  docusate sodium 100 milliGRAM(s) Oral three times a day  senna 2 Tablet(s) Oral at bedtime  furosemide    Tablet 80 milliGRAM(s) Oral every 12 hours  acetazolamide    Tablet 250 milliGRAM(s) Oral daily  gabapentin 100 milliGRAM(s) Oral every 8 hours    MEDICATIONS  (PRN):  acetylcysteine 20% Inhalation 5 milliLiter(s) Inhalation every 6 hours PRN shortness of breath  artificial  tears Solution 1 Drop(s) Both EYES three times a day PRN Dry Eyes  acetaminophen   Tablet. 650 milliGRAM(s) Oral every 6 hours PRN Mild Pain (1 - 3)  dextrose Gel 1 Dose(s) Oral once PRN Blood Glucose LESS THAN 70 milliGRAM(s)/deciliter  glucagon  Injectable 1 milliGRAM(s) IntraMuscular once PRN Glucose LESS THAN 70 milligrams/deciliter        DIAGNOSTIC TESTS:

## 2017-07-05 NOTE — PROGRESS NOTE ADULT - PROBLEM SELECTOR PLAN 5
Afib with slow ventricular response s/p PPM 2011 2/2 symptomatic bradycardia.  -Continue rate control w/ Toprol  -PPM interrogation WNL. Per Dr Sotomayor no need for reinterrogation of PPM s/p BAV as BAV sites not near PPM leads  - Continue AC with Coumadin. Check daily INR, INR 3.19 today, will decrease Coumadin to 1mg today 2/2 abrupt increase in INR compared to INR 2.61 yesterday. Recheck INR in 2 days at rehab.

## 2017-07-05 NOTE — PROGRESS NOTE ADULT - PROBLEM SELECTOR PLAN 5
Afib with slow ventricular response s/p PPM 2011 2/2 symptomatic bradycardia.  -Continue rate control w/ Toprol  -PPM interrogation WNL. Per Dr Sotomayor no need for reinterrogation of PPM s/p BAV as BAV sites not near PPM leads  - Continue AC with Coumadin. Check daily INR, INR 3.19 today, will d/c with Coumadin 1.5mg qd. Recheck INR in 2days at rehab. Afib with slow ventricular response s/p PPM 2011 2/2 symptomatic bradycardia.  -Continue rate control w/ Toprol  -PPM interrogation WNL. Per Dr Sotomayor no need for reinterrogation of PPM s/p BAV as BAV sites not near PPM leads  - Continue AC with Coumadin. Check daily INR, INR 3.19 today, will decrease Coumadin to 1mg today 2/2 abrupt increase in INR compared to INR 2.61 yesterday. Recheck INR in 2 days at rehab.

## 2017-07-05 NOTE — PROGRESS NOTE ADULT - PROBLEM SELECTOR PLAN 2
Lasix and Diamox  Diamox dose decreased yesterday to 250mg PO qd  alkalosis stable  maintain negative fluid balance- CXR with decreased congestion

## 2017-07-05 NOTE — PROGRESS NOTE ADULT - PROBLEM SELECTOR PROBLEM 10
Need for prophylactic measure

## 2017-07-05 NOTE — PROGRESS NOTE ADULT - SUBJECTIVE AND OBJECTIVE BOX
Patient seen and examined at bedside.     Patient feels better at present. No new complaints at present overnight.    Vital Signs Last 24 Hrs  T(C): 36.7 (07-05-17 @ 06:02), Max: 37.2 (07-04-17 @ 09:36)  HR: 76 (07-05-17 @ 05:17) (68 - 81)  BP: 114/59 (07-05-17 @ 05:17) (109/56 - 119/58)  RR: 17 (07-05-17 @ 05:17) (15 - 18)  SpO2: 96% (07-05-17 @ 05:17) (95% - 97%)      Review of Systems:    CONSTITUTIONAL: No fever, weight loss, or fatigue  EYES: No eye pain, visual disturbances, or discharge  ENMT:  No difficulty hearing, tinnitus, vertigo; No sinus or throat pain  NECK: No pain or stiffness  BREASTS: No pain, masses, or nipple discharge  RESPIRATORY: No cough, wheezing, chills or hemoptysis; No shortness of breath  CARDIOVASCULAR: No chest pain, palpitations, dizziness, or leg swelling  GASTROINTESTINAL: No abdominal or epigastric pain. No nausea, vomiting, or hematemesis; No diarrhea or constipation. No melena or hematochezia.  GENITOURINARY: No dysuria, frequency, hematuria, or incontinence  NEUROLOGICAL: No headaches, memory loss, loss of strength, numbness, or tremors  SKIN: No itching, burning, rashes, or lesions   LYMPH NODES: No enlarged glands  ENDOCRINE: No heat or cold intolerance; No hair loss  MUSCULOSKELETAL: No joint pain or swelling; No muscle, back, or extremity pain  PSYCHIATRIC: No depression, anxiety, mood swings, or difficulty sleeping  HEME/LYMPH: No easy bruising, or bleeding gums  ALLERY AND IMMUNOLOGIC: No hives or eczema    PHYSICAL EXAM:  GENERAL: NAD, well-developed, well nourished, alert, awake, no acute distress at present  HEAD:  Atraumatic, Normocephalic,   EYES: EOMI, ED, conjunctiva and sclera clear, no nystagmus  Oral cavity: Oral mucosa dry and pink, no oral lesions or ulcers, dental caries+nt  NECK: Neck supple, No JVD, no palpable thyromegaly, no lymphadenopahy  CHEST/LUNG: Clear to auscultation bilaterally; No wheeze, no rales, no crepitations  HEART: Regular rate and rhythm. MICKY II/VI at LPSB, No gallop, no rub   ABDOMEN: Soft, Nontender, Nondistended; Bowel sounds present, no guarding, no rigidity, no rebound tenderness, No flank or CVA angle tenderness, no palpable or pulsatile mass noted.   EXTREMITIES:  Bilateral lower extremity 2+ Peripheral Pulses, No clubbing, cyanosis, or edema, no calf tenderness  Neurology: AAOx3, no focal neurological deficit. Motor 5/5 all 4 extremities. sensory intact. cranial nerves II to XII grossly intact. Able to comprehend and answers all questions appropriately. No neck stiffness.   SKIN: No rashes or lesions    LABS:    07-04    140  |  95<L>  |  92<H>  ----------------------------<  91  4.2   |  33<H>  |  2.40<H>    Ca    9.6      04 Jul 2017 10:30  Mg     2.2     07-04                            10.0   9.6   )-----------( 257      ( 05 Jul 2017 06:38 )             33.5     PT/INR - ( 05 Jul 2017 06:38 )   PT: 36.2 sec;   INR: 3.19          PTT - ( 04 Jul 2017 10:30 )  PTT:34.5 sec            Allergies    No Known Allergies    Intolerances          07-04 @ 07:01  -  07-05 @ 07:00  --------------------------------------------------------  IN:    Oral Fluid: 290 mL  Total IN: 290 mL    OUT:    Indwelling Catheter - Urethral: 400 mL    Voided: 3 mL  Total OUT: 403 mL    Total NET: -113 mL                MEDICATIONS  (STANDING):  ferrous    sulfate 325 milliGRAM(s) Oral every 8 hours  clopidogrel Tablet 75 milliGRAM(s) Oral daily  hydrocortisone 2.5% Cream 1 Application(s) Topical two times a day  Nephrocaps 1 Capsule(s) Oral daily  metoprolol succinate ER 12.5 milliGRAM(s) Oral daily  ALBUTerol/ipratropium for Nebulization 3 milliLiter(s) Nebulizer every 4 hours  atorvastatin 40 milliGRAM(s) Oral at bedtime  buDESOnide   0.25 milliGRAM(s) Respule 0.25 milliGRAM(s) Inhalation every 12 hours  sodium chloride 0.45%. 1000 milliLiter(s) (10 mL/Hr) IV Continuous <Continuous>  insulin lispro (HumaLOG) corrective regimen sliding scale   SubCutaneous every 6 hours  dextrose 5%. 1000 milliLiter(s) (50 mL/Hr) IV Continuous <Continuous>  dextrose 50% Injectable 12.5 Gram(s) IV Push once  dextrose 50% Injectable 25 Gram(s) IV Push once  dextrose 50% Injectable 25 Gram(s) IV Push once  pantoprazole    Tablet 40 milliGRAM(s) Oral before breakfast  sodium chloride 0.9% lock flush 3 milliLiter(s) IV Push every 8 hours  docusate sodium 100 milliGRAM(s) Oral three times a day  senna 2 Tablet(s) Oral at bedtime  furosemide    Tablet 80 milliGRAM(s) Oral every 12 hours  polyethylene glycol 3350 17 Gram(s) Oral two times a day  acetazolamide    Tablet 250 milliGRAM(s) Oral daily  gabapentin 100 milliGRAM(s) Oral every 8 hours    MEDICATIONS  (PRN):  acetylcysteine 20% Inhalation 5 milliLiter(s) Inhalation every 6 hours PRN shortness of breath  artificial  tears Solution 1 Drop(s) Both EYES three times a day PRN Dry Eyes  acetaminophen   Tablet. 650 milliGRAM(s) Oral every 6 hours PRN Mild Pain (1 - 3)  dextrose Gel 1 Dose(s) Oral once PRN Blood Glucose LESS THAN 70 milliGRAM(s)/deciliter  glucagon  Injectable 1 milliGRAM(s) IntraMuscular once PRN Glucose LESS THAN 70 milligrams/deciliter      CAPILLARY BLOOD GLUCOSE  84 (05 Jul 2017 06:02)  89 (04 Jul 2017 21:58)  86 (04 Jul 2017 16:14)  101 (04 Jul 2017 11:29)            RADIOLOGY & ADDITIONAL STUDIES:

## 2017-07-05 NOTE — PROGRESS NOTE ADULT - PROBLEM SELECTOR PROBLEM 7
CKD (chronic kidney disease) stage 4, GFR 15-29 ml/min

## 2017-07-05 NOTE — PROGRESS NOTE ADULT - SUBJECTIVE AND OBJECTIVE BOX
CARDIOLOGY NP PROGRESS NOTE    Subjective: Pt seen and examined sitting in chair. Eager to go to rehab. Denies chest pain or sob.    Overnight Events: None    TELEMETRY: Afib w/ V-pacing 70-80s        VITAL SIGNS:  T(C): 36.3 (07-05-17 @ 09:44), Max: 36.7 (07-05-17 @ 06:02)  HR: 74 (07-05-17 @ 09:02) (68 - 81)  BP: 93/50 (07-05-17 @ 09:02) (93/50 - 119/58)  RR: 18 (07-05-17 @ 09:02) (15 - 18)  SpO2: 95% (07-05-17 @ 09:02) (95% - 96%)  Wt(kg): --    I&O's Summary    04 Jul 2017 07:01  -  05 Jul 2017 07:00  --------------------------------------------------------  IN: 290 mL / OUT: 403 mL / NET: -113 mL          PHYSICAL EXAM:    General: A/ox 3, No acute Distress, obese  Neck: Supple, NO JVD  Cardiac: S1 S2, grade III/VI systolic murmur  Pulmonary: R lung CTA, L basilar crackles much improved, Breathing unlabored on 2L NC O2, No Rhonchi/Wheezing  Abdomen: Soft, Non-tender, +BS x 4 quads  Extremities: No Rashes, 1+ romeo LE edema  Neuro: A/o x 3, No focal deficits          LABS:                          10.0   9.6   )-----------( 257      ( 05 Jul 2017 06:38 )             33.5                              07-05    140  |  95<L>  |  87<H>  ----------------------------<  79  4.3   |  32<H>  |  2.50<H>    Ca    9.6      05 Jul 2017 06:38  Mg     2.1     07-05      PT/INR - ( 05 Jul 2017 06:38 )   PT: 36.2 sec;   INR: 3.19          PTT - ( 04 Jul 2017 10:30 )  PTT:34.5 sec  CAPILLARY BLOOD GLUCOSE  106 (05 Jul 2017 11:30)  84 (05 Jul 2017 06:02)  89 (04 Jul 2017 21:58)  86 (04 Jul 2017 16:14)                  No Known Allergies    MEDICATIONS  (STANDING):  ferrous    sulfate 325 milliGRAM(s) Oral every 8 hours  clopidogrel Tablet 75 milliGRAM(s) Oral daily  hydrocortisone 2.5% Cream 1 Application(s) Topical two times a day  Nephrocaps 1 Capsule(s) Oral daily  metoprolol succinate ER 12.5 milliGRAM(s) Oral daily  ALBUTerol/ipratropium for Nebulization 3 milliLiter(s) Nebulizer every 4 hours  atorvastatin 40 milliGRAM(s) Oral at bedtime  buDESOnide   0.25 milliGRAM(s) Respule 0.25 milliGRAM(s) Inhalation every 12 hours  sodium chloride 0.45%. 1000 milliLiter(s) (10 mL/Hr) IV Continuous <Continuous>  insulin lispro (HumaLOG) corrective regimen sliding scale   SubCutaneous every 6 hours  dextrose 5%. 1000 milliLiter(s) (50 mL/Hr) IV Continuous <Continuous>  dextrose 50% Injectable 12.5 Gram(s) IV Push once  dextrose 50% Injectable 25 Gram(s) IV Push once  dextrose 50% Injectable 25 Gram(s) IV Push once  pantoprazole    Tablet 40 milliGRAM(s) Oral before breakfast  sodium chloride 0.9% lock flush 3 milliLiter(s) IV Push every 8 hours  docusate sodium 100 milliGRAM(s) Oral three times a day  senna 2 Tablet(s) Oral at bedtime  furosemide    Tablet 80 milliGRAM(s) Oral every 12 hours  acetazolamide    Tablet 250 milliGRAM(s) Oral daily  gabapentin 100 milliGRAM(s) Oral every 8 hours    MEDICATIONS  (PRN):  acetylcysteine 20% Inhalation 5 milliLiter(s) Inhalation every 6 hours PRN shortness of breath  artificial  tears Solution 1 Drop(s) Both EYES three times a day PRN Dry Eyes  acetaminophen   Tablet. 650 milliGRAM(s) Oral every 6 hours PRN Mild Pain (1 - 3)  dextrose Gel 1 Dose(s) Oral once PRN Blood Glucose LESS THAN 70 milliGRAM(s)/deciliter  glucagon  Injectable 1 milliGRAM(s) IntraMuscular once PRN Glucose LESS THAN 70 milligrams/deciliter        DIAGNOSTIC TESTS:

## 2017-07-05 NOTE — PROGRESS NOTE ADULT - PROBLEM SELECTOR PLAN 9
Left pressure ulcer to Left buttock/upper thigh for which Wound Care was consulted previously 6/23.   -Wound care reconsulted today: does not appear to be pressure ulcer, is wound of unclear etiology 1cm x 1cm. Also noted w/ incontinence associated dermatitis/fungal rash to romeo buttock/posterior thighs  -Turning and positioning q2 hours.  -Apply [employer] antifungal cream to bilateral buttocks & romeo posterior thigns BID. Wound Care was consulted previously 6/23/17 w/ documentation of Left pressure ulcer to Left buttock/upper thigh.  -Wound care reconsulted today: does not appear to be pressure ulcer, is wound of unclear etiology 1cm x 1cm. Also noted w/ incontinence associated dermatitis/fungal rash to romeo buttock/posterior thighs  -Turning and positioning q2 hours.  -Apply [employer] antifungal cream to bilateral buttocks & romeo posterior thighs BID per Wound Care recs.

## 2017-07-06 VITALS — TEMPERATURE: 98 F

## 2017-07-06 LAB
ANION GAP SERPL CALC-SCNC: 16 MMOL/L — SIGNIFICANT CHANGE UP (ref 5–17)
BUN SERPL-MCNC: 86 MG/DL — HIGH (ref 7–23)
CALCIUM SERPL-MCNC: 9.6 MG/DL — SIGNIFICANT CHANGE UP (ref 8.4–10.5)
CHLORIDE SERPL-SCNC: 95 MMOL/L — LOW (ref 96–108)
CO2 SERPL-SCNC: 30 MMOL/L — SIGNIFICANT CHANGE UP (ref 22–31)
CREAT SERPL-MCNC: 2.5 MG/DL — HIGH (ref 0.5–1.3)
GLUCOSE SERPL-MCNC: 74 MG/DL — SIGNIFICANT CHANGE UP (ref 70–99)
HCT VFR BLD CALC: 33.2 % — LOW (ref 34.5–45)
HGB BLD-MCNC: 9.7 G/DL — LOW (ref 11.5–15.5)
INR BLD: 3.03 — HIGH (ref 0.88–1.16)
MAGNESIUM SERPL-MCNC: 2.2 MG/DL — SIGNIFICANT CHANGE UP (ref 1.6–2.6)
MCHC RBC-ENTMCNC: 26.4 PG — LOW (ref 27–34)
MCHC RBC-ENTMCNC: 29.2 G/DL — LOW (ref 32–36)
MCV RBC AUTO: 90.2 FL — SIGNIFICANT CHANGE UP (ref 80–100)
PLATELET # BLD AUTO: 236 K/UL — SIGNIFICANT CHANGE UP (ref 150–400)
POTASSIUM SERPL-MCNC: 4 MMOL/L — SIGNIFICANT CHANGE UP (ref 3.5–5.3)
POTASSIUM SERPL-SCNC: 4 MMOL/L — SIGNIFICANT CHANGE UP (ref 3.5–5.3)
PROTHROM AB SERPL-ACNC: 34.4 SEC — HIGH (ref 9.8–12.7)
RBC # BLD: 3.68 M/UL — LOW (ref 3.8–5.2)
RBC # FLD: 16.8 % — SIGNIFICANT CHANGE UP (ref 10.3–16.9)
SODIUM SERPL-SCNC: 141 MMOL/L — SIGNIFICANT CHANGE UP (ref 135–145)
WBC # BLD: 8.4 K/UL — SIGNIFICANT CHANGE UP (ref 3.8–10.5)
WBC # FLD AUTO: 8.4 K/UL — SIGNIFICANT CHANGE UP (ref 3.8–10.5)

## 2017-07-06 PROCEDURE — 83550 IRON BINDING TEST: CPT

## 2017-07-06 PROCEDURE — 80076 HEPATIC FUNCTION PANEL: CPT

## 2017-07-06 PROCEDURE — 85610 PROTHROMBIN TIME: CPT

## 2017-07-06 PROCEDURE — C1726: CPT

## 2017-07-06 PROCEDURE — 83036 HEMOGLOBIN GLYCOSYLATED A1C: CPT

## 2017-07-06 PROCEDURE — 82330 ASSAY OF CALCIUM: CPT

## 2017-07-06 PROCEDURE — P9045: CPT

## 2017-07-06 PROCEDURE — 82607 VITAMIN B-12: CPT

## 2017-07-06 PROCEDURE — 94150 VITAL CAPACITY TEST: CPT

## 2017-07-06 PROCEDURE — 85025 COMPLETE CBC W/AUTO DIFF WBC: CPT

## 2017-07-06 PROCEDURE — 80053 COMPREHEN METABOLIC PANEL: CPT

## 2017-07-06 PROCEDURE — 36415 COLL VENOUS BLD VENIPUNCTURE: CPT

## 2017-07-06 PROCEDURE — 84100 ASSAY OF PHOSPHORUS: CPT

## 2017-07-06 PROCEDURE — 82746 ASSAY OF FOLIC ACID SERUM: CPT

## 2017-07-06 PROCEDURE — 97530 THERAPEUTIC ACTIVITIES: CPT

## 2017-07-06 PROCEDURE — 85730 THROMBOPLASTIN TIME PARTIAL: CPT

## 2017-07-06 PROCEDURE — C1769: CPT

## 2017-07-06 PROCEDURE — 84134 ASSAY OF PREALBUMIN: CPT

## 2017-07-06 PROCEDURE — 96374 THER/PROPH/DIAG INJ IV PUSH: CPT

## 2017-07-06 PROCEDURE — 71045 X-RAY EXAM CHEST 1 VIEW: CPT

## 2017-07-06 PROCEDURE — 84443 ASSAY THYROID STIM HORMONE: CPT

## 2017-07-06 PROCEDURE — 87186 SC STD MICRODIL/AGAR DIL: CPT

## 2017-07-06 PROCEDURE — 94640 AIRWAY INHALATION TREATMENT: CPT

## 2017-07-06 PROCEDURE — 86901 BLOOD TYPING SEROLOGIC RH(D): CPT

## 2017-07-06 PROCEDURE — 99285 EMERGENCY DEPT VISIT HI MDM: CPT | Mod: 25

## 2017-07-06 PROCEDURE — C1760: CPT

## 2017-07-06 PROCEDURE — C1889: CPT

## 2017-07-06 PROCEDURE — 87086 URINE CULTURE/COLONY COUNT: CPT

## 2017-07-06 PROCEDURE — 86923 COMPATIBILITY TEST ELECTRIC: CPT

## 2017-07-06 PROCEDURE — C8929: CPT

## 2017-07-06 PROCEDURE — 84132 ASSAY OF SERUM POTASSIUM: CPT

## 2017-07-06 PROCEDURE — 73502 X-RAY EXAM HIP UNI 2-3 VIEWS: CPT

## 2017-07-06 PROCEDURE — 93970 EXTREMITY STUDY: CPT

## 2017-07-06 PROCEDURE — 83735 ASSAY OF MAGNESIUM: CPT

## 2017-07-06 PROCEDURE — 93306 TTE W/DOPPLER COMPLETE: CPT

## 2017-07-06 PROCEDURE — 87070 CULTURE OTHR SPECIMN AEROBIC: CPT

## 2017-07-06 PROCEDURE — 80061 LIPID PANEL: CPT

## 2017-07-06 PROCEDURE — 84295 ASSAY OF SERUM SODIUM: CPT

## 2017-07-06 PROCEDURE — 80048 BASIC METABOLIC PNL TOTAL CA: CPT

## 2017-07-06 PROCEDURE — 94660 CPAP INITIATION&MGMT: CPT

## 2017-07-06 PROCEDURE — 81001 URINALYSIS AUTO W/SCOPE: CPT

## 2017-07-06 PROCEDURE — 82550 ASSAY OF CK (CPK): CPT

## 2017-07-06 PROCEDURE — 97110 THERAPEUTIC EXERCISES: CPT

## 2017-07-06 PROCEDURE — 86850 RBC ANTIBODY SCREEN: CPT

## 2017-07-06 PROCEDURE — A9540: CPT

## 2017-07-06 PROCEDURE — 86900 BLOOD TYPING SEROLOGIC ABO: CPT

## 2017-07-06 PROCEDURE — 97164 PT RE-EVAL EST PLAN CARE: CPT

## 2017-07-06 PROCEDURE — 97116 GAIT TRAINING THERAPY: CPT

## 2017-07-06 PROCEDURE — 84484 ASSAY OF TROPONIN QUANT: CPT

## 2017-07-06 PROCEDURE — 82553 CREATINE MB FRACTION: CPT

## 2017-07-06 PROCEDURE — 82803 BLOOD GASES ANY COMBINATION: CPT

## 2017-07-06 PROCEDURE — C1887: CPT

## 2017-07-06 PROCEDURE — 93005 ELECTROCARDIOGRAM TRACING: CPT

## 2017-07-06 PROCEDURE — 85027 COMPLETE CBC AUTOMATED: CPT

## 2017-07-06 PROCEDURE — 82728 ASSAY OF FERRITIN: CPT

## 2017-07-06 PROCEDURE — 83880 ASSAY OF NATRIURETIC PEPTIDE: CPT

## 2017-07-06 PROCEDURE — 83615 LACTATE (LD) (LDH) ENZYME: CPT

## 2017-07-06 PROCEDURE — 97162 PT EVAL MOD COMPLEX 30 MIN: CPT

## 2017-07-06 PROCEDURE — 83605 ASSAY OF LACTIC ACID: CPT

## 2017-07-06 PROCEDURE — 78580 LUNG PERFUSION IMAGING: CPT

## 2017-07-06 RX ADMIN — SODIUM CHLORIDE 3 MILLILITER(S): 9 INJECTION INTRAMUSCULAR; INTRAVENOUS; SUBCUTANEOUS at 14:02

## 2017-07-06 RX ADMIN — Medication 1 APPLICATION(S): at 07:05

## 2017-07-06 RX ADMIN — PANTOPRAZOLE SODIUM 40 MILLIGRAM(S): 20 TABLET, DELAYED RELEASE ORAL at 07:05

## 2017-07-06 RX ADMIN — Medication 325 MILLIGRAM(S): at 14:01

## 2017-07-06 RX ADMIN — Medication 3 MILLILITER(S): at 14:01

## 2017-07-06 RX ADMIN — GABAPENTIN 100 MILLIGRAM(S): 400 CAPSULE ORAL at 07:05

## 2017-07-06 RX ADMIN — GABAPENTIN 100 MILLIGRAM(S): 400 CAPSULE ORAL at 14:00

## 2017-07-06 RX ADMIN — Medication 3 MILLILITER(S): at 10:36

## 2017-07-06 RX ADMIN — SODIUM CHLORIDE 3 MILLILITER(S): 9 INJECTION INTRAMUSCULAR; INTRAVENOUS; SUBCUTANEOUS at 07:11

## 2017-07-06 RX ADMIN — Medication 100 MILLIGRAM(S): at 07:06

## 2017-07-06 RX ADMIN — Medication 1 CAPSULE(S): at 11:44

## 2017-07-06 RX ADMIN — Medication 100 MILLIGRAM(S): at 14:01

## 2017-07-06 RX ADMIN — ACETAZOLAMIDE 250 MILLIGRAM(S): 250 TABLET ORAL at 11:45

## 2017-07-06 RX ADMIN — Medication 0.25 MILLIGRAM(S): at 07:05

## 2017-07-06 RX ADMIN — Medication 650 MILLIGRAM(S): at 09:30

## 2017-07-06 RX ADMIN — Medication 12.5 MILLIGRAM(S): at 11:44

## 2017-07-06 RX ADMIN — Medication 650 MILLIGRAM(S): at 08:45

## 2017-07-06 RX ADMIN — Medication 3 MILLILITER(S): at 07:07

## 2017-07-06 RX ADMIN — Medication 80 MILLIGRAM(S): at 07:06

## 2017-07-06 RX ADMIN — CLOPIDOGREL BISULFATE 75 MILLIGRAM(S): 75 TABLET, FILM COATED ORAL at 11:45

## 2017-07-06 RX ADMIN — Medication 325 MILLIGRAM(S): at 07:06

## 2017-07-06 NOTE — PROGRESS NOTE ADULT - PROBLEM SELECTOR PROBLEM 1
Acute on chronic diastolic CHF (congestive heart failure)
CKD (chronic kidney disease) stage 4, GFR 15-29 ml/min
Acute on chronic diastolic CHF (congestive heart failure)
Acute pulmonary edema
Acute respiratory failure with hypercapnia
CKD (chronic kidney disease) stage 4, GFR 15-29 ml/min
CKD (chronic kidney disease) stage 4, GFR 15-29 ml/min
Cardiorenal syndrome
Acute on chronic diastolic CHF (congestive heart failure)
Acute on chronic diastolic CHF (congestive heart failure)
Acute on chronic systolic heart failure
Acute pulmonary edema
Acute respiratory failure with hypercapnia
Chronic kidney disease
Aortic stenosis
CHF (congestive heart failure)
CKD (chronic kidney disease) stage 4, GFR 15-29 ml/min
Chronic kidney disease
Groin pain, chronic, right
Heart failure
Nonrheumatic aortic valve stenosis
Need for prophylactic measure
Chronic kidney disease
Cardiorenal syndrome
Chronic kidney disease
Cardiorenal syndrome

## 2017-07-06 NOTE — PROGRESS NOTE ADULT - PROBLEM/PLAN-2
DISPLAY PLAN FREE TEXT

## 2017-07-06 NOTE — PROGRESS NOTE ADULT - SUBJECTIVE AND OBJECTIVE BOX
cc: follow-up evaluation and management of acute/chronic diastolic chf and aortic stenosis    subjective: pt resting    PAST MEDICAL & SURGICAL HISTORY:  PVD (peripheral vascular disease)  Iron deficiency anemia  Lupus (systemic lupus erythematosus)  Paroxysmal atrial fibrillation  Aortic stenosis, severe  CKD (chronic kidney disease) stage 4, GFR 15-29 ml/min  Diabetes  HTN (hypertension)  CHF (congestive heart failure)  CAD (coronary artery disease)  COPD (chronic obstructive pulmonary disease)  History of total knee replacement:   Presence of cardiac pacemaker: at [facility] by Dr Escoto    MEDICATIONS  (STANDING):  ferrous    sulfate 325 milliGRAM(s) Oral every 8 hours  clopidogrel Tablet 75 milliGRAM(s) Oral daily  hydrocortisone 2.5% Cream 1 Application(s) Topical two times a day  Nephrocaps 1 Capsule(s) Oral daily  metoprolol succinate ER 12.5 milliGRAM(s) Oral daily  ALBUTerol/ipratropium for Nebulization 3 milliLiter(s) Nebulizer every 4 hours  atorvastatin 40 milliGRAM(s) Oral at bedtime  buDESOnide   0.25 milliGRAM(s) Respule 0.25 milliGRAM(s) Inhalation every 12 hours  sodium chloride 0.45%. 1000 milliLiter(s) (10 mL/Hr) IV Continuous <Continuous>  insulin lispro (HumaLOG) corrective regimen sliding scale   SubCutaneous every 6 hours  dextrose 5%. 1000 milliLiter(s) (50 mL/Hr) IV Continuous <Continuous>  dextrose 50% Injectable 12.5 Gram(s) IV Push once  dextrose 50% Injectable 25 Gram(s) IV Push once  dextrose 50% Injectable 25 Gram(s) IV Push once  pantoprazole    Tablet 40 milliGRAM(s) Oral before breakfast  sodium chloride 0.9% lock flush 3 milliLiter(s) IV Push every 8 hours  docusate sodium 100 milliGRAM(s) Oral three times a day  senna 2 Tablet(s) Oral at bedtime  furosemide    Tablet 80 milliGRAM(s) Oral every 12 hours  acetazolamide    Tablet 250 milliGRAM(s) Oral daily  gabapentin 100 milliGRAM(s) Oral every 8 hours    MEDICATIONS  (PRN):  acetylcysteine 20% Inhalation 5 milliLiter(s) Inhalation every 6 hours PRN shortness of breath  artificial  tears Solution 1 Drop(s) Both EYES three times a day PRN Dry Eyes  acetaminophen   Tablet. 650 milliGRAM(s) Oral every 6 hours PRN Mild Pain (1 - 3)  dextrose Gel 1 Dose(s) Oral once PRN Blood Glucose LESS THAN 70 milliGRAM(s)/deciliter  glucagon  Injectable 1 milliGRAM(s) IntraMuscular once PRN Glucose LESS THAN 70 milligrams/deciliter    Vital Signs Last 24 Hrs  T(C): 36.7 (2017 05:05), Max: 36.7 (2017 21:48)  T(F): 98 (2017 05:05), Max: 98 (2017 21:48)  HR: 76 (2017 08:52) (68 - 76)  BP: 110/48 (2017 08:52) (95/44 - 115/51)  BP(mean): 72 (2017 08:52) (60 - 86)  RR: 16 (2017 08:52) (14 - 17)  SpO2: 98% (2017 08:52) (93% - 98%)    PHYSICAL EXAM:  General: nad  heent - mmm  Cardiac: irregular; systolic murmur; decreased s2  Pulmonary: bilat breath sounds present  Abdomen: soft abd; bowel sounds present  extremities: edema continues to improve  vasc -warm le    LABS:                                            9.7    8.4   )-----------( 236      ( 2017 06:34 )             33.2   07-05    140  |  95<L>  |  87<H>  ----------------------------<  79  4.3   |  32<H>  |  2.50<H>    Ca    9.6      2017 06:38  Mg     2.1     07-05              DIAGNOSTIC TESTS:     < from: Xray Chest 1 View AP- PORTABLE-Urgent (17 @ 08:29) >  EXAM:  XR CHEST 1 VIEW PORT URGENT                          PROCEDURE DATE:  2017                     INTERPRETATION:  Clinical History: Shortness of breath    Portable examination the chest demonstrates no interval change lung   infiltrates in comparison to prior examination of the chest 2017.   Bilateral effusions. No interval change position remaining support   devices.    Impression: No interval change lung pathology            "Thank you for the opportunity to participate in thecare of this   patient."        KATHARINA MOROCHO M.D., ATTENDING RADIOLOGIST  This document has been electronically signed. 2017 10:13AM    < end of copied text >      < from: Xray Chest 1 View AP -PORTABLE-Routine (17 @ 03:40) >  EXAM:  XR CHEST 1 VIEW PORT ROUTINE                          PROCEDURE DATE:  2017                     INTERPRETATION:  HISTORY: Extubated, follow-up    Portable AP view is compared to prior radiograph of 2017.    Endotracheal tube has been removed. No pneumothorax is evident, although   the superior-most lung apices are excluded from view. There has been   slight improvement in infiltrate in the right upper lung, with otherwise   little change in bilateral pulmonary infiltrates and effusions (right   more extensive than left), and there has otherwise been no interval   change.    IMPRESSION: Status post extubation. Slight improvement in infiltrate in   the right upper lung, with otherwise little change in bilateral pulmonary   infiltrates and effusions (right more extensive than left).            "Thank you for the opportunity to participate in the care of this   patient."        CHINO ROMAN M.D. ATTENDING RADIOLOGIST  This document has been electronically signed. 2017  1:16PM        < end of copied text >            < from: Xray Chest 1 View AP-PORTABLE IMMEDIATE (17 @ 12:07) >    EXAM:  XR CHEST 1 VIEW PORT IMMEDIATE                          PROCEDURE DATE:  2017                     INTERPRETATION:  Portable chest    History: Post bronc bronchoscopy exam x-ray. Follow-up abnormal exam.    Scattered lung infiltrates again noted. Overall similar appearance to   prior exam earlier same day. Endotracheal tube in satisfactory position.   No pneumothorax identified.            "Thank you for the opportunity to participate in the care of this   patient."        JUDE LOPEZ M.D., ATTENDING RADIOLOGIST  This document has been electronically signed. 2017  3:03PM    < end of copied text >      < from: Echocardiogram (17 @ 12:03) >  EXAM:  ECHOCARDIOGRAM (CARDIOL)                          PROCEDURE DATE:  2017                        INTERPRETATION:  Patient Height: 165.0 cm  Patient Weight: 84.0 kg  Systolic Pressure: 128 mmHg  Diastolic Pressure: 52 mmHg  BSA: 1.9 m^2  Interpretation Summary  A complete two-dimensional transthoracic echocardiogram was performed (2D,   M-mode, spectral and color flow doppler).  Study Quality: Fair.Left   ventricular hypertrophy presentThe ejection fraction could not be   accurately  calculated (poor endocardiac resolution)  The left atrium is severely   dilated.Right atrial size is normal.The right ventricle is normal in   size and   function.Abnormal (paradoxical) septal motion consistent with   LBBBCalcified   aortic valve.There is trace to mild aortic regurgitation.There is   Moderate to   severe aortic stenosis.The peak pressure gradient is 45 mmHg.The mean   pressure   gradient is 30 mmHg.The calculated aortic valve area using the continuity   equation is 1 cm2.The calculated stroke volume index is 39 cc/m2 (normal   >35cc/m2).There is moderate mitral annular calcification.There is mild   mitral   regurgitation.Structurally normal tricuspid valve.Structurally normal   pulmonic   valve.  Procedure Details  A completetwo-dimensional transthoracic echocardiogram was performed (2D,  M-mode, spectral and color flow doppler).  Study Quality: Fair.  Left Ventricle  Left ventricular hypertrophy present  The ejection fraction could not be accurately calculated (poor endocardiac  resolution)  Abnormal (paradoxical) septal motion consistent with LBBB  Left Atrium  The left atrium is severely dilated.  Right Atrium  Right atrial size is normal.  Right Ventricle  The right ventricle is normal in size and function.  Aortic Valve  Calcified aortic valve.  There is trace to mild aortic regurgitation.  There is Moderate to severe aortic stenosis.  The peak pressure gradient is 45 mmHg.  The mean pressure gradient is 30 mmHg.  The calculated aortic valve area using the continuity equation is 1 cm2.  The calculated stroke volume index is 39 cc/m2 (normal >35cc/m2).  Mitral Valve  There is moderate mitral annular calcification.  There is mild mitral regurgitation.  Tricuspid Valve  Structurally normal tricuspid valve.  Pulmonic Valve  Structurally normal pulmonic valve.  Arteries and Venous System  No aortic root dilatation.  The IVC is dilated (>2.1 cm) with an abnormal inspiratory collapse (<50%)  consistent with elevated right atrial pressure.  Pericardium / Pleura  There is no pericardial effusion.  Doppler Measurements & Calculations  MV E point: 140.2 cm/sec  MV A point: 53.4 cm/sec  MV E/A: 2.6  MV dec slope: 636.0 cm/sec^2  Ao V2 max: 319.4 cm/sec  Ao max P.8 mmHg  Ao max PG (full): 36.2 mmHg  Ao V2 mean: 251.5 cm/sec  Ao mean P.0 mmHg  Ao mean PG (full): 24.4 mmHg  Ao V2 VTI: 80.8 cm  ALEKS(I,A): 0.9 cm^2  ALEKS(I,D): 0.9 cm^2  ALEKS(V,A): 1.1 cm^2  ALEKS(V,D): 1.1 cm^2  LV max P.7 mmHg  LV mean P.6 mmHg  LV V1 max: 108 cm/sec  LV V1 mean: 76.1 cm/sec  LV V1 VTI: 24.0 cm  MR max graciela: 538.0 cm/sec  MR max P.8 mmHg  MR mean graciela: 400.3 cm/sec  MR mean P.0 mmHg  MR VTI: 181.9 cm  SV(Ao): 690.6 ml  SI(Ao): 360.9 ml/m^2  SV(LVOT): 75.0 ml  SI(LVOT): 39.2 ml/m^2  TR Max graciela: 252.8cm/sec  MMode 2D Measurements & Calculations  IVSd: 1.2 cm  LVIDd: 6.4 cm  LVIDs: 5.3 cm  LVPWd: 1.1 cm  IVS/LVPW: 1.0  FS: 18.0 %  EDV(Teich): 209.4 ml  ESV(Teich): 132.7 ml  LV mass(C)d: 331.0 grams  LV mass(C)dI: 173.0 grams/m^2  SI(cubed): 61.9 ml/m^2  Ao root diam: 3.3 cm  Ao root area: 8.5 cm^2  LA dimension: 4.5 cm  LA/Ao: 1.4  LVOT diam: 2.0 cm  LVOT area: 3.1 cm^2  LVOT area (M): 3.1 cm^2  EDV(MOD-sp4): 112 ml  EDV(MOD-sp2): 105 ml  Interpreting Physician:Perry Huynh MD electronicallysigned on   2017 12:24:22                "Thank you for the opportunity to participate in the care of this   patient."        PERRY HUYNH M.D., ATTENDING CARDIOLOGIST  This document has been electronically signed. 2017 12:24PM        < end of copied text >        EXAM:  XR CHEST 1 VIEW PORT URGENT                          PROCEDURE DATE:  2017                     INTERPRETATION:  Clinical History: Congestion    Portable examination the chest demonstrates no interval change congestion   and/or infiltrates in comparison to prior examination of the chest   2017. Bilateral effusions. No interval change position remaining   support devices. Cardiomegaly.    Impression: No interval change lung pathology            "Thank you for the opportunity to participate in the care of this   patient."        KATHARINA MOROCHO M.D., ATTENDING RADIOLOGIST  This document has been electronically signed. 2017 10:38AM      2017 ecg reviewed on 2017    EXAM:  XR CHEST 1 VIEW PORT ROUTINE                          PROCEDURE DATE:  2017                     INTERPRETATION:  Indication: chf    A single portable view of the chest is submitted. Comparison is made to   the most recent prior study dated 2017. Bilateral pulmonary   infiltrates and effusions are similar to the previous examination.   Impression:    No significant interval change            "Thank you for the opportunity to participate in the care of this   patient."        MASOUD WORKMAN M.D., ATTENDING RADIOLOGIST  This document has been electronically signed. 2017  4:33PM

## 2017-07-06 NOTE — PROGRESS NOTE ADULT - ASSESSMENT
1) acute/chronic diastolic chf with known ckd  -monitor on rx  2) nonrheumatic aortic stenosis  -s/p bav  3) atrial fibrillation; presence of pacemaker  -per ep; continue ac  4) CAD s/p PCI   -continue rx as tolerated  5) COPD - f/u per pulm recs  6) PVD - monitor  7) DM - holding ace-i/arb with ckd  8) normocytic anemia - monitor  9) skin lesion - per wound care  10) ppx: therapeutic ac; ppi   11) maintain  K>4 Mg>2  12) placement pending to [facility]  d/w NP and case management

## 2017-07-06 NOTE — PROGRESS NOTE ADULT - I WAS PHYSICALLY PRESENT FOR THE KEY PORTIONS OF THE EVALUATION AND MANAGEMENT (E/M) SERVICE PROVIDED.  I AGREE WITH THE ABOVE HISTORY, PHYSICAL, AND PLAN WHICH I HAVE REVIEWED AND EDITED WHERE APPROPRIATE

## 2017-07-06 NOTE — PROGRESS NOTE ADULT - SUBJECTIVE AND OBJECTIVE BOX
CC/ HPI:  86 yo female with chronic diastolic CHF (EF 50-55% 11/16'), mod to severe aortic stenosis, pAFIB, CAD s/p PCI x 3 stent, Lupus (not on meds), COPD, CKD (baseline Cr 2-3), admitted for CHF exacerbation complicated by hypercapnic respiratory failure, status post BAV, today without respiratory complaint.    PAST MEDICAL & SURGICAL HISTORY:  PVD (peripheral vascular disease)  Iron deficiency anemia  Lupus (systemic lupus erythematosus)  Paroxysmal atrial fibrillation  Aortic stenosis, severe  CKD (chronic kidney disease) stage 4, GFR 15-29 ml/min  Diabetes  HTN (hypertension)  CHF (congestive heart failure)  CAD (coronary artery disease)  COPD (chronic obstructive pulmonary disease)  History of total knee replacement: 2003  Cardiac pacemaker: at [facility] by Dr Escoto, 2011    SOCHX:  + tobacco,  -  alcohol    FMHX: FA/MO  - contributory     ROS reviewed below with positive findings marked (+) :  GEN:  fever, chills ENT: tracheostomy,   epistaxis,  sinusitis COR: +CAD, +CHF,  +HTN, +dysrhythmia PUL: +COPD, ILD, asthma, pneumonia GI: PEG, dysphagia, hemorrhage, other PETEY: +kidney disease, electrolyte disorder HEM:  +anemia, thrombus, coagulopathy, cancer ENDO:  thyroid disease, +diabetes mellitus CNS:  dementia, stroke, seizure, PSY:  depression, anxiety, other      MEDICATIONS  (STANDING):  ferrous    sulfate 325 milliGRAM(s) Oral every 8 hours  clopidogrel Tablet 75 milliGRAM(s) Oral daily  hydrocortisone 2.5% Cream 1 Application(s) Topical two times a day  Nephrocaps 1 Capsule(s) Oral daily  metoprolol succinate ER 12.5 milliGRAM(s) Oral daily  ALBUTerol/ipratropium for Nebulization 3 milliLiter(s) Nebulizer every 4 hours  atorvastatin 40 milliGRAM(s) Oral at bedtime  buDESOnide   0.25 milliGRAM(s) Respule 0.25 milliGRAM(s) Inhalation every 12 hours  sodium chloride 0.45%. 1000 milliLiter(s) (10 mL/Hr) IV Continuous <Continuous>  insulin lispro (HumaLOG) corrective regimen sliding scale   SubCutaneous every 6 hours  pantoprazole    Tablet 40 milliGRAM(s) Oral before breakfast  sodium chloride 0.9% lock flush 3 milliLiter(s) IV Push every 8 hours  docusate sodium 100 milliGRAM(s) Oral three times a day  senna 2 Tablet(s) Oral at bedtime  furosemide    Tablet 80 milliGRAM(s) Oral every 12 hours  acetazolamide    Tablet 250 milliGRAM(s) Oral daily  gabapentin 100 milliGRAM(s) Oral every 8 hours    MEDICATIONS  (PRN):  acetylcysteine 20% Inhalation 5 milliLiter(s) Inhalation every 6 hours PRN shortness of breath  artificial  tears Solution 1 Drop(s) Both EYES three times a day PRN Dry Eyes  acetaminophen   Tablet. 650 milliGRAM(s) Oral every 6 hours PRN Mild Pain (1 - 3)  dextrose Gel 1 Dose(s) Oral once PRN Blood Glucose LESS THAN 70 milliGRAM(s)/deciliter  glucagon  Injectable 1 milliGRAM(s) IntraMuscular once PRN Glucose LESS THAN 70 milligrams/deciliter      Vital Signs Last 24 Hrs  T(C): 36.7 (06 Jul 2017 05:05), Max: 36.7 (05 Jul 2017 21:48)  T(F): 98 (06 Jul 2017 05:05), Max: 98 (05 Jul 2017 21:48)  HR: 68 (06 Jul 2017 10:52) (68 - 76)  BP: 98/48 (06 Jul 2017 10:52) (95/44 - 115/51)  BP(mean): 69 (06 Jul 2017 10:52) (60 - 86)  RR: 15 (06 Jul 2017 10:52) (14 - 17)  SpO2: 96% (06 Jul 2017 10:52) (93% - 98%)    GENERAL:         comfortable,  - distress.  HEENT:            - trauma,  - icterus,  - injection,  - nasal discharge.  NECK:              - jugular venous distention, - thyromegaly.  LYMPH:           - lymphadenopathy, - masses.  RESP:              + crackles,   - rhonchi,   - wheezes.   COR:                S1S2 + murmurs,  - gallops,  - rubs.  ABD:                bowel sounds,   soft, - tender, - distended, - organomegaly.  EXT/MSC:         - cyanosis,  - clubbing,  - edema.    NEURO:             alert,   responds to stimuli.                          9.7    8.4   )-----------( 236      ( 06 Jul 2017 06:34 )             33.2     07-06    141  |  95<L>  |  86<H>  ----------------------------<  74  4.0   |  30  |  2.50<H>    Ca    9.6      06 Jul 2017 06:34  Mg     2.2     07-06          X-ray Chest (07.03.17)  No interval change congestion and/or infiltrates. Improvement bilateral effusions        ASSESSMENT/PLAN    1)  Status post BAV  2)  Cardiomyopathy  3)  Chronic obstructive pulmonary disease  4)  Pulmonary hypertension    Bronchodilators:  Atrovent/ albuterol q 4-6 hours.  Corticosteroids:  budesonide  Cardiac/HTN: post BAV, diuresis as needed  GI: Rx/ prophylaxis c PPI/H2B  Heme: Rx/VT AC  Discuss with medical team.

## 2017-07-06 NOTE — PROGRESS NOTE ADULT - SUBJECTIVE AND OBJECTIVE BOX
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 Pt states that her "legs were too weak". Pt is a poor historian and HPI obtained from her daughter. Pt's daughter states that her mother was unable to get OOB for 3 days and pt used her Life Alert x3 times to call for help-was told to go to the ED. Reports that she had been "tired" for several day, SOB w/ minimal exertion for "several months" and her mobility has declined since her discharge from rehab in December. Additionally, pt has gained 17 lbs over the past month (from 258 to 275) and non-compliant with her diet. As per daughter, pt has been hospitalized prior 2-3 yrs ago (? [facility]) for CHF exacerbation w/ aggressive diuresis. Of note, pt was evaluation by Dr. Barger for aortic stenosis in December 16'. However, due to her deconditioned status during that time, pt was recommended medical management.     S/P BAV     Pt seen and examined at bedside. Denies chest pain or sob. Eager to go to rehab.        TELEMETRY: Afib w/ V-pacing 70s      VITAL SIGNS:  T(C): 36.7 (07-06-17 @ 05:05), Max: 36.7 (07-05-17 @ 21:48)  HR: 76 (07-06-17 @ 08:52) (68 - 88)  BP: 110/48 (07-06-17 @ 08:52) (95/44 - 115/51)  RR: 16 (07-06-17 @ 08:52) (14 - 17)  SpO2: 98% (07-06-17 @ 08:52) (93% - 98%)  Wt(kg): --    I&O's Summary    05 Jul 2017 07:01  -  06 Jul 2017 07:00  --------------------------------------------------------  IN: 124 mL / OUT: 0 mL / NET: 124 mL          PHYSICAL EXAM:    General: A/ox 3, No acute Distress  Neck: Supple, NO JVD  Cardiac: S1 S2, grade   Pulmonary: CTAB, Breathing unlabored, No Rhonchi/Rales/Wheezing  Abdomen: Soft, Non -tender, +BS x 4 quads  Extremities: No Rashes, No edema  Neuro: A/o x 3, No focal deficits          LABS:                          9.7    8.4   )-----------( 236      ( 06 Jul 2017 06:34 )             33.2                              07-05    140  |  95<L>  |  87<H>  ----------------------------<  79  4.3   |  32<H>  |  2.50<H>    Ca    9.6      05 Jul 2017 06:38  Mg     2.1     07-05                              PT/INR - ( 06 Jul 2017 06:34 )   PT: 34.4 sec;   INR: 3.03            CAPILLARY BLOOD GLUCOSE  78 (06 Jul 2017 05:38)  83 (05 Jul 2017 21:48)  83 (05 Jul 2017 16:32)  106 (05 Jul 2017 11:30)                  No Known Allergies    MEDICATIONS  (STANDING):  ferrous    sulfate 325 milliGRAM(s) Oral every 8 hours  clopidogrel Tablet 75 milliGRAM(s) Oral daily  hydrocortisone 2.5% Cream 1 Application(s) Topical two times a day  Nephrocaps 1 Capsule(s) Oral daily  metoprolol succinate ER 12.5 milliGRAM(s) Oral daily  ALBUTerol/ipratropium for Nebulization 3 milliLiter(s) Nebulizer every 4 hours  atorvastatin 40 milliGRAM(s) Oral at bedtime  buDESOnide   0.25 milliGRAM(s) Respule 0.25 milliGRAM(s) Inhalation every 12 hours  sodium chloride 0.45%. 1000 milliLiter(s) (10 mL/Hr) IV Continuous <Continuous>  insulin lispro (HumaLOG) corrective regimen sliding scale   SubCutaneous every 6 hours  dextrose 5%. 1000 milliLiter(s) (50 mL/Hr) IV Continuous <Continuous>  dextrose 50% Injectable 12.5 Gram(s) IV Push once  dextrose 50% Injectable 25 Gram(s) IV Push once  dextrose 50% Injectable 25 Gram(s) IV Push once  pantoprazole    Tablet 40 milliGRAM(s) Oral before breakfast  sodium chloride 0.9% lock flush 3 milliLiter(s) IV Push every 8 hours  docusate sodium 100 milliGRAM(s) Oral three times a day  senna 2 Tablet(s) Oral at bedtime  furosemide    Tablet 80 milliGRAM(s) Oral every 12 hours  acetazolamide    Tablet 250 milliGRAM(s) Oral daily  gabapentin 100 milliGRAM(s) Oral every 8 hours    MEDICATIONS  (PRN):  acetylcysteine 20% Inhalation 5 milliLiter(s) Inhalation every 6 hours PRN shortness of breath  artificial  tears Solution 1 Drop(s) Both EYES three times a day PRN Dry Eyes  acetaminophen   Tablet. 650 milliGRAM(s) Oral every 6 hours PRN Mild Pain (1 - 3)  dextrose Gel 1 Dose(s) Oral once PRN Blood Glucose LESS THAN 70 milliGRAM(s)/deciliter  glucagon  Injectable 1 milliGRAM(s) IntraMuscular once PRN Glucose LESS THAN 70 milligrams/deciliter        DIAGNOSTIC TESTS:

## 2017-07-06 NOTE — PROGRESS NOTE ADULT - ASSESSMENT
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 for CHF exacerbation. s/p BAV

## 2017-07-06 NOTE — PROGRESS NOTE ADULT - NSHPATTENDINGPLANDISCUSS_GEN_ALL_CORE
CCU
CCU
CCU team
Dr LUCÍA Muse
Dr Muse
Dr. Tapia
HS
MICU
NP
HS
housestaff and patient.
housestaff.
patient, daughter and housestaff.
Patient/Consultant/Medical team
Dr Soto
Dr Soto
HS
NP

## 2017-07-06 NOTE — PROGRESS NOTE ADULT - PROBLEM/PLAN-1
DISPLAY PLAN FREE TEXT

## 2017-07-06 NOTE — PROGRESS NOTE ADULT - SUBJECTIVE AND OBJECTIVE BOX
CC: ACUTE CHRONIC CONGESTIVEHEART FAILURE UNSPECIFIE      INTERVAL HISTORY: clinically stable      ROS: No chest pain, no sob, no abd pain. No n/v/d    PAST MEDICAL & SURGICAL HISTORY:  PVD (peripheral vascular disease)  Iron deficiency anemia  Lupus (systemic lupus erythematosus)  Paroxysmal atrial fibrillation  Aortic stenosis, severe  CKD (chronic kidney disease) stage 4, GFR 15-29 ml/min  Diabetes  HTN (hypertension)  CHF (congestive heart failure)  CAD (coronary artery disease)  COPD (chronic obstructive pulmonary disease)  History of total knee replacement: 2003  Presence of cardiac pacemaker: at [facility] by Dr Escoto      PHYSICAL EXAM:  T(C): 36.7 (07-06-17 @ 05:05), Max: 36.7 (07-05-17 @ 21:48)  HR: 68 (07-06-17 @ 05:00)  BP: 95/44 (07-06-17 @ 05:00) (95/44 - 115/51)  RR: 16 (07-06-17 @ 05:00)  SpO2: 95% (07-06-17 @ 05:00)  Wt(kg): --  I&O's Summary    05 Jul 2017 07:01  -  06 Jul 2017 07:00  --------------------------------------------------------  IN: 124 mL / OUT: 0 mL / NET: 124 mL      Weight   General: AAO x 3,  NAD.  HEENT: moist mucous membranes, no pallor/cyanosis.  Neck: no JVD visible.  Cardiac: S1, S2. RRR. No murmurs   Respratory:basilar rales  Abdomen: soft. nontender. nondistended  Skin: no rashes.  Extremities:trace LE edema b/l  Access:       DATA:                        9.7<L>  8.4   )-----------( 236      ( 06 Jul 2017 06:34 )             33.2<L>    Ferritin, Serum: 92.0 ng/mL (06-12 @ 05:42)      140    |  95<L>  |  87<H>  ----------------------------<  79     Ca:9.6   (05 Jul 2017 06:38)  4.3     |  32<H>  |  2.50<H>                                MEDICATIONS  (STANDING):  ferrous    sulfate 325 milliGRAM(s) Oral every 8 hours  clopidogrel Tablet 75 milliGRAM(s) Oral daily  hydrocortisone 2.5% Cream 1 Application(s) Topical two times a day  Nephrocaps 1 Capsule(s) Oral daily  metoprolol succinate ER 12.5 milliGRAM(s) Oral daily  ALBUTerol/ipratropium for Nebulization 3 milliLiter(s) Nebulizer every 4 hours  atorvastatin 40 milliGRAM(s) Oral at bedtime  buDESOnide   0.25 milliGRAM(s) Respule 0.25 milliGRAM(s) Inhalation every 12 hours  sodium chloride 0.45%. 1000 milliLiter(s) (10 mL/Hr) IV Continuous <Continuous>  insulin lispro (HumaLOG) corrective regimen sliding scale   SubCutaneous every 6 hours  dextrose 5%. 1000 milliLiter(s) (50 mL/Hr) IV Continuous <Continuous>  dextrose 50% Injectable 12.5 Gram(s) IV Push once  dextrose 50% Injectable 25 Gram(s) IV Push once  dextrose 50% Injectable 25 Gram(s) IV Push once  pantoprazole    Tablet 40 milliGRAM(s) Oral before breakfast  sodium chloride 0.9% lock flush 3 milliLiter(s) IV Push every 8 hours  docusate sodium 100 milliGRAM(s) Oral three times a day  senna 2 Tablet(s) Oral at bedtime  furosemide    Tablet 80 milliGRAM(s) Oral every 12 hours  acetazolamide    Tablet 250 milliGRAM(s) Oral daily  gabapentin 100 milliGRAM(s) Oral every 8 hours    MEDICATIONS  (PRN):  acetylcysteine 20% Inhalation 5 milliLiter(s) Inhalation every 6 hours PRN shortness of breath  artificial  tears Solution 1 Drop(s) Both EYES three times a day PRN Dry Eyes  acetaminophen   Tablet. 650 milliGRAM(s) Oral every 6 hours PRN Mild Pain (1 - 3)  dextrose Gel 1 Dose(s) Oral once PRN Blood Glucose LESS THAN 70 milliGRAM(s)/deciliter  glucagon  Injectable 1 milliGRAM(s) IntraMuscular once PRN Glucose LESS THAN 70 milligrams/deciliter

## 2017-07-06 NOTE — PROGRESS NOTE ADULT - PROBLEM SELECTOR PROBLEM 2
Aortic stenosis, severe
Acid-base imbalance
Aortic stenosis, severe
Pleural effusion
Sleep disorder
Acute respiratory failure with hypercapnia
Aortic stenosis, severe
Aortic stenosis, severe
CHF (congestive heart failure)
Pleural effusion
Pleural effusion
Severe aortic valve stenosis
Sleep disorder
Acid-base imbalance
Aortic stenosis
CHF (congestive heart failure)
Chronic kidney disease
Heart failure
Hypercapnic respiratory failure
Severe aortic valve stenosis
CHF (congestive heart failure)
Acid-base imbalance
CHF (congestive heart failure)
Acid-base imbalance

## 2017-07-06 NOTE — PROGRESS NOTE ADULT - SUBJECTIVE AND OBJECTIVE BOX
CARDIOLOGY NP PROGRESS NOTE    Subjective: Pt seen and examined at bedside. Denies chest pain or sob. Eager to go to rehab.    Overnight Events: None    TELEMETRY: Afib w/ V-pacing 70s          VITAL SIGNS:  T(C): 36.7 (07-06-17 @ 05:05), Max: 36.7 (07-05-17 @ 21:48)  HR: 76 (07-06-17 @ 08:52) (68 - 88)  BP: 110/48 (07-06-17 @ 08:52) (95/44 - 115/51)  RR: 16 (07-06-17 @ 08:52) (14 - 17)  SpO2: 98% (07-06-17 @ 08:52) (93% - 98%)  Wt(kg): --    I&O's Summary    05 Jul 2017 07:01  -  06 Jul 2017 07:00  --------------------------------------------------------  IN: 124 mL / OUT: 0 mL / NET: 124 mL          PHYSICAL EXAM:    General: A/ox 3, No acute Distress  Neck: Supple, NO JVD  Cardiac: S1 S2, grade   Pulmonary: CTAB, Breathing unlabored, No Rhonchi/Rales/Wheezing  Abdomen: Soft, Non -tender, +BS x 4 quads  Extremities: No Rashes, No edema  Neuro: A/o x 3, No focal deficits          LABS:                          9.7    8.4   )-----------( 236      ( 06 Jul 2017 06:34 )             33.2                              07-05    140  |  95<L>  |  87<H>  ----------------------------<  79  4.3   |  32<H>  |  2.50<H>    Ca    9.6      05 Jul 2017 06:38  Mg     2.1     07-05                              PT/INR - ( 06 Jul 2017 06:34 )   PT: 34.4 sec;   INR: 3.03            CAPILLARY BLOOD GLUCOSE  78 (06 Jul 2017 05:38)  83 (05 Jul 2017 21:48)  83 (05 Jul 2017 16:32)  106 (05 Jul 2017 11:30)                  No Known Allergies    MEDICATIONS  (STANDING):  ferrous    sulfate 325 milliGRAM(s) Oral every 8 hours  clopidogrel Tablet 75 milliGRAM(s) Oral daily  hydrocortisone 2.5% Cream 1 Application(s) Topical two times a day  Nephrocaps 1 Capsule(s) Oral daily  metoprolol succinate ER 12.5 milliGRAM(s) Oral daily  ALBUTerol/ipratropium for Nebulization 3 milliLiter(s) Nebulizer every 4 hours  atorvastatin 40 milliGRAM(s) Oral at bedtime  buDESOnide   0.25 milliGRAM(s) Respule 0.25 milliGRAM(s) Inhalation every 12 hours  sodium chloride 0.45%. 1000 milliLiter(s) (10 mL/Hr) IV Continuous <Continuous>  insulin lispro (HumaLOG) corrective regimen sliding scale   SubCutaneous every 6 hours  dextrose 5%. 1000 milliLiter(s) (50 mL/Hr) IV Continuous <Continuous>  dextrose 50% Injectable 12.5 Gram(s) IV Push once  dextrose 50% Injectable 25 Gram(s) IV Push once  dextrose 50% Injectable 25 Gram(s) IV Push once  pantoprazole    Tablet 40 milliGRAM(s) Oral before breakfast  sodium chloride 0.9% lock flush 3 milliLiter(s) IV Push every 8 hours  docusate sodium 100 milliGRAM(s) Oral three times a day  senna 2 Tablet(s) Oral at bedtime  furosemide    Tablet 80 milliGRAM(s) Oral every 12 hours  acetazolamide    Tablet 250 milliGRAM(s) Oral daily  gabapentin 100 milliGRAM(s) Oral every 8 hours    MEDICATIONS  (PRN):  acetylcysteine 20% Inhalation 5 milliLiter(s) Inhalation every 6 hours PRN shortness of breath  artificial  tears Solution 1 Drop(s) Both EYES three times a day PRN Dry Eyes  acetaminophen   Tablet. 650 milliGRAM(s) Oral every 6 hours PRN Mild Pain (1 - 3)  dextrose Gel 1 Dose(s) Oral once PRN Blood Glucose LESS THAN 70 milliGRAM(s)/deciliter  glucagon  Injectable 1 milliGRAM(s) IntraMuscular once PRN Glucose LESS THAN 70 milligrams/deciliter        DIAGNOSTIC TESTS:

## 2017-07-10 DIAGNOSIS — Z51.5 ENCOUNTER FOR PALLIATIVE CARE: ICD-10-CM

## 2017-07-10 DIAGNOSIS — L89.312 PRESSURE ULCER OF RIGHT BUTTOCK, STAGE 2: ICD-10-CM

## 2017-07-10 DIAGNOSIS — J44.9 CHRONIC OBSTRUCTIVE PULMONARY DISEASE, UNSPECIFIED: ICD-10-CM

## 2017-07-10 DIAGNOSIS — I27.2 OTHER SECONDARY PULMONARY HYPERTENSION: ICD-10-CM

## 2017-07-10 DIAGNOSIS — I13.0 HYPERTENSIVE HEART AND CHRONIC KIDNEY DISEASE WITH HEART FAILURE AND STAGE 1 THROUGH STAGE 4 CHRONIC KIDNEY DISEASE, OR UNSPECIFIED CHRONIC KIDNEY DISEASE: ICD-10-CM

## 2017-07-10 DIAGNOSIS — Z66 DO NOT RESUSCITATE: ICD-10-CM

## 2017-07-10 DIAGNOSIS — J96.90 RESPIRATORY FAILURE, UNSPECIFIED, UNSPECIFIED WHETHER WITH HYPOXIA OR HYPERCAPNIA: ICD-10-CM

## 2017-07-10 DIAGNOSIS — I35.0 NONRHEUMATIC AORTIC (VALVE) STENOSIS: ICD-10-CM

## 2017-07-10 DIAGNOSIS — I42.9 CARDIOMYOPATHY, UNSPECIFIED: ICD-10-CM

## 2017-07-10 DIAGNOSIS — I73.9 PERIPHERAL VASCULAR DISEASE, UNSPECIFIED: ICD-10-CM

## 2017-07-10 DIAGNOSIS — N18.4 CHRONIC KIDNEY DISEASE, STAGE 4 (SEVERE): ICD-10-CM

## 2017-07-10 DIAGNOSIS — B48.8 OTHER SPECIFIED MYCOSES: ICD-10-CM

## 2017-07-10 DIAGNOSIS — I24.8 OTHER FORMS OF ACUTE ISCHEMIC HEART DISEASE: ICD-10-CM

## 2017-07-10 DIAGNOSIS — D50.9 IRON DEFICIENCY ANEMIA, UNSPECIFIED: ICD-10-CM

## 2017-07-10 DIAGNOSIS — L30.9 DERMATITIS, UNSPECIFIED: ICD-10-CM

## 2017-07-10 DIAGNOSIS — R26.9 UNSPECIFIED ABNORMALITIES OF GAIT AND MOBILITY: ICD-10-CM

## 2017-07-10 DIAGNOSIS — Z95.5 PRESENCE OF CORONARY ANGIOPLASTY IMPLANT AND GRAFT: ICD-10-CM

## 2017-07-10 DIAGNOSIS — E66.01 MORBID (SEVERE) OBESITY DUE TO EXCESS CALORIES: ICD-10-CM

## 2017-07-10 DIAGNOSIS — G47.33 OBSTRUCTIVE SLEEP APNEA (ADULT) (PEDIATRIC): ICD-10-CM

## 2017-07-10 DIAGNOSIS — I50.33 ACUTE ON CHRONIC DIASTOLIC (CONGESTIVE) HEART FAILURE: ICD-10-CM

## 2017-07-10 DIAGNOSIS — R32 UNSPECIFIED URINARY INCONTINENCE: ICD-10-CM

## 2017-07-10 DIAGNOSIS — J96.02 ACUTE RESPIRATORY FAILURE WITH HYPERCAPNIA: ICD-10-CM

## 2017-07-10 DIAGNOSIS — D63.8 ANEMIA IN OTHER CHRONIC DISEASES CLASSIFIED ELSEWHERE: ICD-10-CM

## 2017-07-10 DIAGNOSIS — I48.0 PAROXYSMAL ATRIAL FIBRILLATION: ICD-10-CM

## 2017-07-10 DIAGNOSIS — I25.10 ATHEROSCLEROTIC HEART DISEASE OF NATIVE CORONARY ARTERY WITHOUT ANGINA PECTORIS: ICD-10-CM

## 2017-07-10 DIAGNOSIS — G25.81 RESTLESS LEGS SYNDROME: ICD-10-CM

## 2017-07-10 DIAGNOSIS — Z95.0 PRESENCE OF CARDIAC PACEMAKER: ICD-10-CM

## 2017-07-10 DIAGNOSIS — R41.82 ALTERED MENTAL STATUS, UNSPECIFIED: ICD-10-CM

## 2017-07-10 DIAGNOSIS — Z79.01 LONG TERM (CURRENT) USE OF ANTICOAGULANTS: ICD-10-CM

## 2017-07-10 DIAGNOSIS — Z91.11 PATIENT'S NONCOMPLIANCE WITH DIETARY REGIMEN: ICD-10-CM

## 2017-07-10 DIAGNOSIS — E11.22 TYPE 2 DIABETES MELLITUS WITH DIABETIC CHRONIC KIDNEY DISEASE: ICD-10-CM

## 2017-07-10 DIAGNOSIS — G93.49 OTHER ENCEPHALOPATHY: ICD-10-CM

## 2017-07-10 DIAGNOSIS — M32.9 SYSTEMIC LUPUS ERYTHEMATOSUS, UNSPECIFIED: ICD-10-CM

## 2017-07-10 DIAGNOSIS — D69.6 THROMBOCYTOPENIA, UNSPECIFIED: ICD-10-CM

## 2017-07-10 DIAGNOSIS — L89.892 PRESSURE ULCER OF OTHER SITE, STAGE 2: ICD-10-CM

## 2017-07-10 DIAGNOSIS — B96.1 KLEBSIELLA PNEUMONIAE [K. PNEUMONIAE] AS THE CAUSE OF DISEASES CLASSIFIED ELSEWHERE: ICD-10-CM

## 2017-07-10 DIAGNOSIS — N39.0 URINARY TRACT INFECTION, SITE NOT SPECIFIED: ICD-10-CM

## 2017-07-10 DIAGNOSIS — K59.00 CONSTIPATION, UNSPECIFIED: ICD-10-CM

## 2017-07-10 DIAGNOSIS — Z87.891 PERSONAL HISTORY OF NICOTINE DEPENDENCE: ICD-10-CM

## 2017-07-17 ENCOUNTER — APPOINTMENT (OUTPATIENT)
Dept: CARDIOTHORACIC SURGERY | Facility: CLINIC | Age: 82
End: 2017-07-17

## 2017-07-17 VITALS
HEART RATE: 72 BPM | DIASTOLIC BLOOD PRESSURE: 75 MMHG | TEMPERATURE: 97.2 F | SYSTOLIC BLOOD PRESSURE: 101 MMHG | OXYGEN SATURATION: 97 % | RESPIRATION RATE: 18 BRPM

## 2017-07-20 RX ORDER — ACETAZOLAMIDE 250 MG/1
250 TABLET ORAL DAILY
Refills: 0 | Status: ACTIVE | COMMUNITY

## 2017-07-20 RX ORDER — ATORVASTATIN CALCIUM 40 MG/1
40 TABLET, FILM COATED ORAL
Qty: 30 | Refills: 3 | Status: ACTIVE | COMMUNITY

## 2017-07-20 RX ORDER — IPRATROPIUM/ALBUTEROL SULFATE 0.5-3MG/3
0.5-2.5 (3) AMPUL FOR NEBULIZATION (ML) INHALATION 4 TIMES DAILY
Refills: 0 | Status: ACTIVE | COMMUNITY

## 2017-07-20 RX ORDER — CLOPIDOGREL 75 MG/1
75 TABLET, FILM COATED ORAL DAILY
Qty: 30 | Refills: 2 | Status: ACTIVE | COMMUNITY

## 2017-07-20 RX ORDER — BUDESONIDE 0.25 MG/2ML
0.25 SUSPENSION RESPIRATORY (INHALATION) TWICE DAILY
Refills: 0 | Status: ACTIVE | COMMUNITY

## 2017-07-20 RX ORDER — GABAPENTIN 100 MG
100 TABLET ORAL 3 TIMES DAILY
Refills: 0 | Status: ACTIVE | COMMUNITY

## 2017-07-20 RX ORDER — FERROUS SULFATE 325(65) MG
324 TABLET ORAL DAILY
Refills: 0 | Status: ACTIVE | COMMUNITY

## 2017-07-20 RX ORDER — WARFARIN SODIUM 6 MG/1
TABLET ORAL
Refills: 0 | Status: ACTIVE | COMMUNITY

## 2017-07-20 RX ORDER — ASCORBIC ACID, FOLIC ACID, NIACIN, THIAMINE, RIBOFLAVIN, PYRIDOXINE, CYANOCOBALAMIN, PANTOTHENIC ACID, BIOTIN 100; 150; 6; 1; 20; 5; 10; 1.7; 1.5 MG/1; UG/1; UG/1; MG/1; MG/1; MG/1; MG/1; MG/1; MG/1
1 CAPSULE, LIQUID FILLED ORAL DAILY
Refills: 0 | Status: ACTIVE | COMMUNITY

## 2017-09-13 NOTE — PATIENT PROFILE ADULT. - AS [facility]
Nichole is calling because she has an order for a bilateral mammogram, one of the orders is not signed. She will need another order signed and faxed over.   Fax number [phone] ATTN: Nichole   (2) very limited

## 2018-04-04 NOTE — PROGRESS NOTE ADULT - SUBJECTIVE AND OBJECTIVE BOX
CARDIOLOGY NP PROGRESS NOTE    Subjective: Pt seen and examined at bedside. Sleepy this AM as she did not sleep overnight, non-focal neuro exam, returned back to baseline at re-eval later in morning. Denies chest pain, sob.    Overnight Events: None    TELEMETRY: AF 70s          VITAL SIGNS:  T(C): 36.9, Max: 37.3 (06-13 @ 05:12)  HR: 85 (64 - 85)  BP: 105/47 (99/41 - 125/59)  RR: 22 (17 - 22)  SpO2: 95% (92% - 97%)  Wt(kg): --    I&O's Summary  I & Os for 24h ending 13 Jun 2017 07:00  =============================================  IN: 180 ml / OUT: 1825 ml / NET: -1645 ml    I & Os for current day (as of 13 Jun 2017 14:37)  =============================================  IN: 180 ml / OUT: 350 ml / NET: -170 ml        PHYSICAL EXAM:    General: A/ox 3, No acute Distress, Obese  Neck: Supple, +JVD  Cardiac: S1 S2, Grade III/VI systolic murmur  Pulmonary: Bibasilar crackles, Breathing unlabored, No Rhonchi/Rales/Wheezing  Abdomen: Soft, Non -tender, +BS x 4 quads  Extremities: No Rashes, 2+ romeo edema from upper thighs to romeo feet, romeo LE redness appears chronic venous stasis, 1+ romeo pedal pulses  Neuro: A/o x 3, No focal deficits  Skin: Right buttock pressure ulcer stage II, 1 x 1.5cm; bilateral groins with redness noted           LABS:                          11.1   6.6   )-----------( 141      ( 13 Jun 2017 05:22 )             38.6                              06-13    142  |  100  |  45<H>  ----------------------------<  95  4.9   |  33<H>  |  3.00<H>    Ca    9.1      13 Jun 2017 05:22  Mg     1.9     06-13    TPro  7.0  /  Alb  3.0<L>  /  TBili  0.4  /  DBili  x   /  AST  14  /  ALT  11  /  AlkPhos  114  06-12    LIVER FUNCTIONS - ( 12 Jun 2017 05:27 )  Alb: 3.0 g/dL / Pro: 7.0 g/dL / ALK PHOS: 114 U/L / ALT: 11 U/L / AST: 14 U/L / GGT: x                                 PT/INR - ( 13 Jun 2017 05:22 )   PT: 45.1 sec;   INR: 3.95            CAPILLARY BLOOD GLUCOSE    CARDIAC MARKERS ( 13 Jun 2017 05:22 )  x     / 0.04 ng/mL / 29 U/L / x     / 2.2 ng/mL  CARDIAC MARKERS ( 12 Jun 2017 12:35 )  x     / 0.04 ng/mL / 35 U/L / x     / 2.2 ng/mL  CARDIAC MARKERS ( 12 Jun 2017 05:27 )  x     / 0.04 ng/mL / 30 U/L / x     / 1.9 ng/mL  CARDIAC MARKERS ( 11 Jun 2017 18:40 )  x     / 0.04 ng/mL / 34 U/L / x     / 1.7 ng/mL            No Known Allergies    MEDICATIONS  (STANDING):  metoprolol succinate ER 25milliGRAM(s) Oral daily  ferrous    sulfate 325milliGRAM(s) Oral every 8 hours  furosemide   Injectable 40milliGRAM(s) IV Push every 12 hours  cefTRIAXone   IVPB 1Gram(s) IV Intermittent every 24 hours  cefTRIAXone   IVPB  IV Intermittent   acetazolamide    Tablet 250milliGRAM(s) Oral daily    MEDICATIONS  (PRN):  acetaminophen   Tablet. 975milliGRAM(s) Oral every 6 hours PRN Moderate Pain (4 - 6)        DIAGNOSTIC TESTS:     Echo 6/12/17: EF 50%, mod conc LVH, basal septal hypokinesis (seen previously on Echo 12/2015), severely dilated LA, severe AS, ALEKS 0.5, mean pressure gradient 43, mod pulm HTN, PASP 52. yes

## 2018-04-26 NOTE — ED PROVIDER NOTE - PMH
CAD (coronary artery disease)    CHF (congestive heart failure)    COPD (chronic obstructive pulmonary disease)    Diabetes    HTN (hypertension) DISPLAY PLAN FREE TEXT

## 2018-11-18 NOTE — ED ADULT NURSE NOTE - PRIMARY CARE PROVIDER
Notes  AMG REFER TO :     AMG REFER TO :     MRI LUMBAR SPINE W/O CONTRAST     [facility] [address]  [phone] (p)  [phone] (f)      DX CODE : Lumbar radiculopathy (M54.16)    VISITS 1.       Signatures   Electronically signed by : Ragini Orona, ; Jul 18 2018  9:26AM CST    Electronically signed by : Anthony Cheng DO; Jul 18 2018 10:30AM CST None

## 2019-08-01 NOTE — PROGRESS NOTE ADULT - PROBLEM SELECTOR PLAN 3
s/p PCI x 3 stent (04', 06' and 12')-last cath 3/15' PCI dLM w/ Impella support  -Trop 0.04 x4. Will cont to trend cardiac enzymes until downtrend.  -Cont on telemetry   -Continue Toprol XL 25mg po daily   -Lipid panel WNL  -Per daughter [facility] Call [facility] d/c'ed ASA due to dual AC w/ Coumadin, statin was d/c'ed years ago for unclear reasons. Call placed to [facility] Tellez for clarification, awaiting call back. Unknown if ever smoked

## 2020-12-22 NOTE — H&P ADULT - LV FUNCTION ASSESSMENT
Subjective:       Patient ID: Yolanda Win is a 33 y.o. female.    Chief Complaint: No chief complaint on file.    HPI 33 year old female, who returns for follow-up of carcinoma of the right breast.     The patient location is: HOME   The chief complaint leading to consultation is: BREAT CANCER.    Visit type: audiovisual    Face to Face time with patient: 10 MINUTES  15 minutes of total time spent on the encounter, which includes face to face time and non-face to face time preparing to see the patient (eg, review of tests), Obtaining and/or reviewing separately obtained history, Documenting clinical information in the electronic or other health record, Independently interpreting results (not separately reported) and communicating results to the patient/family/caregiver, or Care coordination (not separately reported).         Each patient to whom he or she provides medical services by telemedicine is:  (1) informed of the relationship between the physician and patient and the respective role of any other health care provider with respect to management of the patient; and (2) notified that he or she may decline to receive medical services by telemedicine and may withdraw from such care at any time.    Notes:         She had a hysterectomy  December 18th.  SHE IS RECOVERING FROM THAT VERY WELL.          Breast history:  Mammogram and ultrasound on December 11th, 2019 showed right breast mass 7 cm from the nipple.  By ultrasound this mass measured 33 x 32 x 21 mm.  There was no associated axillary lymphadenopathy noted.    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  The cancer was 25% ER positive and TX and HER2 negative.  Ki-67 was 90%.    MRI showed a 5.2 cm x 2.6 cm x 4.6 cm irregularly shaped mass with rim enhancement seen in the right breast at 11 o'clock.    CT scan of the  chest abdomen and pelvis and bone scan showed no evidence of metastatic disease.    She completed 4 cycles of neoadjuvant Adriamycin Cytoxan February 13, 2020.  She completed 12 weeks of weekly Taxol on May 20, 2020.    She was taken to the operating room by Dr. Ayers on July 1, 2020 and underwent bilateral mastectomy.    The right breast showed no residual malignancy, 5 right axillary sentinel lymph nodes were all negative for malignancy.  Final pathological stage yp T0 N0.  The left breast showed no atypia or malignancy.    Radiation completed 11/25/20.  Review of Systems   Constitutional: Positive for fatigue (IMPROVED). Negative for activity change, appetite change, fever and unexpected weight change.   Eyes: Negative for visual disturbance.   Respiratory: Negative for cough and shortness of breath.    Cardiovascular: Negative for chest pain.   Gastrointestinal: Negative for abdominal pain, constipation, diarrhea and rectal pain.   Genitourinary: Negative for frequency.   Musculoskeletal: Negative for back pain.   Skin: Negative for rash.   Neurological: Positive for numbness. Negative for headaches.   Hematological: Negative for adenopathy.   Psychiatric/Behavioral: Negative for dysphoric mood. The patient is not nervous/anxious.        Objective:      Physical Exam  Constitutional:       General: She is not in acute distress.     Appearance: Normal appearance. She is well-developed.   Skin:     Findings: Rash:       Neurological:      Mental Status: She is alert and oriented to person, place, and time.   Psychiatric:         Mood and Affect: Mood normal.         Behavior: Behavior normal.         Thought Content: Thought content normal.         Judgment: Judgment normal.         Assessment:       1. Malignant neoplasm of upper-outer quadrant of right breast in female, estrogen receptor positive        Plan:          I reviewed the rationale for adjuvant endocrine therapy with aromatase inhibitors, including  the mechanism of action. I discussed side effects of aromatase inhibitor therapy, including hot flashes, weight gain, musculoskeletal symptoms, and bone loss. Written information for letrozole was provided.    RETURN IN 3 MONTHS WITH                 yes

## 2022-02-09 NOTE — ED ADULT TRIAGE NOTE - CADM TRG TX PRIOR TO ARRIVAL
22 y/o male pmh lymphoma w/ recent new chemo x3 days ago c/o headache. Pt admits to new onset headache. Pt had normal labs and normal head CT and was seen and eval by neuro. Headache was controlled at first but then pain returned, pt to be placed in CDU for pain control and MRI w/wo contrast.
none

## 2022-09-24 NOTE — PROGRESS NOTE ADULT - PROBLEM SELECTOR PLAN 6
Practice RICE : rest the injured area, apply ice, apply compression such as an ACE wrap to the site, and elevation- keep the injured area up     -For pain take an NSAID such as ibuprofen, or motrin if able  This will decrease pain and swelling to the area  If unable to take, can try over the counter Voltaren cream instead  -If pain continues can also take these medications - can try over the counter medications (these do not need a prescription) such as acetaminophen (Tylenol), lidocaine or other pain patches, Voltaren cream, or lidocaine cream      -Follow up with orthopedics  There is an orthopedics site in the same building as the care now call [phone] to schedule an appointment  per pulmonary

## 2023-01-07 NOTE — PROGRESS NOTE ADULT - PROVIDER SPECIALTY LIST ADULT
CC:   Chief Complaint   Patient presents with   • Hives     Onset Tuesday, has hives, red spots, on BL arms, upper lip is swollen, buttocks, back has applied benadryl but not going away, itching,         Established Patient     SUBJECTIVE  HPI:Luzmaria Valenzuela is a 54 year old female who presents today with     Rash  This is a new problem. Episode onset: x 3-4 days. The rash is diffuse. The rash is characterized by itchiness. She was exposed to nothing. Past treatments include antihistamine. The treatment provided mild relief.       His past medical history is significant for:  Past Medical History:   Diagnosis Date   • Dyslipidemia    • Vitamin D deficiency        Allergies: ALLERGIES:  No Known Allergies    Current Outpatient Medications   Medication Sig Dispense Refill   • ciprofloxacin (CIPRO) 500 MG tablet Take 1 tablet by mouth in the morning and 1 tablet in the evening. 10 tablet 0   • fluticasone (FLONASE) 50 MCG/ACT nasal spray Spray 2 sprays in each nostril daily. 16 g 1   • Sodium Sulfate-Mag Sulfate-KCl 4716-564-143 MG Tab Take 12 tablets by mouth as directed. See Colonoscopy Instructions. 24 tablet 0     No current facility-administered medications for this visit.         Review of Systems:  Review of Systems   Skin: Positive for rash.   All other systems reviewed and are negative.      All other systems reviewed are negative    OBJECTIVE  Vitals:   Visit Vitals  /76   Pulse 96   Temp 98.8 °F (37.1 °C)   Resp 16   LMP  (LMP Unknown)   SpO2 97%       Physical Exam:  Physical Exam  Vitals and nursing note reviewed.   Skin:     Findings: Rash present. Rash is urticarial.         ASSESSMENT/PLAN  Rash-hives  I had a discussion with the patient about this condition.  Discussed typical cause, course of illness and prognosis.  Medication as ordered.  Should expect improvement over the next 48-72 hours and resolution within the next week.  If not or if worse seek immediate reevaluation in [place] or with  Cardiology patient's pcp.  Patient verbalized understanding.    If symptoms should suddenly change or worsen, seek immediate reevaluation with PCP or return to [facility].  The patient verbalized understanding.    Lalit Blank MD  1/7/2023

## 2023-01-25 NOTE — ED ADULT NURSE NOTE - PATIENT DISCHARGE SIGNATURE
LCV: 09/22/2022    Roomed by LORENE Gonzales. Patient verified by name and date of birth.    no side effects     Problem # 1: ACNE-face  SUBJECTIVE: It has been present for many years and has been stable on oral contraceptives .  Previous treatments:amoxicillin little help.  Risk factors: none.  Symptoms: none  OBJECTIVE:  Grade II acne- comedones- cheeks, perioral area  ASSESSMENT: ACNE - stable  PLAN:  --See medication orders and --Discussed risks (side effects) and benefits of medication & encouraged to use as directed   Probiotics daily  Avoid pregnancy and potassium supplements     I have personally reviewed and analyzed the patient's medical record in detail.     Joseph Meyers MD  follow up 3mo or prn   
21-Feb-2017

## 2023-02-28 NOTE — PROGRESS NOTE ADULT - PROVIDER SPECIALTY LIST ADULT
Pulmonology Bactrim Counseling:  I discussed with the patient the risks of sulfa antibiotics including but not limited to GI upset, allergic reaction, drug rash, diarrhea, dizziness, photosensitivity, and yeast infections.  Rarely, more serious reactions can occur including but not limited to aplastic anemia, agranulocytosis, methemoglobinemia, blood dyscrasias, liver or kidney failure, lung infiltrates or desquamative/blistering drug rashes.

## 2023-05-24 NOTE — DIETITIAN INITIAL EVALUATION ADULT. - NUTRITIONGOAL OUTCOME1
optimization of pulmonary status prior to surgery r/o acute appendicitis Inpatient presurgical testing consult prior to laparoscopic appendectomy Pt to meet 75% of energy needs and comply w/ CKD diet

## 2023-05-24 NOTE — PROVIDER CONTACT NOTE (OTHER) - ASSESSMENT
dinner. Instructed to be NPO for now to put BiPAP back on. BiPAP placed on at 5:35 PM. Pt appears comfortable and saturation is improving at 97%. Will closely monitor.
O2 sat 92-95% on 4L NC, RR 20-23
None

## 2023-07-18 NOTE — PROGRESS NOTE ADULT - GASTROINTESTINAL
Assessment & Plan:    1. Secondary hyperparathyroidism ([address])    2. Vitamin D deficiency    3. Stage 3a chronic kidney disease (HCC)  -     Urinalysis with microscopic; Future; Expected date: 12/05/2023  -     PTH, intact; Future; Expected date: 12/05/2023  -     Uric acid; Future; Expected date: 12/05/2023  -     Phosphorus; Future; Expected date: 12/05/2023  -     Albumin / creatinine urine ratio; Future; Expected date: 12/05/2023  -     Magnesium; Future; Expected date: 12/05/2023  -     Comprehensive metabolic panel; Future; Expected date: 12/05/2023    4. Lupus ([address])    5. Urinary incontinence       1. CKD3a  Cr 0.8-1.1 at baseline. Most recent Cr 0.94 with eGFR 62 ml/min. Appears euvolemic. UA bland. Bone mineral parameters, elevated PTH likely due to vitamin D deficiency   Metabolic parameters stable. Reviewed CKD stages, Cr and eGFR trends. Continue to screen for lupus nephritis with complement, dsdna, cr and UA per rheum. Last urine protein to cr ratio 0.12 gram.  Encourage fluid intake 8-16 ounces per day. Vitamin D management per rheum. Follow up in 6 months. NSAID okay with lupus hx given stability of Cr.     2. 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 25 vitamin D level 18.6 from May. Treat vitamin D at discretion of rheumatology. Monitor PTH /phos every 6 months. 3. Vitamin D deficiency   Vitamin D/prolia management per rheumatology  Sec HPT due to vitamin D likely. 4. 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 Continue to follow with optometry every 3-6 months for toxicity associated with plaquenil. Continue to follow UA/proteinuria/cr trends to eval for lupus nephritis,no hx renal biopsy. 5. Urinary incontinence  She does not want a urology referral.  No concerns for UTI at this time. The benefits, risks and alternatives to the treatment plan were discussed at this visit.  Patient was advised of common adverse effects of any medical therapies prescribed. All questions were answered and discussed with the patient and any accompanying family members or caretakers. Subjective:      Patient ID: Neeta Watson is a 76 y.o. female presents for follow up in the [facility] office. Last seen 1/6/23 for CKD3a with baseline 0.8-1.1. She has hx lupus followed by rheumatology. No hx lupus nephritis. She is accompanied by  during the visit. HPI    In interim since last visit, denies any new medical conditions, surgeries, allergies or medications. Reports urinary incontinence previously saw a urologist but does not want to see one at thsi time. She has to wear depends. Denies LUTS otherwise. Has lupus affecting skin/joints. Follows with rheumatology and is maintained on plaquenil. Follows with optometry as well. She has fallen several times. Does not check BP at home. She has trouble with her balance. Given prolia shot, rheumatologist managing vitamin D. Taking vitamin D 50,000 unit as well. Reports 4 toes curling under when she trips. Reports knee pain and elbow pain. She fell going backwards. No new skin rashes. Reports chronic LE edema swelling. Takes imodium for diarrhea. Reports chronic diarrhea. Takes naproxen once a day for arthritis pain. Reports SOB with exertion. Denies lung disease. Ct chest with resolution of right lower lobe. Does not exercise. She was in therapy and stopped. Reports nausea nitermittently and takes peptobismol. Drinks 3 cups of fluid per day. The following portions of the patient's history were reviewed and updated as appropriate: allergies, current medications, past family history, past medical history, past social history, past surgical history, and problem list.    Review of Systems   Respiratory: Positive for shortness of breath. Gastrointestinal: Positive for diarrhea and nausea. Genitourinary: Negative. Musculoskeletal: Positive for arthralgias.    All other systems reviewed and are negative. Objective:      /78 Comment: left arm  Pulse 77   Ht 5' 1" (1.549 m)   Wt 81.2 kg (179 lb)   LMP  (LMP Unknown)   SpO2 97%   BMI 33.82 kg/m²          Physical Exam  Vitals reviewed. Constitutional:       General: She is not in acute distress. Appearance: She is not ill-appearing. HENT:      Head: Atraumatic. Mouth/Throat:      Mouth: Mucous membranes are moist.      Pharynx: Oropharynx is clear. Eyes:      Conjunctiva/sclera: Conjunctivae normal.   Cardiovascular:      Rate and Rhythm: Normal rate and regular rhythm. Heart sounds: No friction rub. Pulmonary:      Breath sounds: No wheezing, rhonchi or rales. Abdominal:      General: There is no distension. Palpations: Abdomen is soft. Tenderness: There is no abdominal tenderness. Musculoskeletal:      Right lower leg: No edema. Left lower leg: No edema. Skin:     General: Skin is warm. Coloration: Skin is not jaundiced. Findings: No bruising. Neurological:      General: No focal deficit present. Mental Status: She is alert and oriented to person, place, and time. Mental status is at baseline. Cranial Nerves: No cranial nerve deficit.    Psychiatric:         Mood and Affect: Mood normal.         Behavior: Behavior normal.             Lab Results   Component Value Date     10/03/2015    SODIUM 141 05/21/2023    K 3.6 05/21/2023     05/21/2023    CO2 30 05/21/2023    ANIONGAP 10 10/03/2015    AGAP 7 05/21/2023    BUN 13 05/21/2023    CREATININE 0.94 05/21/2023    GLUC 154 (H) 05/19/2019    GLUF 107 (H) 05/21/2023    CALCIUM 9.3 05/21/2023    AST 24 12/22/2022    ALT 33 12/22/2022    ALKPHOS 65 12/22/2022    PROT 7.5 10/03/2015    TP 6.9 12/22/2022    BILITOT 1.14 (H) 10/03/2015    TBILI 0.95 12/22/2022    EGFR 62 05/21/2023      Lab Results   Component Value Date    CREATININE 0.94 05/21/2023    CREATININE 1.10 12/22/2022    CREATININE 1.07 07/01/2022    CREATININE 1.13 02/03/2022    CREATININE 1.13 12/14/2021    CREATININE 1.11 09/02/2021    CREATININE 1.08 03/01/2021    CREATININE 1.18 01/22/2021    CREATININE 0.89 12/01/2020    CREATININE 0.98 10/06/2020    CREATININE 1.05 07/12/2020    CREATININE 1.06 05/21/2020    CREATININE 0.93 12/11/2019    CREATININE 1.04 10/22/2019    CREATININE 0.94 08/11/2019      Lab Results   Component Value Date    COLORU Yellow 05/22/2023    CLARITYU Clear 05/22/2023    SPECGRAV >=1.030 05/22/2023    PHUR 6.0 05/22/2023    LEUKOCYTESUR Negative 05/22/2023    NITRITE Negative 05/22/2023    PROTEIN UA Negative 05/22/2023    GLUCOSEU Negative 05/22/2023    KETONESU Negative 05/22/2023    UROBILINOGEN 0.2 05/22/2023    BILIRUBINUR Negative 05/22/2023    BLOODU Trace-Intact (A) 05/22/2023    RBCUA 2-4 05/22/2023    WBCUA 0-1 05/22/2023    EPIS Occasional 05/22/2023    BACTERIA None Seen 05/22/2023      No results found for: "LABPROT"  No results found for: "MICROALBUR", "WQBF21PHB"  Lab Results   Component Value Date    WBC 4.85 12/22/2022    HGB 13.8 12/22/2022    HCT 42.5 12/22/2022    MCV 95 12/22/2022     12/22/2022      Lab Results   Component Value Date    HGB 13.8 12/22/2022    HGB 13.1 07/01/2022    HGB 13.6 12/14/2021    HGB 13.1 09/02/2021    HGB 14.7 03/01/2021      No results found for: "IRON", "TIBC", "FERRITIN"   No results found for: "PTHCALCIUM", "[id number]", "PHOSPHORUS"   Lab Results   Component Value Date    CHOLESTEROL 201 (H) 12/22/2022    HDL 85 12/22/2022    LDLCALC 97 12/22/2022    TRIG 95 12/22/2022      Lab Results   Component Value Date    URICACID 4.9 07/01/2022      Lab Results   Component Value Date    HGBA1C 5.8 (H) 01/22/2021      Lab Results   Component Value Date    FREET4 1.11 02/03/2022      Lab Results   Component Value Date    LUCINA Positive (A) 05/21/2020      Lab Results   Component Value Date    PROT 7.5 10/03/2015        Portions of the record may have been created with voice recognition software. Occasional wrong word or "sound a like" substitutions may have occurred due to the inherent limitations of voice recognition software. Read the chart carefully and recognize, using context, where substitutions have occurred. If you have any questions, please contact the dictating provider. negative

## 2023-10-18 NOTE — ED ADULT NURSE NOTE - OBJECTIVE STATEMENT
"Rae"Evgeny Jordan was seen and treated in our emergency department on 10/18/2023.  She may return to work on 10/23/2023.       If you have any questions or concerns, please don't hesitate to call.      Leila PITTMANN RN    "
Pt to ER for evaluation of epistaxis x this am, pt on coumadin. no active bleeding upon arrival. denies cp/sob, no n/v/d, no headache/weakness. speaking in full clear sentences, awaiting further medical disposition will continue to monitor.

## 2025-06-30 NOTE — PROGRESS NOTE ADULT - PROBLEM SELECTOR PLAN 1
"Nursing report ED to floor  Yoanna Reed  69 y.o.  female    HPI :  HPI  Stated Reason for Visit: multiple falls due to dizziness  History Obtained From: patient    Chief Complaint  Chief Complaint   Patient presents with    Dizziness       Admitting doctor:   Chinedu Blandon MD    Admitting diagnosis:   The primary encounter diagnosis was Severe sepsis. Diagnoses of Near syncope, NSTEMI (non-ST elevated myocardial infarction), Pleural effusion, Acute midline low back pain without sciatica, and Recurrent falls were also pertinent to this visit.    Code status:   Current Code Status       Date Active Code Status Order ID Comments User Context       Not on file            Allergies:   Patient has no known allergies.    Isolation:   No active isolations    Intake and Output  No intake or output data in the 24 hours ending 06/30/25 1433    Weight:       06/30/25  1008   Weight: 76.7 kg (169 lb)       Most recent vitals:   Vitals:    06/30/25 1008 06/30/25 1140 06/30/25 1215 06/30/25 1333   BP: 121/72  127/62    Pulse: 105 71 73 74   Resp: 20  18    Temp:       TempSrc:       SpO2: 98% 95% 96% 96%   Weight: 76.7 kg (169 lb)      Height: 172.7 cm (68\")          Active LDAs/IV Access:   Lines, Drains & Airways       Active LDAs       Name Placement date Placement time Site Days    Peripheral IV 06/30/25 20 G Anterior;Left Forearm 06/30/25  --  Forearm  less than 1                    Labs (abnormal labs have a star):   Labs Reviewed   COMPREHENSIVE METABOLIC PANEL - Abnormal; Notable for the following components:       Result Value    Glucose 124 (*)     BUN 29.0 (*)     Creatinine 1.46 (*)     Sodium 131 (*)     Potassium 3.3 (*)     Chloride 95 (*)     CO2 18.1 (*)     Albumin 3.4 (*)     Anion Gap 17.9 (*)     eGFR 38.8 (*)     All other components within normal limits    Narrative:     GFR Categories in Chronic Kidney Disease (CKD)              GFR Category          GFR (mL/min/1.73)    Interpretation  G1   "                  90 or greater        Normal or high (1)  G2                    60-89                Mild decrease (1)  G3a                   45-59                Mild to moderate decrease  G3b                   30-44                Moderate to severe decrease  G4                    15-29                Severe decrease  G5                    14 or less           Kidney failure    (1)In the absence of evidence of kidney disease, neither GFR category G1 or G2 fulfill the criteria for CKD.    eGFR calculation 2021 CKD-EPI creatinine equation, which does not include race as a factor   PROTIME-INR - Abnormal; Notable for the following components:    Protime 17.0 (*)     INR 1.39 (*)     All other components within normal limits   BNP (IN-HOUSE) - Abnormal; Notable for the following components:    proBNP 2,460.0 (*)     All other components within normal limits    Narrative:     This assay is used as an aid in the diagnosis of individuals suspected of having heart failure. It can be used as an aid in the diagnosis of acute decompensated heart failure (ADHF) in patients presenting with signs and symptoms of ADHF to the emergency department (ED). In addition, NT-proBNP of <300 pg/mL indicates ADHF is not likely.    Age Range Result Interpretation  NT-proBNP Concentration (pg/mL:      <50             Positive            >450                   Gray                 300-450                    Negative             <300    50-75           Positive            >900                  Gray                300-900                  Negative            <300      >75             Positive            >1800                  Gray                300-1800                  Negative            <300   TROPONIN - Abnormal; Notable for the following components:    HS Troponin T 73 (*)     All other components within normal limits    Narrative:     High Sensitive Troponin T Reference Range:  <14.0 ng/L- Negative Female for AMI  <22.0 ng/L- Negative  "Male for AMI  >=14 - Abnormal Female indicating possible myocardial injury.  >=22 - Abnormal Male indicating possible myocardial injury.   Clinicians would have to utilize clinical acumen, EKG, Troponin, and serial changes to determine if it is an Acute Myocardial Infarction or myocardial injury due to an underlying chronic condition.        D-DIMER, QUANTITATIVE - Abnormal; Notable for the following components:    D-Dimer, Quantitative 4.44 (*)     All other components within normal limits    Narrative:     According to the assay 's published package insert, a normal (<0.50 MCGFEU/mL) D-dimer result in conjunction with a non-high clinical probability assessment, excludes deep vein thrombosis (DVT) and pulmonary embolism (PE) with high sensitivity.    D-dimer values increase with age and this can make VTE exclusion of an older population difficult. To address this, the [employer], based on best available evidence and recent guidelines, recommends that clinicians use age-adjusted D-dimer thresholds in patients greater than 50 years of age with: a) a low probability of PE who do not meet all Pulmonary Embolism Rule Out Criteria, or b) in those with intermediate probability of PE.   The formula for an age-adjusted D-dimer cut-off is \"age/100\".  For example, a 60 year old patient would have an age-adjusted cut-off of 0.60 MCGFEU/mL and an 80 year old 0.80 MCGFEU/mL.   HIGH SENSITIVITIY TROPONIN T 1HR - Abnormal; Notable for the following components:    HS Troponin T 61 (*)     All other components within normal limits    Narrative:     High Sensitive Troponin T Reference Range:  <14.0 ng/L- Negative Female for AMI  <22.0 ng/L- Negative Male for AMI  >=14 - Abnormal Female indicating possible myocardial injury.  >=22 - Abnormal Male indicating possible myocardial injury.   Clinicians would have to utilize clinical acumen, EKG, Troponin, and serial changes to determine if it is an Acute " "Myocardial Infarction or myocardial injury due to an underlying chronic condition.        CK - Abnormal; Notable for the following components:    Creatine Kinase 833 (*)     All other components within normal limits   PROCALCITONIN - Abnormal; Notable for the following components:    Procalcitonin 2.18 (*)     All other components within normal limits    Narrative:     As a Marker for Sepsis (Non-Neonates):    1. <0.5 ng/mL represents a low risk of severe sepsis and/or septic shock.  2. >2 ng/mL represents a high risk of severe sepsis and/or septic shock.    As a Marker for Lower Respiratory Tract Infections that require antibiotic therapy:    PCT on Admission    Antibiotic Therapy       6-12 Hrs later    >0.5                Strongly Recommended  >0.25 - <0.5        Recommended   0.1 - 0.25          Discouraged              Remeasure/reassess PCT  <0.1                Strongly Discouraged     Remeasure/reassess PCT    As 28 day mortality risk marker: \"Change in Procalcitonin Result\" (>80% or <=80%) if Day 0 (or Day 1) and Day 4 values are available. Refer to http://www.[employer]-pct-calculator.com    Change in PCT <=80%  A decrease of PCT levels below or equal to 80% defines a positive change in PCT test result representing a higher risk for 28-day all-cause mortality of patients diagnosed with severe sepsis for septic shock.    Change in PCT >80%  A decrease of PCT levels of more than 80% defines a negative change in PCT result representing a lower risk for 28-day all-cause mortality of patients diagnosed with severe sepsis or septic shock.      CBC WITH AUTO DIFFERENTIAL - Abnormal; Notable for the following components:    WBC 18.20 (*)     MCV 98.2 (*)     MCH 33.2 (*)     Platelets 597 (*)     Neutrophil % 86.9 (*)     Lymphocyte % 5.6 (*)     Eosinophil % 0.2 (*)     Immature Grans % 1.3 (*)     Neutrophils, Absolute 15.82 (*)     Monocytes, Absolute 1.05 (*)     Immature Grans, Absolute 0.24 (*)     All other " components within normal limits   APTT - Normal   LACTIC ACID, PLASMA - Normal   BLOOD CULTURE   BLOOD CULTURE   RAINBOW DRAW    Narrative:     The following orders were created for panel order [place] Draw.  Procedure                               Abnormality         Status                     ---------                               -----------         ------                     Green Top (Gel)[[id number]]                                  Final result               Lavender Top[[id number]]                                     Final result               Gold Top - SST[[id number]]                                   Final result               Light Blue Top[[id number]]                                   Final result                 Please view results for these tests on the individual orders.   URINALYSIS W/ MICROSCOPIC IF INDICATED (NO CULTURE)   GREEN TOP   LAVENDER TOP   GOLD TOP - SST   LIGHT BLUE TOP   CBC AND DIFFERENTIAL    Narrative:     The following orders were created for panel order CBC & Differential.  Procedure                               Abnormality         Status                     ---------                               -----------         ------                     CBC Auto Differential[[id number]]        Abnormal            Final result                 Please view results for these tests on the individual orders.       EKG:   Telemetry Scan   Final Result      ECG 12 Lead Other; near syncope   Preliminary Result   HEART RATE=81  bpm   RR Rifazxvo=851  ms   NC Fenrsizc=582  ms   P Horizontal Axis=13  deg   P Front Axis=47  deg   QRSD Fhubfvzd=682  ms   QT Wwvggnsu=199  ms   NBxP=972  ms   QRS Axis=-59  deg   T Wave Axis=89  deg   - ABNORMAL ECG -   Sinus rhythm   Left anterior fascicular block   LVH with secondary repolarization abnormality   Date and Time of Study:2025-06-30 10:47:48          Meds given in ED:   Medications   HYDROcodone-acetaminophen (NORCO) 7.5-325 MG per tablet 1 tablet (1 tablet Oral  Admitted with acute CHF exacerbation 2/2 dietary non-compliance and critical AS. EF 50-55%, severely dilated LA.  -Daily weights, strict I/Os (us in place)  -continue Toprol XL 12.5mg po daily  -Cont diamox 500mg qd and Furosemide 80mg IV Q12hr per renal recs. Careful diuresis with close monitoring of BP in setting of severe AS. Net goal neg 1.5-2.5L as BP tolerates.  - f/u CTS recs in regards to BAV prior to discharge. Given 6/30/25 1051)   lactated ringers bolus 1,000 mL (0 mL Intravenous Stopped 6/30/25 1220)   morphine injection 4 mg (4 mg Intravenous Given 6/30/25 1050)   iopamidol (ISOVUE-370) 76 % injection 100 mL (95 mL Intravenous Given by Other 6/30/25 1102)   cefTRIAXone (ROCEPHIN) 2,000 mg in sodium chloride 0.9 % 100 mL MBP (0 mg Intravenous Stopped 6/30/25 1329)   morphine injection 4 mg (4 mg Intravenous Given 6/30/25 1236)       Imaging results:  CT Angiogram Chest Pulmonary Embolism  Result Date: 6/30/2025   1. The study is negative for pulmonary embolism. 2. Large right pleural effusion with associated compressive atelectasis of the right lung. 3. Suspected partially obscured partially cavitary subpleural nodule in the posterior right upper lobe measuring 1.9 cm in average diameter. 4. Trace left pleural fluid with multifocal groundglass opacities in the left lung that could represent atelectasis or could be infectious/inflammatory. 5. Mild enlargement of the main pulmonary arteries could reflect pulmonary arterial hypertension.  This report was finalized on 6/30/2025 12:03 PM by Ridge Ragland MD on Workstation: [id number]        Ambulatory status:   - with assist     Social issues:   Social History     Socioeconomic History    Marital status: Single   Tobacco Use    Smoking status: Some Days     Current packs/day: 0.25     Average packs/day: 0.2 packs/day for 47.5 years (11.9 ttl pk-yrs)     Types: Cigarettes     Start date: 1978     Passive exposure: Never    Smokeless tobacco: Never   Vaping Use    Vaping status: Never Used   Substance and Sexual Activity    Alcohol use: Yes     Comment: social    Drug use: Defer    Sexual activity: Defer       Peripheral Neurovascular  Peripheral Neurovascular (Adult)  Peripheral Neurovascular WDL: WDL    Neuro Cognitive  Neuro Cognitive (Adult)  Cognitive/Neuro/Behavioral WDL: .WDL except, all (pt denies head injury at times of falls)  Level of Consciousness: Alert  Arousal  Level: opens eyes spontaneously  Orientation: oriented x 4  Speech: clear, spontaneous, logical  Pupils  Pupil PERRLA: yes  [place] Coma Scale  Best Eye Response: 4-->(E4) spontaneous  Best Motor Response: 6-->(M6) obeys commands  Best Verbal Response: 5-->(V5) oriented  [place] Coma Scale Score: 15    Learning  Learning Assessment  Learning Readiness and Ability: no barriers identified    Respiratory  Respiratory  Additional Documentation: Breath Sounds (Group)  Respiratory WDL  Respiratory WDL: .WDL except  Breath Sounds  All Lung Fields Breath Sounds: All Fields  All Lung Fields Breath Sounds: Posterior:, wheezes, expiratory    Abdominal Pain       Pain Assessments  Pain (Adult)  (0-10) Pain Rating: Rest: 6  (0-10) Pain Rating: Activity: 6  Patient requested Medication Prescribed for Lower Pain Scale: Yes  Pain Location: back  Pain Side/Orientation: generalized  Pain Description: constant, sore  Pain Radiation to: abdomen  Response to Pain Interventions: intervention not effective per patient    NIH Stroke Scale       Jeanette Bhatti RN  06/30/25 14:33 EDT